# Patient Record
Sex: MALE | Race: BLACK OR AFRICAN AMERICAN | Employment: OTHER | URBAN - METROPOLITAN AREA
[De-identification: names, ages, dates, MRNs, and addresses within clinical notes are randomized per-mention and may not be internally consistent; named-entity substitution may affect disease eponyms.]

---

## 2017-01-09 ENCOUNTER — TELEPHONE (OUTPATIENT)
Dept: INTERNAL MEDICINE | Age: 82
End: 2017-01-09

## 2017-01-09 RX ORDER — BENZONATATE 100 MG/1
100 CAPSULE ORAL 3 TIMES DAILY PRN
Qty: 30 CAPSULE | Refills: 0 | Status: SHIPPED | OUTPATIENT
Start: 2017-01-09 | End: 2017-01-25 | Stop reason: SDUPTHER

## 2017-01-25 RX ORDER — BENZONATATE 100 MG/1
CAPSULE ORAL
Qty: 30 CAPSULE | Refills: 0 | Status: SHIPPED | OUTPATIENT
Start: 2017-01-25 | End: 2017-02-13 | Stop reason: SDUPTHER

## 2017-03-07 ENCOUNTER — OFFICE VISIT (OUTPATIENT)
Dept: INTERNAL MEDICINE | Age: 82
End: 2017-03-07

## 2017-03-07 VITALS
WEIGHT: 186 LBS | DIASTOLIC BLOOD PRESSURE: 92 MMHG | SYSTOLIC BLOOD PRESSURE: 164 MMHG | OXYGEN SATURATION: 96 % | HEART RATE: 80 BPM | BODY MASS INDEX: 23.25 KG/M2

## 2017-03-07 DIAGNOSIS — D64.9 ANEMIA, UNSPECIFIED TYPE: Chronic | ICD-10-CM

## 2017-03-07 DIAGNOSIS — R11.0 NAUSEA: ICD-10-CM

## 2017-03-07 DIAGNOSIS — K22.89 PATULOUS LOWER ESOPHAGEAL SPHINCTER: ICD-10-CM

## 2017-03-07 DIAGNOSIS — E11.9 TYPE 2 DIABETES MELLITUS WITHOUT COMPLICATION, WITHOUT LONG-TERM CURRENT USE OF INSULIN (HCC): Primary | Chronic | ICD-10-CM

## 2017-03-07 DIAGNOSIS — I10 ESSENTIAL HYPERTENSION: Chronic | ICD-10-CM

## 2017-03-07 DIAGNOSIS — R11.2 NON-INTRACTABLE VOMITING WITH NAUSEA, UNSPECIFIED VOMITING TYPE: ICD-10-CM

## 2017-03-07 DIAGNOSIS — N32.81 OVERACTIVE BLADDER: Chronic | ICD-10-CM

## 2017-03-07 DIAGNOSIS — E78.5 HYPERLIPIDEMIA, UNSPECIFIED HYPERLIPIDEMIA TYPE: Chronic | ICD-10-CM

## 2017-03-07 LAB
ALBUMIN SERPL-MCNC: 4.1 G/DL (ref 3.4–5)
ALP BLD-CCNC: 92 U/L (ref 40–129)
ALT SERPL-CCNC: 10 U/L (ref 10–40)
ANION GAP SERPL CALCULATED.3IONS-SCNC: 15 MMOL/L (ref 3–16)
AST SERPL-CCNC: 16 U/L (ref 15–37)
BASOPHILS ABSOLUTE: 0 K/UL (ref 0–0.2)
BASOPHILS RELATIVE PERCENT: 0.7 %
BILIRUB SERPL-MCNC: 0.3 MG/DL (ref 0–1)
BILIRUBIN DIRECT: <0.2 MG/DL (ref 0–0.3)
BILIRUBIN URINE: NEGATIVE
BILIRUBIN, INDIRECT: NORMAL MG/DL (ref 0–1)
BLOOD, URINE: NEGATIVE
BUN BLDV-MCNC: 15 MG/DL (ref 7–20)
CALCIUM SERPL-MCNC: 9.4 MG/DL (ref 8.3–10.6)
CHLORIDE BLD-SCNC: 101 MMOL/L (ref 99–110)
CHOLESTEROL, TOTAL: 136 MG/DL (ref 0–199)
CLARITY: CLEAR
CO2: 25 MMOL/L (ref 21–32)
COLOR: YELLOW
CREAT SERPL-MCNC: 1.2 MG/DL (ref 0.8–1.3)
CREATININE URINE: 93.1 MG/DL (ref 39–259)
EOSINOPHILS ABSOLUTE: 0.2 K/UL (ref 0–0.6)
EOSINOPHILS RELATIVE PERCENT: 3.1 %
EPITHELIAL CELLS, UA: 1 /HPF (ref 0–5)
GFR AFRICAN AMERICAN: >60
GFR NON-AFRICAN AMERICAN: 58
GLUCOSE BLD-MCNC: 123 MG/DL (ref 70–99)
GLUCOSE URINE: NEGATIVE MG/DL
HCT VFR BLD CALC: 35.8 % (ref 40.5–52.5)
HDLC SERPL-MCNC: 55 MG/DL (ref 40–60)
HEMOGLOBIN: 11.7 G/DL (ref 13.5–17.5)
HYALINE CASTS: 2 /HPF (ref 0–8)
IRON SATURATION: 41 % (ref 20–50)
IRON: 99 UG/DL (ref 59–158)
KETONES, URINE: NEGATIVE MG/DL
LDL CHOLESTEROL CALCULATED: 56 MG/DL
LEUKOCYTE ESTERASE, URINE: NEGATIVE
LYMPHOCYTES ABSOLUTE: 1.1 K/UL (ref 1–5.1)
LYMPHOCYTES RELATIVE PERCENT: 16.8 %
MCH RBC QN AUTO: 28 PG (ref 26–34)
MCHC RBC AUTO-ENTMCNC: 32.7 G/DL (ref 31–36)
MCV RBC AUTO: 85.6 FL (ref 80–100)
MICROALBUMIN UR-MCNC: 58.3 MG/DL
MICROALBUMIN/CREAT UR-RTO: 626.2 MG/G (ref 0–30)
MICROSCOPIC EXAMINATION: YES
MONOCYTES ABSOLUTE: 0.6 K/UL (ref 0–1.3)
MONOCYTES RELATIVE PERCENT: 9.2 %
NEUTROPHILS ABSOLUTE: 4.5 K/UL (ref 1.7–7.7)
NEUTROPHILS RELATIVE PERCENT: 70.2 %
NITRITE, URINE: NEGATIVE
PDW BLD-RTO: 12.7 % (ref 12.4–15.4)
PH UA: 6.5
PHOSPHORUS: 3.6 MG/DL (ref 2.5–4.9)
PLATELET # BLD: 200 K/UL (ref 135–450)
PMV BLD AUTO: 9.9 FL (ref 5–10.5)
POTASSIUM SERPL-SCNC: 4.6 MMOL/L (ref 3.5–5.1)
PROTEIN UA: 100 MG/DL
RBC # BLD: 4.19 M/UL (ref 4.2–5.9)
RBC UA: 3 /HPF (ref 0–4)
SODIUM BLD-SCNC: 141 MMOL/L (ref 136–145)
SPECIFIC GRAVITY UA: 1.01
TOTAL IRON BINDING CAPACITY: 244 UG/DL (ref 260–445)
TOTAL PROTEIN: 7.1 G/DL (ref 6.4–8.2)
TRIGL SERPL-MCNC: 125 MG/DL (ref 0–150)
URINE TYPE: ABNORMAL
UROBILINOGEN, URINE: 0.2 E.U./DL
VLDLC SERPL CALC-MCNC: 25 MG/DL
WBC # BLD: 6.4 K/UL (ref 4–11)
WBC UA: 1 /HPF (ref 0–5)

## 2017-03-07 PROCEDURE — 99214 OFFICE O/P EST MOD 30 MIN: CPT | Performed by: INTERNAL MEDICINE

## 2017-03-07 PROCEDURE — G8420 CALC BMI NORM PARAMETERS: HCPCS | Performed by: INTERNAL MEDICINE

## 2017-03-07 PROCEDURE — 4040F PNEUMOC VAC/ADMIN/RCVD: CPT | Performed by: INTERNAL MEDICINE

## 2017-03-07 PROCEDURE — G8598 ASA/ANTIPLAT THER USED: HCPCS | Performed by: INTERNAL MEDICINE

## 2017-03-07 PROCEDURE — G8427 DOCREV CUR MEDS BY ELIG CLIN: HCPCS | Performed by: INTERNAL MEDICINE

## 2017-03-07 PROCEDURE — 1123F ACP DISCUSS/DSCN MKR DOCD: CPT | Performed by: INTERNAL MEDICINE

## 2017-03-07 PROCEDURE — 1036F TOBACCO NON-USER: CPT | Performed by: INTERNAL MEDICINE

## 2017-03-07 PROCEDURE — G8484 FLU IMMUNIZE NO ADMIN: HCPCS | Performed by: INTERNAL MEDICINE

## 2017-03-07 RX ORDER — DESMOPRESSIN ACETATE 0.2 MG/1
0.2 TABLET ORAL NIGHTLY
COMMUNITY
Start: 2017-03-07 | End: 2018-09-07 | Stop reason: SDUPTHER

## 2017-03-07 RX ORDER — ASPIRIN 325 MG
325 TABLET, DELAYED RELEASE (ENTERIC COATED) ORAL DAILY
COMMUNITY
Start: 2017-03-07 | End: 2018-06-01 | Stop reason: SDUPTHER

## 2017-03-07 RX ORDER — OXYBUTYNIN CHLORIDE 15 MG/1
15 TABLET, EXTENDED RELEASE ORAL 2 TIMES DAILY
COMMUNITY
Start: 2017-03-07 | End: 2019-04-02 | Stop reason: SDUPTHER

## 2017-03-07 ASSESSMENT — ENCOUNTER SYMPTOMS
ABDOMINAL PAIN: 0
ABDOMINAL DISTENTION: 0
RECTAL PAIN: 0
APNEA: 0
NAUSEA: 1
SHORTNESS OF BREATH: 0
EYES NEGATIVE: 1
VOMITING: 1
BLOOD IN STOOL: 0
CHOKING: 0
BLURRED VISION: 0
CHEST TIGHTNESS: 0
WHEEZING: 0
BACK PAIN: 1
STRIDOR: 0
CONSTIPATION: 0
COUGH: 1
DIARRHEA: 0
ANAL BLEEDING: 0

## 2017-03-08 LAB
ESTIMATED AVERAGE GLUCOSE: 148.5 MG/DL
HBA1C MFR BLD: 6.8 %

## 2017-03-10 ENCOUNTER — HOSPITAL ENCOUNTER (OUTPATIENT)
Dept: GENERAL RADIOLOGY | Age: 82
Discharge: HOME | End: 2017-03-10
Attending: INTERNAL MEDICINE | Admitting: INTERNAL MEDICINE

## 2017-03-10 DIAGNOSIS — K22.89 PATULOUS LOWER ESOPHAGEAL SPHINCTER: ICD-10-CM

## 2017-03-10 DIAGNOSIS — R11.0 NAUSEA: ICD-10-CM

## 2017-03-10 DIAGNOSIS — R11.2 NON-INTRACTABLE VOMITING WITH NAUSEA, UNSPECIFIED VOMITING TYPE: ICD-10-CM

## 2017-03-21 DIAGNOSIS — F20.0 PARANOID SCHIZOPHRENIA (HCC): Chronic | ICD-10-CM

## 2017-03-21 RX ORDER — RISPERIDONE 0.5 MG/1
TABLET, FILM COATED ORAL
Qty: 90 TABLET | Refills: 1 | Status: SHIPPED | OUTPATIENT
Start: 2017-03-21 | End: 2017-09-19 | Stop reason: SDUPTHER

## 2017-04-11 ENCOUNTER — TELEPHONE (OUTPATIENT)
Dept: INTERNAL MEDICINE | Age: 82
End: 2017-04-11

## 2017-04-25 ENCOUNTER — HOSPITAL ENCOUNTER (OUTPATIENT)
Dept: ENDOSCOPY | Age: 82
Discharge: HOME | End: 2017-04-25
Attending: INTERNAL MEDICINE | Admitting: INTERNAL MEDICINE

## 2017-04-25 VITALS
BODY MASS INDEX: 23 KG/M2 | TEMPERATURE: 97.9 F | SYSTOLIC BLOOD PRESSURE: 162 MMHG | DIASTOLIC BLOOD PRESSURE: 74 MMHG | OXYGEN SATURATION: 96 % | HEIGHT: 75 IN | RESPIRATION RATE: 16 BRPM | WEIGHT: 185 LBS | HEART RATE: 55 BPM

## 2017-04-25 LAB
GLUCOSE BLD-MCNC: 112 MG/DL (ref 70–99)
GLUCOSE BLD-MCNC: 126 MG/DL (ref 70–99)
PERFORMED ON: ABNORMAL
PERFORMED ON: ABNORMAL

## 2017-04-25 RX ORDER — LIDOCAINE HYDROCHLORIDE 10 MG/ML
1 INJECTION, SOLUTION EPIDURAL; INFILTRATION; INTRACAUDAL; PERINEURAL
Status: ACTIVE | OUTPATIENT
Start: 2017-04-25 | End: 2017-04-25

## 2017-04-25 RX ORDER — SODIUM CHLORIDE 9 MG/ML
INJECTION, SOLUTION INTRAVENOUS CONTINUOUS
Status: DISCONTINUED | OUTPATIENT
Start: 2017-04-25 | End: 2017-04-26 | Stop reason: HOSPADM

## 2017-04-25 RX ORDER — SODIUM CHLORIDE 0.9 % (FLUSH) 0.9 %
10 SYRINGE (ML) INJECTION PRN
Status: DISCONTINUED | OUTPATIENT
Start: 2017-04-25 | End: 2017-04-26 | Stop reason: HOSPADM

## 2017-04-25 RX ORDER — SODIUM CHLORIDE 0.9 % (FLUSH) 0.9 %
10 SYRINGE (ML) INJECTION EVERY 12 HOURS SCHEDULED
Status: DISCONTINUED | OUTPATIENT
Start: 2017-04-25 | End: 2017-04-26 | Stop reason: HOSPADM

## 2017-04-25 RX ADMIN — SODIUM CHLORIDE: 9 INJECTION, SOLUTION INTRAVENOUS at 09:57

## 2017-04-25 ASSESSMENT — PAIN - FUNCTIONAL ASSESSMENT: PAIN_FUNCTIONAL_ASSESSMENT: 0-10

## 2017-04-25 ASSESSMENT — ENCOUNTER SYMPTOMS: SHORTNESS OF BREATH: 1

## 2017-04-25 ASSESSMENT — PAIN SCALES - GENERAL: PAINLEVEL_OUTOF10: 0

## 2017-06-15 ENCOUNTER — OFFICE VISIT (OUTPATIENT)
Dept: INFECTIOUS DISEASES | Age: 82
End: 2017-06-15

## 2017-06-15 VITALS
SYSTOLIC BLOOD PRESSURE: 140 MMHG | BODY MASS INDEX: 22.25 KG/M2 | TEMPERATURE: 98.2 F | WEIGHT: 178 LBS | DIASTOLIC BLOOD PRESSURE: 66 MMHG

## 2017-06-15 DIAGNOSIS — T84.50XA INFECTION AND INFLAMMATORY REACTION DUE TO INTERNAL JOINT PROSTHESIS, INITIAL ENCOUNTER (HCC): Primary | ICD-10-CM

## 2017-06-15 DIAGNOSIS — A49.8 KLEBSIELLA INFECTION: ICD-10-CM

## 2017-06-15 PROCEDURE — 99214 OFFICE O/P EST MOD 30 MIN: CPT | Performed by: INTERNAL MEDICINE

## 2017-06-15 PROCEDURE — G8598 ASA/ANTIPLAT THER USED: HCPCS | Performed by: INTERNAL MEDICINE

## 2017-06-15 PROCEDURE — 1123F ACP DISCUSS/DSCN MKR DOCD: CPT | Performed by: INTERNAL MEDICINE

## 2017-06-15 PROCEDURE — G8427 DOCREV CUR MEDS BY ELIG CLIN: HCPCS | Performed by: INTERNAL MEDICINE

## 2017-06-15 PROCEDURE — 4040F PNEUMOC VAC/ADMIN/RCVD: CPT | Performed by: INTERNAL MEDICINE

## 2017-06-15 PROCEDURE — 1036F TOBACCO NON-USER: CPT | Performed by: INTERNAL MEDICINE

## 2017-06-15 PROCEDURE — G8420 CALC BMI NORM PARAMETERS: HCPCS | Performed by: INTERNAL MEDICINE

## 2017-06-16 ENCOUNTER — OFFICE VISIT (OUTPATIENT)
Dept: INTERNAL MEDICINE | Age: 82
End: 2017-06-16

## 2017-06-16 VITALS
SYSTOLIC BLOOD PRESSURE: 172 MMHG | WEIGHT: 177 LBS | DIASTOLIC BLOOD PRESSURE: 82 MMHG | BODY MASS INDEX: 22.01 KG/M2 | HEIGHT: 75 IN | HEART RATE: 67 BPM | OXYGEN SATURATION: 98 %

## 2017-06-16 DIAGNOSIS — I10 ESSENTIAL HYPERTENSION: Chronic | ICD-10-CM

## 2017-06-16 DIAGNOSIS — R11.0 NAUSEA: Chronic | ICD-10-CM

## 2017-06-16 DIAGNOSIS — E11.9 TYPE 2 DIABETES MELLITUS WITHOUT COMPLICATION, WITHOUT LONG-TERM CURRENT USE OF INSULIN (HCC): Primary | Chronic | ICD-10-CM

## 2017-06-16 DIAGNOSIS — E78.5 HYPERLIPIDEMIA, UNSPECIFIED HYPERLIPIDEMIA TYPE: Chronic | ICD-10-CM

## 2017-06-16 DIAGNOSIS — F20.0 PARANOID SCHIZOPHRENIA (HCC): ICD-10-CM

## 2017-06-16 DIAGNOSIS — G40.209 PARTIAL SYMPTOMATIC EPILEPSY WITH COMPLEX PARTIAL SEIZURES, NOT INTRACTABLE, WITHOUT STATUS EPILEPTICUS (HCC): ICD-10-CM

## 2017-06-16 DIAGNOSIS — N18.30 TYPE 2 DIABETES MELLITUS WITH STAGE 3 CHRONIC KIDNEY DISEASE, WITHOUT LONG-TERM CURRENT USE OF INSULIN (HCC): ICD-10-CM

## 2017-06-16 DIAGNOSIS — M25.552 LEFT HIP PAIN: ICD-10-CM

## 2017-06-16 DIAGNOSIS — I73.9 PERIPHERAL VASCULAR DISEASE, UNSPECIFIED (HCC): ICD-10-CM

## 2017-06-16 DIAGNOSIS — E11.22 TYPE 2 DIABETES MELLITUS WITH STAGE 3 CHRONIC KIDNEY DISEASE, WITHOUT LONG-TERM CURRENT USE OF INSULIN (HCC): ICD-10-CM

## 2017-06-16 DIAGNOSIS — D64.9 ANEMIA, UNSPECIFIED TYPE: Chronic | ICD-10-CM

## 2017-06-16 DIAGNOSIS — N18.30 CHRONIC KIDNEY DISEASE, STAGE 3 (HCC): ICD-10-CM

## 2017-06-16 LAB
ALBUMIN SERPL-MCNC: 4.1 G/DL (ref 3.4–5)
ANION GAP SERPL CALCULATED.3IONS-SCNC: 14 MMOL/L (ref 3–16)
BASOPHILS ABSOLUTE: 0 K/UL (ref 0–0.2)
BASOPHILS RELATIVE PERCENT: 0.6 %
BUN BLDV-MCNC: 19 MG/DL (ref 7–20)
CALCIUM SERPL-MCNC: 9.4 MG/DL (ref 8.3–10.6)
CHLORIDE BLD-SCNC: 99 MMOL/L (ref 99–110)
CHOLESTEROL, TOTAL: 135 MG/DL (ref 0–199)
CO2: 27 MMOL/L (ref 21–32)
CREAT SERPL-MCNC: 1.3 MG/DL (ref 0.8–1.3)
EOSINOPHILS ABSOLUTE: 0.2 K/UL (ref 0–0.6)
EOSINOPHILS RELATIVE PERCENT: 3.3 %
FOLATE: >20 NG/ML (ref 4.78–24.2)
GFR AFRICAN AMERICAN: >60
GFR NON-AFRICAN AMERICAN: 53
GLUCOSE BLD-MCNC: 105 MG/DL (ref 70–99)
HCT VFR BLD CALC: 35.5 % (ref 40.5–52.5)
HDLC SERPL-MCNC: 52 MG/DL (ref 40–60)
HEMOGLOBIN: 11.4 G/DL (ref 13.5–17.5)
LDL CHOLESTEROL CALCULATED: 63 MG/DL
LYMPHOCYTES ABSOLUTE: 1 K/UL (ref 1–5.1)
LYMPHOCYTES RELATIVE PERCENT: 17.6 %
MCH RBC QN AUTO: 27.9 PG (ref 26–34)
MCHC RBC AUTO-ENTMCNC: 32 G/DL (ref 31–36)
MCV RBC AUTO: 87 FL (ref 80–100)
MONOCYTES ABSOLUTE: 0.6 K/UL (ref 0–1.3)
MONOCYTES RELATIVE PERCENT: 10 %
NEUTROPHILS ABSOLUTE: 4 K/UL (ref 1.7–7.7)
NEUTROPHILS RELATIVE PERCENT: 68.5 %
PDW BLD-RTO: 13.1 % (ref 12.4–15.4)
PHOSPHORUS: 3.7 MG/DL (ref 2.5–4.9)
PLATELET # BLD: 214 K/UL (ref 135–450)
PMV BLD AUTO: 9.4 FL (ref 5–10.5)
POTASSIUM SERPL-SCNC: 4.3 MMOL/L (ref 3.5–5.1)
RBC # BLD: 4.07 M/UL (ref 4.2–5.9)
SODIUM BLD-SCNC: 140 MMOL/L (ref 136–145)
TRIGL SERPL-MCNC: 99 MG/DL (ref 0–150)
VITAMIN B-12: 1081 PG/ML (ref 211–911)
VLDLC SERPL CALC-MCNC: 20 MG/DL
WBC # BLD: 5.8 K/UL (ref 4–11)

## 2017-06-16 PROCEDURE — G8420 CALC BMI NORM PARAMETERS: HCPCS | Performed by: INTERNAL MEDICINE

## 2017-06-16 PROCEDURE — 1036F TOBACCO NON-USER: CPT | Performed by: INTERNAL MEDICINE

## 2017-06-16 PROCEDURE — 1123F ACP DISCUSS/DSCN MKR DOCD: CPT | Performed by: INTERNAL MEDICINE

## 2017-06-16 PROCEDURE — 4040F PNEUMOC VAC/ADMIN/RCVD: CPT | Performed by: INTERNAL MEDICINE

## 2017-06-16 PROCEDURE — G8598 ASA/ANTIPLAT THER USED: HCPCS | Performed by: INTERNAL MEDICINE

## 2017-06-16 PROCEDURE — 99214 OFFICE O/P EST MOD 30 MIN: CPT | Performed by: INTERNAL MEDICINE

## 2017-06-16 PROCEDURE — G8427 DOCREV CUR MEDS BY ELIG CLIN: HCPCS | Performed by: INTERNAL MEDICINE

## 2017-06-16 RX ORDER — HYDROCODONE BITARTRATE AND ACETAMINOPHEN 5; 325 MG/1; MG/1
1 TABLET ORAL EVERY 6 HOURS PRN
Qty: 60 TABLET | Refills: 0 | Status: SHIPPED | OUTPATIENT
Start: 2017-06-16 | End: 2018-08-14 | Stop reason: SDUPTHER

## 2017-06-16 RX ORDER — ONDANSETRON 4 MG/1
4 TABLET, FILM COATED ORAL DAILY PRN
Qty: 30 TABLET | Refills: 12 | Status: SHIPPED | OUTPATIENT
Start: 2017-06-16 | End: 2020-08-03 | Stop reason: SDUPTHER

## 2017-06-16 ASSESSMENT — PATIENT HEALTH QUESTIONNAIRE - PHQ9
2. FEELING DOWN, DEPRESSED OR HOPELESS: 0
SUM OF ALL RESPONSES TO PHQ9 QUESTIONS 1 & 2: 0
SUM OF ALL RESPONSES TO PHQ QUESTIONS 1-9: 0
1. LITTLE INTEREST OR PLEASURE IN DOING THINGS: 0

## 2017-06-16 ASSESSMENT — ENCOUNTER SYMPTOMS
NAUSEA: 1
RESPIRATORY NEGATIVE: 1
EYES NEGATIVE: 1
SHORTNESS OF BREATH: 0
BLURRED VISION: 0

## 2017-06-17 LAB
ESTIMATED AVERAGE GLUCOSE: 139.9 MG/DL
HBA1C MFR BLD: 6.5 %

## 2017-06-19 LAB
ALBUMIN SERPL-MCNC: 3.5 G/DL (ref 3.1–4.9)
ALPHA-1-GLOBULIN: 0.3 G/DL (ref 0.2–0.4)
ALPHA-2-GLOBULIN: 0.8 G/DL (ref 0.4–1.1)
BETA GLOBULIN: 1.1 G/DL (ref 0.9–1.6)
GAMMA GLOBULIN: 1.8 G/DL (ref 0.6–1.8)
SPE/IFE INTERPRETATION: NORMAL
TOTAL PROTEIN: 7.5 G/DL (ref 6.4–8.2)

## 2017-07-26 RX ORDER — LOSARTAN POTASSIUM 100 MG/1
100 TABLET ORAL DAILY
Qty: 90 TABLET | Refills: 3 | Status: SHIPPED | OUTPATIENT
Start: 2017-07-26 | End: 2017-07-26 | Stop reason: SDUPTHER

## 2017-07-26 RX ORDER — LOSARTAN POTASSIUM 100 MG/1
100 TABLET ORAL DAILY
Qty: 90 TABLET | Refills: 3 | Status: SHIPPED | OUTPATIENT
Start: 2017-07-26 | End: 2018-03-12 | Stop reason: SINTOL

## 2017-09-02 DIAGNOSIS — R41.3 MEMORY LOSS: Chronic | ICD-10-CM

## 2017-09-05 RX ORDER — GALANTAMINE HYDROBROMIDE 24 MG/1
CAPSULE, EXTENDED RELEASE ORAL
Qty: 30 CAPSULE | Refills: 12 | Status: SHIPPED | OUTPATIENT
Start: 2017-09-05 | End: 2017-09-19 | Stop reason: SDUPTHER

## 2017-09-19 ENCOUNTER — OFFICE VISIT (OUTPATIENT)
Dept: INTERNAL MEDICINE | Age: 82
End: 2017-09-19

## 2017-09-19 ENCOUNTER — HOSPITAL ENCOUNTER (OUTPATIENT)
Dept: OTHER | Age: 82
Discharge: HOME | End: 2017-09-19
Attending: INTERNAL MEDICINE | Admitting: INTERNAL MEDICINE

## 2017-09-19 VITALS
BODY MASS INDEX: 22.13 KG/M2 | DIASTOLIC BLOOD PRESSURE: 70 MMHG | HEIGHT: 75 IN | OXYGEN SATURATION: 97 % | WEIGHT: 178 LBS | HEART RATE: 63 BPM | SYSTOLIC BLOOD PRESSURE: 120 MMHG

## 2017-09-19 DIAGNOSIS — Z23 NEED FOR INFLUENZA VACCINATION: ICD-10-CM

## 2017-09-19 DIAGNOSIS — R05.9 COUGH: ICD-10-CM

## 2017-09-19 DIAGNOSIS — R09.89 CHEST CONGESTION: ICD-10-CM

## 2017-09-19 DIAGNOSIS — I10 ESSENTIAL HYPERTENSION: Chronic | ICD-10-CM

## 2017-09-19 DIAGNOSIS — K59.00 CONSTIPATION, UNSPECIFIED CONSTIPATION TYPE: Chronic | ICD-10-CM

## 2017-09-19 DIAGNOSIS — K21.9 GASTROESOPHAGEAL REFLUX DISEASE WITHOUT ESOPHAGITIS: Chronic | ICD-10-CM

## 2017-09-19 DIAGNOSIS — E11.9 TYPE 2 DIABETES MELLITUS WITHOUT COMPLICATION, WITHOUT LONG-TERM CURRENT USE OF INSULIN (HCC): Primary | Chronic | ICD-10-CM

## 2017-09-19 DIAGNOSIS — D64.9 ANEMIA, UNSPECIFIED TYPE: Chronic | ICD-10-CM

## 2017-09-19 DIAGNOSIS — R93.89 ABNORMAL CHEST CT: ICD-10-CM

## 2017-09-19 DIAGNOSIS — F20.0 PARANOID SCHIZOPHRENIA (HCC): Chronic | ICD-10-CM

## 2017-09-19 DIAGNOSIS — F34.1 DYSTHYMIA: ICD-10-CM

## 2017-09-19 DIAGNOSIS — F41.9 ANXIETY: Chronic | ICD-10-CM

## 2017-09-19 DIAGNOSIS — R41.3 MEMORY LOSS: Chronic | ICD-10-CM

## 2017-09-19 DIAGNOSIS — E78.5 HYPERLIPIDEMIA, UNSPECIFIED HYPERLIPIDEMIA TYPE: Chronic | ICD-10-CM

## 2017-09-19 LAB
ALBUMIN SERPL-MCNC: 4.1 G/DL (ref 3.4–5)
ANION GAP SERPL CALCULATED.3IONS-SCNC: 12 MMOL/L (ref 3–16)
BASOPHILS ABSOLUTE: 0 K/UL (ref 0–0.2)
BASOPHILS RELATIVE PERCENT: 0.7 %
BILIRUBIN URINE: NEGATIVE
BLOOD, URINE: NEGATIVE
BUN BLDV-MCNC: 18 MG/DL (ref 7–20)
CALCIUM SERPL-MCNC: 9.3 MG/DL (ref 8.3–10.6)
CHLORIDE BLD-SCNC: 100 MMOL/L (ref 99–110)
CLARITY: ABNORMAL
CO2: 27 MMOL/L (ref 21–32)
COLOR: YELLOW
CREAT SERPL-MCNC: 1.4 MG/DL (ref 0.8–1.3)
CREATININE URINE: 91.9 MG/DL (ref 39–259)
EOSINOPHILS ABSOLUTE: 0.2 K/UL (ref 0–0.6)
EOSINOPHILS RELATIVE PERCENT: 2.8 %
EPITHELIAL CELLS, UA: 2 /HPF (ref 0–5)
FERRITIN: 173.2 NG/ML (ref 30–400)
GFR AFRICAN AMERICAN: 59
GFR NON-AFRICAN AMERICAN: 49
GLUCOSE BLD-MCNC: 106 MG/DL (ref 70–99)
GLUCOSE URINE: NEGATIVE MG/DL
HCT VFR BLD CALC: 34.4 % (ref 40.5–52.5)
HEMOGLOBIN: 11.4 G/DL (ref 13.5–17.5)
HYALINE CASTS: 1 /LPF (ref 0–8)
IRON SATURATION: 31 % (ref 20–50)
IRON: 72 UG/DL (ref 59–158)
KETONES, URINE: NEGATIVE MG/DL
LEUKOCYTE ESTERASE, URINE: NEGATIVE
LYMPHOCYTES ABSOLUTE: 1 K/UL (ref 1–5.1)
LYMPHOCYTES RELATIVE PERCENT: 15.6 %
MCH RBC QN AUTO: 28.5 PG (ref 26–34)
MCHC RBC AUTO-ENTMCNC: 33.2 G/DL (ref 31–36)
MCV RBC AUTO: 85.8 FL (ref 80–100)
MICROALBUMIN UR-MCNC: 15.1 MG/DL
MICROALBUMIN/CREAT UR-RTO: 164.3 MG/G (ref 0–30)
MICROSCOPIC EXAMINATION: YES
MONOCYTES ABSOLUTE: 0.6 K/UL (ref 0–1.3)
MONOCYTES RELATIVE PERCENT: 9.9 %
NEUTROPHILS ABSOLUTE: 4.5 K/UL (ref 1.7–7.7)
NEUTROPHILS RELATIVE PERCENT: 71 %
NITRITE, URINE: NEGATIVE
PDW BLD-RTO: 12.6 % (ref 12.4–15.4)
PH UA: 7
PHOSPHORUS: 3.7 MG/DL (ref 2.5–4.9)
PLATELET # BLD: 197 K/UL (ref 135–450)
PMV BLD AUTO: 9.7 FL (ref 5–10.5)
POTASSIUM SERPL-SCNC: 4.6 MMOL/L (ref 3.5–5.1)
PROTEIN UA: ABNORMAL MG/DL
RBC # BLD: 4 M/UL (ref 4.2–5.9)
RBC UA: 2 /HPF (ref 0–4)
SODIUM BLD-SCNC: 139 MMOL/L (ref 136–145)
SPECIFIC GRAVITY UA: 1.01
TOTAL IRON BINDING CAPACITY: 230 UG/DL (ref 260–445)
TSH SERPL DL<=0.05 MIU/L-ACNC: 1.86 UIU/ML (ref 0.27–4.2)
URINE TYPE: ABNORMAL
UROBILINOGEN, URINE: 0.2 E.U./DL
WBC # BLD: 6.4 K/UL (ref 4–11)
WBC UA: 1 /HPF (ref 0–5)

## 2017-09-19 PROCEDURE — G8427 DOCREV CUR MEDS BY ELIG CLIN: HCPCS | Performed by: INTERNAL MEDICINE

## 2017-09-19 PROCEDURE — G8598 ASA/ANTIPLAT THER USED: HCPCS | Performed by: INTERNAL MEDICINE

## 2017-09-19 PROCEDURE — 90662 IIV NO PRSV INCREASED AG IM: CPT | Performed by: INTERNAL MEDICINE

## 2017-09-19 PROCEDURE — G0008 ADMIN INFLUENZA VIRUS VAC: HCPCS | Performed by: INTERNAL MEDICINE

## 2017-09-19 PROCEDURE — 4040F PNEUMOC VAC/ADMIN/RCVD: CPT | Performed by: INTERNAL MEDICINE

## 2017-09-19 PROCEDURE — 1036F TOBACCO NON-USER: CPT | Performed by: INTERNAL MEDICINE

## 2017-09-19 PROCEDURE — 1123F ACP DISCUSS/DSCN MKR DOCD: CPT | Performed by: INTERNAL MEDICINE

## 2017-09-19 PROCEDURE — 99214 OFFICE O/P EST MOD 30 MIN: CPT | Performed by: INTERNAL MEDICINE

## 2017-09-19 PROCEDURE — G8420 CALC BMI NORM PARAMETERS: HCPCS | Performed by: INTERNAL MEDICINE

## 2017-09-19 RX ORDER — TERAZOSIN 10 MG/1
10 CAPSULE ORAL NIGHTLY
Qty: 90 CAPSULE | Refills: 3 | Status: SHIPPED | OUTPATIENT
Start: 2017-09-19 | End: 2018-10-03 | Stop reason: SDUPTHER

## 2017-09-19 RX ORDER — POTASSIUM CHLORIDE 750 MG/1
10 TABLET, EXTENDED RELEASE ORAL DAILY
Qty: 30 TABLET | Refills: 12 | Status: SHIPPED | OUTPATIENT
Start: 2017-09-19 | End: 2018-09-26 | Stop reason: SDUPTHER

## 2017-09-19 RX ORDER — RISPERIDONE 0.5 MG/1
0.5 TABLET, FILM COATED ORAL NIGHTLY
Qty: 90 TABLET | Refills: 3 | Status: SHIPPED | OUTPATIENT
Start: 2017-09-19 | End: 2018-07-09

## 2017-09-19 RX ORDER — GALANTAMINE HYDROBROMIDE 24 MG/1
24 CAPSULE, EXTENDED RELEASE ORAL DAILY
COMMUNITY
Start: 2017-09-19 | End: 2017-11-01 | Stop reason: ALTCHOICE

## 2017-09-19 RX ORDER — MONTELUKAST SODIUM 10 MG/1
10 TABLET ORAL NIGHTLY
Qty: 90 TABLET | Refills: 3 | Status: SHIPPED | OUTPATIENT
Start: 2017-09-19 | End: 2018-10-18 | Stop reason: SDUPTHER

## 2017-09-19 RX ORDER — POLYETHYLENE GLYCOL 3350 17 G/17G
17 POWDER, FOR SOLUTION ORAL DAILY PRN
Qty: 1 EACH | Refills: 12 | Status: SHIPPED | OUTPATIENT
Start: 2017-09-19 | End: 2019-06-12 | Stop reason: SDUPTHER

## 2017-09-19 RX ORDER — PANTOPRAZOLE SODIUM 40 MG/1
40 TABLET, DELAYED RELEASE ORAL DAILY
Qty: 90 TABLET | Refills: 3 | Status: SHIPPED | OUTPATIENT
Start: 2017-09-19 | End: 2018-11-08 | Stop reason: SDUPTHER

## 2017-09-19 RX ORDER — SIMVASTATIN 10 MG
10 TABLET ORAL NIGHTLY
Qty: 90 TABLET | Refills: 3 | Status: SHIPPED | OUTPATIENT
Start: 2017-09-19 | End: 2017-10-04 | Stop reason: SDUPTHER

## 2017-09-19 RX ORDER — ATENOLOL 25 MG/1
12.5 TABLET ORAL DAILY
Qty: 45 TABLET | Refills: 3 | Status: SHIPPED | OUTPATIENT
Start: 2017-09-19 | End: 2018-10-08 | Stop reason: SDUPTHER

## 2017-09-19 ASSESSMENT — ENCOUNTER SYMPTOMS
BLOOD IN STOOL: 0
SHORTNESS OF BREATH: 0
BLURRED VISION: 0
RECTAL PAIN: 0
APNEA: 0
WHEEZING: 0
DIARRHEA: 0
BACK PAIN: 0
STRIDOR: 0
EYES NEGATIVE: 1
COUGH: 1
ABDOMINAL DISTENTION: 0
CHOKING: 0
VOMITING: 0
NAUSEA: 0
CONSTIPATION: 1
ABDOMINAL PAIN: 0
ANAL BLEEDING: 0

## 2017-09-20 LAB
ESTIMATED AVERAGE GLUCOSE: 137 MG/DL
HBA1C MFR BLD: 6.4 %

## 2017-09-22 ENCOUNTER — HOSPITAL ENCOUNTER (OUTPATIENT)
Dept: CT IMAGING | Age: 82
Discharge: HOME | End: 2017-09-22
Attending: INTERNAL MEDICINE | Admitting: INTERNAL MEDICINE

## 2017-09-22 DIAGNOSIS — R09.89 CHEST CONGESTION: ICD-10-CM

## 2017-09-22 DIAGNOSIS — R93.89 ABNORMAL CHEST CT: ICD-10-CM

## 2017-09-22 DIAGNOSIS — R05.9 COUGH: ICD-10-CM

## 2017-10-04 ENCOUNTER — OFFICE VISIT (OUTPATIENT)
Dept: CARDIOTHORACIC SURGERY | Age: 82
End: 2017-10-04

## 2017-10-04 VITALS
HEART RATE: 68 BPM | SYSTOLIC BLOOD PRESSURE: 158 MMHG | DIASTOLIC BLOOD PRESSURE: 80 MMHG | BODY MASS INDEX: 22.38 KG/M2 | WEIGHT: 180 LBS | HEIGHT: 75 IN | OXYGEN SATURATION: 97 % | TEMPERATURE: 98.1 F

## 2017-10-04 DIAGNOSIS — I10 ESSENTIAL HYPERTENSION: ICD-10-CM

## 2017-10-04 DIAGNOSIS — K22.0 ACHALASIA: ICD-10-CM

## 2017-10-04 DIAGNOSIS — I25.9 ISCHEMIC HEART DISEASE: ICD-10-CM

## 2017-10-04 DIAGNOSIS — E11.9 TYPE 2 DIABETES MELLITUS WITHOUT COMPLICATION, WITHOUT LONG-TERM CURRENT USE OF INSULIN (HCC): Primary | ICD-10-CM

## 2017-10-04 PROCEDURE — 99202 OFFICE O/P NEW SF 15 MIN: CPT | Performed by: THORACIC SURGERY (CARDIOTHORACIC VASCULAR SURGERY)

## 2017-10-04 NOTE — MR AVS SNAPSHOT
by mouth daily    fluticasone (FLONASE) 50 MCG/ACT nasal spray 2 sprays by Nasal route daily    nitroGLYCERIN (NITROSTAT) 0.4 MG SL tablet Place 1 tablet under the tongue every 5 minutes as needed.       Allergies              Lorazepam          Additional Information        Basic Information     Date Of Birth Sex Race Ethnicity Preferred Language    1935 Male Black Non-/Non  English      Problem List as of 10/4/2017  Date Reviewed: 5/31/2016                Type 2 diabetes mellitus without complication, without long-term current use of insulin (HCC) (Chronic)    Essential hypertension (Chronic)    Anemia (Chronic)    Hyperlipidemia (Chronic)    Ischemic heart disease (Chronic)    Elevated PSA (Chronic)    Anxiety (Chronic)    Paranoid schizophrenia (HCC) (Chronic)    Erectile dysfunction (Chronic)    Back pain (Chronic)    BPH (benign prostatic hypertrophy) (Chronic)    Dyspnea on exertion (Chronic)    Peripheral vascular disease (HCC) (Chronic)    Gastroesophageal reflux disease without esophagitis (Chronic)    Depression (Chronic)    Constipation (Chronic)    Insomnia (Chronic)    Left hip pain (Chronic)    Nausea (Chronic)    Memory loss (Chronic)    Prostate cancer (HCC) (Chronic)    Acute cholecystitis (Chronic)    Overactive bladder (Chronic)    Eczema (Chronic)    Allergic rhinitis (Chronic)    Complex partial seizure disorder (HCC) (Chronic)    Diverticulosis (Chronic)    Hemorrhoid    Chest congestion    Non-intractable vomiting with nausea    Patulous lower esophageal sphincter    Perennial allergic rhinitis (Chronic)    Chronic kidney disease, stage 3    Type 2 diabetes mellitus with stage 3 chronic kidney disease (HCC)    Abnormal chest CT    Abnormal weight loss    Former smoker    Need for pneumococcal vaccination    S/P hip replacement    Closed left hip fracture (HCC)    Bradycardia    Need for influenza vaccination    Need for shingles vaccine    Need for Tdap vaccination

## 2017-10-05 NOTE — PROGRESS NOTES
Consultation H&P        Date of Admission:  No admission date for patient encounter. Date of Consultation:  10/5/2017    PCP:  No primary care provider on file. Chief Complaint: Dysphagia with recurrent paroxysmal cough    History of Present Illness:    We are asked to see this patient in consultation by Dr. nusrat Cox Bertrand is a 80 y.o. male who lung history of gastroesophageal reflux subsequently was diagnosed with achalasia underwent Botox injection 3 times his last barium swallow showed dilated esophagus as he described his cough especially when he tried to lie flat fifth of severe cough no street vein foamy secretions no blood     Past Medical History:  Past Medical History:   Diagnosis Date    Acute cholecystitis 9/13/2012    Anemia 1/9/2012    Anxiety 7/2/2010    Back pain 5/16/2010    BPH (benign prostatic hypertrophy) 5/16/2010    Constipation 6/16/2015    Depression 6/16/2015    Diverticulosis 5/16/2010    Dyspnea on exertion 5/16/2010    Eczema 4/12/2011    Elevated PSA 10/30/2012    Erectile dysfunction 5/16/2010    Essential hypertension 8/26/2015    GERD (gastroesophageal reflux disease) 5/16/2010    Hemorrhoid 5/16/2010    Hyperlipidemia 1/10/2011    Hyperphosphatemia 7/3/2010    Hypertension 5/16/2010    Insomnia 6/16/2015    Left hip pain 6/16/2015    Memory loss 1/14/2015    Nausea 6/16/2015    Overactive bladder 7/12/2011    Paranoid schizophrenia ([address] 5/16/2010    Perennial allergic rhinitis 11/30/2016    Prostate cancer ([address] 1/8/2013    Type 2 diabetes mellitus without complication, without long-term current use of insulin ([address] 8/31/2016       Past Surgical History:  Past Surgical History:   Procedure Laterality Date    CHOLECYSTECTOMY, LAPAROSCOPIC  September 13, 2012    Dr. Garcia Press  December 1, 2011    Dr. Yessi Paredes COLONOSCOPY  June 16, 2011   Sherley Reddish HIP SURGERY Left June 23, 2015     mouth daily with food 9/6/17  Yes Ingrid Linn MD   OXcarbazepine (TRILEPTAL) 300 MG tablet Take 1 tablet by mouth 2 times daily 8/23/17  Yes Ingrid Linn MD   losartan (COZAAR) 100 MG tablet Take 1 tablet by mouth daily 7/26/17  Yes Ingrid Linn MD   HYDROcodone-acetaminophen (NORCO) 5-325 MG per tablet Take 1 tablet by mouth every 6 hours as needed for Pain . 6/16/17  Yes Ingrid Linn MD   [facility] 4 MG tablet Take 1 tablet by mouth daily as needed for Nausea 6/16/17  Yes Ingrid Linn MD   aspirin 325 MG EC tablet Take 1 tablet by mouth daily with food 3/7/17  Yes Ingrid Linn MD   oxybutynin (DITROPAN XL) 15 MG extended release tablet Take 1 tablet by mouth 2 times daily 3/7/17  Yes Ingrid Linn MD   desmopressin (DDAVP) 0.2 MG tablet Take 1 tablet by mouth nightly 3/7/17  Yes Ingrid Linn MD   Accu-Chek Softclix Lancets MISC TEST 2 TO 4 TIMES PER DAY 11/14/16  Yes Ingrid Linn MD   ACCU-CHEKENAN ZEENAT PLUS strip USE AS DIRECTED TO TEST BLOOD SUGAR UP TO 2-4 TIMES A DAY 11/14/16  Yes Ingrid Linn MD   melatonin 3 MG TABS tablet Take 1 tablet by mouth nightly 8/31/16  Yes Ingrid Linn MD   ferrous sulfate 325 (65 FE) MG tablet Take 1 tablet by mouth 2 times daily (with meals) 8/31/16  Yes Ingrid Linn MD   cefUROXime (CEFTIN) 500 MG tablet Take 1 tablet by mouth daily 5/31/16  Yes Ingrid Linn MD   diazepam (VALIUM) 5 MG tablet Take 1 tablet by mouth 2 times daily as needed for Anxiety 8/26/15  Yes Ingrid Linn MD   Multiple Vitamins-Minerals (CENTRUM SILVER ULTRA MENS) TABS Take 1 tablet by mouth daily 6/16/15  Yes Ingrid Linn MD   fluticasone [address]) 50 MCG/ACT nasal spray 2 sprays by Nasal route daily 6/16/15  Yes Ingrid Linn MD   nitroGLYCERIN (NITROSTAT) 0.4 MG SL tablet Place 1 tablet under the tongue every 5 minutes as needed. 1/9/12  Yes Ingrid Linn MD        Facility Administered Medications: Allergies:     Allergies   Allergen Reactions    Lorazepam         Social History:    Working:   Caffeine:   Lifestyle:    Social History     Social History    Marital status:      Spouse name: Christelle Lee Carry)    Number of children: 4    Years of education: N/A     Occupational History    retired - January 2000      pest control for 17 years     Social History Main Topics    Smoking status: Former Smoker     Years: 5.00     Types: Cigarettes     Quit date: 4/13/1990    Smokeless tobacco: Never Used      Comment: quit smoking in 1990 --- smoked from [id number]    Alcohol use No    Drug use: No    Sexual activity: Yes     Partners: Female      Comment:  - Christelle Vanegas (Ankit Ramirez) - 1958     Other Topics Concern    Not on file     Social History Narrative       Family History:  Heart Disease:   Stroke:   Cancer:   Diabetes:   Hypertension:   Aneurysm/PVD:         Problem Relation Age of Onset    Cancer Mother      colon cancer    Cancer Brother      colon cancer, stomach cancer       Review of Systems:  Constitutional:  No night sweats, headaches, weight loss. Eyes:  No glaucoma, cataracts. ENMT:  No nosebleeds, deviated septum. Cardiac:  No arrhythmias, previous MI. Vascular:  No claudication, varicosities. GI:  No PUD, heartburn. :  No kidney stones, frequent UTIs  Musculoskeletal:  No arthritis, gout. Respiratory:  No SOB, emphysema, asthma. Integumentary:  No dermatitis, itching, rash. Neurological:  No stroke, TIAs, seizures. Psychiatric:  No depression, anxiety. Endocrine: No diabetes, thyroid issues. Hematologic:  No bleeding, easy bruising. Immunologic:  No known cancer, steroid therapies.       Physical Examination:    BP (!) 158/80 (Site: Left Arm, Position: Sitting, Cuff Size: Medium Adult)  Pulse 68  Temp 98.1 °F (36.7 °C) (Oral)   Ht 6' 3\" (1.905 m)  Wt 180 lb (81.6 kg)  SpO2 97%  BMI 22.5 kg/m2     BP RUE:  BP LUE:   Admission Weight: 180 lb (81.6 kg)   Hand dominance:    General appearance: NAD, well nourished  Eyes: anicteric, PERRLA  ENMT: no scars or lesions, no nasal deformity, normal dentition, no cyanosis of oral mucosa  Neck: no masses, no thyroid enlargement, no JVD. Respiratory: effort is unlabored, symmetric, no crackles, wheezes or rubs. No palpable/percussable abnormalities. Cardiovascular: regular, no murmur. PMI normal, no thrill. No carotid bruits. No edema or varicosities. Abdominal aorta cannot be appreciated given body habitus. GI: abdomen soft, nondistended, no organomegaly. No masses. Lymphatic: no cervical/supraclavicular adenopathy  Musculoskeletal: strength and tone normal. Full ROM. No scoliosis. Extremities: warm and pink. No clubbing or petechiae. Skin: no dermatitis or ulceration. No nodularity or induration. Neuro: CN grossly intact. Sensation and motor function grossly intact. Psychiatric: oriented, appropriate mood/affect. MEDICAL DECISION MAKING/TESTING  Studies personally reviewed. Echo:     CXR:   Limited upper GI series due to patient significantly limited   mobility and also inability to stand. Stomach and duodenum not   adequately evaluated. 2. Esophageal distention with frequent tertiary contractions and   severe dysmotility. Limited evaluation of gastroesophageal   junction. I cannot exclude inflammatory or neoplastic stricture. Delayed passage of contrast into the stomach, which is felt to be   primarily related to significant esophageal dysmotility. Recommend   correlation with upper endoscopy. CT  Impression:   1. Dilated esophagus with dependent fluid which could be related   to esophageal reflux or esophageal dysmotility. 2. Mild wall thickening of mid and distal thoracic esophagus   likely inflammatory. 3. Extensive tree-in-bud opacities in the lungs stable. 4. 4 mm left upper lobe pulmonary nodule stable. Labs:   CBC: No results for input(s): WBC, HGB, HCT, MCV, PLT in the last 72 hours.   BMP: No results for input(s): NA, K, CL, CO2, PHOS, BUN, CREATININE,

## 2017-10-12 ENCOUNTER — OFFICE VISIT (OUTPATIENT)
Dept: CARDIOLOGY CLINIC | Age: 82
End: 2017-10-12

## 2017-10-12 VITALS
WEIGHT: 181.6 LBS | SYSTOLIC BLOOD PRESSURE: 120 MMHG | HEART RATE: 58 BPM | BODY MASS INDEX: 22.7 KG/M2 | DIASTOLIC BLOOD PRESSURE: 60 MMHG

## 2017-10-12 DIAGNOSIS — R94.31 ABNORMAL ECG: ICD-10-CM

## 2017-10-12 DIAGNOSIS — I25.9 ISCHEMIC HEART DISEASE: Primary | Chronic | ICD-10-CM

## 2017-10-12 DIAGNOSIS — E11.9 CONTROLLED TYPE 2 DIABETES MELLITUS WITHOUT COMPLICATION, WITHOUT LONG-TERM CURRENT USE OF INSULIN (HCC): ICD-10-CM

## 2017-10-12 PROCEDURE — 93000 ELECTROCARDIOGRAM COMPLETE: CPT | Performed by: INTERNAL MEDICINE

## 2017-10-12 PROCEDURE — G8598 ASA/ANTIPLAT THER USED: HCPCS | Performed by: INTERNAL MEDICINE

## 2017-10-12 PROCEDURE — G8420 CALC BMI NORM PARAMETERS: HCPCS | Performed by: INTERNAL MEDICINE

## 2017-10-12 PROCEDURE — 1036F TOBACCO NON-USER: CPT | Performed by: INTERNAL MEDICINE

## 2017-10-12 PROCEDURE — 99204 OFFICE O/P NEW MOD 45 MIN: CPT | Performed by: INTERNAL MEDICINE

## 2017-10-12 PROCEDURE — 1123F ACP DISCUSS/DSCN MKR DOCD: CPT | Performed by: INTERNAL MEDICINE

## 2017-10-12 PROCEDURE — G8427 DOCREV CUR MEDS BY ELIG CLIN: HCPCS | Performed by: INTERNAL MEDICINE

## 2017-10-12 PROCEDURE — 4040F PNEUMOC VAC/ADMIN/RCVD: CPT | Performed by: INTERNAL MEDICINE

## 2017-10-12 PROCEDURE — G8484 FLU IMMUNIZE NO ADMIN: HCPCS | Performed by: INTERNAL MEDICINE

## 2017-10-12 ASSESSMENT — ENCOUNTER SYMPTOMS
EYES NEGATIVE: 1
ABDOMINAL PAIN: 1
RESPIRATORY NEGATIVE: 1
ALLERGIC/IMMUNOLOGIC NEGATIVE: 1
ABDOMINAL DISTENTION: 1

## 2017-10-12 NOTE — PROGRESS NOTES
Subjective:      Patient ID: Abril Ledesma is a 80 y.o. male. CC:  DM and abnormal ECG and preop clearance    HPI:  Patient needs a heller myotomy and needs cardiac eval.  Echo in 2013 was normal and has no chest pain. eCG with NSST/T wave changes. Walks with cane. Had hip surgery in 2014 with no cardiac issues. Review of Systems   Constitutional: Negative. HENT: Negative. Eyes: Negative. Respiratory: Negative. Gastrointestinal: Positive for abdominal distention and abdominal pain. Endocrine: Negative. Genitourinary: Negative. Musculoskeletal: Positive for arthralgias and gait problem. Allergic/Immunologic: Negative. Hematological: Negative. Psychiatric/Behavioral: Negative. Objective:   Physical Exam   Constitutional: He is oriented to person, place, and time. He appears well-developed and well-nourished. HENT:   Head: Normocephalic and atraumatic. Eyes: EOM are normal. Pupils are equal, round, and reactive to light. Neck: Normal range of motion. Neck supple. Cardiovascular: Normal rate and regular rhythm. Exam reveals no friction rub. No murmur heard. Pulmonary/Chest: Effort normal and breath sounds normal.   Abdominal: Soft. Bowel sounds are normal.   Musculoskeletal: He exhibits no edema or deformity. Neurological: He is alert and oriented to person, place, and time. Skin: Skin is warm and dry. Psychiatric: He has a normal mood and affect.  His behavior is normal. Judgment and thought content normal.       Assessment:      Patient Active Problem List   Diagnosis    Ischemic heart disease    Paranoid schizophrenia ([address]    Complex partial seizure disorder ([address]    Diverticulosis    Erectile dysfunction    Back pain    BPH (benign prostatic hypertrophy)    Dyspnea on exertion    Peripheral vascular disease ([address]    Hemorrhoid    Anxiety    Allergic rhinitis    Hyperlipidemia    Eczema    Overactive bladder    Special screening for

## 2017-10-13 ENCOUNTER — HOSPITAL ENCOUNTER (OUTPATIENT)
Dept: NON INVASIVE DIAGNOSTICS | Age: 82
Discharge: HOME | End: 2017-10-13
Attending: INTERNAL MEDICINE | Admitting: INTERNAL MEDICINE

## 2017-10-13 DIAGNOSIS — E11.9 CONTROLLED TYPE 2 DIABETES MELLITUS WITHOUT COMPLICATION, WITHOUT LONG-TERM CURRENT USE OF INSULIN (HCC): ICD-10-CM

## 2017-10-13 DIAGNOSIS — R94.31 ABNORMAL ECG: ICD-10-CM

## 2017-10-13 DIAGNOSIS — I25.9 CHRONIC ISCHEMIC HEART DISEASE: ICD-10-CM

## 2017-10-13 DIAGNOSIS — I25.9 ISCHEMIC HEART DISEASE: ICD-10-CM

## 2017-10-13 LAB
LV EF: 66 %
LVEF MODALITY: NORMAL

## 2017-10-25 ENCOUNTER — OFFICE VISIT (OUTPATIENT)
Dept: CARDIOTHORACIC SURGERY | Age: 82
End: 2017-10-25

## 2017-10-25 VITALS
TEMPERATURE: 98.3 F | WEIGHT: 176 LBS | HEIGHT: 75 IN | SYSTOLIC BLOOD PRESSURE: 154 MMHG | OXYGEN SATURATION: 97 % | HEART RATE: 63 BPM | DIASTOLIC BLOOD PRESSURE: 80 MMHG | BODY MASS INDEX: 21.88 KG/M2

## 2017-10-25 DIAGNOSIS — K21.9 GASTROESOPHAGEAL REFLUX DISEASE WITHOUT ESOPHAGITIS: Primary | ICD-10-CM

## 2017-10-25 PROCEDURE — 99212 OFFICE O/P EST SF 10 MIN: CPT | Performed by: THORACIC SURGERY (CARDIOTHORACIC VASCULAR SURGERY)

## 2017-10-26 ENCOUNTER — TELEPHONE (OUTPATIENT)
Dept: INTERNAL MEDICINE | Age: 82
End: 2017-10-26

## 2017-10-26 DIAGNOSIS — G47.00 INSOMNIA, UNSPECIFIED TYPE: Chronic | ICD-10-CM

## 2017-10-26 RX ORDER — LANOLIN ALCOHOL/MO/W.PET/CERES
3 CREAM (GRAM) TOPICAL NIGHTLY
Qty: 90 TABLET | Refills: 0 | Status: SHIPPED | OUTPATIENT
Start: 2017-10-26 | End: 2017-10-30 | Stop reason: SDUPTHER

## 2017-10-26 NOTE — PROGRESS NOTES
[facility]  Clinic Note     10/25/2017 9:32 PM  Surgeon:  Tavo Villaseñor    achalasia     Chief complaint : Dysphagia  Subjective:  Mr. Izabela Savage   Patient was seen in the clinic for finish his workup came with his family to discuss his upcoming procedure  Vital Signs: BP (!) 154/80 (Site: Right Arm, Position: Sitting, Cuff Size: Medium Adult)   Pulse 63   Temp 98.3 °F (36.8 °C) (Oral)   Ht 6' 3\" (1.905 m)   Wt 176 lb (79.8 kg)   SpO2 97%   BMI 22.00 kg/m²      I/O:  No intake or output data in the 24 hours ending 10/25/17 2132    Exam:   Cardiovascular: S1 plus S2 +0 no additional sounds  Pulmonary: Bilaterally clear      Labs:   CBC: No results for input(s): WBC, HGB, HCT, MCV, PLT in the last 72 hours. BMP: No results for input(s): NA, K, CL, CO2, PHOS, BUN, CREATININE in the last 72 hours. Invalid input(s): CA  PT/INR: No results for input(s): PROTIME, INR in the last 72 hours. APTT: No results for input(s): APTT in the last 72 hours.     Scheduled Meds:     Patient Active Problem List   Diagnosis    Ischemic heart disease    Paranoid schizophrenia ([address]    Complex partial seizure disorder ([address]    Diverticulosis    Erectile dysfunction    Back pain    Benign prostatic hyperplasia    Dyspnea on exertion    Peripheral vascular disease ([address]    Hemorrhoid    Anxiety    Allergic rhinitis    Hyperlipidemia    Eczema    Overactive bladder    Special screening for malignant neoplasm of prostate    Fatigue    Anemia    Cough    Routine general medical examination at a health care facility    Special screening for malignant neoplasms, colon    Acute cholecystitis    Elevated PSA    Prostate cancer ([address]    Need for Tdap vaccination    Need for influenza vaccination    Need for shingles vaccine    Bradycardia    Memory loss    Closed left hip fracture ([address]    Depression    Constipation    Insomnia    Left hip pain    Nausea    S/P hip replacement    Essential hypertension    Abnormal weight loss    Former smoker    Need for pneumococcal vaccination    Abnormal chest CT    Chronic kidney disease, stage 3    Type 2 diabetes mellitus with stage 3 chronic kidney disease (HCC)    Gastroesophageal reflux disease without esophagitis    Type 2 diabetes mellitus without complication, without long-term current use of insulin (HCC)    Perennial allergic rhinitis    Non-intractable vomiting with nausea    Patulous lower esophageal sphincter    Chest congestion       Assessment/Plan:   1. Risk and benefit was discussed with the patient included not limited to bleeding infection esophageal leak  2.  Laparoscopic procedure was discussed with him and his family in detail Heller myotomy success rate and possibility of dilation in the future patient was agreeable to proceed    Marcial Reddy MD FACS

## 2017-10-30 RX ORDER — OMEPRAZOLE MAGNESIUM 20 MG
3 CAPSULE,DELAYED RELEASE (ENTERIC COATED) ORAL NIGHTLY
Qty: 30 TABLET | Refills: 12 | Status: SHIPPED | OUTPATIENT
Start: 2017-10-30 | End: 2018-11-06 | Stop reason: SDUPTHER

## 2017-11-01 ENCOUNTER — HOSPITAL ENCOUNTER (OUTPATIENT)
Dept: PREADMISSION TESTING | Age: 82
Discharge: HOME | End: 2017-11-01
Attending: THORACIC SURGERY (CARDIOTHORACIC VASCULAR SURGERY) | Admitting: THORACIC SURGERY (CARDIOTHORACIC VASCULAR SURGERY)

## 2017-11-01 VITALS
RESPIRATION RATE: 16 BRPM | HEART RATE: 77 BPM | BODY MASS INDEX: 21.88 KG/M2 | DIASTOLIC BLOOD PRESSURE: 67 MMHG | HEIGHT: 75 IN | OXYGEN SATURATION: 7 % | WEIGHT: 176 LBS | SYSTOLIC BLOOD PRESSURE: 134 MMHG | TEMPERATURE: 98.6 F

## 2017-11-01 LAB
ABO/RH: NORMAL
ALBUMIN SERPL-MCNC: 3.8 G/DL (ref 3.4–5)
ALP BLD-CCNC: 80 U/L (ref 40–129)
ALT SERPL-CCNC: 8 U/L (ref 10–40)
ANION GAP SERPL CALCULATED.3IONS-SCNC: 13 MMOL/L (ref 3–16)
ANTIBODY SCREEN: NORMAL
APTT: 28.2 SEC (ref 24.1–34.9)
AST SERPL-CCNC: 16 U/L (ref 15–37)
BACTERIA: ABNORMAL /HPF
BILIRUB SERPL-MCNC: <0.2 MG/DL (ref 0–1)
BILIRUBIN DIRECT: <0.2 MG/DL (ref 0–0.3)
BILIRUBIN URINE: NEGATIVE
BILIRUBIN, INDIRECT: ABNORMAL MG/DL (ref 0–1)
BLOOD, URINE: NEGATIVE
BUN BLDV-MCNC: 19 MG/DL (ref 7–20)
CALCIUM SERPL-MCNC: 9.1 MG/DL (ref 8.3–10.6)
CHLORIDE BLD-SCNC: 102 MMOL/L (ref 99–110)
CLARITY: CLEAR
CO2: 26 MMOL/L (ref 21–32)
COLOR: YELLOW
CREAT SERPL-MCNC: 1.2 MG/DL (ref 0.8–1.3)
EPITHELIAL CELLS, UA: ABNORMAL /HPF
GFR AFRICAN AMERICAN: >60
GFR NON-AFRICAN AMERICAN: 58
GLUCOSE BLD-MCNC: 172 MG/DL (ref 70–99)
GLUCOSE URINE: NEGATIVE MG/DL
HCT VFR BLD CALC: 33.9 % (ref 40.5–52.5)
HEMOGLOBIN: 11.2 G/DL (ref 13.5–17.5)
INR BLD: 1.1 (ref 0.85–1.15)
KETONES, URINE: NEGATIVE MG/DL
LEUKOCYTE ESTERASE, URINE: ABNORMAL
MAGNESIUM: 1.9 MG/DL (ref 1.8–2.4)
MCH RBC QN AUTO: 28.6 PG (ref 26–34)
MCHC RBC AUTO-ENTMCNC: 32.9 G/DL (ref 31–36)
MCV RBC AUTO: 86.8 FL (ref 80–100)
MICROSCOPIC EXAMINATION: YES
NITRITE, URINE: NEGATIVE
PDW BLD-RTO: 12.9 % (ref 12.4–15.4)
PH UA: 6
PLATELET # BLD: 205 K/UL (ref 135–450)
PMV BLD AUTO: 9.3 FL (ref 5–10.5)
POTASSIUM SERPL-SCNC: 4.4 MMOL/L (ref 3.5–5.1)
PROTEIN UA: 30 MG/DL
PROTHROMBIN TIME: 12.4 SEC (ref 9.6–13)
RBC # BLD: 3.9 M/UL (ref 4.2–5.9)
RBC UA: ABNORMAL /HPF (ref 0–2)
SODIUM BLD-SCNC: 141 MMOL/L (ref 136–145)
SPECIFIC GRAVITY UA: 1.01
TOTAL PROTEIN: 7.5 G/DL (ref 6.4–8.2)
URINE TYPE: ABNORMAL
UROBILINOGEN, URINE: 0.2 E.U./DL
WBC # BLD: 5.6 K/UL (ref 4–11)
WBC UA: ABNORMAL /HPF (ref 0–5)

## 2017-11-01 RX ORDER — SODIUM CHLORIDE 9 MG/ML
INJECTION, SOLUTION INTRAVENOUS CONTINUOUS
Status: CANCELLED | OUTPATIENT
Start: 2017-11-05

## 2017-11-01 NOTE — PRE-PROCEDURE INSTRUCTIONS
[address]                          Date of Procedure 11/6 Time of Procedure 0730    PRIOR TO PROCEDURE DATE:  1. Please follow any guidelines/instructions prior to your procedure as advised by your surgeon. 2. Arrange for someone to drive you home and be with you for the first 24 hours after discharge for your safety after your procedure for which you received sedation. Ensure it is someone we can share information with regarding your discharge. 3. You must contact your surgeon for instructions IF:   You are taking any blood thinners, aspirin, anti-inflammatory or vitamin E.   There is a change in your physical condition such as a cold, fever, rash, cuts, sores or any other infection, especially near your surgical site. 4. Do not drink alcohol the day before or day of your procedure. 5. A Pre-op History and Physical for surgery MUST be completed by your Physician or Urgent Care within 30 days of your procedure date. Please bring a copy with you on the day of your procedure and along with any other testing performed. THE DAY OF YOUR PROCEDURE:  1. Follow instructions for ARRIVAL TIME as DIRECTED BY YOUR SURGEON. If your surgeon does not give you a specific arrival time, please arrive at  0530.    2. Enter the MAIN entrance from [facility] and follow the signs to the free [employer] or [facility] parking (offered free of charge 6am-5pm). 3. Enter the Main Entrance of the hospital (do NOT enter from the lower level of the parking garage). Upon entrance, check in with the  at the main desk on your left. If no one is available at the desk, proceed into the [facility] Waiting Room and go through the door directly into the [facility]. There is a Check-in desk ACROSS from Room 5 (marked with a sign hanging from the ceiling).  The phone number for the surgery center is [phone].    4. DO NOT EAT OR DRINK ANYTHING AFTER MIDNIGHT (exception would be medication instructions below only)     5. MEDICATIONS    Take the following medications with a SMALL sip of water:  TAKE ATENOLOL, LOSARTAN, TERAZOSIN, PROTONIX, OXYBUTYNIN. OK TO TAKE DIAZEPAM  OR FLONASE. Edmund Alexander CALL SURGEON TODAY TO CONFIRM WHEN/ IF TO HOLD ASPIRIN.  Use your usual dose of inhalers the morning of surgery. BRING your rescue inhaler with you to hospital.    Anesthesia does NOT want you to take insulin the morning of surgery. They will control your blood sugar while you are at the hospital. Please contact your ordering physician for instructions regarding your insulin the night before your procedure. If you have an insulin pump, please keep it set on basal rate. 6. Do not swallow water when brushing teeth. No gum, candy, mints or ice chips. Refrain from smoking or at least decrease the amount. 7. Dress in loose, comfortable clothing appropriate for redressing after your procedure. Do not wear jewelry (including body piercings), make-up (especially NO eye make-up), fingernail polish (NO toenail polish if foot/leg surgery), lotion, powders or metal hairclips. 8. Dentures, glasses, or contacts will need to be removed before your procedure. Bring cases for your glasses, contacts, dentures, or hearing aids to protect them while you are in surgery. 9. If you use a CPAP, please bring it with you on the day of your procedure. 10. We recommend that valuable personal  belongings, such as credit cards, cash, cell phones, e-tablets or jewelry, be left at home during your stay. The hospital will not be responsible for valuables that are not secured in the hospital safe. However, if your insurance requires a co-pay, you may want to bring a method of payment, i.e. Check or credit card, if you wish to pay your co-pay the day of surgery. 11. If you are to stay overnight, you may bring a bag with personal items.  Please have any large items you may need brought in by your family after your arrival to your hospital room. 12. If you have a Living Will or Durable Power of , please bring a copy on the day of your procedure. 15.  With your permission, one family member may accompany you while you are being prepared for surgery. Once you are ready, additional family members may join you. HOW WE KEEP YOU SAFE and WORK TO PREVENT SURGICAL SITE INFECTIONS:  1. Health care workers should always check your ID bracelet to verify your name and birth date. You will be asked many times to state your name, date of birth, and allergies. 2. Health care workers should always clean their hands with soap or alcohol gel before providing care to you. It is okay to ask anyone if they cleaned their hands before they touch you. 3. You will be actively involved in verifying the type of procedure you are having and ensuring the correct surgical site. This will be confirmed multiple times prior to your procedure. Do NOT elmira your surgery site UNLESS instructed to by your surgeon. 4. Do not shave or wax for 72 hours prior to procedure near your operative site. Shaving with a razor can irritate your skin and make it easier to develop an infection. On the day of your procedure, any hair that needs to be removed near the surgical site will be clipped by a healthcare worker using a special clippers designed to avoid skin irritation. 5. When you are in the operating room, your surgical site will be cleansed with a special soap, and in most cases, you will be given an antibiotic before the surgery begins. AFTER YOUR PROCEDURE:  1. For comfort and safety, arrange to have someone at home with you for the first 24 hours after discharge. 2. You and your family will be given written instructions about your diet, activity, dressing care, medications, and return visits. 3. Always clean your hands before and after caring for your wound.  Do not let your family touch your surgery site without cleaning their hands. 4. Mild nausea, headache, muscle aches, sore throat, or fatigue may occur after anesthesia. Should any of these symptoms become severe, or should you notice any signs of infection, you should call your surgeon. 5. Narcotic pain medications can cause significant constipation. You may want to add a stool softener to your postoperative medication schedule or speak to your surgeon on how best to manage this SIDE EFFECT. SPECIAL INSTRUCTIONS     Thank you for allowing us to care for you. We strive to exceed your expectations in the overall delivery of care and service provided to you and your family. If you need to contact us for any reason, please call us at [phone]    Instructions reviewed and copy given to patient during preadmission testing visit. Ish Campbell. 11/1/2017 .1:00 PM      ADDITIONAL EDUCATIONAL INFORMATION REVIEWED / PROVIDED TO YOU AND YOUR FAMILY:  Yes Taking Control of Your Pain   Yes FAQs about Surgical Site Infections      Yes Hibiclens® Bathing Instructions   No Incentive Spirometer given to patient- PLEASE BRING THIS SPIROMETER BACK WITH YOU ON THE DAY OF YOUR SURGERY  No CMS Comprehensive Care for Joint Replacement Model Notification Letter  No Other

## 2017-11-01 NOTE — PROGRESS NOTES
The following educational items and goals will be achieved upon completion of the patient's Pre-admission testing experience:             Identify the learner who is being assessed for education:  patient                    Ability to Learn:  Exhibits ability to grasp concepts and respond to questions: High  Ready to Learn: Yes  calm   Preferred Method of Learning:  verbal  Barriers to Learning: Verbalizes interest  Special Considerations due to cultural, Jewish, spiritual beliefs:  No  Language:  English  :  Belinda Rivas  [x] Appropriate evaluation / integration of data as delineated by ASPAN Standards of Perianesthesia Nursing Practice    Pain scale and pain management   [x]Patient verbalizes understanding of pain scale and pain management  [x]Pre-operative determination of patients anticipated Post-Operative pain goal:   4 of 10 on 10 point scale post op goal  [] Other     Medication(s) - Compliance with preop medication instructions  [x] Patient verbalizes understanding of preop medications (see [facility] Presurgical Instructions)    Instructions, Pre op                                                                                            [x] Patient verbalizes understanding of presurgical instructions as reviewed with phone interview nurse or in-person nurse review    Fall Risk Potential, Preoperatively                                                                                   []No preoperative risk identified  [x]Preop risk identified:                    []Sensory deficit        []Motor deficit        []Balance problem        [x]Home medication        [x]Uses assistive device                    []History of a Fall within the last 30 days    Goal(s) for fall prevention:  [x]Prevent fall or injury by requesting assistance with activities of daily living  [x]Patient / Significant other verbalizes understanding the need to call for

## 2017-11-02 ENCOUNTER — TELEPHONE (OUTPATIENT)
Dept: CARDIOTHORACIC SURGERY | Age: 82
End: 2017-11-02

## 2017-11-02 NOTE — TELEPHONE ENCOUNTER
Spoke with patient regarding surgery scheduled for 11/6/2017 @ 7:30 am with arrival time of 5:30 am @ [facility] with Dr. Dante James to include: Hyler myotomy, esophagogastroduodenoscopy with possible 270 wrap. Patient completed pre-admit testing 11/1/2017 and knows not to eat or drink after midnight the day of surgery and to call me with any questions or concerns.

## 2017-11-03 LAB — URINE CULTURE, ROUTINE: NORMAL

## 2017-11-06 PROBLEM — K22.0 ACHALASIA: Status: ACTIVE | Noted: 2017-11-06

## 2017-11-09 ENCOUNTER — CARE COORDINATION (OUTPATIENT)
Dept: CASE MANAGEMENT | Age: 82
End: 2017-11-09

## 2017-11-09 NOTE — CARE COORDINATION
Alfon[facility] 45 Transitions Initial Follow Up Call    Call within 2 business days of discharge: Yes    Patient: Mikael Mathew Patient : 1935   MRN: [id number]  Reason for Admission: Dilated esophagus with surgery  Discharge Date: 17 RARS: [facility] Risk Score: 19.5     Spoke with: spouse    Facility: [facility]    Non-face-to-face services provided:  Scheduled appointment with Specialist-Dr Grecia Jimenez and reviewed discharge summary and/or continuity of care documents  Education of patient/family/caregiver/guardian to support self-management-review diet      Care Transitions 24 Hour Call    Do you have any ongoing symptoms?:  No  Do you have a copy of your discharge instructions?:  Yes  Do you have all of your prescriptions and are they filled?:  Yes  Have you been contacted by a [facility] Pharmacist?:  No  Have you scheduled your follow up appointment?:  Yes  How are you going to get to your appointment?:  Car - family or friend to transport  Were you discharged with any Home Care or Post Acute Services:  No  Do you feel like you have everything you need to keep you well at home?:  Yes  Care Transitions Interventions     Care Transition f/u call to pt home. Spoke with spouse. She said pt is doing \"real good'. States he is eating good. No nausea or pain. Review diet and she confirmed pt is on a full liquid diet as ordered on discharge and she has the diet instructions. To see Dr Claudio Tan on . She said pt took the dressing off his chest this morning and there was no drainage. Pt then showered. Care transitions will continue to follow. Follow Up  Future Appointments  Date Time Provider Al Perkins   2017 12:00 PM Chante Kaur MD CVTS R[facility]   2017 10:40 AM Ray Mohan MD [facility] [address] IM [facility]   2017 10:40 AM Onur Vargas MD [address] [place] [address] Transition Coordinator  [phone]  [email or web address]

## 2017-11-09 NOTE — CARE COORDINATION
Alfon[facility] 45 Transitions Initial Follow Up Call    Call within 2 business days of discharge: Yes    Patient: Zaid Hooker Patient : 1935   MRN: [id number]  Reason for Admission: Dilated esophagus with surgery. Discharge Date: 17 RARS: [facility] Risk Score: 19.5     Care Transition attempted 24 hr post hospital followup call. Called home/cell number and phone rang several times without answer and no option to leave message. Will try again later. Facility: [facility]    Non-face-to-face services provided:  Obtained and reviewed discharge summary and/or continuity of care documents    Care Transitions 24 Hour Call    Care Transitions Interventions         Follow Up  Future Appointments  Date Time Provider Al Perkins   2017 12:00 PM Franky Dobbs MD CVTS R[facility]   2017 10:40 AM Nazia Hernandez MD [facility] [address] IM [facility]   2017 10:40 AM Dorys Morin MD [address] [place] [address] Transition Coordinator  [phone]  [email or web address]

## 2017-11-14 ENCOUNTER — OFFICE VISIT (OUTPATIENT)
Dept: CARDIOTHORACIC SURGERY | Age: 82
End: 2017-11-14

## 2017-11-14 VITALS
BODY MASS INDEX: 22.88 KG/M2 | HEIGHT: 75 IN | OXYGEN SATURATION: 98 % | TEMPERATURE: 97.7 F | SYSTOLIC BLOOD PRESSURE: 130 MMHG | DIASTOLIC BLOOD PRESSURE: 68 MMHG | HEART RATE: 64 BPM | WEIGHT: 184 LBS

## 2017-11-14 DIAGNOSIS — I10 ESSENTIAL HYPERTENSION: ICD-10-CM

## 2017-11-14 DIAGNOSIS — Z48.89 AFTERCARE FOLLOWING SURGERY: Primary | ICD-10-CM

## 2017-11-14 PROCEDURE — 99024 POSTOP FOLLOW-UP VISIT: CPT | Performed by: THORACIC SURGERY (CARDIOTHORACIC VASCULAR SURGERY)

## 2017-11-14 NOTE — LETTER
11/14/17      Julee Juarez M.D., Joie Li, [facility], [address] Cardiovascular and Thoracic Surgeons  [address] [address]        RE:    Kimberleenikki Patel,   1935    Dear Bonny Vital MD    Kimberlee Patel was seen today for routine follow-up after his recent Nissen fundoplication. Attached is the postop note.         Sincerely,     Jayden Cartagena MD    CC: Yessi Urena MD  No address on file

## 2017-11-22 RX ORDER — CEFUROXIME AXETIL 500 MG/1
500 TABLET ORAL 2 TIMES DAILY
Qty: 60 TABLET | Refills: 5 | Status: SHIPPED | OUTPATIENT
Start: 2017-11-22 | End: 2017-12-13 | Stop reason: SDUPTHER

## 2017-11-29 ENCOUNTER — OFFICE VISIT (OUTPATIENT)
Dept: CARDIOTHORACIC SURGERY | Age: 82
End: 2017-11-29

## 2017-11-29 VITALS
OXYGEN SATURATION: 97 % | WEIGHT: 182 LBS | HEIGHT: 75 IN | DIASTOLIC BLOOD PRESSURE: 76 MMHG | TEMPERATURE: 98.2 F | HEART RATE: 56 BPM | BODY MASS INDEX: 22.63 KG/M2 | SYSTOLIC BLOOD PRESSURE: 154 MMHG

## 2017-11-29 DIAGNOSIS — K21.9 HIATAL HERNIA WITH GERD: ICD-10-CM

## 2017-11-29 DIAGNOSIS — K21.9 GASTROESOPHAGEAL REFLUX DISEASE WITHOUT ESOPHAGITIS: Primary | ICD-10-CM

## 2017-11-29 DIAGNOSIS — K44.9 HIATAL HERNIA WITH GERD: ICD-10-CM

## 2017-11-29 DIAGNOSIS — K22.0 ACHALASIA OF ESOPHAGUS: ICD-10-CM

## 2017-11-29 PROCEDURE — 99024 POSTOP FOLLOW-UP VISIT: CPT | Performed by: THORACIC SURGERY (CARDIOTHORACIC VASCULAR SURGERY)

## 2017-11-29 NOTE — PROGRESS NOTES
Cardiac, Vascular and Thoracic Surgeons  Clinic Note     11/29/2017 5:00 PM  Surgeon:  Mary Sandoval     S/P :  Hiatal hernia repair upper scopic with Heller myotomy and floppy wrap    Chief complaint : Post op follow-up  Subjective:  Mr. Aníbal Lopez   No major complain or events since seen last  Vital Signs: BP (!) 154/76 (Site: Right Arm, Position: Sitting, Cuff Size: Medium Adult)   Pulse 56   Temp 98.2 °F (36.8 °C) (Oral)   Ht 6' 3\" (1.905 m)   Wt 182 lb (82.6 kg)   SpO2 97%   BMI 22.75 kg/m²      I/O:  No intake or output data in the 24 hours ending 11/29/17 1700    Exam:   Cardiovascular: S1 plus S2 +0  Pulmonary: Bilaterally clear    Chest X-Ray:  Normal    Labs:   CBC: No results for input(s): WBC, HGB, HCT, MCV, PLT in the last 72 hours. BMP: No results for input(s): NA, K, CL, CO2, PHOS, BUN, CREATININE in the last 72 hours. Invalid input(s): CA  PT/INR: No results for input(s): PROTIME, INR in the last 72 hours. APTT: No results for input(s): APTT in the last 72 hours.     Scheduled Meds:     Patient Active Problem List   Diagnosis    Ischemic heart disease    Paranoid schizophrenia ([address]    Complex partial seizure disorder ([address]    Diverticulosis    Erectile dysfunction    Back pain    Benign prostatic hyperplasia    Dyspnea on exertion    Peripheral vascular disease ([address]    Hemorrhoid    Anxiety    Allergic rhinitis    Hyperlipidemia    Eczema    Overactive bladder    Special screening for malignant neoplasm of prostate    Fatigue    Anemia    Cough    Routine general medical examination at a health care facility    Special screening for malignant neoplasms, colon    Acute cholecystitis    Elevated PSA    Prostate cancer ([address]    Need for Tdap vaccination    Need for influenza vaccination    Need for shingles vaccine    Bradycardia    Memory loss    Closed left hip fracture ([address]    Depression    Constipation    Insomnia    Left hip pain    Nausea    S/P hip

## 2017-12-11 ENCOUNTER — OFFICE VISIT (OUTPATIENT)
Dept: INTERNAL MEDICINE | Age: 82
End: 2017-12-11

## 2017-12-11 DIAGNOSIS — I10 ESSENTIAL HYPERTENSION: Chronic | ICD-10-CM

## 2017-12-11 DIAGNOSIS — C61 PROSTATE CANCER (HCC): ICD-10-CM

## 2017-12-11 DIAGNOSIS — E11.22 TYPE 2 DIABETES MELLITUS WITH STAGE 3 CHRONIC KIDNEY DISEASE, WITHOUT LONG-TERM CURRENT USE OF INSULIN (HCC): ICD-10-CM

## 2017-12-11 DIAGNOSIS — E11.22 TYPE 2 DIABETES MELLITUS WITH STAGE 3 CHRONIC KIDNEY DISEASE, WITHOUT LONG-TERM CURRENT USE OF INSULIN (HCC): Primary | ICD-10-CM

## 2017-12-11 DIAGNOSIS — Z00.00 MEDICARE ANNUAL WELLNESS VISIT, SUBSEQUENT: ICD-10-CM

## 2017-12-11 DIAGNOSIS — N18.30 TYPE 2 DIABETES MELLITUS WITH STAGE 3 CHRONIC KIDNEY DISEASE, WITHOUT LONG-TERM CURRENT USE OF INSULIN (HCC): ICD-10-CM

## 2017-12-11 DIAGNOSIS — N18.30 TYPE 2 DIABETES MELLITUS WITH STAGE 3 CHRONIC KIDNEY DISEASE, WITHOUT LONG-TERM CURRENT USE OF INSULIN (HCC): Primary | ICD-10-CM

## 2017-12-11 DIAGNOSIS — K21.9 GASTROESOPHAGEAL REFLUX DISEASE WITHOUT ESOPHAGITIS: Chronic | ICD-10-CM

## 2017-12-11 LAB
A/G RATIO: 1.2 (ref 1.1–2.2)
ALBUMIN SERPL-MCNC: 4.1 G/DL (ref 3.4–5)
ALP BLD-CCNC: 80 U/L (ref 40–129)
ALT SERPL-CCNC: 8 U/L (ref 10–40)
ANION GAP SERPL CALCULATED.3IONS-SCNC: 15 MMOL/L (ref 3–16)
AST SERPL-CCNC: 14 U/L (ref 15–37)
BILIRUB SERPL-MCNC: <0.2 MG/DL (ref 0–1)
BUN BLDV-MCNC: 18 MG/DL (ref 7–20)
CALCIUM SERPL-MCNC: 9.5 MG/DL (ref 8.3–10.6)
CHLORIDE BLD-SCNC: 100 MMOL/L (ref 99–110)
CHOLESTEROL, TOTAL: 133 MG/DL (ref 0–199)
CO2: 25 MMOL/L (ref 21–32)
CREAT SERPL-MCNC: 1.3 MG/DL (ref 0.8–1.3)
GFR AFRICAN AMERICAN: >60
GFR NON-AFRICAN AMERICAN: 53
GLOBULIN: 3.3 G/DL
GLUCOSE BLD-MCNC: 115 MG/DL (ref 70–99)
HCT VFR BLD CALC: 32.9 % (ref 40.5–52.5)
HDLC SERPL-MCNC: 57 MG/DL (ref 40–60)
HEMOGLOBIN: 10.8 G/DL (ref 13.5–17.5)
LDL CHOLESTEROL CALCULATED: 60 MG/DL
MCH RBC QN AUTO: 28.4 PG (ref 26–34)
MCHC RBC AUTO-ENTMCNC: 32.8 G/DL (ref 31–36)
MCV RBC AUTO: 86.6 FL (ref 80–100)
PDW BLD-RTO: 13.1 % (ref 12.4–15.4)
PLATELET # BLD: 238 K/UL (ref 135–450)
PMV BLD AUTO: 9.3 FL (ref 5–10.5)
POTASSIUM SERPL-SCNC: 4.8 MMOL/L (ref 3.5–5.1)
RBC # BLD: 3.79 M/UL (ref 4.2–5.9)
SODIUM BLD-SCNC: 140 MMOL/L (ref 136–145)
TOTAL PROTEIN: 7.4 G/DL (ref 6.4–8.2)
TRIGL SERPL-MCNC: 79 MG/DL (ref 0–150)
VLDLC SERPL CALC-MCNC: 16 MG/DL
WBC # BLD: 6.4 K/UL (ref 4–11)

## 2017-12-11 PROCEDURE — G0439 PPPS, SUBSEQ VISIT: HCPCS | Performed by: INTERNAL MEDICINE

## 2017-12-11 PROCEDURE — 99173 VISUAL ACUITY SCREEN: CPT | Performed by: INTERNAL MEDICINE

## 2017-12-11 PROCEDURE — G8598 ASA/ANTIPLAT THER USED: HCPCS | Performed by: INTERNAL MEDICINE

## 2017-12-11 ASSESSMENT — ENCOUNTER SYMPTOMS
NAUSEA: 0
SHORTNESS OF BREATH: 0
VOMITING: 0
CHEST TIGHTNESS: 0
ABDOMINAL PAIN: 0

## 2017-12-11 ASSESSMENT — LIFESTYLE VARIABLES: HOW OFTEN DO YOU HAVE A DRINK CONTAINING ALCOHOL: 0

## 2017-12-11 ASSESSMENT — ANXIETY QUESTIONNAIRES: GAD7 TOTAL SCORE: 0

## 2017-12-11 ASSESSMENT — PATIENT HEALTH QUESTIONNAIRE - PHQ9: SUM OF ALL RESPONSES TO PHQ QUESTIONS 1-9: 0

## 2017-12-11 NOTE — PROGRESS NOTES
2011   [facility] HIP SURGERY Left June 23, 2015    Dr. Francina Homans - incision and drainage of left hip infected hematoma     JOINT REPLACEMENT Left     hip    OTHER SURGICAL HISTORY  11/06/2017     ESOPHAGOGASTRODUODENOSCOPY                OTHER SURGICAL HISTORY  11/06/2017     LAPAROSCOPIC HELLER MYOTOMY 1 WRAP, EGD    PROSTATE BIOPSY  November 2012    Dr. Lynn Driver  May 8, 2001    TURP    TOTAL HIP ARTHROPLASTY Left May 17, 2015    Dr. Candido Monahan  November 7, 2011    UPPER GASTROINTESTINAL ENDOSCOPY  February 4, 2016    Dr. Lilia De Guzman ENDOSCOPY N/A 04/15/2016    Esophagogastroduodenoscopy with Botulinum toxin injection of the lower esophageal sphincter (LES)    UPPER GASTROINTESTINAL ENDOSCOPY  04/25/2017    dilatation; and botox injection 4 units       Family History:       Problem Relation Age of Onset    Cancer Mother      colon cancer    Cancer Brother      colon cancer, stomach cancer       Social History:   TOBACCO:   reports that he quit smoking about 27 years ago. His smoking use included Cigarettes. He quit after 5.00 years of use. He has never used smokeless tobacco.  ETOH:   reports that he does not drink alcohol. OCCUPATION:  Retired     Allergies:  Lorazepam    Current Medications:    Prior to Admission medications    Medication Sig Start Date End Date Taking? Authorizing Provider   cefUROXime (CEFTIN) 500 MG tablet Take 1 tablet by mouth daily 12/13/17 3/13/18  Irena Miner MD   fluticasone [facility]) 50 MCG/ACT nasal spray 2 sprays by Nasal route daily as needed for Rhinitis    Historical Provider, MD   [facility] MELATONIN 3 MG TABS tablet TAKE 1 TABLET BY MOUTH NIGHTLY 10/30/17   Theodore De Oliveira MD   polyethylene glycol (GLYCOLAX) packet Take 17 g by mouth daily as needed for Constipation Mix with eight ounces of water.  9/19/17   Ad Maynard MD   pantoprazole (PROTONIX) 40 MG tablet Take 1 tablet by mouth daily 9/19/17   Gurdeep Almanza MD   terazosin (HYTRIN) 10 MG capsule Take 1 capsule by mouth nightly 9/19/17   Gurdeep Almanza MD   montelukast (SINGULAIR) 10 MG tablet Take 1 tablet by mouth nightly 9/19/17   Gurdeep Almanza MD   potassium chloride (KLOR-CON M10) 10 MEQ extended release tablet Take 1 tablet by mouth daily with food. 9/19/17   Gurdeep Almanza MD   risperiDONE (RISPERDAL) 0.5 MG tablet Take 1 tablet by mouth nightly 9/19/17   Gurdeep Almanza MD   sertraline (ZOLOFT) 50 MG tablet Take 1 tablet by mouth daily 9/19/17   Gurdeep Almanza MD   atenolol (TENORMIN) 25 MG tablet Take 0.5 tablets by mouth daily 9/19/17   Gurdeep Almanza MD   metFORMIN (GLUCOPHAGE) 500 MG tablet Take 1 tablet by mouth daily with food 9/6/17   Gurdeep Almanza MD   OXcarbazepine (TRILEPTAL) 300 MG tablet Take 1 tablet by mouth 2 times daily 8/23/17   Gurdeep Almanza MD   losartan (COZAAR) 100 MG tablet Take 1 tablet by mouth daily 7/26/17   Gurdeep Almanza MD   HYDROcodone-acetaminophen (NORCO) 5-325 MG per tablet Take 1 tablet by mouth every 6 hours as needed for Pain .  6/16/17   Gurdeep Almanza MD   ondansetron [facility]) 4 MG tablet Take 1 tablet by mouth daily as needed for Nausea 6/16/17   Gurdeep Almanza MD   aspirin 325 MG EC tablet Take 1 tablet by mouth daily with food 3/7/17   Gurdeep Almanza MD   oxybutynin (DITROPAN XL) 15 MG extended release tablet Take 1 tablet by mouth 2 times daily 3/7/17   Gurdeep Almanza MD   desmopressin (DDAVP) 0.2 MG tablet Take 1 tablet by mouth nightly 3/7/17   Gurdeep Almanza MD   Accu-Chennamdi Softclix Lancets MISC TEST 2 TO 4 TIMES PER DAY 11/14/16   Gurdeep Almanza MD   ACCUAna MariaCHENNAMDI ZEENAT PLUS strip USE AS DIRECTED TO TEST BLOOD SUGAR UP TO 2-4 TIMES A DAY 11/14/16   Gurdeep Almanza MD   ferrous sulfate 325 (65 FE) MG tablet Take 1 tablet by mouth 2 times daily (with meals) 8/31/16   Gurdeep Almanza MD   diazepam (VALIUM) 5 MG tablet Take 1 tablet by mouth 2 times daily as needed for Anxiety 8/26/15   Leroy Sherman MD   Multiple Vitamins-Minerals (CENTRUM SILVER ULTRA MENS) TABS Take 1 tablet by mouth daily 6/16/15   Leroy Sherman MD   nitroGLYCERIN (NITROSTAT) 0.4 MG SL tablet Place 1 tablet under the tongue every 5 minutes as needed. 1/9/12   Leroy Sherman MD       REVIEW OF SYSTEMS:  Review of Systems   Constitutional: Negative for chills and fever. Respiratory: Negative for chest tightness and shortness of breath. Cardiovascular: Negative for chest pain. Gastrointestinal: Negative for abdominal pain, nausea and vomiting. Neurological: Negative for weakness and numbness. Psychiatric/Behavioral: Negative for dysphoric mood. The patient is nervous/anxious. Screening:  Colon cancer screening: he had recent colonoscopy - to his knowledge (and his wife- normal       Prostate cancer screening: he is treated for prostate cancer     Need screening for lung cancer? No    AAA screening needed:  no     Last eye exam: 2014- I recommended to repeat that     Annual wellness visit:  1) Patient reports > 2 fall in the past year ? No  2) Patient has safety precautious to prevent falls at home? Yes      safety precautions tips were given  3) patient reports anxiety/ depression? Yes  4) patient report limiting vision difficulties? No      Patient was encouraged to see his regular eye specialist and to do a hearing     screening   5) patient report limiting hearing difficulties? No  6) patient report eating well and satisfactory diet? Yes - healthy diet tips were given   7) patient reports physical activity? Yes recommended walking 30 min/ day for 5 day a week   8) patient has a living well? No living will forms and explanations were       Given  9) Patient lives at: house with his wife       Immunization:    Immunization History   Administered Date(s) Administered    Influenza A (H1N1) Vaccine IM 01/05/2010    Influenza Virus Vaccine 10/09/2013    Influenza, High Dose 10/01/2010, 10/10/2011, 10/09/2012, 10/15/2014, 11/25/2015, 11/30/2016, 09/19/2017    PPD Test 05/21/2015, 05/31/2015    Pneumococcal 13-valent Conjugate (Rcryprx79) 11/25/2015    Pneumococcal Polysaccharide ([id number]) 11/13/2006, 10/10/2011    Tdap (Boostrix, Adacel) 04/09/2013       Physical Exam:      Vitals: /64 (Site: Right Arm)   Pulse 60   Ht 6' 3\" (1.905 m)   Wt 177 lb (80.3 kg)   SpO2 91%   BMI 22.12 kg/m²     Body mass index is 22.12 kg/m². Wt Readings from Last 3 Encounters:   12/13/17 174 lb (78.9 kg)   12/11/17 177 lb (80.3 kg)   11/29/17 182 lb (82.6 kg)     Physical Exam   Constitutional: He is oriented to person, place, and time. He appears well-developed and well-nourished. No distress. HENT:   Head: Normocephalic and atraumatic. Right Ear: External ear normal.   Left Ear: External ear normal.   Mouth/Throat: Oropharynx is clear and moist. No oropharyngeal exudate. Eyes: Conjunctivae and EOM are normal. Pupils are equal, round, and reactive to light. Right eye exhibits no discharge. Left eye exhibits no discharge. No scleral icterus. Neck: Normal range of motion. No JVD present. No thyromegaly present. Cardiovascular: Normal rate, normal heart sounds and intact distal pulses. No murmur heard. Pulmonary/Chest: Effort normal and breath sounds normal. No respiratory distress. He has no wheezes. He has no rales. Abdominal: Soft. Bowel sounds are normal. He exhibits no distension. There is no tenderness. There is no rebound. No organomegaly  No pulsating Aorta   Musculoskeletal: Normal range of motion. He exhibits no edema or tenderness. Feet:  No sensory- motor deficit  No wounds/callous/ cuts or rash  Nails:needs to be  trimmed. I recommended daily foot exam, feet protection. Lymphadenopathy:     He has no cervical adenopathy. Neurological: He is alert and oriented to person, place, and time. He has normal strength. No cranial nerve deficit or sensory deficit.  Gait normal. GCS eye Patient presents with    Medicare AWV     establish care        Screenings for behavioral, psychosocial and functional/safety risks, and cognitive dysfunction are all negative except as indicated below. These results, as well as other patient data from the [address] form, are documented in Flowsheets linked to this Encounter. Allergies   Allergen Reactions    Lorazepam      Neither he nor wife remember a problem       Prior to Visit Medications    Medication Sig Taking? Authorizing Provider   cefUROXime (CEFTIN) 500 MG tablet Take 1 tablet by mouth daily  Ct Dumont MD   fluticasone (FLONASE) 50 MCG/ACT nasal spray 2 sprays by Nasal route daily as needed for Rhinitis  Historical Provider, MD   [facility] MELATONIN 3 MG TABS tablet TAKE 1 TABLET BY MOUTH NIGHTLY  Margarita Solano MD   polyethylene glycol (GLYCOLAX) packet Take 17 g by mouth daily as needed for Constipation Mix with eight ounces of water. Melissa Jaffe MD   pantoprazole (PROTONIX) 40 MG tablet Take 1 tablet by mouth daily  Melissa Jaffe MD   terazosin (HYTRIN) 10 MG capsule Take 1 capsule by mouth nightly  Melissa Jaffe MD   montelukast (SINGULAIR) 10 MG tablet Take 1 tablet by mouth nightly  Melissa Jaffe MD   potassium chloride (KLOR-CON M10) 10 MEQ extended release tablet Take 1 tablet by mouth daily with food.   Melissa Jaffe MD   risperiDONE (RISPERDAL) 0.5 MG tablet Take 1 tablet by mouth nightly  Melissa Jaffe MD   sertraline (ZOLOFT) 50 MG tablet Take 1 tablet by mouth daily  Melissa Jaffe MD   atenolol (TENORMIN) 25 MG tablet Take 0.5 tablets by mouth daily  Melissa Jaffe MD   metFORMIN (GLUCOPHAGE) 500 MG tablet Take 1 tablet by mouth daily with food  Melissa Jaffe MD   OXcarbazepine (TRILEPTAL) 300 MG tablet Take 1 tablet by mouth 2 times daily  Melissa Jaffe MD   losartan (COZAAR) 100 MG tablet Take 1 tablet by mouth daily  Melissa Jaffe MD   HYDROcodone-acetaminophen (NORCO) 5-325 MG per tablet Take 1 tablet by mouth every 6 hours as needed for Pain . Shelly Machado MD   ondansetron [facility]) 4 MG tablet Take 1 tablet by mouth daily as needed for Nausea  Shelly Machado MD   aspirin 325 MG EC tablet Take 1 tablet by mouth daily with food  Shelly Machado MD   oxybutynin (DITROPAN XL) 15 MG extended release tablet Take 1 tablet by mouth 2 times daily  Shelly Machado MD   desmopressin (DDAVP) 0.2 MG tablet Take 1 tablet by mouth nightly  Shelly Machado MD   Accu-Chek Softclix Lancets MISC TEST 2 TO 4 TIMES PER DAY  Shelly Machado MD   ACCU-CHEKENAN ZEENAT PLUS strip USE AS DIRECTED TO TEST BLOOD SUGAR UP TO 2-4 TIMES A DAY  Shelly Machado MD   ferrous sulfate 325 (65 FE) MG tablet Take 1 tablet by mouth 2 times daily (with meals)  Shelly Machado MD   diazepam (VALIUM) 5 MG tablet Take 1 tablet by mouth 2 times daily as needed for Anxiety  Shelly Machado MD   Multiple Vitamins-Minerals (CENTRUM SILVER ULTRA MENS) TABS Take 1 tablet by mouth daily  Shelly Machado MD   nitroGLYCERIN (NITROSTAT) 0.4 MG SL tablet Place 1 tablet under the tongue every 5 minutes as needed.   Shelly Machado MD       Past Medical History:   Diagnosis Date    Acute cholecystitis 9/13/2012    Anemia 1/9/2012    Anxiety 7/2/2010    Arthritis     Back pain 5/16/2010    BPH (benign prostatic hypertrophy) 5/16/2010    Constipation 6/16/2015    Depression 6/16/2015    Diverticulosis 5/16/2010    Dyspnea on exertion 5/16/2010    Eczema 4/12/2011    Elevated PSA 10/30/2012    Erectile dysfunction 5/16/2010    Essential hypertension 8/26/2015    GERD (gastroesophageal reflux disease) 5/16/2010    Hemorrhoid 5/16/2010    Hyperlipidemia 1/10/2011    Hyperphosphatemia 7/3/2010    Hypertension 5/16/2010    Insomnia 6/16/2015    Left hip pain 6/16/2015    Memory loss 1/14/2015    Nausea 6/16/2015    Overactive bladder 7/12/2011    Paranoid schizophrenia ([facility] 5/16/2010    Perennial allergic rhinitis 11/30/2016    Prostate cancer ([facility] 1/8/2013    Type 2 Practitioner (Nurse Practitioner)    Wt Readings from Last 3 Encounters:   12/13/17 174 lb (78.9 kg)   12/11/17 177 lb (80.3 kg)   11/29/17 182 lb (82.6 kg)     Vitals:    12/11/17 1009 12/11/17 1113   BP: (!) 166/62 126/64   Site: Right Arm Right Arm   Position: Sitting    Cuff Size: Medium Adult    Pulse: 60    SpO2: 91%    Weight: 177 lb (80.3 kg)    Height: 6' 3\" (1.905 m)      The following problems were reviewed today and where indicated follow up appointments were made and/or referrals ordered.     Risk Factor Screenings with Interventions     Fall Risk:  2 or more falls in past year?: no  Fall with injury in past year?: no  Fall Risk Interventions:    · None indicated    Depression:  PHQ-2 Score: 0  Depression Interventions:  · None indicated    Anxiety:  Anxiety Score: 0  Anxiety Interventions:  · None indicated    Cognitive:     Cognitive Impairment Interventions:  · None indicated    Substance Abuse:  Social History     Social History Main Topics    Smoking status: Former Smoker     Years: 5.00     Types: Cigarettes     Quit date: 4/13/1990    Smokeless tobacco: Never Used      Comment: quit smoking in 1990 --- smoked from 0578-1901    Alcohol use No    Drug use: No    Sexual activity: Yes     Partners: Female      Comment:  - Catarina Pena ([id number]) - 1958     Audit Questionnaire: Screen for Alcohol Misuse  How often do you have a drink containing alcohol?: Never  Substance Abuse Interventions:  · None indicated    Health Risk Assessment:     General  In general, how would you say your health is?: Good  In the past 7 days, have you experienced any of the following?: None of These  Do you get the social and emotional support that you need?: Yes  Do you have a Living Will?: (!) No  General Health Risk Interventions:  · No Living Will: additional information provided    Health Habits/Nutrition  Do you exercise for at least 20 minutes 2-3 times per week?: (!) No  Have you lost any weight without trying in the past 3 months?: No  Do you eat fewer than 2 meals per day?: No  Have you seen a dentist within the past year?: (!) No  Body mass index is 22.12 kg/m². Health Habits/Nutrition Interventions:  · discussed physical activity.  relativly ok     Hearing/Vision  Do you or your family notice any trouble with your hearing?: No  Do you have difficulty driving, watching TV, or doing any of your daily activities because of your eyesight?: No  Have you had an eye exam within the past year?: (!) No  Hearing/Vision Interventions:  · recomended intermittent eye doctro exam     Safety  Do you have working smoke detectors?: Yes  Have all throw rugs been removed or fastened?: Yes  Do you have non-slip mats in all bathtubs?: Yes  Do all of your stairways have a railing or banister?: Yes  Are your doorways, halls and stairs free of clutter?: Yes  Do you always fasten your seatbelt when you are in a car?: Yes  Safety Interventions:  · None indicated    ADLs  In the past 7 days, did you need help from others to perform any of the following everyday activities?: None  In the past 7 days, did you need help from others to take care of any of the following?: None  ADL Interventions:  · None indicated    Personalized Preventive Plan   Current Health Maintenance Status  Immunization History   Administered Date(s) Administered    Influenza A (H1N1) Vaccine IM 01/05/2010    Influenza Virus Vaccine 10/09/2013    Influenza, High Dose 10/01/2010, 10/10/2011, 10/09/2012, 10/15/2014, 11/25/2015, 11/30/2016, 09/19/2017    PPD Test 05/21/2015, 05/31/2015    Pneumococcal 13-valent Conjugate ([id number]) 11/25/2015    Pneumococcal Polysaccharide ([id number]) 11/13/2006, 10/10/2011    Tdap (Boostrix, Adacel) 04/09/2013        Health Maintenance   Topic Date Due    DTaP/Tdap/Td vaccine (2 - Td) 04/09/2023    Zostavax vaccine  Addressed    Flu vaccine  Completed    Pneumococcal low/med risk  Completed       Recommendations for Preventive

## 2017-12-12 LAB
ESTIMATED AVERAGE GLUCOSE: 134.1 MG/DL
HBA1C MFR BLD: 6.3 %

## 2017-12-13 ENCOUNTER — OFFICE VISIT (OUTPATIENT)
Dept: INFECTIOUS DISEASES | Age: 82
End: 2017-12-13

## 2017-12-13 VITALS
DIASTOLIC BLOOD PRESSURE: 82 MMHG | TEMPERATURE: 97.9 F | SYSTOLIC BLOOD PRESSURE: 136 MMHG | HEIGHT: 75 IN | BODY MASS INDEX: 21.64 KG/M2 | WEIGHT: 174 LBS

## 2017-12-13 DIAGNOSIS — T84.50XA INFECTION AND INFLAMMATORY REACTION DUE TO INTERNAL JOINT PROSTHESIS, INITIAL ENCOUNTER (HCC): Primary | ICD-10-CM

## 2017-12-13 DIAGNOSIS — A49.8 KLEBSIELLA INFECTION: ICD-10-CM

## 2017-12-13 LAB
PROSTATE SPECIFIC ANTIGEN FREE: 0.8 UG/L
PROSTATE SPECIFIC ANTIGEN PERCENT FREE: 57.1 %
PROSTATE SPECIFIC ANTIGEN: 1.4 UG/L (ref 0–4)

## 2017-12-13 PROCEDURE — 1036F TOBACCO NON-USER: CPT | Performed by: INTERNAL MEDICINE

## 2017-12-13 PROCEDURE — G8420 CALC BMI NORM PARAMETERS: HCPCS | Performed by: INTERNAL MEDICINE

## 2017-12-13 PROCEDURE — G8484 FLU IMMUNIZE NO ADMIN: HCPCS | Performed by: INTERNAL MEDICINE

## 2017-12-13 PROCEDURE — 4040F PNEUMOC VAC/ADMIN/RCVD: CPT | Performed by: INTERNAL MEDICINE

## 2017-12-13 PROCEDURE — 1123F ACP DISCUSS/DSCN MKR DOCD: CPT | Performed by: INTERNAL MEDICINE

## 2017-12-13 PROCEDURE — G8598 ASA/ANTIPLAT THER USED: HCPCS | Performed by: INTERNAL MEDICINE

## 2017-12-13 PROCEDURE — 99214 OFFICE O/P EST MOD 30 MIN: CPT | Performed by: INTERNAL MEDICINE

## 2017-12-13 PROCEDURE — G8427 DOCREV CUR MEDS BY ELIG CLIN: HCPCS | Performed by: INTERNAL MEDICINE

## 2017-12-13 RX ORDER — CEFUROXIME AXETIL 500 MG/1
500 TABLET ORAL DAILY
Qty: 90 TABLET | Refills: 3 | Status: SHIPPED | OUTPATIENT
Start: 2017-12-13 | End: 2018-12-09 | Stop reason: SDUPTHER

## 2017-12-13 NOTE — PROGRESS NOTES
Infectious Diseases Oupatient Follow-up Note    Primary Care Physician:  Joseph Ignacio MD  History Obtained From:   Patient, wife - Jarred Longoria / ID problem:    Chief Complaint   Patient presents with    Follow-up     Hip infection       HISTORY OF PRESENT ILLNESS / Interval history:      Dx: L TEMO infection, cult + K oxytoca, on chronic suppression. 69 yo man with multiple medical problems. Pt lives in community. Pt does have urinary incontinence. Pt had L hip fracture on 5/15 and had L TEMO on 5/17. He was discharged to [facility] May 20 and home approximately 6/14 (per Dr Callaway Congress note). Pt had L hip subcutaneous collection that was drained in office, culture negative (per Dr Binh Corbin note). Pt reports drainage from wound over few weeks, yellow in color. He say that pain he has had after surgery has diminished over time. No f/c/sweats. Admit 6/19/15 with L hip pain. Afebrile. WBC 13.9. + UA, started on augmentin for UTI. Plain x-ray unremarkable. Seen by Ortho. Pt had hip CT that demonstrated a soft tissue collection. Ortho, Dr Gerry Diaz, aspirated and then opened wound. Fluid with blood and purulence, sent for culture - growing K oxytoca. He underwent surgery 6/23 (I&D, exchange femoral head, no removal). Discharged 6/26/15 to [facility]. Pt received iv ertapenem through 8/1, was discharged. Started on po cefuroxime 500 bid on 8/3. He has f/u with Ortho and PCP. Seen 9/30/15, pt presented with wife, Catarina Pena. He is feeling well overall. L hip is 'sore with sitting a while'. Wound closed. Taking po cefuroxime. He sometimes has nausea in am after taking am antibiotic dose, never with evening dose. He takes this med at the same time as other meds. Wife also reports wt loss though this seems to predate fall / hip fracture, surgeries and SSI. Seen 11/11/15, pt presented with wife, Catarina Pena. He was feeling well overall. L hip is \"better\", still 'sore with sitting a while'.   Wound closed. Taking po cefuroxime twice a day. Tolerating antibiotic, no further nausea after morning dose. His weight is up 4 lb (see note 9/30 regarding wt loss). Seen 2/18/16, pt presents with wife, Binh Diaz. He is feeling well overall. L hip is \"better\", no pain. Wound closed. Taking po cefuroxime once a day. Tolerating antibiotic, no further nausea after morning dose. Seen 5/19/16, pt presents with wife, Binh Diaz. L hip has restricted ROM, unable to bend over. Seen by Dr Lakhwinder Small and had x-ray with 'calcium'. Taking po cefuroxime once a day. Seen 11/16/16, pt presents with wife, Binh Diaz. Pt using walker out of house and cane at home. L hip has restricted ROM, unable to bend over. Taking po cefuroxime twice a day. Today 6/15/17, pt presents with wife, Binh Diaz. Pt using walker out of house and cane at home. L hip has restricted ROM, unable to bend over. Taking po cefuroxime twice a day. Today 12/13/17, pt presents with wife, Binh Diaz. Pt using walker out of house and cane at home. L hip has restricted ROM, unable to bend over. Taking po cefuroxime once a day.       Past Medical History:   Diagnosis Date    Acute cholecystitis 9/13/2012    Anemia 1/9/2012    Anxiety 7/2/2010    Arthritis     Back pain 5/16/2010    BPH (benign prostatic hypertrophy) 5/16/2010    Constipation 6/16/2015    Depression 6/16/2015    Diverticulosis 5/16/2010    Dyspnea on exertion 5/16/2010    Eczema 4/12/2011    Elevated PSA 10/30/2012    Erectile dysfunction 5/16/2010    Essential hypertension 8/26/2015    GERD (gastroesophageal reflux disease) 5/16/2010    Hemorrhoid 5/16/2010    Hyperlipidemia 1/10/2011    Hyperphosphatemia 7/3/2010    Hypertension 5/16/2010    Insomnia 6/16/2015    Left hip pain 6/16/2015    Memory loss 1/14/2015    Nausea 6/16/2015    Overactive bladder 7/12/2011    Paranoid schizophrenia ([facility] [address] 5/16/2010    Perennial allergic rhinitis 11/30/2016    Prostate cancer ([facility] [address] 1/8/2013    Type 2 diabetes mellitus without complication, without long-term current use of insulin ([facility] [address] 8/31/2016       Past Surgical History:   Procedure Laterality Date    CHOLECYSTECTOMY, LAPAROSCOPIC  September 13, 2012    Dr. Orlando Patel  December 1, 2011    Dr. Homero Dias COLONOSCOPY  June 16, 2011   [facility] HIP SURGERY Left June 23, 2015    Dr. Chelle Santoyo - incision and drainage of left hip infected hematoma     JOINT REPLACEMENT Left     hip    OTHER SURGICAL HISTORY  11/06/2017     ESOPHAGOGASTRODUODENOSCOPY                OTHER SURGICAL HISTORY  11/06/2017     LAPAROSCOPIC HELLER MYOTOMY 1 WRAP, EGD    PROSTATE BIOPSY  November 2012    Dr. Robi Bai  May 8, 2001    TURP    TOTAL HIP ARTHROPLASTY Left May 17, 2015    Dr. Destini Thornton  November 7, 2011    UPPER GASTROINTESTINAL ENDOSCOPY  February 4, 2016    Dr. Prabha Mcnair ENDOSCOPY N/A 04/15/2016    Esophagogastroduodenoscopy with Botulinum toxin injection of the lower esophageal sphincter (LES)    UPPER GASTROINTESTINAL ENDOSCOPY  04/25/2017    dilatation; and botox injection 4 units       Current Medications:    Current Outpatient Prescriptions   Medication Sig Dispense Refill    cefUROXime (CEFTIN) 500 MG tablet Take 1 tablet by mouth 2 times daily 60 tablet 5    fluticasone (FLONASE) 50 MCG/ACT nasal spray 2 sprays by Nasal route daily as needed for Rhinitis      CVS MELATONIN 3 MG TABS tablet TAKE 1 TABLET BY MOUTH NIGHTLY 30 tablet 12    polyethylene glycol (GLYCOLAX) packet Take 17 g by mouth daily as needed for Constipation Mix with eight ounces of water.  1 each 12    pantoprazole (PROTONIX) 40 MG tablet Take 1 tablet by mouth daily 90 tablet 3    terazosin (HYTRIN) 10 MG capsule Take 1 capsule by mouth nightly 90 capsule 3    montelukast (SINGULAIR) 10 MG tablet Take 1 tablet by mouth nightly 90 tablet 3    potassium chloride (KLOR-CON M10) 10 MEQ extended release tablet Take 1 tablet by mouth daily with food. 30 tablet 12    risperiDONE (RISPERDAL) 0.5 MG tablet Take 1 tablet by mouth nightly 90 tablet 3    sertraline (ZOLOFT) 50 MG tablet Take 1 tablet by mouth daily 90 tablet 3    atenolol (TENORMIN) 25 MG tablet Take 0.5 tablets by mouth daily 45 tablet 3    metFORMIN (GLUCOPHAGE) 500 MG tablet Take 1 tablet by mouth daily with food 90 tablet 3    OXcarbazepine (TRILEPTAL) 300 MG tablet Take 1 tablet by mouth 2 times daily 180 tablet 3    losartan (COZAAR) 100 MG tablet Take 1 tablet by mouth daily 90 tablet 3    HYDROcodone-acetaminophen (NORCO) 5-325 MG per tablet Take 1 tablet by mouth every 6 hours as needed for Pain . 60 tablet 0    ondansetron (ZOFRAN) 4 MG tablet Take 1 tablet by mouth daily as needed for Nausea 30 tablet 12    aspirin 325 MG EC tablet Take 1 tablet by mouth daily with food      oxybutynin (DITROPAN XL) 15 MG extended release tablet Take 1 tablet by mouth 2 times daily      desmopressin (DDAVP) 0.2 MG tablet Take 1 tablet by mouth nightly      Accu-Chek Softclix Lancets MISC TEST 2 TO 4 TIMES PER  each 1    ACCU-CHEK ZEENAT PLUS strip USE AS DIRECTED TO TEST BLOOD SUGAR UP TO 2-4 TIMES A  strip 2    ferrous sulfate 325 (65 FE) MG tablet Take 1 tablet by mouth 2 times daily (with meals)      diazepam (VALIUM) 5 MG tablet Take 1 tablet by mouth 2 times daily as needed for Anxiety 60 tablet 2    Multiple Vitamins-Minerals (CENTRUM SILVER ULTRA MENS) TABS Take 1 tablet by mouth daily      nitroGLYCERIN (NITROSTAT) 0.4 MG SL tablet Place 1 tablet under the tongue every 5 minutes as needed. 25 tablet 12     No current facility-administered medications for this visit.         Allergies:  Lorazepam    Social History:    TOBACCO:   None   ETOH:   None   DRUGS:   None   MARITAL STATUS:     OCCUPATION:  Retired drainage  4. Return to office in 6 - 12 months    Spent over 40 minutes with patient, reviewing data, discussing multiple problems and medications, formulating plan.   Spent over 50% visit educating and counseling pt and wife, discussed chronic joint infection, risk / benefit of continue or stopping suppressive antibiotics    Quinton Mcknight MD

## 2017-12-25 VITALS
SYSTOLIC BLOOD PRESSURE: 126 MMHG | HEIGHT: 75 IN | OXYGEN SATURATION: 91 % | HEART RATE: 60 BPM | DIASTOLIC BLOOD PRESSURE: 64 MMHG | BODY MASS INDEX: 22.01 KG/M2 | WEIGHT: 177 LBS

## 2018-01-30 RX ORDER — MULTIVITAMIN WITH FOLIC ACID 400 MCG
1 TABLET ORAL DAILY
Qty: 30 TABLET | Refills: 8 | Status: SHIPPED | OUTPATIENT
Start: 2018-01-30 | End: 2018-10-22 | Stop reason: SDUPTHER

## 2018-01-31 ENCOUNTER — CARE COORDINATION (OUTPATIENT)
Dept: CARE COORDINATION | Age: 83
End: 2018-01-31

## 2018-01-31 RX ORDER — MULTIVITAMIN
TABLET ORAL
Qty: 30 TABLET | Refills: 12 | Status: SHIPPED | OUTPATIENT
Start: 2018-01-31 | End: 2019-09-16 | Stop reason: SDUPTHER

## 2018-02-14 ENCOUNTER — CARE COORDINATION (OUTPATIENT)
Dept: CARE COORDINATION | Age: 83
End: 2018-02-14

## 2018-02-20 ENCOUNTER — CARE COORDINATION (OUTPATIENT)
Dept: INTERNAL MEDICINE | Age: 83
End: 2018-02-20

## 2018-02-20 NOTE — LETTER
2/20/2018    Jabari Dominguez  [address]    I am following up on my previous contact. I wanted to introduce myself and the care coordination program offered here at Loretta Mane MD's office. Our goal is to support you in your efforts to be as healthy as possible. [facility] ([facility]) is committed to walk with you on your journey to better health. Please let me know if you have any questions or if you would like to schedule an appointment with me. You can reach me at the numbers listed below.      In good health,     Maggy Finley, RN    Office  [phone]

## 2018-02-27 ENCOUNTER — CARE COORDINATION (OUTPATIENT)
Dept: CARE COORDINATION | Age: 83
End: 2018-02-27

## 2018-02-27 NOTE — CARE COORDINATION
No response after multiple attempt and final letter sent.  [facility] will close out to CC and remove from Care Team.

## 2018-03-12 ENCOUNTER — OFFICE VISIT (OUTPATIENT)
Dept: INTERNAL MEDICINE | Age: 83
End: 2018-03-12

## 2018-03-12 VITALS
WEIGHT: 175 LBS | DIASTOLIC BLOOD PRESSURE: 80 MMHG | SYSTOLIC BLOOD PRESSURE: 152 MMHG | BODY MASS INDEX: 21.87 KG/M2 | OXYGEN SATURATION: 93 % | HEART RATE: 74 BPM

## 2018-03-12 DIAGNOSIS — R05.3 CHRONIC COUGH: Primary | ICD-10-CM

## 2018-03-12 DIAGNOSIS — I10 ESSENTIAL HYPERTENSION: Chronic | ICD-10-CM

## 2018-03-12 DIAGNOSIS — I25.9 ISCHEMIC HEART DISEASE: Chronic | ICD-10-CM

## 2018-03-12 DIAGNOSIS — E78.5 HYPERLIPIDEMIA, UNSPECIFIED HYPERLIPIDEMIA TYPE: Chronic | ICD-10-CM

## 2018-03-12 DIAGNOSIS — F20.0 PARANOID SCHIZOPHRENIA (HCC): Chronic | ICD-10-CM

## 2018-03-12 DIAGNOSIS — E11.9 TYPE 2 DIABETES MELLITUS WITHOUT COMPLICATION, WITHOUT LONG-TERM CURRENT USE OF INSULIN (HCC): Chronic | ICD-10-CM

## 2018-03-12 PROBLEM — R11.2 NON-INTRACTABLE VOMITING WITH NAUSEA: Status: RESOLVED | Noted: 2017-03-07 | Resolved: 2018-03-12

## 2018-03-12 PROBLEM — R09.89 CHEST CONGESTION: Status: RESOLVED | Noted: 2017-09-19 | Resolved: 2018-03-12

## 2018-03-12 LAB
A/G RATIO: 1.2 (ref 1.1–2.2)
ALBUMIN SERPL-MCNC: 3.8 G/DL (ref 3.4–5)
ALP BLD-CCNC: 80 U/L (ref 40–129)
ALT SERPL-CCNC: 10 U/L (ref 10–40)
ANION GAP SERPL CALCULATED.3IONS-SCNC: 16 MMOL/L (ref 3–16)
AST SERPL-CCNC: 17 U/L (ref 15–37)
BASOPHILS ABSOLUTE: 0 K/UL (ref 0–0.2)
BASOPHILS RELATIVE PERCENT: 0.4 %
BILIRUB SERPL-MCNC: <0.2 MG/DL (ref 0–1)
BUN BLDV-MCNC: 17 MG/DL (ref 7–20)
CALCIUM SERPL-MCNC: 8.8 MG/DL (ref 8.3–10.6)
CHLORIDE BLD-SCNC: 102 MMOL/L (ref 99–110)
CHOLESTEROL, TOTAL: 123 MG/DL (ref 0–199)
CO2: 24 MMOL/L (ref 21–32)
CREAT SERPL-MCNC: 1.3 MG/DL (ref 0.8–1.3)
EOSINOPHILS ABSOLUTE: 0.2 K/UL (ref 0–0.6)
EOSINOPHILS RELATIVE PERCENT: 2.8 %
GFR AFRICAN AMERICAN: >60
GFR NON-AFRICAN AMERICAN: 53
GLOBULIN: 3.2 G/DL
GLUCOSE BLD-MCNC: 108 MG/DL (ref 70–99)
HCT VFR BLD CALC: 33.7 % (ref 40.5–52.5)
HDLC SERPL-MCNC: 55 MG/DL (ref 40–60)
HEMOGLOBIN: 11.2 G/DL (ref 13.5–17.5)
LDL CHOLESTEROL CALCULATED: 53 MG/DL
LYMPHOCYTES ABSOLUTE: 0.9 K/UL (ref 1–5.1)
LYMPHOCYTES RELATIVE PERCENT: 12 %
MCH RBC QN AUTO: 28.6 PG (ref 26–34)
MCHC RBC AUTO-ENTMCNC: 33.1 G/DL (ref 31–36)
MCV RBC AUTO: 86.4 FL (ref 80–100)
MONOCYTES ABSOLUTE: 0.6 K/UL (ref 0–1.3)
MONOCYTES RELATIVE PERCENT: 8.9 %
NEUTROPHILS ABSOLUTE: 5.4 K/UL (ref 1.7–7.7)
NEUTROPHILS RELATIVE PERCENT: 75.9 %
PDW BLD-RTO: 13.2 % (ref 12.4–15.4)
PLATELET # BLD: 218 K/UL (ref 135–450)
PMV BLD AUTO: 10 FL (ref 5–10.5)
POTASSIUM SERPL-SCNC: 4.4 MMOL/L (ref 3.5–5.1)
RBC # BLD: 3.9 M/UL (ref 4.2–5.9)
SODIUM BLD-SCNC: 142 MMOL/L (ref 136–145)
TOTAL PROTEIN: 7 G/DL (ref 6.4–8.2)
TRIGL SERPL-MCNC: 77 MG/DL (ref 0–150)
VLDLC SERPL CALC-MCNC: 15 MG/DL
WBC # BLD: 7.1 K/UL (ref 4–11)

## 2018-03-12 PROCEDURE — 1036F TOBACCO NON-USER: CPT | Performed by: INTERNAL MEDICINE

## 2018-03-12 PROCEDURE — 4040F PNEUMOC VAC/ADMIN/RCVD: CPT | Performed by: INTERNAL MEDICINE

## 2018-03-12 PROCEDURE — G8598 ASA/ANTIPLAT THER USED: HCPCS | Performed by: INTERNAL MEDICINE

## 2018-03-12 PROCEDURE — 1123F ACP DISCUSS/DSCN MKR DOCD: CPT | Performed by: INTERNAL MEDICINE

## 2018-03-12 PROCEDURE — 99214 OFFICE O/P EST MOD 30 MIN: CPT | Performed by: INTERNAL MEDICINE

## 2018-03-12 PROCEDURE — G8482 FLU IMMUNIZE ORDER/ADMIN: HCPCS | Performed by: INTERNAL MEDICINE

## 2018-03-12 PROCEDURE — G8420 CALC BMI NORM PARAMETERS: HCPCS | Performed by: INTERNAL MEDICINE

## 2018-03-12 PROCEDURE — G8427 DOCREV CUR MEDS BY ELIG CLIN: HCPCS | Performed by: INTERNAL MEDICINE

## 2018-03-12 RX ORDER — ATORVASTATIN CALCIUM 10 MG/1
10 TABLET, FILM COATED ORAL DAILY
Qty: 90 TABLET | Refills: 3 | Status: SHIPPED | OUTPATIENT
Start: 2018-03-12 | End: 2019-02-23 | Stop reason: SDUPTHER

## 2018-03-12 RX ORDER — AMLODIPINE BESYLATE 10 MG/1
10 TABLET ORAL DAILY
Qty: 90 TABLET | Refills: 2 | Status: SHIPPED | OUTPATIENT
Start: 2018-03-12 | End: 2018-08-14

## 2018-03-12 ASSESSMENT — ENCOUNTER SYMPTOMS
ABDOMINAL PAIN: 0
NAUSEA: 0
VOMITING: 0
SHORTNESS OF BREATH: 0
CHEST TIGHTNESS: 0
BLOOD IN STOOL: 0
COUGH: 1

## 2018-03-12 NOTE — PROGRESS NOTES
congestion. Respiratory: Positive for cough. Negative for chest tightness and shortness of breath. Cardiovascular: Negative for chest pain. Gastrointestinal: Negative for abdominal pain, blood in stool, nausea and vomiting. Genitourinary: Negative for hematuria. Physical Exam:      Vitals: BP (!) 152/80 (Site: Left Arm, Position: Sitting, Cuff Size: Small Adult)   Pulse 74   Wt 175 lb (79.4 kg)   SpO2 93%   BMI 21.87 kg/m²     Body mass index is 21.87 kg/m². Wt Readings from Last 3 Encounters:   03/12/18 175 lb (79.4 kg)   12/13/17 174 lb (78.9 kg)   12/11/17 177 lb (80.3 kg)     Physical Exam   Constitutional: He is oriented to person, place, and time. He appears well-developed and well-nourished. No distress. HENT:   Head: Normocephalic and atraumatic. Right Ear: External ear normal.   Left Ear: External ear normal.   Mouth/Throat: Oropharynx is clear and moist. No oropharyngeal exudate. Eyes: Conjunctivae and EOM are normal. Right eye exhibits no discharge. Left eye exhibits no discharge. No scleral icterus. Neck: Normal range of motion. Neck supple. Cardiovascular: Normal rate and normal heart sounds. No murmur heard. Pulmonary/Chest: Effort normal and breath sounds normal. No respiratory distress. He has no wheezes. He has no rales. Abdominal: Soft. There is no tenderness. Lymphadenopathy:     He has no cervical adenopathy. Neurological: He is alert and oriented to person, place, and time. He has normal reflexes. No cranial nerve deficit. He exhibits normal muscle tone. Skin: No rash noted. He is not diaphoretic. Psychiatric: He has a normal mood and affect. His behavior is normal. Thought content normal.        Assessment/Plan:   Karlie Dover was seen today for diabetes, hypertension and hyperlipidemia. Diagnoses and all orders for this visit:    Chronic cough  Patient has chronic cough. He doesn't have any history of smoking.   He does have symptoms of reflux and also was having evidence of reflux in the CAT scan. He is on lisinoprilI will discontinue the lisinopril and replaced with amlodipine. If that will not help consider GI consult, he is already on Protonix 40 mg    Essential hypertension  Blood pressure is uncontrolled. I will switch lisinopril for amlodipine due to his cough he will come to check the blood pressure D this week and we'll see if further adjustment needs to be done  -     amLODIPine (NORVASC) 10 MG tablet; Take 1 tablet by mouth daily  -     CBC Auto Differential; Future  -     Comprehensive Metabolic Panel; Future  -     Lipid Panel; Future    Type 2 diabetes mellitus without complication, without long-term current use of insulin (HCC)  Well-controlledno changes in medications  -     CBC Auto Differential; Future  -     Comprehensive Metabolic Panel; Future  -     Hemoglobin A1C; Future  -     Lipid Panel; Future    Hyperlipidemia, unspecified hyperlipidemia type  LDL is very low. Although he is diabetic due to his age and comorbidity and polypharmacy I will start Lipitor 10 mg daily -     Lipid Panel; Future    Ischemic heart disease  Keep aspirin, Start Lipitor   -     CBC Auto Differential; Future  -     Comprehensive Metabolic Panel; Future  -     Lipid Panel; Future    Paranoid schizophrenia [facility])  -     External Referral to Psychiatry      Quality & Risk Score Accuracy - MEDICARE ADVANTAGE    Last edited 03/12/18 10:09 EDT by Janna Valerio MD           - Patient was encouraged to call the office or be seen with MD for any worsening or lack    of improvement in his symptoms. Pt was informed that in urgent cases with difficulties     in scheduling- he can come to the office and he will be seen as soon as possible.   - Anticipated follow up in 3 months    Additional patients instructions (if given):   Patient Instructions   Please recheck in the office the blood pressure this week     Janna Valerio M.D.   3/12/2018, 11:06 AM

## 2018-03-13 LAB
ESTIMATED AVERAGE GLUCOSE: 131.2 MG/DL
HBA1C MFR BLD: 6.2 %

## 2018-03-19 ENCOUNTER — OFFICE VISIT (OUTPATIENT)
Dept: PSYCHIATRY | Age: 83
End: 2018-03-19

## 2018-03-19 VITALS
BODY MASS INDEX: 21.51 KG/M2 | SYSTOLIC BLOOD PRESSURE: 148 MMHG | WEIGHT: 173 LBS | HEART RATE: 68 BPM | HEIGHT: 75 IN | DIASTOLIC BLOOD PRESSURE: 66 MMHG

## 2018-03-19 DIAGNOSIS — F41.8 OTHER SPECIFIED ANXIETY DISORDERS: Primary | ICD-10-CM

## 2018-03-19 DIAGNOSIS — F20.0 PARANOID SCHIZOPHRENIA (HCC): ICD-10-CM

## 2018-03-19 PROCEDURE — 99204 OFFICE O/P NEW MOD 45 MIN: CPT | Performed by: PSYCHIATRY & NEUROLOGY

## 2018-05-21 ENCOUNTER — OFFICE VISIT (OUTPATIENT)
Dept: INTERNAL MEDICINE | Age: 83
End: 2018-05-21

## 2018-05-21 VITALS
SYSTOLIC BLOOD PRESSURE: 138 MMHG | RESPIRATION RATE: 12 BRPM | DIASTOLIC BLOOD PRESSURE: 72 MMHG | HEART RATE: 57 BPM | BODY MASS INDEX: 21.51 KG/M2 | HEIGHT: 75 IN | OXYGEN SATURATION: 98 % | WEIGHT: 173 LBS

## 2018-05-21 DIAGNOSIS — L72.3 SEBACEOUS CYST OF RIGHT AXILLA: Primary | ICD-10-CM

## 2018-05-21 PROCEDURE — G8420 CALC BMI NORM PARAMETERS: HCPCS | Performed by: INTERNAL MEDICINE

## 2018-05-21 PROCEDURE — G8598 ASA/ANTIPLAT THER USED: HCPCS | Performed by: INTERNAL MEDICINE

## 2018-05-21 PROCEDURE — 99213 OFFICE O/P EST LOW 20 MIN: CPT | Performed by: INTERNAL MEDICINE

## 2018-05-21 PROCEDURE — 1036F TOBACCO NON-USER: CPT | Performed by: INTERNAL MEDICINE

## 2018-05-21 PROCEDURE — 4040F PNEUMOC VAC/ADMIN/RCVD: CPT | Performed by: INTERNAL MEDICINE

## 2018-05-21 PROCEDURE — 1123F ACP DISCUSS/DSCN MKR DOCD: CPT | Performed by: INTERNAL MEDICINE

## 2018-05-21 PROCEDURE — G8427 DOCREV CUR MEDS BY ELIG CLIN: HCPCS | Performed by: INTERNAL MEDICINE

## 2018-05-21 ASSESSMENT — ENCOUNTER SYMPTOMS
BACK PAIN: 0
NAUSEA: 0
SHORTNESS OF BREATH: 0
ABDOMINAL PAIN: 0
EYE REDNESS: 0
CHEST TIGHTNESS: 0

## 2018-06-01 RX ORDER — ASPIRIN 325 MG
325 TABLET, DELAYED RELEASE (ENTERIC COATED) ORAL DAILY
Qty: 30 TABLET | Refills: 12 | Status: SHIPPED | OUTPATIENT
Start: 2018-06-01 | End: 2019-04-24 | Stop reason: SDUPTHER

## 2018-06-12 ENCOUNTER — OFFICE VISIT (OUTPATIENT)
Dept: INTERNAL MEDICINE | Age: 83
End: 2018-06-12

## 2018-06-12 VITALS
HEART RATE: 61 BPM | BODY MASS INDEX: 21.12 KG/M2 | SYSTOLIC BLOOD PRESSURE: 112 MMHG | DIASTOLIC BLOOD PRESSURE: 62 MMHG | WEIGHT: 169 LBS | OXYGEN SATURATION: 96 %

## 2018-06-12 DIAGNOSIS — I10 ESSENTIAL HYPERTENSION: Chronic | ICD-10-CM

## 2018-06-12 DIAGNOSIS — E11.9 TYPE 2 DIABETES MELLITUS WITHOUT COMPLICATION, WITHOUT LONG-TERM CURRENT USE OF INSULIN (HCC): Primary | Chronic | ICD-10-CM

## 2018-06-12 DIAGNOSIS — I25.9 ISCHEMIC HEART DISEASE: Chronic | ICD-10-CM

## 2018-06-12 DIAGNOSIS — E11.9 TYPE 2 DIABETES MELLITUS WITHOUT COMPLICATION, WITHOUT LONG-TERM CURRENT USE OF INSULIN (HCC): Chronic | ICD-10-CM

## 2018-06-12 DIAGNOSIS — R97.20 ELEVATED PSA: Chronic | ICD-10-CM

## 2018-06-12 DIAGNOSIS — R63.4 LOSS OF WEIGHT: ICD-10-CM

## 2018-06-12 DIAGNOSIS — E78.5 HYPERLIPIDEMIA, UNSPECIFIED HYPERLIPIDEMIA TYPE: Chronic | ICD-10-CM

## 2018-06-12 LAB
A/G RATIO: 1.3 (ref 1.1–2.2)
ALBUMIN SERPL-MCNC: 4 G/DL (ref 3.4–5)
ALP BLD-CCNC: 78 U/L (ref 40–129)
ALT SERPL-CCNC: 10 U/L (ref 10–40)
ANION GAP SERPL CALCULATED.3IONS-SCNC: 12 MMOL/L (ref 3–16)
AST SERPL-CCNC: 17 U/L (ref 15–37)
BASOPHILS ABSOLUTE: 0 K/UL (ref 0–0.2)
BASOPHILS RELATIVE PERCENT: 0.5 %
BILIRUB SERPL-MCNC: <0.2 MG/DL (ref 0–1)
BUN BLDV-MCNC: 21 MG/DL (ref 7–20)
CALCIUM SERPL-MCNC: 9.4 MG/DL (ref 8.3–10.6)
CHLORIDE BLD-SCNC: 102 MMOL/L (ref 99–110)
CO2: 27 MMOL/L (ref 21–32)
CREAT SERPL-MCNC: 1.3 MG/DL (ref 0.8–1.3)
EOSINOPHILS ABSOLUTE: 0.2 K/UL (ref 0–0.6)
EOSINOPHILS RELATIVE PERCENT: 2.6 %
GFR AFRICAN AMERICAN: >60
GFR NON-AFRICAN AMERICAN: 53
GLOBULIN: 3.2 G/DL
GLUCOSE BLD-MCNC: 109 MG/DL (ref 70–99)
HCT VFR BLD CALC: 32.6 % (ref 40.5–52.5)
HEMOGLOBIN: 11 G/DL (ref 13.5–17.5)
LACTATE DEHYDROGENASE: 159 U/L (ref 100–190)
LIPASE: 37 U/L (ref 13–60)
LYMPHOCYTES ABSOLUTE: 1 K/UL (ref 1–5.1)
LYMPHOCYTES RELATIVE PERCENT: 13.3 %
MCH RBC QN AUTO: 29 PG (ref 26–34)
MCHC RBC AUTO-ENTMCNC: 33.9 G/DL (ref 31–36)
MCV RBC AUTO: 85.7 FL (ref 80–100)
MONOCYTES ABSOLUTE: 0.8 K/UL (ref 0–1.3)
MONOCYTES RELATIVE PERCENT: 9.7 %
NEUTROPHILS ABSOLUTE: 5.7 K/UL (ref 1.7–7.7)
NEUTROPHILS RELATIVE PERCENT: 73.9 %
PDW BLD-RTO: 13.1 % (ref 12.4–15.4)
PLATELET # BLD: 223 K/UL (ref 135–450)
PMV BLD AUTO: 9.4 FL (ref 5–10.5)
POTASSIUM SERPL-SCNC: 4.3 MMOL/L (ref 3.5–5.1)
RBC # BLD: 3.8 M/UL (ref 4.2–5.9)
SODIUM BLD-SCNC: 141 MMOL/L (ref 136–145)
TOTAL PROTEIN: 7.2 G/DL (ref 6.4–8.2)
WBC # BLD: 7.8 K/UL (ref 4–11)

## 2018-06-12 PROCEDURE — 1123F ACP DISCUSS/DSCN MKR DOCD: CPT | Performed by: INTERNAL MEDICINE

## 2018-06-12 PROCEDURE — G8427 DOCREV CUR MEDS BY ELIG CLIN: HCPCS | Performed by: INTERNAL MEDICINE

## 2018-06-12 PROCEDURE — G8598 ASA/ANTIPLAT THER USED: HCPCS | Performed by: INTERNAL MEDICINE

## 2018-06-12 PROCEDURE — 1036F TOBACCO NON-USER: CPT | Performed by: INTERNAL MEDICINE

## 2018-06-12 PROCEDURE — 4040F PNEUMOC VAC/ADMIN/RCVD: CPT | Performed by: INTERNAL MEDICINE

## 2018-06-12 PROCEDURE — 99214 OFFICE O/P EST MOD 30 MIN: CPT | Performed by: INTERNAL MEDICINE

## 2018-06-12 PROCEDURE — G8420 CALC BMI NORM PARAMETERS: HCPCS | Performed by: INTERNAL MEDICINE

## 2018-06-12 ASSESSMENT — ENCOUNTER SYMPTOMS
SHORTNESS OF BREATH: 0
CHEST TIGHTNESS: 0
VOMITING: 0
COUGH: 0
ABDOMINAL PAIN: 1
DIARRHEA: 0
NAUSEA: 0
BLOOD IN STOOL: 0

## 2018-06-13 LAB
ESTIMATED AVERAGE GLUCOSE: 137 MG/DL
HBA1C MFR BLD: 6.4 %

## 2018-06-15 LAB
PROSTATE SPECIFIC ANTIGEN FREE: 0.6 UG/L
PROSTATE SPECIFIC ANTIGEN PERCENT FREE: 42.9 %
PROSTATE SPECIFIC ANTIGEN: 1.4 UG/L (ref 0–4)

## 2018-06-16 DIAGNOSIS — R63.4 LOSS OF WEIGHT: Primary | ICD-10-CM

## 2018-07-09 ENCOUNTER — OFFICE VISIT (OUTPATIENT)
Dept: PSYCHIATRY | Age: 83
End: 2018-07-09

## 2018-07-09 VITALS
HEIGHT: 75 IN | HEART RATE: 74 BPM | WEIGHT: 172 LBS | SYSTOLIC BLOOD PRESSURE: 118 MMHG | BODY MASS INDEX: 21.39 KG/M2 | DIASTOLIC BLOOD PRESSURE: 66 MMHG

## 2018-07-09 DIAGNOSIS — F41.8 OTHER SPECIFIED ANXIETY DISORDERS: Primary | ICD-10-CM

## 2018-07-09 PROCEDURE — 99214 OFFICE O/P EST MOD 30 MIN: CPT | Performed by: PSYCHIATRY & NEUROLOGY

## 2018-07-09 NOTE — PROGRESS NOTES
PSYCHIATRY PROGRESS NOTE    Lashell Mata  1935  07/09/18    Face to Face time: 15m  CC:   Chief Complaint   Patient presents with    Follow-up     Patient is here for a follow up. Reported h/o Schizophrenia, anxiety      HPI:   Lashell Mata is a [de-identified] y.o. male with h/o anxiety,  ?paranoid schizophrenia who p/t clinic to establish care with this provider. Referred by Kika Gomes MD.     Interim: unable to obtain records from . Pt and family are not sure why they are here today but say Dr. Dom Eng encouraged them to come in re: medications. They still feel he doesn't need the Risperdal or Trileptal. Denies any aggression, depression, psychosis, ceci, sleep disturbance. The pt does not have access to any firearms. Denies SI/HI. Denies any stressors. History obtained from patient and chart (confirmed by patient today).     Past Psychiatric History:    Prior hospitalizations: [facility] 2009 1 week   Prior diagnoses: ?2009    Prior medication trials/reactions to meds: Valium, Ativan, Risperdal, Zoloft   Outpatient Treatment: PCP   Suicide Attempts: Denies      Substance Use History:   Nicotine:   History   Smoking Status    Former Smoker    Packs/day: 1.00    Years: 18.00    Types: Cigarettes    Start date: 10/22/1972   [facility] Quit date: 4/13/1990   Smokeless Tobacco    Never Used     Comment: quit smoking in 1990       Alcohol: Denies   Illicits: Denies   Caffeine:  Denies   Rehabs/Complicated W/D: Denies, no DUI    Past Medical/Surgical History:   Past Medical History:   Diagnosis Date    Acute cholecystitis 9/13/2012    Anemia 1/9/2012    Anxiety 7/2/2010    Arthritis     Back pain 5/16/2010    BPH (benign prostatic hypertrophy) 5/16/2010    Constipation 6/16/2015    Depression 6/16/2015    Diverticulosis 5/16/2010    Dyspnea on exertion 5/16/2010    Eczema 4/12/2011    Elevated PSA 10/30/2012    Erectile dysfunction 5/16/2010    Essential hypertension 8/26/2015    GERD (gastroesophageal reflux disease) 5/16/2010    Hemorrhoid 5/16/2010    Hyperlipidemia 1/10/2011    Hyperphosphatemia 7/3/2010    Hypertension 5/16/2010    Insomnia 6/16/2015    Left hip pain 6/16/2015    Memory loss 1/14/2015    Nausea 6/16/2015    Overactive bladder 7/12/2011    Paranoid schizophrenia ([facility] [address] 5/16/2010    Perennial allergic rhinitis 11/30/2016    Prostate cancer ([facility] [address] 1/8/2013    Type 2 diabetes mellitus without complication, without long-term current use of insulin ([facility] [address] 8/31/2016     Past Surgical History:   Procedure Laterality Date    CHOLECYSTECTOMY, LAPAROSCOPIC  September 13, 2012    Dr. Felix Peterson  December 1, 2011    Dr. Beth Mendosa  June 16, 2011   [address] [place] [address] HIP SURGERY Left June 23, 2015    Dr. Jared Guzman - incision and drainage of left hip infected hematoma     JOINT REPLACEMENT Left     hip    OTHER SURGICAL HISTORY  11/06/2017     ESOPHAGOGASTRODUODENOSCOPY                OTHER SURGICAL HISTORY  11/06/2017     LAPAROSCOPIC HELLER MYOTOMY 270 WRAP, EGD    PROSTATE BIOPSY  November 2012    Dr. Roseline Melendez  May 8, 2001    TURP    TOTAL HIP ARTHROPLASTY Left May 17, 2015    Dr. Tyler Beard  November 7, 2011    UPPER GASTROINTESTINAL ENDOSCOPY  February 4, 2016    Dr. Guillermina Vargas ENDOSCOPY N/A 04/15/2016    Esophagogastroduodenoscopy with Botulinum toxin injection of the lower esophageal sphincter (LES)    UPPER GASTROINTESTINAL ENDOSCOPY  04/25/2017    dilatation; and botox injection 4 units         PCP: João Arnold MD      Social/Developmental History:    Developmental: Born and raised [address]   Marital: 64 years   Children: 4    Family:    Housing: With wife   Occupation/Income: Retired   Education:              [employer]:     Legal hx: Denies   Abuse hx:   Violence hx:   Access to firearms: No - got rid of in 2009, wife confirms    Family History:    Medical:  Family History   Problem Relation Age of Onset    Cancer Mother         colon cancer    Cancer Brother         colon cancer, stomach cancer      Psychiatric: Denies   History of completed suicide: Denies    Allergies: Allergies   Allergen Reactions    Lorazepam      Neither he nor wife remember a problem         Current Medications:   Current Outpatient Prescriptions   Medication Sig Dispense Refill    aspirin 325 MG EC tablet TAKE 1 TABLET BY MOUTH DAILY WITH FOOD 30 tablet 12    amLODIPine (NORVASC) 10 MG tablet Take 1 tablet by mouth daily 90 tablet 2    atorvastatin (LIPITOR) 10 MG tablet Take 1 tablet by mouth daily 90 tablet 3    DAILY MULTIPLE VITAMINS TABS TAKE 1 TABLET EVERY DAY 30 tablet 12    Multiple Vitamin (DAILY-TJ) TABS TAKE 1 TABLET BY MOUTH DAILY 30 tablet 8    fluticasone (FLONASE) 50 MCG/ACT nasal spray 2 sprays by Nasal route daily as needed for Rhinitis      [employer]S MELATONIN 3 MG TABS tablet TAKE 1 TABLET BY MOUTH NIGHTLY 30 tablet 12    polyethylene glycol (GLYCOLAX) packet Take 17 g by mouth daily as needed for Constipation Mix with eight ounces of water. 1 each 12    pantoprazole (PROTONIX) 40 MG tablet Take 1 tablet by mouth daily 90 tablet 3    terazosin (HYTRIN) 10 MG capsule Take 1 capsule by mouth nightly 90 capsule 3    montelukast (SINGULAIR) 10 MG tablet Take 1 tablet by mouth nightly 90 tablet 3    potassium chloride (KLOR-CON M10) 10 MEQ extended release tablet Take 1 tablet by mouth daily with food.  30 tablet 12    risperiDONE (RISPERDAL) 0.5 MG tablet Take 1 tablet by mouth nightly 90 tablet 3    sertraline (ZOLOFT) 50 MG tablet Take 1 tablet by mouth daily 90 tablet 3    atenolol (TENORMIN) 25 MG tablet Take 0.5 tablets by mouth daily 45 tablet 3    OXcarbazepine (TRILEPTAL) 300 MG tablet Take 1 tablet by mouth 2 times daily 180 tablet 3   

## 2018-07-20 RX ORDER — LOSARTAN POTASSIUM 100 MG/1
100 TABLET ORAL DAILY
Qty: 90 TABLET | Refills: 3 | Status: SHIPPED | OUTPATIENT
Start: 2018-07-20 | End: 2019-07-07 | Stop reason: SDUPTHER

## 2018-07-24 DIAGNOSIS — R41.3 MEMORY LOSS: Chronic | ICD-10-CM

## 2018-07-25 DIAGNOSIS — R41.3 MEMORY LOSS: Chronic | ICD-10-CM

## 2018-07-26 RX ORDER — GALANTAMINE HYDROBROMIDE 24 MG/1
24 CAPSULE, EXTENDED RELEASE ORAL DAILY
Qty: 30 CAPSULE | Refills: 5 | Status: SHIPPED | OUTPATIENT
Start: 2018-07-26 | End: 2018-07-30 | Stop reason: SDUPTHER

## 2018-07-30 ENCOUNTER — TELEPHONE (OUTPATIENT)
Dept: INTERNAL MEDICINE | Age: 83
End: 2018-07-30

## 2018-07-30 DIAGNOSIS — M25.552 LEFT HIP PAIN: ICD-10-CM

## 2018-07-30 RX ORDER — HYDROCODONE BITARTRATE AND ACETAMINOPHEN 5; 325 MG/1; MG/1
1 TABLET ORAL EVERY 6 HOURS PRN
Qty: 60 TABLET | Refills: 0 | Status: CANCELLED | OUTPATIENT
Start: 2018-07-30 | End: 2018-08-29

## 2018-07-30 RX ORDER — GALANTAMINE HYDROBROMIDE 24 MG/1
CAPSULE, EXTENDED RELEASE ORAL
Qty: 30 CAPSULE | Refills: 4 | Status: SHIPPED | OUTPATIENT
Start: 2018-07-30 | End: 2019-04-12 | Stop reason: SDUPTHER

## 2018-08-06 ENCOUNTER — OFFICE VISIT (OUTPATIENT)
Dept: PSYCHIATRY | Age: 83
End: 2018-08-06

## 2018-08-06 VITALS
WEIGHT: 169 LBS | HEIGHT: 75 IN | HEART RATE: 70 BPM | BODY MASS INDEX: 21.01 KG/M2 | SYSTOLIC BLOOD PRESSURE: 124 MMHG | DIASTOLIC BLOOD PRESSURE: 80 MMHG

## 2018-08-06 DIAGNOSIS — F34.1 DYSTHYMIA: ICD-10-CM

## 2018-08-06 DIAGNOSIS — F41.9 ANXIETY: Chronic | ICD-10-CM

## 2018-08-06 DIAGNOSIS — F20.0 PARANOID SCHIZOPHRENIA (HCC): Primary | Chronic | ICD-10-CM

## 2018-08-06 DIAGNOSIS — F41.8 OTHER SPECIFIED ANXIETY DISORDERS: ICD-10-CM

## 2018-08-06 PROCEDURE — 99214 OFFICE O/P EST MOD 30 MIN: CPT | Performed by: PSYCHIATRY & NEUROLOGY

## 2018-08-06 NOTE — PROGRESS NOTES
PSYCHIATRY PROGRESS NOTE    Dinaelys Penaloza  1935  08/06/18    Face to Face time: 15m  CC:   Chief Complaint   Patient presents with    Follow-up     Patient is here for a follow up. Reported h/o Schizophrenia, anxiety      HPI:   Dianelys Penaloza is a [de-identified] y.o. male with h/o anxiety,  ?paranoid schizophrenia who p/t clinic to establish care with this provider. Referred by Alessandra Koyanagi, MD.     Interim: no major events per chart    Risperdal stopped last appt and pt here for f/u to ensure no recurrence of psychosis. Here with wife and they both reports there has been no change since stopping the medication. Sleeping the same amount. Denies depressed mood, SI/HI, AVH, paranoia. Denies Anxiety. No ne stressors, spends time watching TV. D/w pt and family s/sx's of brittany and psychosisto look for. History obtained from patient and chart (confirmed by patient today).     Past Psychiatric History:    Prior hospitalizations: [facility] 2009 1 week   Prior diagnoses: ?2009    Prior medication trials/reactions to meds: Valium, Ativan, Risperdal, Zoloft   Outpatient Treatment: PCP   Suicide Attempts: Denies      Substance Use History:   Nicotine:   History   Smoking Status    Former Smoker    Packs/day: 1.00    Years: 18.00    Types: Cigarettes    Start date: 10/22/1972   Rosie Velasquez Quit date: 4/13/1990   Smokeless Tobacco    Never Used     Comment: quit smoking in 1990       Alcohol: Denies   Illicits: Denies   Caffeine:  Denies   Rehabs/Complicated W/D: Denies, no DUI    Past Medical/Surgical History:   Past Medical History:   Diagnosis Date    Acute cholecystitis 9/13/2012    Anemia 1/9/2012    Anxiety 7/2/2010    Arthritis     Back pain 5/16/2010    BPH (benign prostatic hypertrophy) 5/16/2010    Constipation 6/16/2015    Depression 6/16/2015    Diverticulosis 5/16/2010    Dyspnea on exertion 5/16/2010    Eczema 4/12/2011    Elevated PSA 10/30/2012    Erectile dysfunction 5/16/2010    Essential Denies   Abuse hx:   Violence hx:   Access to firearms: No - got rid of in 2009, wife confirms    Family History:    Medical:  Family History   Problem Relation Age of Onset    Cancer Mother         colon cancer    Cancer Brother         colon cancer, stomach cancer      Psychiatric: Denies   History of completed suicide: Denies    Allergies: Allergies   Allergen Reactions    Lorazepam      Neither he nor wife remember a problem         Current Medications:   Current Outpatient Prescriptions   Medication Sig Dispense Refill    galantamine (RAZADYNE ER) 24 MG extended release capsule TAKE 1 CAPSULE BY MOUTH DAILY 30 capsule 4    losartan (COZAAR) 100 MG tablet TAKE 1 TABLET BY MOUTH DAILY 90 tablet 3    aspirin 325 MG EC tablet TAKE 1 TABLET BY MOUTH DAILY WITH FOOD 30 tablet 12    amLODIPine (NORVASC) 10 MG tablet Take 1 tablet by mouth daily 90 tablet 2    atorvastatin (LIPITOR) 10 MG tablet Take 1 tablet by mouth daily 90 tablet 3    DAILY MULTIPLE VITAMINS TABS TAKE 1 TABLET EVERY DAY 30 tablet 12    Multiple Vitamin (DAILY-TJ) TABS TAKE 1 TABLET BY MOUTH DAILY 30 tablet 8    fluticasone (FLONASE) 50 MCG/ACT nasal spray 2 sprays by Nasal route daily as needed for Rhinitis      [employer] MELATONIN 3 MG TABS tablet TAKE 1 TABLET BY MOUTH NIGHTLY 30 tablet 12    polyethylene glycol (GLYCOLAX) packet Take 17 g by mouth daily as needed for Constipation Mix with eight ounces of water. 1 each 12    pantoprazole (PROTONIX) 40 MG tablet Take 1 tablet by mouth daily 90 tablet 3    terazosin (HYTRIN) 10 MG capsule Take 1 capsule by mouth nightly 90 capsule 3    montelukast (SINGULAIR) 10 MG tablet Take 1 tablet by mouth nightly 90 tablet 3    potassium chloride (KLOR-CON M10) 10 MEQ extended release tablet Take 1 tablet by mouth daily with food.  30 tablet 12    sertraline (ZOLOFT) 50 MG tablet Take 1 tablet by mouth daily 90 tablet 3    atenolol (TENORMIN) 25 MG tablet Take 0.5 tablets by mouth daily 45 tablet 3    HYDROcodone-acetaminophen (NORCO) 5-325 MG per tablet Take 1 tablet by mouth every 6 hours as needed for Pain . 60 tablet 0    ondansetron (ZOFRAN) 4 MG tablet Take 1 tablet by mouth daily as needed for Nausea 30 tablet 12    oxybutynin (DITROPAN XL) 15 MG extended release tablet Take 1 tablet by mouth 2 times daily      desmopressin (DDAVP) 0.2 MG tablet Take 1 tablet by mouth nightly      Accu-Chek Softclix Lancets MISC TEST 2 TO 4 TIMES PER  each 1    ACCU-CHEK ZEENAT PLUS strip USE AS DIRECTED TO TEST BLOOD SUGAR UP TO 2-4 TIMES A  strip 2    ferrous sulfate 325 (65 FE) MG tablet Take 1 tablet by mouth 2 times daily (with meals)      Multiple Vitamins-Minerals (CENTRUM SILVER ULTRA MENS) TABS Take 1 tablet by mouth daily      nitroGLYCERIN (NITROSTAT) 0.4 MG SL tablet Place 1 tablet under the tongue every 5 minutes as needed. 25 tablet 12    cefUROXime (CEFTIN) 500 MG tablet Take 1 tablet by mouth daily 90 tablet 3     No current facility-administered medications for this visit. OBJECTIVE:  Vitals:   Vitals:    08/06/18 1032   BP: 124/80   Pulse: 70     Wt Readings from Last 3 Encounters:   08/06/18 169 lb (76.7 kg)   07/09/18 172 lb (78 kg)   06/12/18 169 lb (76.7 kg)     Body mass index is 21.12 kg/m². ROS: Denies trouble with fever, rash, headache, vision changes, chest pain, shortness of breath, nausea, extremity pain, weakness, dysuria.      Mental Status Exam:     Appearance    alert, cooperative, walker  Muscle strength/tone: no atrophy or abnormal movements, anti-gravity  Gait/station: normal, anti-gravity  Speech    spontaneous, normal rate, normal volume and well articulated  Mood    Euthymic  Affect    normal affect Congruent to thought content and mood  Thought Content    intact, no delusions voiced  Thought Process    linear, goal directed and coherent   Associations    logical connections  Perceptions: denies AH/VH, does not appear preoccupied with the internal environment  Insight    Fair  Judgment    Intact  Orientation    oriented to person, place, time, and general circumstances  Memory    recent and remote memory intact  Attention/Concentration    intact  Ability to understand instructions Yes  Ability to respond meaningfully Yes  SI:   no suicidal ideation  HI: Denies HI    Labs:     Lab Results   Component Value Date     06/12/2018    K 4.3 06/12/2018     06/12/2018    CO2 27 06/12/2018    BUN 21 06/12/2018    CREATININE 1.3 06/12/2018    GLUCOSE 109 06/12/2018    CALCIUM 9.4 06/12/2018      Lab Results   Component Value Date    WBC 7.8 06/12/2018    HGB 11.0 (L) 06/12/2018    HCT 32.6 (L) 06/12/2018    MCV 85.7 06/12/2018     06/12/2018     Lab Results   Component Value Date    COLORU Yellow 11/01/2017    NITRU Negative 11/01/2017    GLUCOSEU Negative 11/01/2017    GLUCOSEU NEGATIVE 01/09/2012    KETUA Negative 11/01/2017    UROBILINOGEN 0.2 11/01/2017    BILIRUBINUR Negative 11/01/2017    BILIRUBINUR NEGATIVE 01/09/2012     Lab Results   Component Value Date    LABA1C 6.4 06/12/2018     Lab Results   Component Value Date    .0 06/12/2018     Lab Results   Component Value Date    CHOL 123 03/12/2018    CHOL 133 12/11/2017    CHOL 135 06/16/2017     Lab Results   Component Value Date    TRIG 77 03/12/2018    TRIG 79 12/11/2017    TRIG 99 06/16/2017     Lab Results   Component Value Date    HDL 55 03/12/2018    HDL 57 12/11/2017    HDL 52 06/16/2017     Lab Results   Component Value Date    LDLCALC 53 03/12/2018    LDLCALC 60 12/11/2017    LDLCALC 63 06/16/2017     Lab Results   Component Value Date    LABVLDL 15 03/12/2018    LABVLDL 16 12/11/2017    LABVLDL 20 06/16/2017     No results found for: [facility]  Lab Results   Component Value Date    TSH 1.86 09/19/2017     No results found for: ZYHWQEX1Q1  Lab Results   Component Value Date    EOURWSRM77 1081 (H) 06/16/2017     Lab Results   Component Value Date    FOLATE >20.00 06/16/2017         Imaging:[facility] 4/5/14: IMPRESSION:   1. No evidence of recent intracranial hemorrhage. 2. Mild inflammatory changes of the paranasal sinuses. Axis I  Anxiety disorder NOS, questionable h/o paranoid schizophrenia    Axis II: No diagnosis     Axis III       Diagnosis Date    Acute cholecystitis 9/13/2012    Anemia 1/9/2012    Anxiety 7/2/2010    Arthritis     Back pain 5/16/2010    BPH (benign prostatic hypertrophy) 5/16/2010    Constipation 6/16/2015    Depression 6/16/2015    Diverticulosis 5/16/2010    Dyspnea on exertion 5/16/2010    Eczema 4/12/2011    Elevated PSA 10/30/2012    Erectile dysfunction 5/16/2010    Essential hypertension 8/26/2015    GERD (gastroesophageal reflux disease) 5/16/2010    Hemorrhoid 5/16/2010    Hyperlipidemia 1/10/2011    Hyperphosphatemia 7/3/2010    Hypertension 5/16/2010    Insomnia 6/16/2015    Left hip pain 6/16/2015    Memory loss 1/14/2015    Nausea 6/16/2015    Overactive bladder 7/12/2011    Paranoid schizophrenia ([facility] [address] 5/16/2010    Perennial allergic rhinitis 11/30/2016    Prostate cancer ([facility] [address] 1/8/2013    Type 2 diabetes mellitus without complication, without long-term current use of insulin ([facility] [address] 8/31/2016      Active Problems:    * No active hospital problems. *  Resolved Problems:    * No resolved hospital problems. *       Axis IV  Problems with primary support group and Problems related to the social environment    ASSESSMENT AND PLAN      1. Safety: NO Imminent risk of danger to/self/others based on the factors considered below. Appropriate for outpatient level of care. Safety plan includes: 911, PES, hotlines, and interventions discussed today.    Risk factors: Age <25 or >49, male gender  Protective factors: denies depression, denies suicidal ideation, does not have lethal plan, does not have access to guns or weapons, patient is zoë for safety, no prior suicide attempts, no family h/o suicide, no substance abuse, patient has social or family support, no active psychosis or cognitive dysfunction, physically healthy, already has outpatient services in place, compliant with recommended medications, collateral information from wife confirms patient safety, and patient is future oriented. 2. Psychiatric  -Unclear why this patient was diagnosed with paranoid schizophrenia. The event they are referring to would have happened when the patient was 72yo. It's possible he was treated for unknown brief psychosis and discharged, but antipsychotic was continued. Ideally he should not continue Risperdal, as he does have diabetes and SE may not outweigh benefit (or it may not be indicated). Patient and wife deny that he has any psychiatric problems or symptoms and don't want to continue medication.  -Continue Zoloft 50mg qAM   -Was unable to obtain records from [facility]. We D/C Risperdal and Trileptal, and have low threshhold for restarting if he develops problems. No problems in the first 4 weeks, pt and family decline my offer to continue seeing him periodically to ensure no psychosis recurrence, but we reviewed sentinel s/sx's to watch for.   -Labs: reviewed in [facility] reviewed, c/w history   -R/b/se/a d/w pt who consents. 3. Medical  -Following with Alessandra Koyanagi, MD    4. Substance   -No active issues. 5. David Medina M.D.   Psychiatrist

## 2018-08-14 ENCOUNTER — TELEPHONE (OUTPATIENT)
Dept: INTERNAL MEDICINE | Age: 83
End: 2018-08-14

## 2018-08-14 ENCOUNTER — OFFICE VISIT (OUTPATIENT)
Dept: INTERNAL MEDICINE | Age: 83
End: 2018-08-14

## 2018-08-14 VITALS
HEART RATE: 64 BPM | OXYGEN SATURATION: 96 % | WEIGHT: 167 LBS | DIASTOLIC BLOOD PRESSURE: 50 MMHG | BODY MASS INDEX: 20.87 KG/M2 | SYSTOLIC BLOOD PRESSURE: 120 MMHG

## 2018-08-14 DIAGNOSIS — E11.9 TYPE 2 DIABETES MELLITUS WITHOUT COMPLICATION, WITHOUT LONG-TERM CURRENT USE OF INSULIN (HCC): Chronic | ICD-10-CM

## 2018-08-14 DIAGNOSIS — R63.4 LOSS OF WEIGHT: ICD-10-CM

## 2018-08-14 DIAGNOSIS — E11.22 TYPE 2 DIABETES MELLITUS WITH STAGE 3 CHRONIC KIDNEY DISEASE, WITHOUT LONG-TERM CURRENT USE OF INSULIN (HCC): ICD-10-CM

## 2018-08-14 DIAGNOSIS — E78.5 HYPERLIPIDEMIA, UNSPECIFIED HYPERLIPIDEMIA TYPE: Chronic | ICD-10-CM

## 2018-08-14 DIAGNOSIS — N18.30 TYPE 2 DIABETES MELLITUS WITH STAGE 3 CHRONIC KIDNEY DISEASE, WITHOUT LONG-TERM CURRENT USE OF INSULIN (HCC): ICD-10-CM

## 2018-08-14 DIAGNOSIS — C61 PROSTATE CANCER (HCC): Chronic | ICD-10-CM

## 2018-08-14 DIAGNOSIS — I10 ESSENTIAL HYPERTENSION: Chronic | ICD-10-CM

## 2018-08-14 DIAGNOSIS — M25.552 LEFT HIP PAIN: ICD-10-CM

## 2018-08-14 DIAGNOSIS — E11.9 TYPE 2 DIABETES MELLITUS WITHOUT COMPLICATION, WITHOUT LONG-TERM CURRENT USE OF INSULIN (HCC): Primary | Chronic | ICD-10-CM

## 2018-08-14 DIAGNOSIS — G40.209 PARTIAL SYMPTOMATIC EPILEPSY WITH COMPLEX PARTIAL SEIZURES, NOT INTRACTABLE, WITHOUT STATUS EPILEPTICUS (HCC): ICD-10-CM

## 2018-08-14 LAB
A/G RATIO: 1.3 (ref 1.1–2.2)
ALBUMIN SERPL-MCNC: 4.2 G/DL (ref 3.4–5)
ALP BLD-CCNC: 79 U/L (ref 40–129)
ALT SERPL-CCNC: 11 U/L (ref 10–40)
ANION GAP SERPL CALCULATED.3IONS-SCNC: 14 MMOL/L (ref 3–16)
AST SERPL-CCNC: 17 U/L (ref 15–37)
BASOPHILS ABSOLUTE: 0 K/UL (ref 0–0.2)
BASOPHILS RELATIVE PERCENT: 0.4 %
BILIRUB SERPL-MCNC: <0.2 MG/DL (ref 0–1)
BUN BLDV-MCNC: 23 MG/DL (ref 7–20)
CALCIUM SERPL-MCNC: 9.2 MG/DL (ref 8.3–10.6)
CHLORIDE BLD-SCNC: 101 MMOL/L (ref 99–110)
CO2: 24 MMOL/L (ref 21–32)
CREAT SERPL-MCNC: 1.6 MG/DL (ref 0.8–1.3)
EOSINOPHILS ABSOLUTE: 0.1 K/UL (ref 0–0.6)
EOSINOPHILS RELATIVE PERCENT: 1.1 %
GFR AFRICAN AMERICAN: 50
GFR NON-AFRICAN AMERICAN: 42
GLOBULIN: 3.3 G/DL
GLUCOSE BLD-MCNC: 137 MG/DL (ref 70–99)
HCT VFR BLD CALC: 32.9 % (ref 40.5–52.5)
HEMOGLOBIN: 11 G/DL (ref 13.5–17.5)
LIPASE: 30 U/L (ref 13–60)
LYMPHOCYTES ABSOLUTE: 0.8 K/UL (ref 1–5.1)
LYMPHOCYTES RELATIVE PERCENT: 10.6 %
MCH RBC QN AUTO: 28.6 PG (ref 26–34)
MCHC RBC AUTO-ENTMCNC: 33.4 G/DL (ref 31–36)
MCV RBC AUTO: 85.6 FL (ref 80–100)
MONOCYTES ABSOLUTE: 0.5 K/UL (ref 0–1.3)
MONOCYTES RELATIVE PERCENT: 7.2 %
NEUTROPHILS ABSOLUTE: 6.1 K/UL (ref 1.7–7.7)
NEUTROPHILS RELATIVE PERCENT: 80.7 %
PDW BLD-RTO: 12.8 % (ref 12.4–15.4)
PLATELET # BLD: 233 K/UL (ref 135–450)
PMV BLD AUTO: 9.4 FL (ref 5–10.5)
POTASSIUM SERPL-SCNC: 4.4 MMOL/L (ref 3.5–5.1)
PROSTATE SPECIFIC ANTIGEN: 1.84 NG/ML (ref 0–4)
RBC # BLD: 3.84 M/UL (ref 4.2–5.9)
SODIUM BLD-SCNC: 139 MMOL/L (ref 136–145)
TOTAL PROTEIN: 7.5 G/DL (ref 6.4–8.2)
WBC # BLD: 7.5 K/UL (ref 4–11)

## 2018-08-14 PROCEDURE — 1123F ACP DISCUSS/DSCN MKR DOCD: CPT | Performed by: INTERNAL MEDICINE

## 2018-08-14 PROCEDURE — G8420 CALC BMI NORM PARAMETERS: HCPCS | Performed by: INTERNAL MEDICINE

## 2018-08-14 PROCEDURE — 1101F PT FALLS ASSESS-DOCD LE1/YR: CPT | Performed by: INTERNAL MEDICINE

## 2018-08-14 PROCEDURE — G8598 ASA/ANTIPLAT THER USED: HCPCS | Performed by: INTERNAL MEDICINE

## 2018-08-14 PROCEDURE — G8427 DOCREV CUR MEDS BY ELIG CLIN: HCPCS | Performed by: INTERNAL MEDICINE

## 2018-08-14 PROCEDURE — 4040F PNEUMOC VAC/ADMIN/RCVD: CPT | Performed by: INTERNAL MEDICINE

## 2018-08-14 PROCEDURE — 99214 OFFICE O/P EST MOD 30 MIN: CPT | Performed by: INTERNAL MEDICINE

## 2018-08-14 PROCEDURE — 1036F TOBACCO NON-USER: CPT | Performed by: INTERNAL MEDICINE

## 2018-08-14 RX ORDER — OXCARBAZEPINE 300 MG/1
300 TABLET, FILM COATED ORAL 2 TIMES DAILY
Qty: 180 TABLET | Refills: 3 | Status: SHIPPED | OUTPATIENT
Start: 2018-08-14 | End: 2020-08-10 | Stop reason: SDUPTHER

## 2018-08-14 RX ORDER — LORATADINE 10 MG/1
10 TABLET ORAL DAILY
Qty: 20 TABLET | Refills: 0 | Status: SHIPPED | OUTPATIENT
Start: 2018-08-14 | End: 2018-08-30 | Stop reason: SDUPTHER

## 2018-08-14 ASSESSMENT — ENCOUNTER SYMPTOMS
CHEST TIGHTNESS: 0
SHORTNESS OF BREATH: 0
NAUSEA: 0
ABDOMINAL PAIN: 0
VOMITING: 0

## 2018-08-14 NOTE — TELEPHONE ENCOUNTER
Patient asking for refill of Norco 5/325 mg 1 tab QID PRN#120  [facility]/pharmacy #[id number]- [place], [place] - [address] [place] [address]. - P [phone] - F [phone] AYLA Aguirre

## 2018-08-14 NOTE — PROGRESS NOTES
Outpatient Note for established Patient - regular Visit    History Obtained From:  patient, electronic medical record  Is the patient new to me ? - No    HISTORY OF PRESENT ILLNESS:   The patient is a [de-identified] y.o. male who is here today for :  1) HTN- today 120/50  He is on Losartan, Amlodipine and Atenolol   He feels 'cold' sensation. He denies weakness/ tired or dizziness. Pt denies CP/SOB/palpitations/N/V.     2) he is c/o of periumbilical pain started a few days ago. No blood in the stool/ urine/ no diarrhea/ constipation  No dysuric Sx. 3) chronic cough   he is c/o morning cough when he wakes up. No chest pain. No fever/ chills/ SOB. Started last year     4) LOW   he lost 2 lbs in the past week and ~ 7 lbs in the past 5 months. No night sweats/ back pain. He had Prostate cancer 2012. He last saw he urologist a few months ago.    He is suppsed to schedule a new meeting with his urologist     5) CKD stable     Past Medical History:        Diagnosis Date    Acute cholecystitis 9/13/2012    Anemia 1/9/2012    Anxiety 7/2/2010    Arthritis     Back pain 5/16/2010    BPH (benign prostatic hypertrophy) 5/16/2010    Constipation 6/16/2015    Depression 6/16/2015    Diverticulosis 5/16/2010    Dyspnea on exertion 5/16/2010    Eczema 4/12/2011    Elevated PSA 10/30/2012    Erectile dysfunction 5/16/2010    Essential hypertension 8/26/2015    GERD (gastroesophageal reflux disease) 5/16/2010    Hemorrhoid 5/16/2010    Hyperlipidemia 1/10/2011    Hyperphosphatemia 7/3/2010    Hypertension 5/16/2010    Insomnia 6/16/2015    Left hip pain 6/16/2015    Memory loss 1/14/2015    Nausea 6/16/2015    Overactive bladder 7/12/2011    Paranoid schizophrenia ([address] 5/16/2010    Perennial allergic rhinitis 11/30/2016    Prostate cancer ([address] 1/8/2013    Type 2 diabetes mellitus without complication, without long-term current use of insulin ([address] 8/31/2016       Social History:   TOBACCO:   reports

## 2018-08-15 DIAGNOSIS — N18.30 CHRONIC KIDNEY DISEASE, STAGE 3 (HCC): Primary | ICD-10-CM

## 2018-08-15 LAB
ESTIMATED AVERAGE GLUCOSE: 145.6 MG/DL
HBA1C MFR BLD: 6.7 %

## 2018-08-16 ENCOUNTER — HOSPITAL ENCOUNTER (EMERGENCY)
Age: 83
Discharge: HOME | End: 2018-08-16
Attending: EMERGENCY MEDICINE
Payer: MEDICARE

## 2018-08-16 ENCOUNTER — APPOINTMENT (OUTPATIENT)
Dept: GENERAL RADIOLOGY | Age: 83
End: 2018-08-16
Payer: MEDICARE

## 2018-08-16 VITALS
TEMPERATURE: 98 F | HEART RATE: 69 BPM | SYSTOLIC BLOOD PRESSURE: 159 MMHG | RESPIRATION RATE: 16 BRPM | HEIGHT: 75 IN | BODY MASS INDEX: 20.76 KG/M2 | DIASTOLIC BLOOD PRESSURE: 74 MMHG | WEIGHT: 167 LBS | OXYGEN SATURATION: 100 %

## 2018-08-16 DIAGNOSIS — K59.00 CONSTIPATION, UNSPECIFIED CONSTIPATION TYPE: Primary | ICD-10-CM

## 2018-08-16 LAB — POC OCCULT BLOOD STOOL: NEGATIVE

## 2018-08-16 PROCEDURE — 82272 OCCULT BLD FECES 1-3 TESTS: CPT

## 2018-08-16 PROCEDURE — 74022 RADEX COMPL AQT ABD SERIES: CPT

## 2018-08-16 PROCEDURE — 99284 EMERGENCY DEPT VISIT MOD MDM: CPT

## 2018-08-16 PROCEDURE — 6370000000 HC RX 637 (ALT 250 FOR IP): Performed by: EMERGENCY MEDICINE

## 2018-08-16 RX ORDER — SODIUM PHOSPHATE,MONO-DIBASIC 19G-7G/118
1 ENEMA (ML) RECTAL ONCE
Status: COMPLETED | OUTPATIENT
Start: 2018-08-16 | End: 2018-08-16

## 2018-08-16 RX ORDER — HYDROCODONE BITARTRATE AND ACETAMINOPHEN 5; 325 MG/1; MG/1
1 TABLET ORAL EVERY 6 HOURS PRN
Qty: 120 TABLET | Refills: 0 | Status: SHIPPED | OUTPATIENT
Start: 2018-08-16 | End: 2018-09-15

## 2018-08-16 RX ADMIN — MAGESIUM CITRATE 296 ML: 1.75 LIQUID ORAL at 20:43

## 2018-08-16 RX ADMIN — ENEMA 1 ENEMA: 19; 7 ENEMA RECTAL at 18:48

## 2018-08-16 ASSESSMENT — ENCOUNTER SYMPTOMS
ABDOMINAL PAIN: 1
CONSTIPATION: 1
SHORTNESS OF BREATH: 0
VOMITING: 0
DIARRHEA: 0
ABDOMINAL DISTENTION: 0
NAUSEA: 0

## 2018-08-16 NOTE — ED PROVIDER NOTES
1 [facility]  EMERGENCY DEPARTMENT ENCOUNTER          ATTENDING PHYSICIAN NOTE       Date of evaluation: 8/16/2018    Chief Complaint     Constipation      History of Present Illness     Leonardo Harding is a [de-identified] y.o. male who presents With vague abdominal discomfort and inability to have a bowel movement for about 6 days. The patient was just seen by his doctor for this problem and a laboratory evaluation was conducted including CBC, comp intensive metabolic panel, and thyroid which were all unremarkable. These were done 2 days ago. The patient has not been able to have a bowel movement since last Saturday which is about 6 days ago. He denies any fevers or chills. He has no urinary problems. He has no nausea or vomiting. Review of Systems     Review of Systems   Constitutional: Negative for chills and fever. Respiratory: Negative for shortness of breath. Cardiovascular: Negative for chest pain. Gastrointestinal: Positive for abdominal pain and constipation. Negative for abdominal distention, diarrhea, nausea and vomiting. Genitourinary: Negative for difficulty urinating. All other systems reviewed and are negative. Past Medical, Surgical, Family, and Social History     He has a past medical history of Acute cholecystitis; Anemia; Anxiety; Arthritis; Back pain; BPH (benign prostatic hypertrophy); Constipation; Depression; Diverticulosis; Dyspnea on exertion; Eczema; Elevated PSA; Erectile dysfunction; Essential hypertension; GERD (gastroesophageal reflux disease); Hemorrhoid; Hyperlipidemia; Hyperphosphatemia; Hypertension; Insomnia; Left hip pain; Memory loss; Nausea; Overactive bladder; Paranoid schizophrenia ([address] Perennial allergic rhinitis; Prostate cancer ([address] and Type 2 diabetes mellitus without complication, without long-term current use of insulin ([address] He has a past surgical history that includes Prostate surgery (May 8, 2001); Colonoscopy (December 1, 2011);  Upper gastrointestinal endoscopy (November 7, 2011); Colonoscopy (June 16, 2011); Cholecystectomy, laparoscopic (September 13, 2012); Prostate biopsy (November 2012); Total hip arthroplasty (Left, May 17, 2015); hip surgery (Left, June 23, 2015); Upper gastrointestinal endoscopy (February 4, 2016); Upper gastrointestinal endoscopy (N/A, 04/15/2016); Upper gastrointestinal endoscopy (04/25/2017); joint replacement (Left); other surgical history (11/06/2017); and other surgical history (11/06/2017). His family history includes Cancer in his brother and mother. He reports that he quit smoking about 28 years ago. His smoking use included Cigarettes. He started smoking about 45 years ago. He has a 18.00 pack-year smoking history. He has never used smokeless tobacco. He reports that he does not drink alcohol or use drugs. Medications     Discharge Medication List as of 8/16/2018  8:47 PM      CONTINUE these medications which have NOT CHANGED    Details   HYDROcodone-acetaminophen (NORCO) 5-325 MG per tablet Take 1 tablet by mouth every 6 hours as needed for Pain for up to 30 days. Chuckie Coeras Date: 8/16/18, Disp-120 tablet, R-0Print      metFORMIN (GLUCOPHAGE) 500 MG tablet Take 1 tablet by mouth daily with food, Disp-90 tablet, R-3Print      loratadine (CLARITIN) 10 MG tablet Take 1 tablet by mouth daily, Disp-20 tablet, R-0Normal      OXcarbazepine (TRILEPTAL) 300 MG tablet TAKE 1 TABLET BY MOUTH 2 TIMES DAILY, Disp-180 tablet, R-3Normal      sertraline (ZOLOFT) 50 MG tablet Take 1 tablet by mouth daily, Disp-90 tablet, R-3Normal      galantamine (RAZADYNE ER) 24 MG extended release capsule TAKE 1 CAPSULE BY MOUTH DAILY, Disp-30 capsule, R-4Normal      losartan (COZAAR) 100 MG tablet TAKE 1 TABLET BY MOUTH DAILY, Disp-90 tablet, R-3Normal      aspirin 325 MG EC tablet TAKE 1 TABLET BY MOUTH DAILY WITH FOOD, Disp-30 tablet, R-12Normal      atorvastatin (LIPITOR) 10 MG tablet Take 1 tablet by mouth daily, Disp-90 needed. , Disp-25 tablet, R-12       !! - Potential duplicate medications found. Please discuss with provider. Allergies     He is allergic to lorazepam.    Physical Exam     INITIAL VITALS: BP: 123/67, Temp: 98 °F (36.7 °C), Pulse: 79, Resp: 16, SpO2: 100 %   Physical Exam   Constitutional: He is oriented to person, place, and time. He appears well-developed and well-nourished. No distress. Eyes: No scleral icterus. Cardiovascular: Normal rate and regular rhythm. Pulmonary/Chest: Effort normal and breath sounds normal.   Abdominal: Soft. Bowel sounds are normal. He exhibits no distension. There is no tenderness. There is no rebound and no guarding. Neurological: He is alert and oriented to person, place, and time. Skin: Skin is warm and dry. He is not diaphoretic. Psychiatric: He has a normal mood and affect. His behavior is normal.   Nursing note and vitals reviewed. ED Course     Nursing Notes, Past Medical Hx, Past Surgical Hx, Social Hx, Allergies, and Family Hx were reviewed. The patient was given the following medications:  Orders Placed This Encounter   Medications    sodium phosphate (FLEET) enema 1 enema    magnesium citrate solution 296 mL    Psyllium (METAMUCIL SMOOTH TEXTURE) 28.3 % POWD     Sig: Mixed with orange juice once or twice daily     Dispense:  1 Bottle     Refill:  0     Patient had little if any response to the Fleet enema because he was unable to really retain it very well. Digital rectal exam did not reveal any stool in the rectal vault. CONSULTS:  None    MEDICAL DECISION MAKING / ASSESSMENT / PLAN     Dedra Olson is a [de-identified] y.o. male presenting with constipation. The patient already had lab work done just 2 days ago and this was all normal.  I do not feel repeating it would be helpful since the problem preceded that visit. The patient has good bowel sounds and is nontender on exam.  He was given an enema With no success.   X-rays of the abdomen

## 2018-08-17 NOTE — ED NOTES
Pt discharged from ED in stable, ambulatory condition. Discharge instructions explained, all questions answered. Prescription given. Pt walked to [facility] independently.        Justin Damon RN  08/16/18 5942 Statement Selected

## 2018-08-20 NOTE — TELEPHONE ENCOUNTER
Have been trying to reach pt for 4 days. No answer, cannot leave a message. No other contact # in pt chart.

## 2018-08-29 ENCOUNTER — HOSPITAL ENCOUNTER (OUTPATIENT)
Dept: ULTRASOUND IMAGING | Age: 83
Discharge: HOME | End: 2018-08-29
Payer: MEDICARE

## 2018-08-29 ENCOUNTER — OFFICE VISIT (OUTPATIENT)
Dept: INFECTIOUS DISEASES | Age: 83
End: 2018-08-29

## 2018-08-29 VITALS
HEIGHT: 75 IN | BODY MASS INDEX: 20.64 KG/M2 | TEMPERATURE: 98.1 F | DIASTOLIC BLOOD PRESSURE: 66 MMHG | WEIGHT: 166 LBS | SYSTOLIC BLOOD PRESSURE: 146 MMHG

## 2018-08-29 DIAGNOSIS — T84.50XA INFECTION AND INFLAMMATORY REACTION DUE TO INTERNAL JOINT PROSTHESIS, INITIAL ENCOUNTER (HCC): Primary | ICD-10-CM

## 2018-08-29 DIAGNOSIS — R63.4 LOSS OF WEIGHT: ICD-10-CM

## 2018-08-29 DIAGNOSIS — A49.8 KLEBSIELLA INFECTION: ICD-10-CM

## 2018-08-29 PROCEDURE — 76700 US EXAM ABDOM COMPLETE: CPT

## 2018-08-29 PROCEDURE — 1101F PT FALLS ASSESS-DOCD LE1/YR: CPT | Performed by: INTERNAL MEDICINE

## 2018-08-29 PROCEDURE — 99214 OFFICE O/P EST MOD 30 MIN: CPT | Performed by: INTERNAL MEDICINE

## 2018-08-29 PROCEDURE — 4040F PNEUMOC VAC/ADMIN/RCVD: CPT | Performed by: INTERNAL MEDICINE

## 2018-08-29 PROCEDURE — 1123F ACP DISCUSS/DSCN MKR DOCD: CPT | Performed by: INTERNAL MEDICINE

## 2018-08-29 PROCEDURE — 1036F TOBACCO NON-USER: CPT | Performed by: INTERNAL MEDICINE

## 2018-08-29 PROCEDURE — G8420 CALC BMI NORM PARAMETERS: HCPCS | Performed by: INTERNAL MEDICINE

## 2018-08-29 PROCEDURE — G8427 DOCREV CUR MEDS BY ELIG CLIN: HCPCS | Performed by: INTERNAL MEDICINE

## 2018-08-29 PROCEDURE — G8598 ASA/ANTIPLAT THER USED: HCPCS | Performed by: INTERNAL MEDICINE

## 2018-08-29 NOTE — PROGRESS NOTES
Infectious Diseases Oupatient Follow-up Note    Primary Care Physician:  Wanda Dasilva MD  History Obtained From:   Patient, wife - Orysia Handler / ID problem:    Chief Complaint   Patient presents with    Follow-up       HISTORY OF PRESENT ILLNESS / Interval history:      Dx: L TEMO infection, cult + K oxytoca, on chronic suppression. 71 yo man with multiple medical problems. Pt lives in community. Pt does have urinary incontinence. Pt had L hip fracture on 5/15 and had L TEMO on 5/17. He was discharged to [facility] May 20 and home approximately 6/14 (per Dr Raul Keane note). Pt had L hip subcutaneous collection that was drained in office, culture negative (per Dr Mulu Salcedo note). Pt reports drainage from wound over few weeks, yellow in color. He say that pain he has had after surgery has diminished over time. No f/c/sweats. Admit 6/19/15 with L hip pain. Afebrile. WBC 13.9. + UA, started on augmentin for UTI. Plain x-ray unremarkable. Seen by Ortho. Pt had hip CT that demonstrated a soft tissue collection. Ortho, Dr Gilma Sanchez, aspirated and then opened wound. Fluid with blood and purulence, sent for culture - growing K oxytoca. He underwent surgery 6/23 (I&D, exchange femoral head, no removal). Discharged 6/26/15 to [facility]. Pt received iv ertapenem through 8/1, was discharged. Started on po cefuroxime 500 bid on 8/3. He has f/u with Ortho and PCP. Seen 9/30/15, pt presented with wife, Nini Liu. He is feeling well overall. L hip is 'sore with sitting a while'. Wound closed. Taking po cefuroxime. He sometimes has nausea in am after taking am antibiotic dose, never with evening dose. He takes this med at the same time as other meds. Wife also reports wt loss though this seems to predate fall / hip fracture, surgeries and SSI. Seen 11/11/15, pt presented with wife, Nini Liu. He was feeling well overall. L hip is \"better\", still 'sore with sitting a while'. Wound closed.   Taking po Nausea 6/16/2015    Overactive bladder 7/12/2011    Paranoid schizophrenia ([facility] 5/16/2010    Perennial allergic rhinitis 11/30/2016    Prostate cancer ([facility] 1/8/2013    Type 2 diabetes mellitus without complication, without long-term current use of insulin ([facility] 8/31/2016       Past Surgical History:   Procedure Laterality Date    CHOLECYSTECTOMY, LAPAROSCOPIC  September 13, 2012    Dr. Lucie Rico  December 1, 2011    Dr. Raine Vega  June 16, 2011   [facility] HIP SURGERY Left June 23, 2015    Dr. Uyen Parrish - incision and drainage of left hip infected hematoma     JOINT REPLACEMENT Left     hip    OTHER SURGICAL HISTORY  11/06/2017     ESOPHAGOGASTRODUODENOSCOPY                OTHER SURGICAL HISTORY  11/06/2017     LAPAROSCOPIC HELLER MYOTOMY 270 WRAP, EGD    PROSTATE BIOPSY  November 2012    Dr. Sierra Aguero  May 8, 2001    TURP    TOTAL HIP ARTHROPLASTY Left May 17, 2015    Dr. Jo Smith  November 7, 2011    UPPER GASTROINTESTINAL ENDOSCOPY  February 4, 2016    Dr. Lona Lee ENDOSCOPY N/A 04/15/2016    Esophagogastroduodenoscopy with Botulinum toxin injection of the lower esophageal sphincter (LES)    UPPER GASTROINTESTINAL ENDOSCOPY  04/25/2017    dilatation; and botox injection 4 units       Current Medications:    Current Outpatient Prescriptions   Medication Sig Dispense Refill    HYDROcodone-acetaminophen (NORCO) 5-325 MG per tablet Take 1 tablet by mouth every 6 hours as needed for Pain for up to 30 days. Aubrey Aver Date: 8/16/18 120 tablet 0    Psyllium (METAMUCIL SMOOTH TEXTURE) 28.3 % POWD Mixed with orange juice once or twice daily 1 Bottle 0    metFORMIN (GLUCOPHAGE) 500 MG tablet Take 1 tablet by mouth daily with food 90 tablet 3    loratadine (CLARITIN) 10 MG tablet Take 1 tablet by mouth daily 20 tablet 0    OXcarbazepine (TRILEPTAL) 300 MG tablet TAKE 1 TABLET BY MOUTH 2 TIMES DAILY 180 tablet 3    sertraline (ZOLOFT) 50 MG tablet Take 1 tablet by mouth daily 90 tablet 3    galantamine (RAZADYNE ER) 24 MG extended release capsule TAKE 1 CAPSULE BY MOUTH DAILY 30 capsule 4    losartan (COZAAR) 100 MG tablet TAKE 1 TABLET BY MOUTH DAILY 90 tablet 3    aspirin 325 MG EC tablet TAKE 1 TABLET BY MOUTH DAILY WITH FOOD 30 tablet 12    atorvastatin (LIPITOR) 10 MG tablet Take 1 tablet by mouth daily 90 tablet 3    DAILY MULTIPLE VITAMINS TABS TAKE 1 TABLET EVERY DAY 30 tablet 12    Multiple Vitamin (DAILY-TJ) TABS TAKE 1 TABLET BY MOUTH DAILY 30 tablet 8    cefUROXime (CEFTIN) 500 MG tablet Take 1 tablet by mouth daily 90 tablet 3    fluticasone (FLONASE) 50 MCG/ACT nasal spray 2 sprays by Nasal route daily as needed for Rhinitis      [facility] MELATONIN 3 MG TABS tablet TAKE 1 TABLET BY MOUTH NIGHTLY 30 tablet 12    polyethylene glycol (GLYCOLAX) packet Take 17 g by mouth daily as needed for Constipation Mix with eight ounces of water. 1 each 12    pantoprazole (PROTONIX) 40 MG tablet Take 1 tablet by mouth daily 90 tablet 3    terazosin (HYTRIN) 10 MG capsule Take 1 capsule by mouth nightly 90 capsule 3    montelukast (SINGULAIR) 10 MG tablet Take 1 tablet by mouth nightly 90 tablet 3    potassium chloride (KLOR-CON M10) 10 MEQ extended release tablet Take 1 tablet by mouth daily with food.  30 tablet 12    atenolol (TENORMIN) 25 MG tablet Take 0.5 tablets by mouth daily 45 tablet 3    ondansetron (ZOFRAN) 4 MG tablet Take 1 tablet by mouth daily as needed for Nausea 30 tablet 12    oxybutynin (DITROPAN XL) 15 MG extended release tablet Take 1 tablet by mouth 2 times daily      desmopressin (DDAVP) 0.2 MG tablet Take 1 tablet by mouth nightly      Accu-Chek Softclix Lancets MISC TEST 2 TO 4 TIMES PER  each 1    ACCU-CHEK ZEENAT PLUS strip USE AS DIRECTED TO TEST BLOOD SUGAR UP TO 2-4 TIMES Lobular area of decreased attenuation consistent with fluid,  possible seroma or resolving hematoma in the left hip laterally    IMPRESSION:    # Multiple medical problems including HTN, DM, dementia, prostate ca  # Wt loss / low weight  # L hip SSI, s/p I&D 6/23/15 with exchange femoral head, no removal (well fixed per OR note) cult + Klebsiella oxytoca    RECOMMENDATIONS:      1. Cont cefuroxime 500 mg once a day   2. F/u PCP (Dr Estelita Bernabe), Ortho ( [facility]),  (Dr Gaudencio Zimmer)  3. Call if change in L hip - pain, swelling, drainage  4. Return to office in 6 - 12 months    Spent over 40 minutes with patient, reviewing data, discussing multiple problems and medications, formulating plan.   Spent over 50% visit educating and counseling pt and wife, discussed chronic joint infection, risk / benefit of continue or stopping suppressive antibiotics    Suleman Mason MD

## 2018-08-30 RX ORDER — CHOLECALCIFEROL (VITAMIN D3) 50 MCG
CAPSULE ORAL
Qty: 20 TABLET | Refills: 0 | Status: SHIPPED | OUTPATIENT
Start: 2018-08-30 | End: 2018-09-17 | Stop reason: SDUPTHER

## 2018-09-10 ENCOUNTER — APPOINTMENT (OUTPATIENT)
Dept: GENERAL RADIOLOGY | Age: 83
End: 2018-09-10
Payer: MEDICARE

## 2018-09-10 ENCOUNTER — HOSPITAL ENCOUNTER (EMERGENCY)
Age: 83
Discharge: HOME | End: 2018-09-10
Attending: EMERGENCY MEDICINE
Payer: MEDICARE

## 2018-09-10 VITALS
RESPIRATION RATE: 16 BRPM | BODY MASS INDEX: 20.64 KG/M2 | SYSTOLIC BLOOD PRESSURE: 153 MMHG | HEIGHT: 75 IN | HEART RATE: 70 BPM | WEIGHT: 166 LBS | DIASTOLIC BLOOD PRESSURE: 82 MMHG | OXYGEN SATURATION: 100 % | TEMPERATURE: 98.5 F

## 2018-09-10 DIAGNOSIS — R53.1 GENERALIZED WEAKNESS: Primary | ICD-10-CM

## 2018-09-10 LAB
ANION GAP SERPL CALCULATED.3IONS-SCNC: 16 MMOL/L (ref 3–16)
BASOPHILS ABSOLUTE: 0.1 K/UL (ref 0–0.2)
BASOPHILS RELATIVE PERCENT: 0.9 %
BILIRUBIN URINE: NEGATIVE MG/DL
BLOOD, URINE: NEGATIVE
BUN BLDV-MCNC: 23 MG/DL (ref 7–20)
CALCIUM SERPL-MCNC: 9.7 MG/DL (ref 8.3–10.6)
CHLORIDE BLD-SCNC: 101 MMOL/L (ref 99–110)
CLARITY: ABNORMAL
CO2: 21 MMOL/L (ref 21–32)
COLOR: ABNORMAL
CREAT SERPL-MCNC: 1.3 MG/DL (ref 0.8–1.3)
EOSINOPHILS ABSOLUTE: 0.2 K/UL (ref 0–0.6)
EOSINOPHILS RELATIVE PERCENT: 3 %
GFR AFRICAN AMERICAN: >60
GFR NON-AFRICAN AMERICAN: 53
GLUCOSE BLD-MCNC: 108 MG/DL (ref 70–99)
GLUCOSE URINE: NEGATIVE MG/DL
HCT VFR BLD CALC: 33.5 % (ref 40.5–52.5)
HEMOGLOBIN: 11.3 G/DL (ref 13.5–17.5)
KETONES, URINE: NEGATIVE MG/DL
LEUKOCYTE ESTERASE, URINE: NEGATIVE
LYMPHOCYTES ABSOLUTE: 1.3 K/UL (ref 1–5.1)
LYMPHOCYTES RELATIVE PERCENT: 17.3 %
MCH RBC QN AUTO: 28.8 PG (ref 26–34)
MCHC RBC AUTO-ENTMCNC: 33.7 G/DL (ref 31–36)
MCV RBC AUTO: 85.6 FL (ref 80–100)
MICROSCOPIC EXAMINATION: YES
MONOCYTES ABSOLUTE: 0.8 K/UL (ref 0–1.3)
MONOCYTES RELATIVE PERCENT: 10.3 %
NEUTROPHILS ABSOLUTE: 5.1 K/UL (ref 1.7–7.7)
NEUTROPHILS RELATIVE PERCENT: 68.5 %
NITRITE, URINE: NEGATIVE
PDW BLD-RTO: 12.8 % (ref 12.4–15.4)
PH UA: 7.5
PLATELET # BLD: 216 K/UL (ref 135–450)
PMV BLD AUTO: 10 FL (ref 5–10.5)
POTASSIUM REFLEX MAGNESIUM: 3.9 MMOL/L (ref 3.5–5.1)
PROTEIN UA: 100 MG/DL
RBC # BLD: 3.92 M/UL (ref 4.2–5.9)
RBC UA: NORMAL /HPF (ref 0–2)
SODIUM BLD-SCNC: 138 MMOL/L (ref 136–145)
SPECIFIC GRAVITY UA: 1.01
TROPONIN: <0.01 NG/ML
UROBILINOGEN, URINE: 0.2 E.U./DL
WBC # BLD: 7.5 K/UL (ref 4–11)
WBC UA: NORMAL /HPF (ref 0–5)

## 2018-09-10 PROCEDURE — 80048 BASIC METABOLIC PNL TOTAL CA: CPT

## 2018-09-10 PROCEDURE — 93005 ELECTROCARDIOGRAM TRACING: CPT | Performed by: EMERGENCY MEDICINE

## 2018-09-10 PROCEDURE — 99285 EMERGENCY DEPT VISIT HI MDM: CPT

## 2018-09-10 PROCEDURE — 81001 URINALYSIS AUTO W/SCOPE: CPT

## 2018-09-10 PROCEDURE — 71046 X-RAY EXAM CHEST 2 VIEWS: CPT

## 2018-09-10 PROCEDURE — 85025 COMPLETE CBC W/AUTO DIFF WBC: CPT

## 2018-09-10 PROCEDURE — 84484 ASSAY OF TROPONIN QUANT: CPT

## 2018-09-10 ASSESSMENT — ENCOUNTER SYMPTOMS
ABDOMINAL PAIN: 0
SHORTNESS OF BREATH: 1
DIARRHEA: 0
VOMITING: 0

## 2018-09-10 NOTE — ED NOTES
Pt presents to the ED from home per EMS with c/o generalized weakness. Pt a/o x3, answering questions, also endorses sob intermittently. SpO2 100% on RA, no s/s of distress.       Cassi Bright RN  09/10/18 [id number]

## 2018-09-10 NOTE — ED PROVIDER NOTES
[address] [facility]          ATTENDING PHYSICIAN NOTE       Date of evaluation: 9/10/2018    Chief Complaint     Fatigue      History of Present Illness     Yanni Gonzalez is a [de-identified] y.o. male who presents With generalized weakness and on sensation in chest. The symptoms all started this evening. He is not very mobile at baseline and only gets up occasionally with use of his walker. This evening was watching TV when he had it on sensation in his chest. He states it is not pain and he cannot further describe the quality. Reports it is associated with some difficulty breathing. He has not had any cough or fevers. He has not had any vomiting. He has not had any hiccups. Review of Systems     Review of Systems   Constitutional: Positive for fatigue. Negative for activity change, appetite change, chills and fever. Respiratory: Positive for shortness of breath. Cardiovascular: Negative for palpitations. Gastrointestinal: Negative for abdominal pain, diarrhea and vomiting. Musculoskeletal: Negative for neck pain. Neurological: Positive for weakness. Negative for dizziness, light-headedness and headaches. All other systems reviewed and are negative. Past Medical, Surgical, Family, and Social History     He has a past medical history of Acute cholecystitis; Anemia; Anxiety; Arthritis; Back pain; BPH (benign prostatic hypertrophy); Constipation; Depression; Diverticulosis; Dyspnea on exertion; Eczema; Elevated PSA; Erectile dysfunction; Essential hypertension; GERD (gastroesophageal reflux disease); Hemorrhoid; Hyperlipidemia; Hyperphosphatemia; Hypertension; Insomnia; Left hip pain; Memory loss; Nausea; Overactive bladder; Paranoid schizophrenia ([address] Perennial allergic rhinitis; Prostate cancer ([address] and Type 2 diabetes mellitus without complication, without long-term current use of insulin ([address]   He has a past surgical history that includes Prostate surgery (May 8, MG PER TABLET    Take 1 tablet by mouth every 6 hours as needed for Pain for up to 30 days. Mic Bazzi Date: 8/16/18    LOSARTAN (COZAAR) 100 MG TABLET    TAKE 1 TABLET BY MOUTH DAILY    METFORMIN (GLUCOPHAGE) 500 MG TABLET    Take 1 tablet by mouth daily with food    MONTELUKAST (SINGULAIR) 10 MG TABLET    Take 1 tablet by mouth nightly    MULTIPLE VITAMIN (DAILY-TJ) TABS    TAKE 1 TABLET BY MOUTH DAILY    MULTIPLE VITAMINS-MINERALS (CENTRUM SILVER ULTRA MENS) TABS    Take 1 tablet by mouth daily    NITROGLYCERIN (NITROSTAT) 0.4 MG SL TABLET    Place 1 tablet under the tongue every 5 minutes as needed. ONDANSETRON (ZOFRAN) 4 MG TABLET    Take 1 tablet by mouth daily as needed for Nausea    OXCARBAZEPINE (TRILEPTAL) 300 MG TABLET    TAKE 1 TABLET BY MOUTH 2 TIMES DAILY    OXYBUTYNIN (DITROPAN XL) 15 MG EXTENDED RELEASE TABLET    Take 1 tablet by mouth 2 times daily    PANTOPRAZOLE (PROTONIX) 40 MG TABLET    Take 1 tablet by mouth daily    POLYETHYLENE GLYCOL (GLYCOLAX) PACKET    Take 17 g by mouth daily as needed for Constipation Mix with eight ounces of water. POTASSIUM CHLORIDE (KLOR-CON M10) 10 MEQ EXTENDED RELEASE TABLET    Take 1 tablet by mouth daily with food. PSYLLIUM (METAMUCIL SMOOTH TEXTURE) 28.3 % POWD    Mixed with orange juice once or twice daily    SERTRALINE (ZOLOFT) 50 MG TABLET    Take 1 tablet by mouth daily    TERAZOSIN (HYTRIN) 10 MG CAPSULE    Take 1 capsule by mouth nightly       Allergies     He is allergic to lorazepam.    Physical Exam     INITIAL VITALS: BP: (!) 181/110, Temp: 98.5 °F (36.9 °C), Pulse: 69, Resp: 17, SpO2: 100 %   Physical Exam   Constitutional: He is oriented to person, place, and time. He appears well-developed and well-nourished. No distress. HENT:   Head: Normocephalic and atraumatic. Mouth/Throat: Oropharynx is clear and moist.   Eyes: Pupils are equal, round, and reactive to light. EOM are normal.   Neck: Normal range of motion. No JVD present. Cardiovascular: Normal rate, regular rhythm, normal heart sounds and intact distal pulses. No murmur heard. Pulmonary/Chest: Effort normal and breath sounds normal. No respiratory distress. He has no wheezes. He has no rales. Abdominal: Soft. He exhibits no distension. There is no tenderness. Musculoskeletal: Normal range of motion. He exhibits no edema. Neurological: He is alert and oriented to person, place, and time. No cranial nerve deficit. Coordination normal.   Skin: Skin is warm and dry. Nursing note and vitals reviewed. Diagnostic Results     EKG   EKG Interpretation    Interpreted by emergency department physician    Rhythm: sinus arrhythmia  Rate: normal  Axis: normal  Ectopy: none  Conduction: 1st degree AV block  ST Segments: normal  T Waves: normal  Q Waves: none    Clinical Impression: 1st degree AV block    Iris Bald      RADIOLOGY:  XR CHEST STANDARD (2 VW)   Final Result       Diffuse osteopenia. No acute fracture. Unchanged cardia mediastinal silhouette. Unchanged reticular opacities in the lung bases. No large consolidation    or pleural effusion.           LABS:   Results for orders placed or performed during the hospital encounter of 09/10/18   CBC auto differential   Result Value Ref Range    WBC 7.5 4.0 - 11.0 K/uL    RBC 3.92 (L) 4.20 - 5.90 M/uL    Hemoglobin 11.3 (L) 13.5 - 17.5 g/dL    Hematocrit 33.5 (L) 40.5 - 52.5 %    MCV 85.6 80.0 - 100.0 fL    MCH 28.8 26.0 - 34.0 pg    MCHC 33.7 31.0 - 36.0 g/dL    RDW 12.8 12.4 - 15.4 %    Platelets 975 367 - 754 K/uL    MPV 10.0 5.0 - 10.5 fL    Neutrophils % 68.5 %    Lymphocytes % 17.3 %    Monocytes % 10.3 %    Eosinophils % 3.0 %    Basophils % 0.9 %    Neutrophils # 5.1 1.7 - 7.7 K/uL    Lymphocytes # 1.3 1.0 - 5.1 K/uL    Monocytes # 0.8 0.0 - 1.3 K/uL    Eosinophils # 0.2 0.0 - 0.6 K/uL    Basophils # 0.1 0.0 - 0.2 K/uL   Basic Metabolic Panel w/ Reflex to MG   Result Value Ref Range    Sodium 138 136 - 145 mmol/L    Potassium reflex Magnesium 3.9 3.5 - 5.1 mmol/L    Chloride 101 99 - 110 mmol/L    CO2 21 21 - 32 mmol/L    Anion Gap 16 3 - 16    Glucose 108 (H) 70 - 99 mg/dL    BUN 23 (H) 7 - 20 mg/dL    CREATININE 1.3 0.8 - 1.3 mg/dL    GFR Non- 53 (A) >60    GFR African American >60 >60    Calcium 9.7 8.3 - 10.6 mg/dL   Troponin   Result Value Ref Range    Troponin <0.01 <0.01 ng/mL   POC URINE with Microscopic   Result Value Ref Range    Color, UA Not Entered Straw/Yellow    Clarity, UA Not Entered Clear    Glucose, Ur Negative Negative mg/dL    Bilirubin Urine Negative Negative mg/dL    Ketones, Urine Negative Negative mg/dL    Specific Gravity, UA 1.015 1.005 - 1.030    Blood, Urine Negative Negative    pH, UA 7.5 5.0 - 8.0    Protein,  (A) Negative mg/dL    Urobilinogen, Urine 0.2 <2.0 E.U./dL    Nitrite, Urine Negative Negative    Leukocyte Esterase, Urine Negative Negative    Microscopic Examination Yes        RECENT VITALS:  BP: (!) 153/82, Temp: 98.5 °F (36.9 °C), Pulse: 70, Resp: 16, SpO2: 100 %       ED Course     Nursing Notes, Past Medical Hx, Past Surgical Hx, Social Hx, Allergies, and Family Hx were reviewed. The patient was given the following medications:  No orders of the defined types were placed in this encounter. CONSULTS:  None    MEDICAL DECISION MAKING / ASSESSMENT / PLAN     Kylie Ellsworth is a [de-identified] y.o. male who presents with generalized weakness and abnormal sensation in his chest.There was concern for recurrence of hiatal hernia, which was not shown to be the case on chest x-ray. He has not had any infectious symptoms and has no abnormal lung sounds or infiltrates on chest x-ray to suggest underlying infection. He is pain is very atypical and no changes on EKG or elevation of troponin to suggest underlying cardiac cause. He is asymptomatic at this time and stable for discharge. Clinical Impression     1.  Generalized weakness        Disposition

## 2018-09-11 ENCOUNTER — OFFICE VISIT (OUTPATIENT)
Dept: INTERNAL MEDICINE | Age: 83
End: 2018-09-11

## 2018-09-11 VITALS
SYSTOLIC BLOOD PRESSURE: 132 MMHG | BODY MASS INDEX: 21.25 KG/M2 | WEIGHT: 170 LBS | DIASTOLIC BLOOD PRESSURE: 78 MMHG | OXYGEN SATURATION: 97 % | HEART RATE: 61 BPM

## 2018-09-11 DIAGNOSIS — R05.9 COUGH: ICD-10-CM

## 2018-09-11 DIAGNOSIS — R63.4 ABNORMAL LOSS OF WEIGHT: Primary | ICD-10-CM

## 2018-09-11 DIAGNOSIS — C61 PROSTATE CANCER (HCC): Chronic | ICD-10-CM

## 2018-09-11 DIAGNOSIS — I25.9 ISCHEMIC HEART DISEASE: Chronic | ICD-10-CM

## 2018-09-11 DIAGNOSIS — I10 ESSENTIAL HYPERTENSION: Chronic | ICD-10-CM

## 2018-09-11 DIAGNOSIS — D64.9 ANEMIA, UNSPECIFIED TYPE: Chronic | ICD-10-CM

## 2018-09-11 DIAGNOSIS — R63.4 ABNORMAL LOSS OF WEIGHT: ICD-10-CM

## 2018-09-11 DIAGNOSIS — E11.9 TYPE 2 DIABETES MELLITUS WITHOUT COMPLICATION, WITHOUT LONG-TERM CURRENT USE OF INSULIN (HCC): Chronic | ICD-10-CM

## 2018-09-11 DIAGNOSIS — E78.5 HYPERLIPIDEMIA, UNSPECIFIED HYPERLIPIDEMIA TYPE: Chronic | ICD-10-CM

## 2018-09-11 DIAGNOSIS — Z23 NEED FOR INFLUENZA VACCINATION: ICD-10-CM

## 2018-09-11 LAB
ALBUMIN SERPL-MCNC: 3.9 G/DL (ref 3.4–5)
ALP BLD-CCNC: 78 U/L (ref 40–129)
ALT SERPL-CCNC: 11 U/L (ref 10–40)
AST SERPL-CCNC: 19 U/L (ref 15–37)
BASOPHILS ABSOLUTE: 0.1 K/UL (ref 0–0.2)
BASOPHILS RELATIVE PERCENT: 0.9 %
BILIRUB SERPL-MCNC: 0.4 MG/DL (ref 0–1)
BILIRUBIN DIRECT: <0.2 MG/DL (ref 0–0.3)
BILIRUBIN, INDIRECT: NORMAL MG/DL (ref 0–1)
EOSINOPHILS ABSOLUTE: 0.2 K/UL (ref 0–0.6)
EOSINOPHILS RELATIVE PERCENT: 3.5 %
HCT VFR BLD CALC: 33.4 % (ref 40.5–52.5)
HEMOGLOBIN: 11.2 G/DL (ref 13.5–17.5)
LACTATE DEHYDROGENASE: 160 U/L (ref 100–190)
LYMPHOCYTES ABSOLUTE: 1 K/UL (ref 1–5.1)
LYMPHOCYTES RELATIVE PERCENT: 17 %
MCH RBC QN AUTO: 29.1 PG (ref 26–34)
MCHC RBC AUTO-ENTMCNC: 33.6 G/DL (ref 31–36)
MCV RBC AUTO: 86.4 FL (ref 80–100)
MONOCYTES ABSOLUTE: 0.7 K/UL (ref 0–1.3)
MONOCYTES RELATIVE PERCENT: 11.2 %
NEUTROPHILS ABSOLUTE: 4.1 K/UL (ref 1.7–7.7)
NEUTROPHILS RELATIVE PERCENT: 67.4 %
PDW BLD-RTO: 12.6 % (ref 12.4–15.4)
PLATELET # BLD: 207 K/UL (ref 135–450)
PMV BLD AUTO: 9.7 FL (ref 5–10.5)
RBC # BLD: 3.87 M/UL (ref 4.2–5.9)
TOTAL PROTEIN: 7.4 G/DL (ref 6.4–8.2)
WBC # BLD: 6.1 K/UL (ref 4–11)

## 2018-09-11 PROCEDURE — 90662 IIV NO PRSV INCREASED AG IM: CPT | Performed by: INTERNAL MEDICINE

## 2018-09-11 PROCEDURE — 99214 OFFICE O/P EST MOD 30 MIN: CPT | Performed by: INTERNAL MEDICINE

## 2018-09-11 PROCEDURE — 1101F PT FALLS ASSESS-DOCD LE1/YR: CPT | Performed by: INTERNAL MEDICINE

## 2018-09-11 PROCEDURE — 1036F TOBACCO NON-USER: CPT | Performed by: INTERNAL MEDICINE

## 2018-09-11 PROCEDURE — 1123F ACP DISCUSS/DSCN MKR DOCD: CPT | Performed by: INTERNAL MEDICINE

## 2018-09-11 PROCEDURE — G8427 DOCREV CUR MEDS BY ELIG CLIN: HCPCS | Performed by: INTERNAL MEDICINE

## 2018-09-11 PROCEDURE — 4040F PNEUMOC VAC/ADMIN/RCVD: CPT | Performed by: INTERNAL MEDICINE

## 2018-09-11 PROCEDURE — G0008 ADMIN INFLUENZA VIRUS VAC: HCPCS | Performed by: INTERNAL MEDICINE

## 2018-09-11 PROCEDURE — G8420 CALC BMI NORM PARAMETERS: HCPCS | Performed by: INTERNAL MEDICINE

## 2018-09-11 PROCEDURE — G8598 ASA/ANTIPLAT THER USED: HCPCS | Performed by: INTERNAL MEDICINE

## 2018-09-11 ASSESSMENT — ENCOUNTER SYMPTOMS
CHEST TIGHTNESS: 0
VOMITING: 0
ABDOMINAL PAIN: 0
SHORTNESS OF BREATH: 0
NAUSEA: 0
COUGH: 1

## 2018-09-11 ASSESSMENT — PATIENT HEALTH QUESTIONNAIRE - PHQ9
SUM OF ALL RESPONSES TO PHQ QUESTIONS 1-9: 0
SUM OF ALL RESPONSES TO PHQ9 QUESTIONS 1 & 2: 0
1. LITTLE INTEREST OR PLEASURE IN DOING THINGS: 0
2. FEELING DOWN, DEPRESSED OR HOPELESS: 0
SUM OF ALL RESPONSES TO PHQ QUESTIONS 1-9: 0

## 2018-09-11 NOTE — PROGRESS NOTES
Outpatient Note for established Patient - regular Visit    History Obtained From:  patient, electronic medical record  Is the patient new to me ? - No    HISTORY OF PRESENT ILLNESS:   The patient is a [de-identified] y.o. male who is here today for :  1) DM   Lab Results   Component Value Date    LABA1C 6.7 08/14/2018     Lab Results   Component Value Date    .6 08/14/2018   he is on Metformin 500 mg daily. Also Lipitor 10 mg daily. No side effects. 2) HTN  Today 132/78   No side effects. 3) HLD    4) He is c/o of SOB that started a few days ago. No chest pain. He has chronic cough for long time. He was treated with Claritin that helped him. Cough is mainly in the morning. 5) LOW- He did loose ~8 lbs in the past year. No hemoptysis. No Hx of smoking. He has normocytic anemia stable for 6 years. He has constipation - he has BM every 3-4 days. Past Medical History:        Diagnosis Date    Acute cholecystitis 9/13/2012    Anemia 1/9/2012    Anxiety 7/2/2010    Arthritis     Back pain 5/16/2010    BPH (benign prostatic hypertrophy) 5/16/2010    Constipation 6/16/2015    Depression 6/16/2015    Diverticulosis 5/16/2010    Dyspnea on exertion 5/16/2010    Eczema 4/12/2011    Elevated PSA 10/30/2012    Erectile dysfunction 5/16/2010    Essential hypertension 8/26/2015    GERD (gastroesophageal reflux disease) 5/16/2010    Hemorrhoid 5/16/2010    Hyperlipidemia 1/10/2011    Hyperphosphatemia 7/3/2010    Hypertension 5/16/2010    Insomnia 6/16/2015    Left hip pain 6/16/2015    Memory loss 1/14/2015    Nausea 6/16/2015    Overactive bladder 7/12/2011    Paranoid schizophrenia ([address] 5/16/2010    Perennial allergic rhinitis 11/30/2016    Prostate cancer ([address] 1/8/2013    Type 2 diabetes mellitus without complication, without long-term current use of insulin ([address] 8/31/2016       Social History:   TOBACCO:   reports that he quit smoking about 28 years ago.  His smoking use TABLET BY MOUTH NIGHTLY 10/30/17   Gemini Ferrara MD   polyethylene glycol (GLYCOLAX) packet Take 17 g by mouth daily as needed for Constipation Mix with eight ounces of water. 9/19/17   Samuel Oliva MD   pantoprazole (PROTONIX) 40 MG tablet Take 1 tablet by mouth daily 9/19/17   Samuel Oliva MD   terazosin (HYTRIN) 10 MG capsule Take 1 capsule by mouth nightly 9/19/17   Samuel Oliva MD   montelukast (SINGULAIR) 10 MG tablet Take 1 tablet by mouth nightly 9/19/17   Samuel Oliva MD   potassium chloride (KLOR-CON M10) 10 MEQ extended release tablet Take 1 tablet by mouth daily with food. 9/19/17   Samuel Oliva MD   atenolol (TENORMIN) 25 MG tablet Take 0.5 tablets by mouth daily 9/19/17   Samuel Oliva MD   ondansetron [facility]) 4 MG tablet Take 1 tablet by mouth daily as needed for Nausea 6/16/17   Samuel Oliva MD   oxybutynin (DITROPAN XL) 15 MG extended release tablet Take 1 tablet by mouth 2 times daily 3/7/17   Samuel Oliva MD   desmopressin (DDAVP) 0.2 MG tablet Take 1 tablet by mouth nightly 3/7/17   Samuel Oliva MD   Accu-Chennamdi Softclix Lancets MISC TEST 2 TO 4 TIMES PER DAY 11/14/16   Samuel Oliva MD   ACCU-CHENNAMDI ZEENAT PLUS strip USE AS DIRECTED TO TEST BLOOD SUGAR UP TO 2-4 TIMES A DAY 11/14/16   Samuel Oliva MD   ferrous sulfate 325 (65 FE) MG tablet Take 1 tablet by mouth 2 times daily (with meals) 8/31/16   Samuel Oliva MD   Multiple Vitamins-Minerals (CENTRUM SILVER ULTRA MENS) TABS Take 1 tablet by mouth daily 6/16/15   Samuel Oliva MD   nitroGLYCERIN (NITROSTAT) 0.4 MG SL tablet Place 1 tablet under the tongue every 5 minutes as needed. 1/9/12   Samuel Oliva MD       REVIEW OF SYSTEMS:  Review of Systems   Constitutional: Positive for unexpected weight change. Negative for activity change, appetite change, chills and fever. HENT: Negative for hearing loss. Eyes: Negative for visual disturbance. Respiratory: Positive for cough.  Negative for chest tightness and shortness of

## 2018-09-12 RX ORDER — DESMOPRESSIN ACETATE 0.2 MG/1
TABLET ORAL
Qty: 30 TABLET | Refills: 2 | Status: SHIPPED | OUTPATIENT
Start: 2018-09-12 | End: 2019-04-02 | Stop reason: SDUPTHER

## 2018-09-17 RX ORDER — LORATADINE 10 MG/1
TABLET ORAL
Qty: 30 TABLET | Refills: 2 | Status: SHIPPED | OUTPATIENT
Start: 2018-09-17 | End: 2018-11-12 | Stop reason: SDUPTHER

## 2018-09-19 ENCOUNTER — HOSPITAL ENCOUNTER (OUTPATIENT)
Dept: PULMONOLOGY | Age: 83
Discharge: HOME | End: 2018-09-19
Payer: MEDICARE

## 2018-09-19 DIAGNOSIS — R05.9 COUGH: ICD-10-CM

## 2018-09-19 PROCEDURE — 94761 N-INVAS EAR/PLS OXIMETRY MLT: CPT

## 2018-09-19 PROCEDURE — 94060 EVALUATION OF WHEEZING: CPT

## 2018-09-19 PROCEDURE — 94640 AIRWAY INHALATION TREATMENT: CPT

## 2018-09-19 PROCEDURE — 94664 DEMO&/EVAL PT USE INHALER: CPT

## 2018-09-19 PROCEDURE — 94729 DIFFUSING CAPACITY: CPT

## 2018-09-19 PROCEDURE — 94726 PLETHYSMOGRAPHY LUNG VOLUMES: CPT

## 2018-09-19 PROCEDURE — 6360000002 HC RX W HCPCS: Performed by: INTERNAL MEDICINE

## 2018-09-19 RX ORDER — ALBUTEROL SULFATE 2.5 MG/3ML
2.5 SOLUTION RESPIRATORY (INHALATION) ONCE
Status: COMPLETED | OUTPATIENT
Start: 2018-09-19 | End: 2018-09-19

## 2018-09-19 RX ADMIN — ALBUTEROL SULFATE 2.5 MG: 2.5 SOLUTION RESPIRATORY (INHALATION) at 16:02

## 2018-09-20 LAB
EKG ATRIAL RATE: 65 BPM
EKG DIAGNOSIS: NORMAL
EKG P AXIS: 73 DEGREES
EKG P-R INTERVAL: 210 MS
EKG Q-T INTERVAL: 418 MS
EKG QRS DURATION: 88 MS
EKG QTC CALCULATION (BAZETT): 434 MS
EKG R AXIS: 60 DEGREES
EKG T AXIS: 53 DEGREES
EKG VENTRICULAR RATE: 65 BPM

## 2018-09-22 DIAGNOSIS — J45.909 MILD ASTHMA WITHOUT COMPLICATION, UNSPECIFIED WHETHER PERSISTENT: Primary | ICD-10-CM

## 2018-09-22 RX ORDER — FLUTICASONE PROPIONATE 44 UG/1
2 AEROSOL, METERED RESPIRATORY (INHALATION) 2 TIMES DAILY
Qty: 1 INHALER | Refills: 3 | Status: SHIPPED | OUTPATIENT
Start: 2018-09-22 | End: 2018-12-27 | Stop reason: SDUPTHER

## 2018-09-22 RX ORDER — ALBUTEROL SULFATE 90 UG/1
2 AEROSOL, METERED RESPIRATORY (INHALATION) EVERY 6 HOURS PRN
Qty: 1 INHALER | Refills: 3 | Status: SHIPPED | OUTPATIENT
Start: 2018-09-22 | End: 2019-01-02 | Stop reason: SDUPTHER

## 2018-09-26 RX ORDER — POTASSIUM CHLORIDE 750 MG/1
10 TABLET, EXTENDED RELEASE ORAL DAILY
Qty: 30 TABLET | Refills: 3 | Status: SHIPPED | OUTPATIENT
Start: 2018-09-26 | End: 2018-12-22 | Stop reason: SDUPTHER

## 2018-10-03 DIAGNOSIS — I10 ESSENTIAL HYPERTENSION: Chronic | ICD-10-CM

## 2018-10-04 RX ORDER — TERAZOSIN 10 MG/1
CAPSULE ORAL
Qty: 90 CAPSULE | Refills: 3 | Status: SHIPPED | OUTPATIENT
Start: 2018-10-04 | End: 2019-10-10 | Stop reason: SDUPTHER

## 2018-10-08 DIAGNOSIS — I10 ESSENTIAL HYPERTENSION: Chronic | ICD-10-CM

## 2018-10-09 RX ORDER — ATENOLOL 25 MG/1
TABLET ORAL
Qty: 45 TABLET | Refills: 1 | Status: SHIPPED | OUTPATIENT
Start: 2018-10-09 | End: 2019-01-02 | Stop reason: SDUPTHER

## 2018-10-15 ENCOUNTER — OFFICE VISIT (OUTPATIENT)
Dept: INTERNAL MEDICINE CLINIC | Age: 83
End: 2018-10-15
Payer: MEDICARE

## 2018-10-15 VITALS
SYSTOLIC BLOOD PRESSURE: 140 MMHG | BODY MASS INDEX: 21.01 KG/M2 | WEIGHT: 169 LBS | DIASTOLIC BLOOD PRESSURE: 68 MMHG | HEIGHT: 75 IN

## 2018-10-15 DIAGNOSIS — R59.9 ADENOPATHY: Primary | ICD-10-CM

## 2018-10-15 DIAGNOSIS — R06.09 DYSPNEA ON EXERTION: Chronic | ICD-10-CM

## 2018-10-15 DIAGNOSIS — R63.4 ABNORMAL WEIGHT LOSS: ICD-10-CM

## 2018-10-15 DIAGNOSIS — R09.89 PULMONARY AIR TRAPPING: ICD-10-CM

## 2018-10-15 DIAGNOSIS — I10 ESSENTIAL HYPERTENSION: Chronic | ICD-10-CM

## 2018-10-15 DIAGNOSIS — E11.9 TYPE 2 DIABETES MELLITUS WITHOUT COMPLICATION, WITHOUT LONG-TERM CURRENT USE OF INSULIN (HCC): Chronic | ICD-10-CM

## 2018-10-15 DIAGNOSIS — Z87.891 FORMER SMOKER: ICD-10-CM

## 2018-10-15 DIAGNOSIS — D64.9 ANEMIA, UNSPECIFIED TYPE: Chronic | ICD-10-CM

## 2018-10-15 PROCEDURE — G8482 FLU IMMUNIZE ORDER/ADMIN: HCPCS | Performed by: INTERNAL MEDICINE

## 2018-10-15 PROCEDURE — 99214 OFFICE O/P EST MOD 30 MIN: CPT | Performed by: INTERNAL MEDICINE

## 2018-10-15 PROCEDURE — G8427 DOCREV CUR MEDS BY ELIG CLIN: HCPCS | Performed by: INTERNAL MEDICINE

## 2018-10-15 PROCEDURE — 1036F TOBACCO NON-USER: CPT | Performed by: INTERNAL MEDICINE

## 2018-10-15 PROCEDURE — 1101F PT FALLS ASSESS-DOCD LE1/YR: CPT | Performed by: INTERNAL MEDICINE

## 2018-10-15 PROCEDURE — 4040F PNEUMOC VAC/ADMIN/RCVD: CPT | Performed by: INTERNAL MEDICINE

## 2018-10-15 PROCEDURE — G8420 CALC BMI NORM PARAMETERS: HCPCS | Performed by: INTERNAL MEDICINE

## 2018-10-15 PROCEDURE — G8598 ASA/ANTIPLAT THER USED: HCPCS | Performed by: INTERNAL MEDICINE

## 2018-10-15 PROCEDURE — 1123F ACP DISCUSS/DSCN MKR DOCD: CPT | Performed by: INTERNAL MEDICINE

## 2018-10-15 ASSESSMENT — ENCOUNTER SYMPTOMS
SHORTNESS OF BREATH: 1
CHEST TIGHTNESS: 0
ABDOMINAL PAIN: 0

## 2018-10-18 RX ORDER — MONTELUKAST SODIUM 10 MG/1
10 TABLET ORAL NIGHTLY
Qty: 90 TABLET | Refills: 3 | Status: SHIPPED | OUTPATIENT
Start: 2018-10-18 | End: 2019-10-21 | Stop reason: SDUPTHER

## 2018-10-22 RX ORDER — MULTIVITAMIN WITH FOLIC ACID 400 MCG
1 TABLET ORAL DAILY
Qty: 30 TABLET | Refills: 5 | Status: SHIPPED | OUTPATIENT
Start: 2018-10-22 | End: 2019-03-09 | Stop reason: SDUPTHER

## 2018-11-06 RX ORDER — OMEPRAZOLE MAGNESIUM 20 MG
3 CAPSULE,DELAYED RELEASE (ENTERIC COATED) ORAL NIGHTLY
Qty: 30 TABLET | Refills: 9 | Status: SHIPPED | OUTPATIENT
Start: 2018-11-06 | End: 2019-04-02 | Stop reason: SDUPTHER

## 2018-11-08 DIAGNOSIS — K21.9 GASTROESOPHAGEAL REFLUX DISEASE WITHOUT ESOPHAGITIS: Chronic | ICD-10-CM

## 2018-11-12 RX ORDER — LORATADINE 10 MG/1
TABLET ORAL
Qty: 30 TABLET | Refills: 2 | Status: SHIPPED | OUTPATIENT
Start: 2018-11-12 | End: 2019-05-20

## 2018-11-13 RX ORDER — PANTOPRAZOLE SODIUM 40 MG/1
40 TABLET, DELAYED RELEASE ORAL DAILY
Qty: 90 TABLET | Refills: 3 | Status: SHIPPED | OUTPATIENT
Start: 2018-11-13 | End: 2019-09-04 | Stop reason: ALTCHOICE

## 2018-12-18 ENCOUNTER — OFFICE VISIT (OUTPATIENT)
Dept: PULMONOLOGY | Age: 83
End: 2018-12-18
Payer: MEDICARE

## 2018-12-18 VITALS
DIASTOLIC BLOOD PRESSURE: 56 MMHG | RESPIRATION RATE: 16 BRPM | HEART RATE: 60 BPM | OXYGEN SATURATION: 98 % | SYSTOLIC BLOOD PRESSURE: 110 MMHG | WEIGHT: 175.2 LBS | HEIGHT: 75 IN | BODY MASS INDEX: 21.78 KG/M2

## 2018-12-18 DIAGNOSIS — J30.89 PERENNIAL ALLERGIC RHINITIS: Chronic | ICD-10-CM

## 2018-12-18 DIAGNOSIS — R05.3 CHRONIC COUGH: Primary | ICD-10-CM

## 2018-12-18 DIAGNOSIS — R93.89 ABNORMAL CHEST CT: ICD-10-CM

## 2018-12-18 PROCEDURE — 99204 OFFICE O/P NEW MOD 45 MIN: CPT | Performed by: INTERNAL MEDICINE

## 2018-12-18 PROCEDURE — 1036F TOBACCO NON-USER: CPT | Performed by: INTERNAL MEDICINE

## 2018-12-18 PROCEDURE — G8598 ASA/ANTIPLAT THER USED: HCPCS | Performed by: INTERNAL MEDICINE

## 2018-12-18 PROCEDURE — 4040F PNEUMOC VAC/ADMIN/RCVD: CPT | Performed by: INTERNAL MEDICINE

## 2018-12-18 PROCEDURE — 1123F ACP DISCUSS/DSCN MKR DOCD: CPT | Performed by: INTERNAL MEDICINE

## 2018-12-18 PROCEDURE — 1101F PT FALLS ASSESS-DOCD LE1/YR: CPT | Performed by: INTERNAL MEDICINE

## 2018-12-18 PROCEDURE — G8482 FLU IMMUNIZE ORDER/ADMIN: HCPCS | Performed by: INTERNAL MEDICINE

## 2018-12-18 PROCEDURE — G8420 CALC BMI NORM PARAMETERS: HCPCS | Performed by: INTERNAL MEDICINE

## 2018-12-18 PROCEDURE — G8427 DOCREV CUR MEDS BY ELIG CLIN: HCPCS | Performed by: INTERNAL MEDICINE

## 2018-12-18 RX ORDER — HYDROCODONE BITARTRATE AND ACETAMINOPHEN 5; 325 MG/1; MG/1
1 TABLET ORAL EVERY 6 HOURS PRN
COMMUNITY
End: 2019-01-02 | Stop reason: DRUGHIGH

## 2018-12-24 RX ORDER — POTASSIUM CHLORIDE 750 MG/1
10 TABLET, EXTENDED RELEASE ORAL DAILY
Qty: 30 TABLET | Refills: 3 | Status: SHIPPED | OUTPATIENT
Start: 2018-12-24 | End: 2019-03-23 | Stop reason: SDUPTHER

## 2018-12-27 DIAGNOSIS — J45.909 MILD ASTHMA WITHOUT COMPLICATION, UNSPECIFIED WHETHER PERSISTENT: ICD-10-CM

## 2018-12-27 RX ORDER — FLUTICASONE PROPIONATE 44 MCG
AEROSOL WITH ADAPTER (GRAM) INHALATION
Qty: 10.6 INHALER | Refills: 3 | Status: SHIPPED | OUTPATIENT
Start: 2018-12-27 | End: 2019-01-02 | Stop reason: DRUGHIGH

## 2019-01-02 ENCOUNTER — TELEPHONE (OUTPATIENT)
Dept: INTERNAL MEDICINE CLINIC | Age: 84
End: 2019-01-02

## 2019-01-02 ENCOUNTER — OFFICE VISIT (OUTPATIENT)
Dept: INTERNAL MEDICINE CLINIC | Age: 84
End: 2019-01-02
Payer: MEDICARE

## 2019-01-02 VITALS
BODY MASS INDEX: 21.26 KG/M2 | HEART RATE: 76 BPM | HEIGHT: 75 IN | DIASTOLIC BLOOD PRESSURE: 74 MMHG | OXYGEN SATURATION: 97 % | WEIGHT: 171 LBS | SYSTOLIC BLOOD PRESSURE: 158 MMHG

## 2019-01-02 DIAGNOSIS — F20.0 PARANOID SCHIZOPHRENIA (HCC): ICD-10-CM

## 2019-01-02 DIAGNOSIS — E11.9 TYPE 2 DIABETES MELLITUS WITHOUT COMPLICATION, WITHOUT LONG-TERM CURRENT USE OF INSULIN (HCC): Chronic | ICD-10-CM

## 2019-01-02 DIAGNOSIS — E78.5 HYPERLIPIDEMIA, UNSPECIFIED HYPERLIPIDEMIA TYPE: Chronic | ICD-10-CM

## 2019-01-02 DIAGNOSIS — E11.22 TYPE 2 DIABETES MELLITUS WITH STAGE 3 CHRONIC KIDNEY DISEASE, WITHOUT LONG-TERM CURRENT USE OF INSULIN (HCC): ICD-10-CM

## 2019-01-02 DIAGNOSIS — I10 ESSENTIAL HYPERTENSION: Chronic | ICD-10-CM

## 2019-01-02 DIAGNOSIS — J45.909 MILD ASTHMA WITHOUT COMPLICATION, UNSPECIFIED WHETHER PERSISTENT: ICD-10-CM

## 2019-01-02 DIAGNOSIS — I73.9 PERIPHERAL VASCULAR DISEASE (HCC): ICD-10-CM

## 2019-01-02 DIAGNOSIS — R20.9 SENSATION OF COLD IN LEG: ICD-10-CM

## 2019-01-02 DIAGNOSIS — D64.9 ANEMIA, UNSPECIFIED TYPE: Chronic | ICD-10-CM

## 2019-01-02 DIAGNOSIS — I10 ESSENTIAL HYPERTENSION: Primary | Chronic | ICD-10-CM

## 2019-01-02 DIAGNOSIS — N18.30 TYPE 2 DIABETES MELLITUS WITH STAGE 3 CHRONIC KIDNEY DISEASE, WITHOUT LONG-TERM CURRENT USE OF INSULIN (HCC): ICD-10-CM

## 2019-01-02 DIAGNOSIS — I25.9 ISCHEMIC HEART DISEASE: Chronic | ICD-10-CM

## 2019-01-02 DIAGNOSIS — Z96.642 STATUS POST LEFT HIP REPLACEMENT: ICD-10-CM

## 2019-01-02 DIAGNOSIS — R45.89 DYSPHORIC MOOD: ICD-10-CM

## 2019-01-02 DIAGNOSIS — Z23 NEED FOR INFLUENZA VACCINATION: ICD-10-CM

## 2019-01-02 LAB
A/G RATIO: 1.4 (ref 1.1–2.2)
ALBUMIN SERPL-MCNC: 4.1 G/DL (ref 3.4–5)
ALP BLD-CCNC: 74 U/L (ref 40–129)
ALT SERPL-CCNC: 12 U/L (ref 10–40)
ANION GAP SERPL CALCULATED.3IONS-SCNC: 16 MMOL/L (ref 3–16)
AST SERPL-CCNC: 19 U/L (ref 15–37)
BASOPHILS ABSOLUTE: 0 K/UL (ref 0–0.2)
BASOPHILS RELATIVE PERCENT: 0.5 %
BILIRUB SERPL-MCNC: 0.3 MG/DL (ref 0–1)
BUN BLDV-MCNC: 23 MG/DL (ref 7–20)
CALCIUM SERPL-MCNC: 9.4 MG/DL (ref 8.3–10.6)
CHLORIDE BLD-SCNC: 100 MMOL/L (ref 99–110)
CHOLESTEROL, TOTAL: 115 MG/DL (ref 0–199)
CO2: 23 MMOL/L (ref 21–32)
CREAT SERPL-MCNC: 1.3 MG/DL (ref 0.8–1.3)
EOSINOPHILS ABSOLUTE: 0.1 K/UL (ref 0–0.6)
EOSINOPHILS RELATIVE PERCENT: 2.4 %
GFR AFRICAN AMERICAN: >60
GFR NON-AFRICAN AMERICAN: 53
GLOBULIN: 2.9 G/DL
GLUCOSE BLD-MCNC: 126 MG/DL (ref 70–99)
HCT VFR BLD CALC: 33.9 % (ref 40.5–52.5)
HDLC SERPL-MCNC: 54 MG/DL (ref 40–60)
HEMOGLOBIN: 11.3 G/DL (ref 13.5–17.5)
LDL CHOLESTEROL CALCULATED: 48 MG/DL
LYMPHOCYTES ABSOLUTE: 0.7 K/UL (ref 1–5.1)
LYMPHOCYTES RELATIVE PERCENT: 12.6 %
MCH RBC QN AUTO: 28.8 PG (ref 26–34)
MCHC RBC AUTO-ENTMCNC: 33.4 G/DL (ref 31–36)
MCV RBC AUTO: 86.4 FL (ref 80–100)
MONOCYTES ABSOLUTE: 0.5 K/UL (ref 0–1.3)
MONOCYTES RELATIVE PERCENT: 9.3 %
NEUTROPHILS ABSOLUTE: 4.3 K/UL (ref 1.7–7.7)
NEUTROPHILS RELATIVE PERCENT: 75.2 %
PDW BLD-RTO: 13 % (ref 12.4–15.4)
PLATELET # BLD: 225 K/UL (ref 135–450)
PMV BLD AUTO: 9.5 FL (ref 5–10.5)
POTASSIUM SERPL-SCNC: 4.5 MMOL/L (ref 3.5–5.1)
RBC # BLD: 3.93 M/UL (ref 4.2–5.9)
SODIUM BLD-SCNC: 139 MMOL/L (ref 136–145)
TOTAL PROTEIN: 7 G/DL (ref 6.4–8.2)
TRIGL SERPL-MCNC: 64 MG/DL (ref 0–150)
TSH REFLEX: 1.28 UIU/ML (ref 0.27–4.2)
VLDLC SERPL CALC-MCNC: 13 MG/DL
WBC # BLD: 5.7 K/UL (ref 4–11)

## 2019-01-02 PROCEDURE — 4040F PNEUMOC VAC/ADMIN/RCVD: CPT | Performed by: INTERNAL MEDICINE

## 2019-01-02 PROCEDURE — 1101F PT FALLS ASSESS-DOCD LE1/YR: CPT | Performed by: INTERNAL MEDICINE

## 2019-01-02 PROCEDURE — G8598 ASA/ANTIPLAT THER USED: HCPCS | Performed by: INTERNAL MEDICINE

## 2019-01-02 PROCEDURE — 1123F ACP DISCUSS/DSCN MKR DOCD: CPT | Performed by: INTERNAL MEDICINE

## 2019-01-02 PROCEDURE — G8420 CALC BMI NORM PARAMETERS: HCPCS | Performed by: INTERNAL MEDICINE

## 2019-01-02 PROCEDURE — G0008 ADMIN INFLUENZA VIRUS VAC: HCPCS | Performed by: INTERNAL MEDICINE

## 2019-01-02 PROCEDURE — G8482 FLU IMMUNIZE ORDER/ADMIN: HCPCS | Performed by: INTERNAL MEDICINE

## 2019-01-02 PROCEDURE — G8427 DOCREV CUR MEDS BY ELIG CLIN: HCPCS | Performed by: INTERNAL MEDICINE

## 2019-01-02 PROCEDURE — 1036F TOBACCO NON-USER: CPT | Performed by: INTERNAL MEDICINE

## 2019-01-02 PROCEDURE — 99214 OFFICE O/P EST MOD 30 MIN: CPT | Performed by: INTERNAL MEDICINE

## 2019-01-02 PROCEDURE — 90662 IIV NO PRSV INCREASED AG IM: CPT | Performed by: INTERNAL MEDICINE

## 2019-01-02 RX ORDER — ALBUTEROL SULFATE 90 UG/1
2 AEROSOL, METERED RESPIRATORY (INHALATION) EVERY 6 HOURS PRN
Qty: 1 INHALER | Refills: 3 | Status: SHIPPED | OUTPATIENT
Start: 2019-01-02 | End: 2020-09-04

## 2019-01-02 RX ORDER — HYDROCODONE BITARTRATE AND ACETAMINOPHEN 5; 325 MG/1; MG/1
1 TABLET ORAL EVERY 12 HOURS PRN
Qty: 60 TABLET | Refills: 0 | Status: SHIPPED | OUTPATIENT
Start: 2019-01-02 | End: 2019-01-09

## 2019-01-02 RX ORDER — BENZONATATE 100 MG/1
100 CAPSULE ORAL 3 TIMES DAILY PRN
Qty: 42 CAPSULE | Refills: 1 | Status: SHIPPED | OUTPATIENT
Start: 2019-01-02 | End: 2019-04-30 | Stop reason: SDUPTHER

## 2019-01-02 RX ORDER — FLUTICASONE PROPIONATE 50 MCG
2 SPRAY, SUSPENSION (ML) NASAL DAILY PRN
Qty: 1 BOTTLE | Refills: 3 | Status: SHIPPED | OUTPATIENT
Start: 2019-01-02 | End: 2019-04-21 | Stop reason: SDUPTHER

## 2019-01-02 RX ORDER — FLUTICASONE PROPIONATE 110 UG/1
1 AEROSOL, METERED RESPIRATORY (INHALATION) 2 TIMES DAILY
Qty: 1 INHALER | Refills: 3 | Status: SHIPPED | OUTPATIENT
Start: 2019-01-02 | End: 2019-06-13 | Stop reason: ALTCHOICE

## 2019-01-02 RX ORDER — ATENOLOL 25 MG/1
12.5 TABLET ORAL DAILY
Qty: 45 TABLET | Refills: 2 | Status: SHIPPED | OUTPATIENT
Start: 2019-01-02 | End: 2019-09-09 | Stop reason: SDUPTHER

## 2019-01-02 ASSESSMENT — ENCOUNTER SYMPTOMS
VOMITING: 0
BLOOD IN STOOL: 0
NAUSEA: 0
DIARRHEA: 0
ABDOMINAL PAIN: 0
CHEST TIGHTNESS: 0
SHORTNESS OF BREATH: 0

## 2019-01-03 LAB
ESTIMATED AVERAGE GLUCOSE: 137 MG/DL
HBA1C MFR BLD: 6.4 %

## 2019-01-15 ENCOUNTER — TELEPHONE (OUTPATIENT)
Dept: INTERNAL MEDICINE CLINIC | Age: 84
End: 2019-01-15

## 2019-02-23 RX ORDER — ATORVASTATIN CALCIUM 10 MG/1
10 TABLET, FILM COATED ORAL DAILY
Qty: 90 TABLET | Refills: 3 | Status: SHIPPED | OUTPATIENT
Start: 2019-02-23 | End: 2020-02-06

## 2019-03-09 RX ORDER — MULTIVITAMIN WITH FOLIC ACID 400 MCG
TABLET ORAL
Qty: 30 TABLET | Refills: 4 | Status: SHIPPED | OUTPATIENT
Start: 2019-03-09 | End: 2019-06-04 | Stop reason: SDUPTHER

## 2019-03-12 ENCOUNTER — OFFICE VISIT (OUTPATIENT)
Dept: PULMONOLOGY | Age: 84
End: 2019-03-12
Payer: MEDICARE

## 2019-03-12 VITALS
DIASTOLIC BLOOD PRESSURE: 58 MMHG | WEIGHT: 166.5 LBS | SYSTOLIC BLOOD PRESSURE: 132 MMHG | OXYGEN SATURATION: 100 % | HEIGHT: 74 IN | BODY MASS INDEX: 21.37 KG/M2 | HEART RATE: 71 BPM

## 2019-03-12 DIAGNOSIS — R05.3 CHRONIC COUGH: Primary | ICD-10-CM

## 2019-03-12 PROCEDURE — 4040F PNEUMOC VAC/ADMIN/RCVD: CPT | Performed by: INTERNAL MEDICINE

## 2019-03-12 PROCEDURE — 1036F TOBACCO NON-USER: CPT | Performed by: INTERNAL MEDICINE

## 2019-03-12 PROCEDURE — G8427 DOCREV CUR MEDS BY ELIG CLIN: HCPCS | Performed by: INTERNAL MEDICINE

## 2019-03-12 PROCEDURE — 1123F ACP DISCUSS/DSCN MKR DOCD: CPT | Performed by: INTERNAL MEDICINE

## 2019-03-12 PROCEDURE — G8482 FLU IMMUNIZE ORDER/ADMIN: HCPCS | Performed by: INTERNAL MEDICINE

## 2019-03-12 PROCEDURE — 99213 OFFICE O/P EST LOW 20 MIN: CPT | Performed by: INTERNAL MEDICINE

## 2019-03-12 PROCEDURE — 1101F PT FALLS ASSESS-DOCD LE1/YR: CPT | Performed by: INTERNAL MEDICINE

## 2019-03-12 PROCEDURE — G8598 ASA/ANTIPLAT THER USED: HCPCS | Performed by: INTERNAL MEDICINE

## 2019-03-12 PROCEDURE — G8420 CALC BMI NORM PARAMETERS: HCPCS | Performed by: INTERNAL MEDICINE

## 2019-03-25 RX ORDER — POTASSIUM CHLORIDE 750 MG/1
10 TABLET, EXTENDED RELEASE ORAL DAILY
Qty: 30 TABLET | Refills: 2 | Status: SHIPPED | OUTPATIENT
Start: 2019-03-25 | End: 2019-06-16 | Stop reason: SDUPTHER

## 2019-04-02 ENCOUNTER — OFFICE VISIT (OUTPATIENT)
Dept: INTERNAL MEDICINE CLINIC | Age: 84
End: 2019-04-02
Payer: MEDICARE

## 2019-04-02 VITALS
HEIGHT: 74 IN | SYSTOLIC BLOOD PRESSURE: 134 MMHG | WEIGHT: 168 LBS | BODY MASS INDEX: 21.56 KG/M2 | OXYGEN SATURATION: 97 % | DIASTOLIC BLOOD PRESSURE: 68 MMHG | HEART RATE: 69 BPM

## 2019-04-02 DIAGNOSIS — E11.9 TYPE 2 DIABETES MELLITUS WITHOUT COMPLICATION, WITHOUT LONG-TERM CURRENT USE OF INSULIN (HCC): Chronic | ICD-10-CM

## 2019-04-02 DIAGNOSIS — I10 ESSENTIAL HYPERTENSION: Chronic | ICD-10-CM

## 2019-04-02 DIAGNOSIS — E78.5 HYPERLIPIDEMIA, UNSPECIFIED HYPERLIPIDEMIA TYPE: Chronic | ICD-10-CM

## 2019-04-02 DIAGNOSIS — R21 RASH: ICD-10-CM

## 2019-04-02 DIAGNOSIS — D64.9 ANEMIA, UNSPECIFIED TYPE: Chronic | ICD-10-CM

## 2019-04-02 DIAGNOSIS — I25.9 ISCHEMIC HEART DISEASE: Chronic | ICD-10-CM

## 2019-04-02 DIAGNOSIS — N32.81 OVERACTIVE BLADDER: Chronic | ICD-10-CM

## 2019-04-02 DIAGNOSIS — I10 ESSENTIAL HYPERTENSION: Primary | Chronic | ICD-10-CM

## 2019-04-02 DIAGNOSIS — C61 PROSTATE CANCER (HCC): Chronic | ICD-10-CM

## 2019-04-02 LAB
A/G RATIO: 1.3 (ref 1.1–2.2)
ALBUMIN SERPL-MCNC: 4 G/DL (ref 3.4–5)
ALP BLD-CCNC: 70 U/L (ref 40–129)
ALT SERPL-CCNC: 12 U/L (ref 10–40)
ANION GAP SERPL CALCULATED.3IONS-SCNC: 12 MMOL/L (ref 3–16)
AST SERPL-CCNC: 19 U/L (ref 15–37)
BASOPHILS ABSOLUTE: 0 K/UL (ref 0–0.2)
BASOPHILS RELATIVE PERCENT: 0.6 %
BILIRUB SERPL-MCNC: 0.3 MG/DL (ref 0–1)
BUN BLDV-MCNC: 19 MG/DL (ref 7–20)
CALCIUM SERPL-MCNC: 9.3 MG/DL (ref 8.3–10.6)
CHLORIDE BLD-SCNC: 101 MMOL/L (ref 99–110)
CHOLESTEROL, TOTAL: 100 MG/DL (ref 0–199)
CO2: 25 MMOL/L (ref 21–32)
CREAT SERPL-MCNC: 1.4 MG/DL (ref 0.8–1.3)
EOSINOPHILS ABSOLUTE: 0.1 K/UL (ref 0–0.6)
EOSINOPHILS RELATIVE PERCENT: 2.3 %
GFR AFRICAN AMERICAN: 59
GFR NON-AFRICAN AMERICAN: 49
GLOBULIN: 3.2 G/DL
GLUCOSE BLD-MCNC: 112 MG/DL (ref 70–99)
HCT VFR BLD CALC: 34.4 % (ref 40.5–52.5)
HDLC SERPL-MCNC: 51 MG/DL (ref 40–60)
HEMOGLOBIN: 11.2 G/DL (ref 13.5–17.5)
LDL CHOLESTEROL CALCULATED: 36 MG/DL
LYMPHOCYTES ABSOLUTE: 0.9 K/UL (ref 1–5.1)
LYMPHOCYTES RELATIVE PERCENT: 17.3 %
MCH RBC QN AUTO: 28 PG (ref 26–34)
MCHC RBC AUTO-ENTMCNC: 32.5 G/DL (ref 31–36)
MCV RBC AUTO: 86.1 FL (ref 80–100)
MONOCYTES ABSOLUTE: 0.5 K/UL (ref 0–1.3)
MONOCYTES RELATIVE PERCENT: 9.8 %
NEUTROPHILS ABSOLUTE: 3.6 K/UL (ref 1.7–7.7)
NEUTROPHILS RELATIVE PERCENT: 70 %
PDW BLD-RTO: 13 % (ref 12.4–15.4)
PLATELET # BLD: 204 K/UL (ref 135–450)
PMV BLD AUTO: 9.8 FL (ref 5–10.5)
POTASSIUM SERPL-SCNC: 4.7 MMOL/L (ref 3.5–5.1)
RBC # BLD: 4 M/UL (ref 4.2–5.9)
SODIUM BLD-SCNC: 138 MMOL/L (ref 136–145)
TOTAL PROTEIN: 7.2 G/DL (ref 6.4–8.2)
TRIGL SERPL-MCNC: 63 MG/DL (ref 0–150)
VLDLC SERPL CALC-MCNC: 13 MG/DL
WBC # BLD: 5.1 K/UL (ref 4–11)

## 2019-04-02 PROCEDURE — 1123F ACP DISCUSS/DSCN MKR DOCD: CPT | Performed by: INTERNAL MEDICINE

## 2019-04-02 PROCEDURE — 1036F TOBACCO NON-USER: CPT | Performed by: INTERNAL MEDICINE

## 2019-04-02 PROCEDURE — G8427 DOCREV CUR MEDS BY ELIG CLIN: HCPCS | Performed by: INTERNAL MEDICINE

## 2019-04-02 PROCEDURE — 99214 OFFICE O/P EST MOD 30 MIN: CPT | Performed by: INTERNAL MEDICINE

## 2019-04-02 PROCEDURE — 4040F PNEUMOC VAC/ADMIN/RCVD: CPT | Performed by: INTERNAL MEDICINE

## 2019-04-02 PROCEDURE — G8598 ASA/ANTIPLAT THER USED: HCPCS | Performed by: INTERNAL MEDICINE

## 2019-04-02 PROCEDURE — G8420 CALC BMI NORM PARAMETERS: HCPCS | Performed by: INTERNAL MEDICINE

## 2019-04-02 RX ORDER — DESMOPRESSIN ACETATE 0.2 MG/1
TABLET ORAL
Qty: 30 TABLET | Refills: 5 | Status: SHIPPED | OUTPATIENT
Start: 2019-04-02 | End: 2019-09-21 | Stop reason: SDUPTHER

## 2019-04-02 RX ORDER — OXYBUTYNIN CHLORIDE 15 MG/1
15 TABLET, EXTENDED RELEASE ORAL 2 TIMES DAILY
Qty: 60 TABLET | Refills: 3 | Status: SHIPPED | OUTPATIENT
Start: 2019-04-02 | End: 2020-10-15 | Stop reason: SDUPTHER

## 2019-04-02 RX ORDER — CLOTRIMAZOLE 1 %
CREAM (GRAM) TOPICAL
Qty: 1 TUBE | Refills: 2 | Status: SHIPPED | OUTPATIENT
Start: 2019-04-02 | End: 2020-05-11

## 2019-04-02 RX ORDER — LANOLIN ALCOHOL/MO/W.PET/CERES
3 CREAM (GRAM) TOPICAL NIGHTLY
Qty: 30 TABLET | Refills: 5 | Status: SHIPPED | OUTPATIENT
Start: 2019-04-02 | End: 2020-01-23

## 2019-04-02 ASSESSMENT — ENCOUNTER SYMPTOMS
CHEST TIGHTNESS: 0
BLOOD IN STOOL: 0
VOMITING: 0
COUGH: 0
SHORTNESS OF BREATH: 0
NAUSEA: 0
ABDOMINAL PAIN: 0

## 2019-04-02 NOTE — PROGRESS NOTES
Outpatient Note for established Patient - regular Visit    History Obtained From:  patient, electronic medical record  Is the patient new to me ? - No    HISTORY OF PRESENT ILLNESS:   The patient is a 82 y.o. male who is here today for :  Joni Kessler was seen today for rash, hypertension and diabetes. Diagnoses and all orders for this visit:    Essential hypertension  /68  He is on Losartan, Atenolol  Also o Aspirin     Type 2 diabetes mellitus without complication, without long-term current use of insulin ([facility])  Lab Results   Component Value Date    LABA1C 7.0 04/02/2019     Lab Results   Component Value Date    .2 04/02/2019   he is on Metformin 500 mg   No reported side effects  Pt denies CP/SOB/palpitations/abdominal pain/N/V. Last eye exam- last month   Known CKD and ASCVD- controlled     Ischemic heart disease  Pt denies CP/SOB/palpitations/abdominal pain/N/V.    He si on statin, Aspirin and BB     Hyperlipidemia, unspecified hyperlipidemia type  Lab Results   Component Value Date    CHOL 100 04/02/2019    CHOL 115 01/02/2019    CHOL 123 03/12/2018     Lab Results   Component Value Date    TRIG 63 04/02/2019    TRIG 64 01/02/2019    TRIG 77 03/12/2018     Lab Results   Component Value Date    HDL 51 04/02/2019    HDL 54 01/02/2019    HDL 55 03/12/2018     Lab Results   Component Value Date    LDLCALC 36 04/02/2019    LDLCALC 48 01/02/2019    LDLCALC 53 03/12/2018     Lab Results   Component Value Date    LABVLDL 13 04/02/2019    LABVLDL 13 01/02/2019    LABVLDL 15 03/12/2018   history Lipitor 10 mg no reported side effects    Anemia, unspecified type  No bleeding in stool/ urine   Stable anemia normocytic for at least 8 years     Overactive bladder    Prostate cancer [facility])  He is not following a urologist - he will schedule an appt   He see Dr Milton Sellers     He stopped using Flovent   No cough     He is c/o itching rash in lower back     Past Medical History:        Diagnosis Date    Acute cholecystitis 17 g by mouth daily as needed for Constipation Mix with eight ounces of water. 9/19/17  Yes Kaylyn Gonzales MD   [facility] 4 MG tablet Take 1 tablet by mouth daily as needed for Nausea 6/16/17  Yes Kaylyn Gonzales MD   Accu-Chek Softclix Lancets MISC TEST 2 TO 4 TIMES PER DAY 11/14/16  Yes Kaylyn Gonzales MD   ACCU-CHEK ZEENAT PLUS strip USE AS DIRECTED TO TEST BLOOD SUGAR UP TO 2-4 TIMES A DAY 11/14/16  Yes Kaylyn Gonzales MD   ferrous sulfate 325 (65 FE) MG tablet Take 1 tablet by mouth 2 times daily (with meals) 8/31/16  Yes Kaylyn Gonzales MD   Multiple Vitamins-Minerals (CENTRUM SILVER ULTRA MENS) TABS Take 1 tablet by mouth daily 6/16/15  Yes Kaylyn Gonzales MD   nitroGLYCERIN (NITROSTAT) 0.4 MG SL tablet Place 1 tablet under the tongue every 5 minutes as needed. 1/9/12  Yes Kaylyn Gonzales MD       REVIEW OF SYSTEMS:  Review of Systems   Constitutional: Negative for activity change, appetite change, chills, fever and unexpected weight change. Eyes: Negative for visual disturbance. Respiratory: Negative for cough, chest tightness and shortness of breath. Cardiovascular: Negative for chest pain. Gastrointestinal: Negative for abdominal pain, blood in stool, nausea and vomiting. Genitourinary: Negative for difficulty urinating and hematuria. Skin: Positive for rash. Itch in lowe back    Allergic/Immunologic: Negative for immunocompromised state. Neurological: Negative for dizziness, weakness and headaches. Psychiatric/Behavioral: Negative for dysphoric mood and suicidal ideas. The patient is not nervous/anxious. Physical Exam:      Vitals: /68 (Site: Right Upper Arm, Position: Sitting, Cuff Size: Large Adult)   Pulse 69   Ht 6' 2\" (1.88 m)   Wt 168 lb (76.2 kg)   SpO2 97%   BMI 21.57 kg/m²     Body mass index is 21.57 kg/m².   Wt Readings from Last 3 Encounters:   04/02/19 168 lb (76.2 kg)   03/12/19 166 lb 8 oz (75.5 kg)   01/02/19 171 lb (77.6 kg)     Physical Exam Constitutional: He is oriented to person, place, and time. He appears well-developed and well-nourished. No distress. HENT:   Head: Normocephalic and atraumatic. Left Ear: External ear normal.   Mouth/Throat: Oropharynx is clear and moist. No oropharyngeal exudate. R ear small emount of wax    Eyes: Conjunctivae and EOM are normal. Right eye exhibits no discharge. Left eye exhibits no discharge. No scleral icterus. Neck: Normal range of motion. Neck supple. Cardiovascular: Normal rate and normal heart sounds. No murmur heard. Pulmonary/Chest: Effort normal and breath sounds normal. No respiratory distress. He has no wheezes. He has no rales. Abdominal: Soft. He exhibits no distension. There is no tenderness. Musculoskeletal: He exhibits no deformity. Lymphadenopathy:        Head (right side): Submandibular adenopathy present. No submental, no tonsillar, no preauricular, no posterior auricular and no occipital adenopathy present. Head (left side): Submandibular (b/l nodes, symmetric, unchanged, non fixed ) adenopathy present. No submental, no tonsillar, no preauricular and no posterior auricular adenopathy present. He has no cervical adenopathy. Right cervical: No superficial cervical and no deep cervical adenopathy present. Left cervical: No superficial cervical and no deep cervical adenopathy present. Neurological: He is alert and oriented to person, place, and time. He has normal reflexes. He exhibits normal muscle tone. GCS eye subscore is 4. GCS verbal subscore is 5. GCS motor subscore is 6. Skin: No rash noted. He is not diaphoretic. Psychiatric: He has a normal mood and affect. His behavior is normal. Thought content normal.          Assessment/Plan:   Kathy Moran was seen today for rash, hypertension and diabetes. Diagnoses and all orders for this visit:    Essential hypertension  Well controlled- no changes in medication today.    Monitor BP   -     CBC Auto Differential; Future  -     Comprehensive Metabolic Panel; Future  -     Lipid Panel; Future    Type 2 diabetes mellitus without complication, without long-term current use of insulin (HCC)  Well controlled- no changes in medication today. Monitor A1C  -     oxybutynin (DITROPAN XL) 15 MG extended release tablet; Take 1 tablet by mouth 2 times daily  -     desmopressin (DDAVP) 0.2 MG tablet; TAKE 1 TABLET AT BEDTIME  -     melatonin ([employer] MELATONIN) 3 MG TABS tablet; Take 1 tablet by mouth nightly  -     CBC Auto Differential; Future  -     Comprehensive Metabolic Panel; Future  -     Hemoglobin A1C; Future  -     Lipid Panel; Future    Ischemic heart disease  Patient is asymptomatic on lovastatin aspirin and ARB  No need to change medications  -     CBC Auto Differential; Future  -     Comprehensive Metabolic Panel; Future  -     Lipid Panel; Future    Hyperlipidemia, unspecified hyperlipidemia type  Well controlled- no changes in medication today. Monitor lipids  -     Lipid Panel; Future    Anemia, unspecified type  Chronic stable normocytic anemia recheck CBC  -     CBC Auto Differential; Future    Overactive bladder  -     oxybutynin (DITROPAN XL) 15 MG extended release tablet; Take 1 tablet by mouth 2 times daily    Prostate cancer (HCC)    Rash  -     clotrimazole (LOTRIMIN) 1 % cream; Apply topically 2 times daily. Quality & Risk Score Accuracy    Last edited 04/02/19 12:25 EDT by Chun Manzo MD           - Patient was encouraged to callthe office (and ask to see me) or be seen by other provider for any worsening or lack    of improvement in his symptoms.       - Pt was asked toschedule an appointment in 3 months, and to let me know of any scheduling difficulties. Additional patients instructions (if given): There are no Patient Instructions on file for this visit.     Chun Manzo M.D.   4/6/2019, 6:12 PM

## 2019-04-03 LAB
ESTIMATED AVERAGE GLUCOSE: 154.2 MG/DL
HBA1C MFR BLD: 7 %

## 2019-04-04 DIAGNOSIS — D64.9 ANEMIA, UNSPECIFIED TYPE: Primary | ICD-10-CM

## 2019-04-12 DIAGNOSIS — R41.3 MEMORY LOSS: Chronic | ICD-10-CM

## 2019-04-15 RX ORDER — GALANTAMINE HYDROBROMIDE 24 MG/1
CAPSULE, EXTENDED RELEASE ORAL
Qty: 30 CAPSULE | Refills: 0 | Status: SHIPPED | OUTPATIENT
Start: 2019-04-15 | End: 2019-05-08 | Stop reason: SDUPTHER

## 2019-04-22 RX ORDER — FLUTICASONE PROPIONATE 50 MCG
SPRAY, SUSPENSION (ML) NASAL
Qty: 1 BOTTLE | Refills: 3 | Status: SHIPPED | OUTPATIENT
Start: 2019-04-22 | End: 2019-06-13 | Stop reason: ALTCHOICE

## 2019-04-30 RX ORDER — BENZONATATE 100 MG/1
CAPSULE ORAL
Qty: 42 CAPSULE | Refills: 1 | Status: SHIPPED | OUTPATIENT
Start: 2019-04-30 | End: 2019-10-12 | Stop reason: SDUPTHER

## 2019-05-08 DIAGNOSIS — R41.3 MEMORY LOSS: Chronic | ICD-10-CM

## 2019-05-08 RX ORDER — GALANTAMINE HYDROBROMIDE 24 MG/1
CAPSULE, EXTENDED RELEASE ORAL
Qty: 30 CAPSULE | Refills: 2 | Status: SHIPPED | OUTPATIENT
Start: 2019-05-08 | End: 2019-08-07 | Stop reason: SDUPTHER

## 2019-05-16 ENCOUNTER — TELEPHONE (OUTPATIENT)
Dept: INTERNAL MEDICINE CLINIC | Age: 84
End: 2019-05-16

## 2019-05-16 NOTE — TELEPHONE ENCOUNTER
Pt's daughter walked into office to inform her dad has lost 15-20 pounds in a month in a half time frame. Merline Memos pt's daughter would like an appt.

## 2019-05-20 ENCOUNTER — HOSPITAL ENCOUNTER (OUTPATIENT)
Age: 84
Discharge: HOME | End: 2019-05-20
Payer: MEDICARE

## 2019-05-20 ENCOUNTER — OFFICE VISIT (OUTPATIENT)
Dept: INTERNAL MEDICINE CLINIC | Age: 84
End: 2019-05-20
Payer: MEDICARE

## 2019-05-20 ENCOUNTER — TELEPHONE (OUTPATIENT)
Dept: INTERNAL MEDICINE CLINIC | Age: 84
End: 2019-05-20

## 2019-05-20 ENCOUNTER — HOSPITAL ENCOUNTER (OUTPATIENT)
Dept: GENERAL RADIOLOGY | Age: 84
Discharge: HOME | End: 2019-05-20
Payer: MEDICARE

## 2019-05-20 VITALS
WEIGHT: 166 LBS | SYSTOLIC BLOOD PRESSURE: 128 MMHG | BODY MASS INDEX: 21.3 KG/M2 | OXYGEN SATURATION: 99 % | DIASTOLIC BLOOD PRESSURE: 62 MMHG | HEART RATE: 71 BPM | HEIGHT: 74 IN

## 2019-05-20 DIAGNOSIS — R10.9 ABDOMINAL PAIN, UNSPECIFIED ABDOMINAL LOCATION: ICD-10-CM

## 2019-05-20 DIAGNOSIS — R63.4 LOSS OF WEIGHT: Primary | ICD-10-CM

## 2019-05-20 DIAGNOSIS — R53.83 OTHER FATIGUE: ICD-10-CM

## 2019-05-20 DIAGNOSIS — R63.4 LOSS OF WEIGHT: ICD-10-CM

## 2019-05-20 DIAGNOSIS — R05.9 COUGH: ICD-10-CM

## 2019-05-20 DIAGNOSIS — R05.9 COUGH: Primary | ICD-10-CM

## 2019-05-20 LAB
A/G RATIO: 1.4 (ref 1.1–2.2)
ALBUMIN SERPL-MCNC: 4.1 G/DL (ref 3.4–5)
ALP BLD-CCNC: 66 U/L (ref 40–129)
ALT SERPL-CCNC: 10 U/L (ref 10–40)
ANION GAP SERPL CALCULATED.3IONS-SCNC: 14 MMOL/L (ref 3–16)
AST SERPL-CCNC: 17 U/L (ref 15–37)
BASOPHILS ABSOLUTE: 0 K/UL (ref 0–0.2)
BASOPHILS RELATIVE PERCENT: 0.5 %
BILIRUB SERPL-MCNC: 0.3 MG/DL (ref 0–1)
BUN BLDV-MCNC: 19 MG/DL (ref 7–20)
CALCIUM SERPL-MCNC: 9.5 MG/DL (ref 8.3–10.6)
CHLORIDE BLD-SCNC: 100 MMOL/L (ref 99–110)
CO2: 23 MMOL/L (ref 21–32)
CREAT SERPL-MCNC: 1.5 MG/DL (ref 0.8–1.3)
EOSINOPHILS ABSOLUTE: 0.1 K/UL (ref 0–0.6)
EOSINOPHILS RELATIVE PERCENT: 1 %
GFR AFRICAN AMERICAN: 54
GFR NON-AFRICAN AMERICAN: 45
GLOBULIN: 3 G/DL
GLUCOSE BLD-MCNC: 119 MG/DL (ref 70–99)
HCT VFR BLD CALC: 31.8 % (ref 40.5–52.5)
HEMOGLOBIN: 10.8 G/DL (ref 13.5–17.5)
LIPASE: 29 U/L (ref 13–60)
LYMPHOCYTES ABSOLUTE: 0.7 K/UL (ref 1–5.1)
LYMPHOCYTES RELATIVE PERCENT: 11.6 %
MCH RBC QN AUTO: 28.9 PG (ref 26–34)
MCHC RBC AUTO-ENTMCNC: 33.9 G/DL (ref 31–36)
MCV RBC AUTO: 85 FL (ref 80–100)
MONOCYTES ABSOLUTE: 0.6 K/UL (ref 0–1.3)
MONOCYTES RELATIVE PERCENT: 10.1 %
NEUTROPHILS ABSOLUTE: 4.6 K/UL (ref 1.7–7.7)
NEUTROPHILS RELATIVE PERCENT: 76.8 %
PDW BLD-RTO: 12.6 % (ref 12.4–15.4)
PLATELET # BLD: 209 K/UL (ref 135–450)
PMV BLD AUTO: 9.6 FL (ref 5–10.5)
POTASSIUM SERPL-SCNC: 4.8 MMOL/L (ref 3.5–5.1)
RBC # BLD: 3.74 M/UL (ref 4.2–5.9)
SODIUM BLD-SCNC: 137 MMOL/L (ref 136–145)
TOTAL PROTEIN: 7.1 G/DL (ref 6.4–8.2)
WBC # BLD: 5.9 K/UL (ref 4–11)

## 2019-05-20 PROCEDURE — 1123F ACP DISCUSS/DSCN MKR DOCD: CPT | Performed by: INTERNAL MEDICINE

## 2019-05-20 PROCEDURE — G8427 DOCREV CUR MEDS BY ELIG CLIN: HCPCS | Performed by: INTERNAL MEDICINE

## 2019-05-20 PROCEDURE — 1036F TOBACCO NON-USER: CPT | Performed by: INTERNAL MEDICINE

## 2019-05-20 PROCEDURE — 4040F PNEUMOC VAC/ADMIN/RCVD: CPT | Performed by: INTERNAL MEDICINE

## 2019-05-20 PROCEDURE — 71046 X-RAY EXAM CHEST 2 VIEWS: CPT

## 2019-05-20 PROCEDURE — G8420 CALC BMI NORM PARAMETERS: HCPCS | Performed by: INTERNAL MEDICINE

## 2019-05-20 PROCEDURE — 99213 OFFICE O/P EST LOW 20 MIN: CPT | Performed by: INTERNAL MEDICINE

## 2019-05-20 PROCEDURE — G8598 ASA/ANTIPLAT THER USED: HCPCS | Performed by: INTERNAL MEDICINE

## 2019-05-20 ASSESSMENT — ENCOUNTER SYMPTOMS
NAUSEA: 0
ANAL BLEEDING: 0
DIARRHEA: 1
VOMITING: 0
ABDOMINAL PAIN: 0
BLOOD IN STOOL: 0
CONSTIPATION: 0
SHORTNESS OF BREATH: 0
CHEST TIGHTNESS: 0

## 2019-05-20 NOTE — TELEPHONE ENCOUNTER
Pt was scheduled at 9:30 for an appointment. I called to check on him because he was not here. Pt stated that his wife marked down appt time at 10:30 am. He would like to know if he can still come. Dr Loraine Arthur said he can still come. Pt is on his way.

## 2019-05-20 NOTE — PROGRESS NOTES
Outpatient Note for established Patient - regular Visit    History Obtained From:  patient, electronic medical record  Is the patient new to me ? - No    HISTORY OF PRESENT ILLNESS:   The patient is a 82 y.o. male who is here today for :  Aliyah Rehman was seen today for weight loss. Diagnoses and all orders for this visit:    Loss of weight  -     CBC Auto Differential; Future  -     Comprehensive Metabolic Panel; Future  -     LIPASE; Future  -     XR CHEST STANDARD (2 VW); Future  -     Jared Hines MD, Gastroenterology, [facility]    Abdominal pain, unspecified abdominal location  -     [facility] Meghan Blackman MD, Gastroenterology, [facility]    Other fatigue  -     CBC Auto Differential; Future  -     Comprehensive Metabolic Panel; Future  -     LIPASE; Future  -     XR CHEST STANDARD (2 VW); Future  -     Jared Hines MD, Gastroenterology, [facility]    Cough  -     XR CHEST STANDARD (2 VW); Future      He has lost a few lbs  166 (5/2019)<--168 (4/19)<--171 (1/2019)  He has lower abdominal pain occasional nausea. Appetite is reduced   He does feel weak and fatigue. He does get diarrhea in the past 2-3 days. He he quit smoking 25 years ago (he used to smoke 1 cig/ day). No CP/ SOB. He has mild cough only in the morning. Not worsening. No hemoptysis , no GIB or melena reported.    No blood in the urine , no difficulties with urination     Past Medical History:        Diagnosis Date    Acute cholecystitis 9/13/2012    Anemia 1/9/2012    Anxiety 7/2/2010    Arthritis     Back pain 5/16/2010    BPH (benign prostatic hypertrophy) 5/16/2010    Constipation 6/16/2015    Depression 6/16/2015    Diverticulosis 5/16/2010    Dyspnea on exertion 5/16/2010    Eczema 4/12/2011    Elevated PSA 10/30/2012    Erectile dysfunction 5/16/2010    Essential hypertension 8/26/2015    GERD (gastroesophageal reflux disease) 5/16/2010    Hemorrhoid 5/16/2010    Hyperlipidemia 1/10/2011    Hyperphosphatemia 7/3/2010    Hypertension 5/16/2010    Insomnia 6/16/2015    Left hip pain 6/16/2015    Memory loss 1/14/2015    Nausea 6/16/2015    Overactive bladder 7/12/2011    Paranoid schizophrenia ([facility] 5/16/2010    Perennial allergic rhinitis 11/30/2016    Prostate cancer ([facility] 1/8/2013    Type 2 diabetes mellitus without complication, without long-term current use of insulin ([facility] 8/31/2016       Social History:   TOBACCO:   reports that he quit smoking about 29 years ago. His smoking use included cigarettes. He started smoking about 46 years ago. He has a 18.00 pack-year smoking history. He has never used smokeless tobacco.    ETOH:   reports that he does not drink alcohol. Current Medications:    Prior to Admission medications    Medication Sig Start Date End Date Taking? Authorizing Provider   galantamine (RAZADYNE ER) 24 MG extended release capsule TAKE 1 CAPSULE BY MOUTH EVERY DAY 5/8/19  Yes Brooks Clifton MD   benzonatate (TESSALON) 100 MG capsule TAKE 1 CAPSULE BY MOUTH THREE TIMES A DAY AS NEEDED FOR COUGH 4/30/19  Yes Brooks Clifton MD   aspirin 325 MG EC tablet TAKE 1 TABLET BY MOUTH DAILY WITH FOOD 4/24/19  Yes Brooks Clifton MD   fluticasone (FLONASE) 50 MCG/ACT nasal spray USE 2 SPRAYS BY NASAL ROUTE DAILY AS NEEDED FOR RHINITIS 4/22/19  Yes Brooks Clifton MD   desmopressin (DDAVP) 0.2 MG tablet TAKE 1 TABLET AT BEDTIME 4/2/19  Yes Brooks Clifton MD   melatonin ([employer] MELATONIN) 3 MG TABS tablet Take 1 tablet by mouth nightly 4/2/19  Yes Brooks Clifton MD   clotrimazole (LOTRIMIN) 1 % cream Apply topically 2 times daily.  4/2/19  Yes Brooks Clifton MD   KLOR-CON M10 10 MEQ extended release tablet TAKE 1 TABLET BY MOUTH DAILY WITH FOOD. 3/25/19  Yes Brooks Clifton MD   Multiple Vitamin (DAILY-TJ) TABS TAKE 1 TABLET BY MOUTH EVERY DAY 3/9/19  Yes Brooks Clifton MD   atorvastatin (LIPITOR) 10 MG tablet TAKE 1 TABLET BY MOUTH DAILY 2/23/19  Yes Ammon Benavidez MD   albuterol sulfate HFA (PROAIR HFA) 108 (90 Base) MCG/ACT inhaler Inhale 2 puffs into the lungs every 6 hours as needed for Wheezing 1/2/19  Yes Ammon Benavidez MD   fluticasone (FLOVENT HFA) 110 MCG/ACT inhaler Inhale 1 puff into the lungs 2 times daily 1/2/19  Yes Ammon Benavidez MD   atenolol (TENORMIN) 25 MG tablet Take 0.5 tablets by mouth daily 1/2/19  Yes Ammon Benavidez MD   pantoprazole (PROTONIX) 40 MG tablet TAKE 1 TABLET BY MOUTH DAILY 11/13/18  Yes Ronny Donahue MD   montelukast (SINGULAIR) 10 MG tablet TAKE 1 TABLET BY MOUTH NIGHTLY 10/18/18  Yes Ammon Benavidez MD   terazosin (HYTRIN) 10 MG capsule TAKE ONE CAPSULE BY MOUTH NIGHTLY 10/4/18  Yes Ammon Benavidez MD   metFORMIN (GLUCOPHAGE) 500 MG tablet Take 1 tablet by mouth daily with food 8/14/18  Yes Ammon Benavidez MD   losartan (COZAAR) 100 MG tablet TAKE 1 TABLET BY MOUTH DAILY 7/20/18  Yes Ammon Benavidez MD   DAILY MULTIPLE VITAMINS TABS TAKE 1 TABLET EVERY DAY 1/31/18  Yes Ammon Benavidez MD   ondansetron (ZOFRAN) 4 MG tablet Take 1 tablet by mouth daily as needed for Nausea 6/16/17  Yes Annia Lackey MD   Accu-Chennamdi Softclix Lancets MISC TEST 2 TO 4 TIMES PER DAY 11/14/16  Yes Annia Lackey MD   ACCDESEAN ZEENAT PLUS strip USE AS DIRECTED TO TEST BLOOD SUGAR UP TO 2-4 TIMES A DAY 11/14/16  Yes Annia Lackey MD   ferrous sulfate 325 (65 FE) MG tablet Take 1 tablet by mouth 2 times daily (with meals) 8/31/16  Yes Annia Lackey MD   Multiple Vitamins-Minerals (CENTRUM SILVER ULTRA MENS) TABS Take 1 tablet by mouth daily 6/16/15  Yes Annia Lackey MD   nitroGLYCERIN (NITROSTAT) 0.4 MG SL tablet Place 1 tablet under the tongue every 5 minutes as needed.  1/9/12  Yes Annia Lackey MD   oxybutynin (DITROPAN XL) 15 MG extended release tablet Take 1 tablet by mouth 2 times daily 4/2/19   Ammon Benavidez MD   Psyllium thyromegaly present. Cardiovascular: Normal rate and normal heart sounds. No murmur heard. Pulmonary/Chest: Effort normal and breath sounds normal. No respiratory distress. He has no wheezes. He has no rales. Abdominal: Soft. He exhibits no distension and no mass. There is no tenderness. There is no guarding. Musculoskeletal: He exhibits no edema or deformity. Lymphadenopathy:        Head (right side): No submental, no submandibular, no tonsillar, no preauricular, no posterior auricular and no occipital adenopathy present. Head (left side): No submental, no submandibular, no tonsillar, no preauricular, no posterior auricular and no occipital adenopathy present. He has no cervical adenopathy. Right cervical: No superficial cervical and no deep cervical adenopathy present. Left cervical: No superficial cervical and no deep cervical adenopathy present. Right: No supraclavicular adenopathy present. Left: No supraclavicular adenopathy present. Neurological: He is alert and oriented to person, place, and time. He has normal reflexes. He exhibits normal muscle tone. GCS eye subscore is 4. GCS verbal subscore is 5. GCS motor subscore is 6. Skin: No rash noted. He is not diaphoretic. Psychiatric: He has a normal mood and affect. His behavior is normal. Thought content normal.          Assessment/Plan:   Andreea Damon was seen today for weight loss. Diagnoses and all orders for this visit:    Loss of weight  Some weakness weight loss and abdominal pain  Recheck labs and CXR  Also Gi referral  See me in 2 months   -     CBC Auto Differential; Future  -     Comprehensive Metabolic Panel; Future  -     LIPASE; Future  -     XR CHEST STANDARD (2 VW);  Future  -     Gumaro Watters MD, Gastroenterology, [facility]    Abdominal pain, unspecified abdominal location  As above  -     Gumaro Watters MD, Gastroenterology, [facility]    Other fatigue  As abvoe   -     CBC Auto Differential; Future  -     Comprehensive Metabolic Panel; Future  -     LIPASE; Future  -     XR CHEST STANDARD (2 VW); Future  -     Dominic Rodriguez MD, Gastroenterology, [facility]    Cough  No significant smoking Hx   -     XR CHEST STANDARD (2 VW); Future    - Patient was encouraged to callthe office (and ask to see me) or be seen by other provider for any worsening or lack    of improvement in his symptoms.       - Pt was asked toschedule an appointment in 2 months, and to let me know of any scheduling difficulties. Additional patients instructions (if given): There are no Patient Instructions on file for this visit.     Samantha Chandler M.D.   5/20/2019, 11:18 AM

## 2019-05-24 ENCOUNTER — HOSPITAL ENCOUNTER (OUTPATIENT)
Dept: CT IMAGING | Age: 84
Discharge: HOME | End: 2019-05-24
Payer: MEDICARE

## 2019-05-24 DIAGNOSIS — R63.4 LOSS OF WEIGHT: ICD-10-CM

## 2019-05-24 DIAGNOSIS — R05.9 COUGH: ICD-10-CM

## 2019-05-24 DIAGNOSIS — R53.83 OTHER FATIGUE: ICD-10-CM

## 2019-05-24 PROCEDURE — 71250 CT THORAX DX C-: CPT

## 2019-05-30 DIAGNOSIS — R91.1 LUNG NODULE: Primary | ICD-10-CM

## 2019-06-04 RX ORDER — MULTIVITAMIN WITH FOLIC ACID 400 MCG
TABLET ORAL
Qty: 30 TABLET | Refills: 3 | Status: SHIPPED | OUTPATIENT
Start: 2019-06-04 | End: 2019-09-05 | Stop reason: SDUPTHER

## 2019-06-05 ENCOUNTER — OFFICE VISIT (OUTPATIENT)
Dept: PULMONOLOGY | Age: 84
End: 2019-06-05
Payer: MEDICARE

## 2019-06-05 VITALS
DIASTOLIC BLOOD PRESSURE: 72 MMHG | WEIGHT: 164.6 LBS | HEIGHT: 74 IN | HEART RATE: 69 BPM | BODY MASS INDEX: 21.12 KG/M2 | SYSTOLIC BLOOD PRESSURE: 136 MMHG | OXYGEN SATURATION: 99 %

## 2019-06-05 DIAGNOSIS — R05.3 CHRONIC COUGH: Primary | ICD-10-CM

## 2019-06-05 DIAGNOSIS — R93.89 ABNORMAL CHEST CT: ICD-10-CM

## 2019-06-05 PROCEDURE — 1036F TOBACCO NON-USER: CPT | Performed by: INTERNAL MEDICINE

## 2019-06-05 PROCEDURE — 99213 OFFICE O/P EST LOW 20 MIN: CPT | Performed by: INTERNAL MEDICINE

## 2019-06-05 PROCEDURE — 4040F PNEUMOC VAC/ADMIN/RCVD: CPT | Performed by: INTERNAL MEDICINE

## 2019-06-05 PROCEDURE — G8598 ASA/ANTIPLAT THER USED: HCPCS | Performed by: INTERNAL MEDICINE

## 2019-06-05 PROCEDURE — G8427 DOCREV CUR MEDS BY ELIG CLIN: HCPCS | Performed by: INTERNAL MEDICINE

## 2019-06-05 PROCEDURE — G8420 CALC BMI NORM PARAMETERS: HCPCS | Performed by: INTERNAL MEDICINE

## 2019-06-05 PROCEDURE — 1123F ACP DISCUSS/DSCN MKR DOCD: CPT | Performed by: INTERNAL MEDICINE

## 2019-06-05 RX ORDER — FLUTICASONE FUROATE AND VILANTEROL 100; 25 UG/1; UG/1
1 POWDER RESPIRATORY (INHALATION) DAILY
Qty: 1 EACH | Refills: 5 | Status: SHIPPED | OUTPATIENT
Start: 2019-06-05 | End: 2020-05-04

## 2019-06-05 RX ORDER — FLUTICASONE FUROATE AND VILANTEROL 100; 25 UG/1; UG/1
1 POWDER RESPIRATORY (INHALATION) DAILY
Qty: 1 EACH | Refills: 0 | Status: ON HOLD | COMMUNITY
Start: 2019-06-05 | End: 2019-10-01

## 2019-06-05 NOTE — PROGRESS NOTES
Subjective:      Patient ID: Manas Hugo is a 82 y.o. male. HPI  Mr. Vi Borges returns for follow-up for chronic cough. He has not used inhalers since his last visit. He reports having cough in the morning when he first arises, may clear some sputum. Otherwise he is feeling pretty well during the day, without cough or shortness of breath. I reviewed a CT scan performed on 5/24/19, showing small nodular infiltrates in lower lobes, with a tree-in-bud appearance. This is unchanged from a prior scan September 2017. Review of Systems    Objective:   Physical Exam   Constitutional: He is oriented to person, place, and time. He appears well-developed and well-nourished. HENT:   Head: Normocephalic and atraumatic. Mouth/Throat: Oropharynx is clear and moist. No oropharyngeal exudate. Eyes: Conjunctivae are normal. Right eye exhibits no discharge. No scleral icterus. Neck: No tracheal deviation present. No thyromegaly present. Cardiovascular: Normal rate, regular rhythm, normal heart sounds and intact distal pulses. No murmur heard. Pulmonary/Chest: Effort normal and breath sounds normal. No stridor. No respiratory distress. He has no wheezes. He has no rales. He exhibits no tenderness. Musculoskeletal: He exhibits no edema. Lymphadenopathy:     He has no cervical adenopathy. Neurological: He is alert and oriented to person, place, and time. Skin: Skin is warm and dry. Psychiatric: He has a normal mood and affect. His behavior is normal. Judgment and thought content normal.       Assessment:      Chronic cough is associated with tree-in-bud nodular opacities in lower lung zones. This is indicative of small airways disease. There has been no radiographic progression over the past 2 years; clinical symptoms persist.      Plan:      Small airways disease may be responsive to inhaled steroid and bronchodilator therapy. He is unable to manage use of a metered dose inhaler.   I instructed him in use of a dry powder inhaler (breo) this morning and he had adequate technique with this. He is given instructions to use this once daily in the morning and report his progress in 2 weeks.   If he feels this is improving his condition, we will continue        Rober Petersen MD

## 2019-06-05 NOTE — PATIENT INSTRUCTIONS
Inhale one dose from Breo inhaler daily, in the morning. Call in 2 weeks and report on your progress.   There will be a prescription for this at your pharmacy

## 2019-06-12 DIAGNOSIS — K59.00 CONSTIPATION, UNSPECIFIED CONSTIPATION TYPE: Chronic | ICD-10-CM

## 2019-06-12 RX ORDER — POLYETHYLENE GLYCOL 3350 17 G/17G
POWDER, FOR SOLUTION ORAL
Qty: 17 G | Refills: 2 | Status: SHIPPED | OUTPATIENT
Start: 2019-06-12 | End: 2019-09-05 | Stop reason: SDUPTHER

## 2019-06-13 ENCOUNTER — ANESTHESIA EVENT (OUTPATIENT)
Dept: ENDOSCOPY | Age: 84
End: 2019-06-13
Payer: MEDICARE

## 2019-06-13 NOTE — PROGRESS NOTES
Name_______________________________________Printed:____________________  Date and time of surgery___6/14/19  1230_____________________Arrival Time:___1100 masc_____________   1. Do not eat or drink anything after 12 midnight (or____hours) prior to surgery. This includes no water, chewing gum or mints. Endoscopy patients follow your doctors bowel prep instructions,which may include taking part of prep after midnight. 2. Take the following pills with a small sip of water on the morning of surgery___________atenolol, inhaler________________________________________                  Do not take blood pressure medications ending in pril or sartan the jessica prior to surgery or the morning of surgery--DO NOT TAKE LOSARTAN   3. Aspirin, Ibuprofen, Advil, Naproxen, Vitamin E and other Anti-inflammatory products should be stopped for 5 days before surgery or as directed by your physician. 4. Check with your Doctor regarding stopping Plavix, Coumadin,Eliquis, Lovenox,Effient,Pradaxa,Xarelto, Fragmin or other blood thinners and follow their instructions. 5. Do not smoke, and do not drink any alcoholic beverages 24 hours prior to surgery. This includes NA Beer. Refrain from the usage of any recreational drugs. 6. You may brush your teeth and gargle the morning of surgery. DO NOT SWALLOW WATER   7. You MUST make arrangements for a responsible adult to stay on site while you are here and take you home after your surgery. You will not be allowed to leave alone or drive yourself home. It is strongly suggested someone stay with you the first 24 hrs. Your surgery will be cancelled if you do not have a ride home. 8. A parent/legal guardian must accompany a child scheduled for surgery and plan to stay at the hospital until the child is discharged. Please do not bring other children with you.    9. Please wear simple, loose fitting clothing to the hospital.  Do not bring valuables (money, credit cards, checkbooks, etc.) Do not wear any makeup (including no eye makeup) or nail polish on your fingers or toes. 10. DO NOT wear any jewelry or piercings on day of surgery. All body piercing jewelry must be removed. 11. If you have ___dentures, they will be removed before going to the OR; we will provide you a container. If you wear ___contact lenses or ___glasses, they will be removed; please bring a case for them. 12. Please see your family doctor/pediatrician for a history & physical and/or concerning medications. Bring any test results/reports from your physician's office. PCP__________________Phone___________H&P Appt. Date________             13 If you  have a Living Will and Durable Power of  for Healthcare, please bring in a copy. 15. Notify your Surgeon if you develop any illness between now and surgery  time, cough, cold, fever, sore throat, nausea, vomiting, etc.  Please notify your surgeon if you experience dizziness, shortness of breath or blurred vision between now & the time of your surgery             15. DO NOT shave your operative site 96 hours prior to surgery. For face & neck surgery, men may use an electric razor 48 hours prior to surgery. 16. Shower the night before surgery with ___Antibacterial soap ___Hibiclens             17. To provide excellent care visitors will be limited to one in the room at any given time. 18.  Please bring picture ID and insurance card. 19.  Visit our web site for additional information:  [email or web address]              20.During flu season no children under the age of 15 are permitted in the hospital for the safety of all patients.                               21. If you take a long acting insulin in the evening only  take half of your usual  dose the night  before your procedure              22. If you use a c-pap please bring DOS if staying overnight,             23.For your convenience [facility] has a pharmacy on site to fill your prescriptions. 24. If you use oxygen and have a portable tank please bring it  with you the DOS             25. Bring a complete list of all your medications with name and dose include any supplements. 26. Other__________________________________________   *Please call pre admission testing if you any further questions   [facility]   [address]    [id number] [place]  [phone]   HOSP [facility] [address] KYLE AMBROSIO   [phone]       All above information reviewed with patient's wife by phone. Wife verbalizes understanding. All questions and concerns addressed.                                                                                                  Patient/Rep____________________                                                                                                                                    PRE OP INSTRUCTIONS

## 2019-06-14 ENCOUNTER — HOSPITAL ENCOUNTER (OUTPATIENT)
Age: 84
Setting detail: OUTPATIENT SURGERY
Discharge: HOME | End: 2019-06-14
Attending: INTERNAL MEDICINE | Admitting: INTERNAL MEDICINE
Payer: MEDICARE

## 2019-06-14 ENCOUNTER — ANESTHESIA (OUTPATIENT)
Dept: ENDOSCOPY | Age: 84
End: 2019-06-14
Payer: MEDICARE

## 2019-06-14 VITALS
RESPIRATION RATE: 17 BRPM | OXYGEN SATURATION: 100 % | BODY MASS INDEX: 20.69 KG/M2 | HEIGHT: 74 IN | TEMPERATURE: 97.4 F | DIASTOLIC BLOOD PRESSURE: 77 MMHG | SYSTOLIC BLOOD PRESSURE: 152 MMHG | WEIGHT: 161.25 LBS | HEART RATE: 68 BPM

## 2019-06-14 VITALS — OXYGEN SATURATION: 99 % | DIASTOLIC BLOOD PRESSURE: 71 MMHG | SYSTOLIC BLOOD PRESSURE: 150 MMHG

## 2019-06-14 LAB
GLUCOSE BLD-MCNC: 117 MG/DL (ref 70–99)
PERFORMED ON: ABNORMAL

## 2019-06-14 PROCEDURE — 3609012500 HC EGD DILATION BALLOON: Performed by: INTERNAL MEDICINE

## 2019-06-14 PROCEDURE — 2580000003 HC RX 258: Performed by: NURSE ANESTHETIST, CERTIFIED REGISTERED

## 2019-06-14 PROCEDURE — 7100000010 HC PHASE II RECOVERY - FIRST 15 MIN: Performed by: INTERNAL MEDICINE

## 2019-06-14 PROCEDURE — 2500000003 HC RX 250 WO HCPCS: Performed by: NURSE ANESTHETIST, CERTIFIED REGISTERED

## 2019-06-14 PROCEDURE — 7100000011 HC PHASE II RECOVERY - ADDTL 15 MIN: Performed by: INTERNAL MEDICINE

## 2019-06-14 PROCEDURE — 3700000000 HC ANESTHESIA ATTENDED CARE: Performed by: INTERNAL MEDICINE

## 2019-06-14 PROCEDURE — 3700000001 HC ADD 15 MINUTES (ANESTHESIA): Performed by: INTERNAL MEDICINE

## 2019-06-14 PROCEDURE — 3609013800 HC EGD SUBMUCOSAL/BOTOX INJECTION: Performed by: INTERNAL MEDICINE

## 2019-06-14 PROCEDURE — 6360000002 HC RX W HCPCS: Performed by: INTERNAL MEDICINE

## 2019-06-14 PROCEDURE — 7100000000 HC PACU RECOVERY - FIRST 15 MIN: Performed by: INTERNAL MEDICINE

## 2019-06-14 PROCEDURE — 7100000001 HC PACU RECOVERY - ADDTL 15 MIN: Performed by: INTERNAL MEDICINE

## 2019-06-14 PROCEDURE — 2580000003 HC RX 258: Performed by: FAMILY MEDICINE

## 2019-06-14 PROCEDURE — 2709999900 HC NON-CHARGEABLE SUPPLY: Performed by: INTERNAL MEDICINE

## 2019-06-14 PROCEDURE — C1726 CATH, BAL DIL, NON-VASCULAR: HCPCS | Performed by: INTERNAL MEDICINE

## 2019-06-14 PROCEDURE — 6360000002 HC RX W HCPCS: Performed by: NURSE ANESTHETIST, CERTIFIED REGISTERED

## 2019-06-14 RX ORDER — SODIUM CHLORIDE 9 MG/ML
INJECTION, SOLUTION INTRAVENOUS CONTINUOUS PRN
Status: DISCONTINUED | OUTPATIENT
Start: 2019-06-14 | End: 2019-06-14 | Stop reason: SDUPTHER

## 2019-06-14 RX ORDER — KETAMINE HYDROCHLORIDE 10 MG/ML
INJECTION, SOLUTION INTRAMUSCULAR; INTRAVENOUS PRN
Status: DISCONTINUED | OUTPATIENT
Start: 2019-06-14 | End: 2019-06-14 | Stop reason: SDUPTHER

## 2019-06-14 RX ORDER — SODIUM CHLORIDE 9 MG/ML
INJECTION, SOLUTION INTRAVENOUS CONTINUOUS
Status: DISCONTINUED | OUTPATIENT
Start: 2019-06-14 | End: 2019-06-14 | Stop reason: HOSPADM

## 2019-06-14 RX ORDER — 0.9 % SODIUM CHLORIDE 0.9 %
VIAL (ML) INJECTION
Status: DISCONTINUED
Start: 2019-06-14 | End: 2019-06-14 | Stop reason: HOSPADM

## 2019-06-14 RX ORDER — LABETALOL HYDROCHLORIDE 5 MG/ML
5 INJECTION, SOLUTION INTRAVENOUS EVERY 10 MIN PRN
Status: DISCONTINUED | OUTPATIENT
Start: 2019-06-14 | End: 2019-06-14 | Stop reason: HOSPADM

## 2019-06-14 RX ORDER — SODIUM CHLORIDE 0.9 % (FLUSH) 0.9 %
10 SYRINGE (ML) INJECTION PRN
Status: DISCONTINUED | OUTPATIENT
Start: 2019-06-14 | End: 2019-06-14 | Stop reason: HOSPADM

## 2019-06-14 RX ORDER — PROMETHAZINE HYDROCHLORIDE 25 MG/ML
6.25 INJECTION, SOLUTION INTRAMUSCULAR; INTRAVENOUS
Status: DISCONTINUED | OUTPATIENT
Start: 2019-06-14 | End: 2019-06-14 | Stop reason: HOSPADM

## 2019-06-14 RX ORDER — ONDANSETRON 2 MG/ML
4 INJECTION INTRAMUSCULAR; INTRAVENOUS
Status: DISCONTINUED | OUTPATIENT
Start: 2019-06-14 | End: 2019-06-14 | Stop reason: HOSPADM

## 2019-06-14 RX ORDER — PROPOFOL 10 MG/ML
INJECTION, EMULSION INTRAVENOUS PRN
Status: DISCONTINUED | OUTPATIENT
Start: 2019-06-14 | End: 2019-06-14 | Stop reason: SDUPTHER

## 2019-06-14 RX ORDER — SODIUM CHLORIDE 0.9 % (FLUSH) 0.9 %
10 SYRINGE (ML) INJECTION EVERY 12 HOURS SCHEDULED
Status: DISCONTINUED | OUTPATIENT
Start: 2019-06-14 | End: 2019-06-14 | Stop reason: HOSPADM

## 2019-06-14 RX ORDER — LIDOCAINE HYDROCHLORIDE 20 MG/ML
INJECTION, SOLUTION INFILTRATION; PERINEURAL PRN
Status: DISCONTINUED | OUTPATIENT
Start: 2019-06-14 | End: 2019-06-14 | Stop reason: SDUPTHER

## 2019-06-14 RX ADMIN — KETAMINE HYDROCHLORIDE 5 MG: 10 INJECTION, SOLUTION INTRAMUSCULAR; INTRAVENOUS at 13:12

## 2019-06-14 RX ADMIN — PROPOFOL 40 MG: 10 INJECTION, EMULSION INTRAVENOUS at 13:18

## 2019-06-14 RX ADMIN — SODIUM CHLORIDE: 9 INJECTION, SOLUTION INTRAVENOUS at 12:34

## 2019-06-14 RX ADMIN — PROPOFOL 50 MG: 10 INJECTION, EMULSION INTRAVENOUS at 13:12

## 2019-06-14 RX ADMIN — LIDOCAINE HYDROCHLORIDE 60 MG: 20 INJECTION, SOLUTION INFILTRATION; PERINEURAL at 13:12

## 2019-06-14 RX ADMIN — PROPOFOL 40 MG: 10 INJECTION, EMULSION INTRAVENOUS at 13:16

## 2019-06-14 RX ADMIN — SODIUM CHLORIDE: 9 INJECTION, SOLUTION INTRAVENOUS at 13:01

## 2019-06-14 ASSESSMENT — PULMONARY FUNCTION TESTS: PIF_VALUE: 0

## 2019-06-14 ASSESSMENT — ENCOUNTER SYMPTOMS: SHORTNESS OF BREATH: 1

## 2019-06-14 NOTE — PROGRESS NOTES
Patient education given and the patient expresses understanding and acceptance of instructions. Kaylee Wyatt 6/14/2019 2:19 PM  Discharge instructions reviewed with patient/responsible adult. All home medications have been reviewed, questions answered and patient verbalized understanding. Discharge instructions signed and copies given. Pt d/c'd per w/c. Vss. Pt stable. accomp by wife.

## 2019-06-14 NOTE — H&P
PROSTATE BIOPSY  November 2012    Dr. Ferdinand Benito  May 8, 2001    TURP    TOTAL HIP ARTHROPLASTY Left May 17, 2015    Dr. Derrick Hernandez ENDOSCOPY  November 7, 2011   Luis Armando Louise UPPER GASTROINTESTINAL ENDOSCOPY  February 4, 2016    Dr. Kyle Xiong ENDOSCOPY N/A 04/15/2016    Esophagogastroduodenoscopy with Botulinum toxin injection of the lower esophageal sphincter (LES)    UPPER GASTROINTESTINAL ENDOSCOPY  04/25/2017    dilatation; and botox injection 4 units     Medications Prior to Admission:   No current facility-administered medications on file prior to encounter.       Current Outpatient Medications on File Prior to Encounter   Medication Sig Dispense Refill    Multiple Vitamin (DAILY-TJ) TABS TAKE 1 TABLET BY MOUTH EVERY DAY 30 tablet 3    aspirin 325 MG EC tablet TAKE 1 TABLET BY MOUTH DAILY WITH FOOD 30 tablet 11    oxybutynin (DITROPAN XL) 15 MG extended release tablet Take 1 tablet by mouth 2 times daily 60 tablet 3    atenolol (TENORMIN) 25 MG tablet Take 0.5 tablets by mouth daily 45 tablet 2    pantoprazole (PROTONIX) 40 MG tablet TAKE 1 TABLET BY MOUTH DAILY 90 tablet 3    montelukast (SINGULAIR) 10 MG tablet TAKE 1 TABLET BY MOUTH NIGHTLY 90 tablet 3    terazosin (HYTRIN) 10 MG capsule TAKE ONE CAPSULE BY MOUTH NIGHTLY 90 capsule 3    metFORMIN (GLUCOPHAGE) 500 MG tablet Take 1 tablet by mouth daily with food 90 tablet 3    OXcarbazepine (TRILEPTAL) 300 MG tablet TAKE 1 TABLET BY MOUTH 2 TIMES DAILY 180 tablet 3    losartan (COZAAR) 100 MG tablet TAKE 1 TABLET BY MOUTH DAILY 90 tablet 3    ondansetron (ZOFRAN) 4 MG tablet Take 1 tablet by mouth daily as needed for Nausea 30 tablet 12    Multiple Vitamins-Minerals (CENTRUM SILVER ULTRA MENS) TABS Take 1 tablet by mouth daily      fluticasone-vilanterol (BREO ELLIPTA) 100-25 MCG/INH AEPB inhaler Inhale 1 puff into the lungs daily 1 each 5    fluticasone-vilanterol (BREO ELLIPTA) 100-25 MCG/INH AEPB inhaler Inhale 1 puff into the lungs daily 1 each 0    galantamine (RAZADYNE ER) 24 MG extended release capsule TAKE 1 CAPSULE BY MOUTH EVERY DAY 30 capsule 2    benzonatate (TESSALON) 100 MG capsule TAKE 1 CAPSULE BY MOUTH THREE TIMES A DAY AS NEEDED FOR COUGH 42 capsule 1    desmopressin (DDAVP) 0.2 MG tablet TAKE 1 TABLET AT BEDTIME 30 tablet 5    melatonin ([employer]S MELATONIN) 3 MG TABS tablet Take 1 tablet by mouth nightly 30 tablet 5    clotrimazole (LOTRIMIN) 1 % cream Apply topically 2 times daily. 1 Tube 2    KLOR-CON M10 10 MEQ extended release tablet TAKE 1 TABLET BY MOUTH DAILY WITH FOOD. 30 tablet 2    atorvastatin (LIPITOR) 10 MG tablet TAKE 1 TABLET BY MOUTH DAILY 90 tablet 3    albuterol sulfate HFA (PROAIR HFA) 108 (90 Base) MCG/ACT inhaler Inhale 2 puffs into the lungs every 6 hours as needed for Wheezing 1 Inhaler 3    Psyllium (METAMUCIL SMOOTH TEXTURE) 28.3 % POWD Mixed with orange juice once or twice daily 1 Bottle 0    DAILY MULTIPLE VITAMINS TABS TAKE 1 TABLET EVERY DAY 30 tablet 12    Accu-Chek Softclix Lancets MISC TEST 2 TO 4 TIMES PER  each 1    ACCU-CHEK ZEENAT PLUS strip USE AS DIRECTED TO TEST BLOOD SUGAR UP TO 2-4 TIMES A  strip 2    ferrous sulfate 325 (65 FE) MG tablet Take 1 tablet by mouth 2 times daily (with meals)      nitroGLYCERIN (NITROSTAT) 0.4 MG SL tablet Place 1 tablet under the tongue every 5 minutes as needed. 25 tablet 12     Allergies:  Lorazepam    History of allergic reaction to anesthesia:  No    Social History:   TOBACCO:   reports that he quit smoking about 29 years ago. His smoking use included cigarettes. He started smoking about 46 years ago. He has a 18.00 pack-year smoking history. He has never used smokeless tobacco.  ETOH:   reports that he does not drink alcohol. DRUGS:   reports that he does not use drugs.   Family History:       Problem Relation Age of Onset    Cancer Mother         colon cancer    Cancer Brother         colon cancer, stomach cancer       PHYSICAL EXAM:      BP (!) 175/80   Pulse 55   Temp 97.6 °F (36.4 °C) (Temporal)   Resp 16   Ht 6' 2\" (1.88 m)   Wt 161 lb 4 oz (73.1 kg)   SpO2 99%   BMI 20.70 kg/m²  I        Heart:  Normal apical impulse, regular rate and rhythm, normal S1 and S2, no S3 or S4, and no murmur noted    Lungs:  No increased work of breathing, good air exchange, clear to auscultation bilaterally, no crackles or wheezing    Abdomen:  Normal bowel sounds, soft, non-distended, non-tender, no masses palpated, no hepatosplenomegally      ASA Grade:  ASA 3 - Patient with moderate systemic disease with functional limitations    Mallampati Class:  Class I: Soft palate, uvula, fauces, pillars visible  __________  Class II: Soft palate, uvula, fauces visible  ____X_____   Class III: Soft palate, base of uvula visible  __________  Class IV: Hard palate only visible   __________      ASSESSMENT AND PLAN:    1. Patient is a 82 y.o. male here for EGD with anesthesia  2. Procedure options, risks and benefits reviewed with patient. Patient expresses understanding.      Sonia Banuelos MD  [address] [place] [address]  6/14/2019

## 2019-06-14 NOTE — OP NOTE
[address] [place] [address]  Endoscopy Note    Patient: Brigida Segura  : 1935  CSN: [id number]    Procedure: Esophagogastroduodenoscopy with Botulinum toxin injection of the lower esophageal sphincter (LES) and esophageal balloon dilation    Date:  2019    Surgeon:  Kim Vuong MD    Referring Physician:   Regan Miller    Preoperative Diagnosis:   Achalasia    Postoperative Diagnosis:   Achalasia    Anesthesia:   TIVA/MAC per anesthesia     Indications: This is a 82y.o. year old male who presents today with worsening dysphagia and history of achalasia. Description of Procedure:  Informed consent was obtained from the patient after explanation of indications, benefits and possible risks and complications of the procedure. The patient was then taken to the endoscopy suite, placed in the left lateral decubitus position and the above IV sedation was administrered. The Olympus videoendoscope was placed in the patient's mouth and under direct visualization passed into the esophagus. Visualization of the esophagus demonstrated normal mucosa. The scope was then advanced into the stomach. Visualization of the gastric body and antrum demonstrated normal mucosa. A retroflexed exam of the gastric cardia and fundus demonstrated normal mucosa. The pylorus was patent and the scope was advanced into the duodenum. Visualization of the duodenal bulb demonstrated normal mucosa. The second portion of the duodenum demonstrated normal mucosa. The scope was then withdrawn back into the stomach. Botulin toxin injection of the LES was performed using a standard sclerotherapy needle. Four intramuscular injections were made around the circumference of the LES using 25 unit aliquots for a total dose of 100 units botulinin toxin. The distal esophagus was then dilated with a 20 mm balloon dilator. The stomach was then decompressed, and the scope was completely withdrawn.      The patient tolerated the procedure well and was taken to the post anesthesia care unit in good condition. Impression:    1) Normal EGD     2) Successful Botox injection of the LES performed as above. 3) Balloon dilation of esophagus performed as above. Recommendations:  1) Resume meds and diet. 2) Follow up in office in 6 - 12 months.     Griselda Dickerson MD  [address] [place] [address]  6/14/2019

## 2019-06-14 NOTE — ANESTHESIA POSTPROCEDURE EVALUATION
[facility] Department of Anesthesiology  Post-Anesthesia Note       Name:  Manas Hugo                                  Age:  82 y.o. MRN:  [id number]     Last Vitals & Oxygen Saturation: BP (!) 152/77   Pulse 62   Temp 97.1 °F (36.2 °C) (Temporal)   Resp 17   Ht 6' 2\" (1.88 m)   Wt 161 lb 4 oz (73.1 kg)   SpO2 100%   BMI 20.70 kg/m²   Patient Vitals for the past 4 hrs:   BP Temp Temp src Pulse Resp SpO2 Height Weight   06/14/19 1345 (!) 152/77 -- -- 62 17 100 % -- --   06/14/19 1340 (!) 149/73 -- -- 69 16 100 % -- --   06/14/19 1335 137/76 -- -- 71 17 100 % -- --   06/14/19 1330 (!) 142/66 97.1 °F (36.2 °C) Temporal 60 18 100 % -- --   06/14/19 1328 139/64 -- -- 59 -- 100 % -- --   06/14/19 1213 (!) 175/80 97.6 °F (36.4 °C) Temporal 55 16 99 % 6' 2\" (1.88 m) 161 lb 4 oz (73.1 kg)       Level of consciousness:  Awake, alert    Respiratory: Respirations easy, no distress. Stable. Cardiovascular: Hemodynamically stable. Hydration: Adequate. PONV: Adequately managed. Post-op pain: Adequately controlled. Post-op assessment: Tolerated anesthetic well without complication. Complications:  None.     Mare Wyman MD  June 14, 2019   1:56 PM

## 2019-06-14 NOTE — ANESTHESIA PRE PROCEDURE
[facility] Department of Anesthesiology  Pre-Anesthesia Evaluation/Consultation       Name:  Jorge Munoz  : 1935  Age:  82 y.o.                                            MRN:  [id number]  Date: 2019           Surgeon: Surgeon(s):  Tanya Duque MD    Procedure: Procedure(s):  EGD WITH BOTOZ LES     Allergies   Allergen Reactions    Lorazepam      Neither he nor wife remember a problem     Patient Active Problem List   Diagnosis    Ischemic heart disease    Paranoid schizophrenia ([address]    Complex partial seizure disorder ([address]    Diverticulosis    Erectile dysfunction    Back pain    Dyspnea on exertion    Peripheral vascular disease ([address]    Hemorrhoid    Other specified anxiety disorders    Hyperlipidemia    Eczema    Overactive bladder    Fatigue    Anemia    Chronic cough    Elevated PSA    Prostate cancer ([address]    Bradycardia    Memory loss    Closed left hip fracture ([address]    Depression    Constipation    Insomnia    Left hip pain    S/P hip replacement    Essential hypertension    Abnormal weight loss    Former smoker    Abnormal chest CT    Chronic kidney disease, stage 3 (HCC)    Type 2 diabetes mellitus with stage 3 chronic kidney disease (HCC)    Gastroesophageal reflux disease without esophagitis    Type 2 diabetes mellitus without complication, without long-term current use of insulin (HCC)    Perennial allergic rhinitis    Patulous lower esophageal sphincter    Achalasia    Adenopathy     Past Medical History:   Diagnosis Date    Acute cholecystitis 2012    Anemia 2012    Anxiety 2010    Arthritis     Back pain 2010    BPH (benign prostatic hypertrophy) 2010    Constipation 2015    Depression 2015    Diverticulosis 2010    Dyspnea on exertion 2010    Eczema 2011    Elevated PSA 10/30/2012    Erectile dysfunction 2010    Essential hypertension 2015    GERD in 1990    Substance Use Topics    Alcohol use: No    Drug use: No     Medications  No current facility-administered medications on file prior to encounter.       Current Outpatient Medications on File Prior to Encounter   Medication Sig Dispense Refill    Multiple Vitamin (DAILY-TJ) TABS TAKE 1 TABLET BY MOUTH EVERY DAY 30 tablet 3    aspirin 325 MG EC tablet TAKE 1 TABLET BY MOUTH DAILY WITH FOOD 30 tablet 11    oxybutynin (DITROPAN XL) 15 MG extended release tablet Take 1 tablet by mouth 2 times daily 60 tablet 3    atenolol (TENORMIN) 25 MG tablet Take 0.5 tablets by mouth daily 45 tablet 2    pantoprazole (PROTONIX) 40 MG tablet TAKE 1 TABLET BY MOUTH DAILY 90 tablet 3    montelukast (SINGULAIR) 10 MG tablet TAKE 1 TABLET BY MOUTH NIGHTLY 90 tablet 3    terazosin (HYTRIN) 10 MG capsule TAKE ONE CAPSULE BY MOUTH NIGHTLY 90 capsule 3    metFORMIN (GLUCOPHAGE) 500 MG tablet Take 1 tablet by mouth daily with food 90 tablet 3    OXcarbazepine (TRILEPTAL) 300 MG tablet TAKE 1 TABLET BY MOUTH 2 TIMES DAILY 180 tablet 3    losartan (COZAAR) 100 MG tablet TAKE 1 TABLET BY MOUTH DAILY 90 tablet 3    ondansetron (ZOFRAN) 4 MG tablet Take 1 tablet by mouth daily as needed for Nausea 30 tablet 12    Multiple Vitamins-Minerals (CENTRUM SILVER ULTRA MENS) TABS Take 1 tablet by mouth daily      fluticasone-vilanterol (BREO ELLIPTA) 100-25 MCG/INH AEPB inhaler Inhale 1 puff into the lungs daily 1 each 5    fluticasone-vilanterol (BREO ELLIPTA) 100-25 MCG/INH AEPB inhaler Inhale 1 puff into the lungs daily 1 each 0    galantamine (RAZADYNE ER) 24 MG extended release capsule TAKE 1 CAPSULE BY MOUTH EVERY DAY 30 capsule 2    benzonatate (TESSALON) 100 MG capsule TAKE 1 CAPSULE BY MOUTH THREE TIMES A DAY AS NEEDED FOR COUGH 42 capsule 1    desmopressin (DDAVP) 0.2 MG tablet TAKE 1 TABLET AT BEDTIME 30 tablet 5    melatonin (CVS MELATONIN) 3 MG TABS tablet Take 1 tablet by mouth nightly 30 tablet 5    clotrimazole (LOTRIMIN) 1 % cream Apply topically 2 times daily. 1 Tube 2    KLOR-CON M10 10 MEQ extended release tablet TAKE 1 TABLET BY MOUTH DAILY WITH FOOD. 30 tablet 2    atorvastatin (LIPITOR) 10 MG tablet TAKE 1 TABLET BY MOUTH DAILY 90 tablet 3    albuterol sulfate HFA (PROAIR HFA) 108 (90 Base) MCG/ACT inhaler Inhale 2 puffs into the lungs every 6 hours as needed for Wheezing 1 Inhaler 3    Psyllium (METAMUCIL SMOOTH TEXTURE) 28.3 % POWD Mixed with orange juice once or twice daily 1 Bottle 0    DAILY MULTIPLE VITAMINS TABS TAKE 1 TABLET EVERY DAY 30 tablet 12    Accu-Chek Softclix Lancets MISC TEST 2 TO 4 TIMES PER  each 1    ACCU-CHEK ZEENAT PLUS strip USE AS DIRECTED TO TEST BLOOD SUGAR UP TO 2-4 TIMES A  strip 2    ferrous sulfate 325 (65 FE) MG tablet Take 1 tablet by mouth 2 times daily (with meals)      nitroGLYCERIN (NITROSTAT) 0.4 MG SL tablet Place 1 tablet under the tongue every 5 minutes as needed.  25 tablet 12     Current Facility-Administered Medications   Medication Dose Route Frequency Provider Last Rate Last Dose    0.9 % sodium chloride infusion   Intravenous Continuous Mellisa Koroma MD        sodium chloride flush 0.9 % injection 10 mL  10 mL Intravenous 2 times per day Mellisa Koroma MD        sodium chloride flush 0.9 % injection 10 mL  10 mL Intravenous PRN Mellisa Koroma MD        onabotulinumtoxin A (BOTOX) injection 100 Units  100 Units Intramuscular Once Em Avery MD         Vital Signs (Current)   Vitals:    19   BP: (!) 175/80   Pulse: 55   Resp: 16   Temp: 97.6 °F (36.4 °C)   SpO2: 99%     Vital Signs Statistics (for past 48 hrs)     Temp  Av.6 °F (36.4 °C)  Min: 97.6 °F (36.4 °C)   Min taken time: 19  Max: 97.6 °F (36.4 °C)   Max taken time: 19  Pulse  Av  Min: 54   Min taken time: 19  Max: 54   Max taken time: 19  Resp  Av  Min: 16   Min taken time: 19 1213  Max: 16 11/07/2017    MLT2HRF 25 11/07/2017    BEART 1.1 11/07/2017    D6UCVTDO 97 11/07/2017      Type & Screen (If Applicable)  Lab Results   Component Value Date    LABABO O 09/13/2012    LABRH Negative 09/13/2012                            BMI: Wt Readings from Last 3 Encounters:       NPO Status:   Date of last liquid consumption: 06/14/19   Time of last liquid consumption: 1000   Date of last solid food consumption: 06/13/19      Time of last solid consumption: 1208       Anesthesia Evaluation  Patient summary reviewed no history of anesthetic complications:   Airway: Mallampati: III  TM distance: >3 FB   Neck ROM: full  Mouth opening: < 3 FB Dental:    (+) edentulous      Pulmonary:normal exam    (+) shortness of breath:                             Cardiovascular:  Exercise tolerance: poor (<4 METS),   (+) hypertension:, GUTIERRES:,         Rhythm: regular  Rate: normal                    Neuro/Psych:   (+) seizures:, neuromuscular disease:, psychiatric history:depression/anxiety             GI/Hepatic/Renal:   (+) GERD:, renal disease: CRI,           Endo/Other:    (+) DiabetesType II DM, , .                 Abdominal:           Vascular: negative vascular ROS. Anesthesia Plan      MAC     ASA 3       Induction: intravenous. Anesthetic plan and risks discussed with patient. Plan discussed with CRNA. This pre-anesthesia assessment may be used as a history and physical.    DOS STAFF ADDENDUM:    Pt seen and examined, chart reviewed (including anesthesia, drug and allergy history). No interval changes to history and physical examination. Anesthetic plan, risks, benefits, alternatives, and personnel involved discussed with patient. Patient verbalized an understanding and agrees to proceed.       Melina Barroso MD  June 14, 2019  12:26 PM

## 2019-06-17 RX ORDER — POTASSIUM CHLORIDE 750 MG/1
TABLET, EXTENDED RELEASE ORAL
Qty: 30 TABLET | Refills: 5 | Status: SHIPPED | OUTPATIENT
Start: 2019-06-17 | End: 2019-12-10 | Stop reason: SDUPTHER

## 2019-06-19 ENCOUNTER — TELEPHONE (OUTPATIENT)
Dept: PULMONOLOGY | Age: 84
End: 2019-06-19

## 2019-06-19 NOTE — TELEPHONE ENCOUNTER
YUE:  Magda Carr is doing deondre rsince his last visit, he does still have a slight cough, states they were suppose to call and update you.

## 2019-06-20 ENCOUNTER — TELEPHONE (OUTPATIENT)
Dept: PULMONOLOGY | Age: 84
End: 2019-06-20

## 2019-06-20 RX ORDER — INHALER, ASSIST DEVICES
SPACER (EA) MISCELLANEOUS
Qty: 1 EACH | Refills: 0 | Status: SHIPPED | OUTPATIENT
Start: 2019-06-20 | End: 2020-08-19

## 2019-06-20 NOTE — TELEPHONE ENCOUNTER
Please advise patient states BREO is to expensive, and would like if you can send in alternative, medication

## 2019-07-08 RX ORDER — LOSARTAN POTASSIUM 100 MG/1
TABLET ORAL
Qty: 90 TABLET | Refills: 3 | Status: SHIPPED | OUTPATIENT
Start: 2019-07-08 | End: 2020-06-22

## 2019-08-01 DIAGNOSIS — N18.30 TYPE 2 DIABETES MELLITUS WITH STAGE 3 CHRONIC KIDNEY DISEASE, WITHOUT LONG-TERM CURRENT USE OF INSULIN (HCC): ICD-10-CM

## 2019-08-01 DIAGNOSIS — E11.22 TYPE 2 DIABETES MELLITUS WITH STAGE 3 CHRONIC KIDNEY DISEASE, WITHOUT LONG-TERM CURRENT USE OF INSULIN (HCC): ICD-10-CM

## 2019-08-07 DIAGNOSIS — R41.3 MEMORY LOSS: Chronic | ICD-10-CM

## 2019-08-07 RX ORDER — GALANTAMINE HYDROBROMIDE 24 MG/1
CAPSULE, EXTENDED RELEASE ORAL
Qty: 90 CAPSULE | Refills: 0 | Status: SHIPPED | OUTPATIENT
Start: 2019-08-07 | End: 2019-10-30 | Stop reason: SDUPTHER

## 2019-08-12 RX ORDER — FLUTICASONE PROPIONATE 50 MCG
SPRAY, SUSPENSION (ML) NASAL
Qty: 1 BOTTLE | Refills: 1 | Status: SHIPPED | OUTPATIENT
Start: 2019-08-12

## 2019-09-04 ENCOUNTER — TELEPHONE (OUTPATIENT)
Dept: INTERNAL MEDICINE CLINIC | Age: 84
End: 2019-09-04

## 2019-09-04 RX ORDER — OMEPRAZOLE 20 MG/1
20 CAPSULE, DELAYED RELEASE ORAL
Qty: 90 CAPSULE | Refills: 1 | Status: SHIPPED | OUTPATIENT
Start: 2019-09-04 | End: 2020-02-24

## 2019-09-05 ENCOUNTER — OFFICE VISIT (OUTPATIENT)
Dept: INTERNAL MEDICINE CLINIC | Age: 84
End: 2019-09-05
Payer: MEDICARE

## 2019-09-05 VITALS
BODY MASS INDEX: 19.52 KG/M2 | HEART RATE: 86 BPM | OXYGEN SATURATION: 98 % | WEIGHT: 152 LBS | SYSTOLIC BLOOD PRESSURE: 130 MMHG | DIASTOLIC BLOOD PRESSURE: 62 MMHG

## 2019-09-05 DIAGNOSIS — E11.9 TYPE 2 DIABETES MELLITUS WITHOUT COMPLICATION, WITHOUT LONG-TERM CURRENT USE OF INSULIN (HCC): Chronic | ICD-10-CM

## 2019-09-05 DIAGNOSIS — L72.0 EPIDERMOID CYST OF SKIN OF CHEST: ICD-10-CM

## 2019-09-05 DIAGNOSIS — Z12.11 SCREEN FOR COLON CANCER: ICD-10-CM

## 2019-09-05 DIAGNOSIS — R63.0 LOSS OF APPETITE: ICD-10-CM

## 2019-09-05 DIAGNOSIS — D64.9 ANEMIA, UNSPECIFIED TYPE: ICD-10-CM

## 2019-09-05 DIAGNOSIS — I25.9 ISCHEMIC HEART DISEASE: Chronic | ICD-10-CM

## 2019-09-05 DIAGNOSIS — R63.4 LOSS OF WEIGHT: ICD-10-CM

## 2019-09-05 DIAGNOSIS — E78.5 HYPERLIPIDEMIA, UNSPECIFIED HYPERLIPIDEMIA TYPE: Chronic | ICD-10-CM

## 2019-09-05 DIAGNOSIS — I10 ESSENTIAL HYPERTENSION: Primary | Chronic | ICD-10-CM

## 2019-09-05 DIAGNOSIS — I73.9 PERIPHERAL VASCULAR DISEASE (HCC): Chronic | ICD-10-CM

## 2019-09-05 DIAGNOSIS — Z23 NEED FOR INFLUENZA VACCINATION: ICD-10-CM

## 2019-09-05 DIAGNOSIS — I10 ESSENTIAL HYPERTENSION: Chronic | ICD-10-CM

## 2019-09-05 LAB
A/G RATIO: 1.5 (ref 1.1–2.2)
ALBUMIN SERPL-MCNC: 4.4 G/DL (ref 3.4–5)
ALP BLD-CCNC: 58 U/L (ref 40–129)
ALT SERPL-CCNC: 9 U/L (ref 10–40)
ANION GAP SERPL CALCULATED.3IONS-SCNC: 13 MMOL/L (ref 3–16)
AST SERPL-CCNC: 18 U/L (ref 15–37)
BASOPHILS ABSOLUTE: 0 K/UL (ref 0–0.2)
BASOPHILS RELATIVE PERCENT: 0.9 %
BILIRUB SERPL-MCNC: 0.3 MG/DL (ref 0–1)
BUN BLDV-MCNC: 30 MG/DL (ref 7–20)
CALCIUM SERPL-MCNC: 9.8 MG/DL (ref 8.3–10.6)
CHLORIDE BLD-SCNC: 103 MMOL/L (ref 99–110)
CO2: 24 MMOL/L (ref 21–32)
CREAT SERPL-MCNC: 1.4 MG/DL (ref 0.8–1.3)
EOSINOPHILS ABSOLUTE: 0.1 K/UL (ref 0–0.6)
EOSINOPHILS RELATIVE PERCENT: 2 %
FERRITIN: 152.6 NG/ML (ref 30–400)
GFR AFRICAN AMERICAN: 59
GFR NON-AFRICAN AMERICAN: 49
GLOBULIN: 3 G/DL
GLUCOSE BLD-MCNC: 122 MG/DL (ref 70–99)
HCT VFR BLD CALC: 32.7 % (ref 40.5–52.5)
HEMOGLOBIN: 10.8 G/DL (ref 13.5–17.5)
IRON SATURATION: 33 % (ref 20–50)
IRON: 87 UG/DL (ref 59–158)
LIPASE: 22 U/L (ref 13–60)
LYMPHOCYTES ABSOLUTE: 0.7 K/UL (ref 1–5.1)
LYMPHOCYTES RELATIVE PERCENT: 13.4 %
MCH RBC QN AUTO: 28.6 PG (ref 26–34)
MCHC RBC AUTO-ENTMCNC: 33.2 G/DL (ref 31–36)
MCV RBC AUTO: 86.2 FL (ref 80–100)
MONOCYTES ABSOLUTE: 0.5 K/UL (ref 0–1.3)
MONOCYTES RELATIVE PERCENT: 9 %
NEUTROPHILS ABSOLUTE: 3.9 K/UL (ref 1.7–7.7)
NEUTROPHILS RELATIVE PERCENT: 74.7 %
PDW BLD-RTO: 13.3 % (ref 12.4–15.4)
PLATELET # BLD: 216 K/UL (ref 135–450)
PMV BLD AUTO: 9.4 FL (ref 5–10.5)
POTASSIUM SERPL-SCNC: 4.5 MMOL/L (ref 3.5–5.1)
RBC # BLD: 3.79 M/UL (ref 4.2–5.9)
SODIUM BLD-SCNC: 140 MMOL/L (ref 136–145)
TOTAL IRON BINDING CAPACITY: 267 UG/DL (ref 260–445)
TOTAL PROTEIN: 7.4 G/DL (ref 6.4–8.2)
WBC # BLD: 5.2 K/UL (ref 4–11)

## 2019-09-05 PROCEDURE — 4040F PNEUMOC VAC/ADMIN/RCVD: CPT | Performed by: INTERNAL MEDICINE

## 2019-09-05 PROCEDURE — G8598 ASA/ANTIPLAT THER USED: HCPCS | Performed by: INTERNAL MEDICINE

## 2019-09-05 PROCEDURE — 99214 OFFICE O/P EST MOD 30 MIN: CPT | Performed by: INTERNAL MEDICINE

## 2019-09-05 PROCEDURE — G8427 DOCREV CUR MEDS BY ELIG CLIN: HCPCS | Performed by: INTERNAL MEDICINE

## 2019-09-05 PROCEDURE — G0008 ADMIN INFLUENZA VIRUS VAC: HCPCS | Performed by: INTERNAL MEDICINE

## 2019-09-05 PROCEDURE — 1123F ACP DISCUSS/DSCN MKR DOCD: CPT | Performed by: INTERNAL MEDICINE

## 2019-09-05 PROCEDURE — 90653 IIV ADJUVANT VACCINE IM: CPT | Performed by: INTERNAL MEDICINE

## 2019-09-05 PROCEDURE — G8420 CALC BMI NORM PARAMETERS: HCPCS | Performed by: INTERNAL MEDICINE

## 2019-09-05 PROCEDURE — 1036F TOBACCO NON-USER: CPT | Performed by: INTERNAL MEDICINE

## 2019-09-05 RX ORDER — MULTIVITAMIN WITH FOLIC ACID 400 MCG
TABLET ORAL
Qty: 90 TABLET | Refills: 1 | Status: SHIPPED | OUTPATIENT
Start: 2019-09-05 | End: 2019-09-16 | Stop reason: SDUPTHER

## 2019-09-05 RX ORDER — LATANOPROST 50 UG/ML
1 SOLUTION/ DROPS OPHTHALMIC NIGHTLY
COMMUNITY
Start: 2019-07-27

## 2019-09-05 RX ORDER — LANCETS
EACH MISCELLANEOUS
Qty: 100 EACH | Refills: 1 | Status: SHIPPED | OUTPATIENT
Start: 2019-09-05 | End: 2019-09-06 | Stop reason: SDUPTHER

## 2019-09-05 RX ORDER — POLYETHYLENE GLYCOL 3350 17 G/17G
17 POWDER, FOR SOLUTION ORAL DAILY PRN
Qty: 1530 G | Refills: 1 | Status: SHIPPED | OUTPATIENT
Start: 2019-09-05 | End: 2019-10-05

## 2019-09-05 RX ORDER — MIRTAZAPINE 7.5 MG/1
7.5 TABLET, FILM COATED ORAL NIGHTLY
Qty: 90 TABLET | Refills: 1 | Status: SHIPPED | OUTPATIENT
Start: 2019-09-05 | End: 2020-02-17

## 2019-09-05 ASSESSMENT — ENCOUNTER SYMPTOMS
NAUSEA: 0
VOMITING: 0
CONSTIPATION: 0
SHORTNESS OF BREATH: 0
CHEST TIGHTNESS: 0
ABDOMINAL PAIN: 0

## 2019-09-05 NOTE — PATIENT INSTRUCTIONS
Patient Education        Hip Arthritis: Exercises  Introduction  Here are some examples of exercises for you to try. The exercises may be suggested for a condition or for rehabilitation. Start each exercise slowly. Ease off the exercises if you start to have pain. You will be told when to start these exercises and which ones will work best for you. How to do the exercises  Straight-leg raises to the outside    1. Lie on your side, with your affected hip on top. 2. Tighten the front thigh muscles of your top leg to keep your knee straight. 3. Keep your hip and your leg straight in line with the rest of your body, and keep your knee pointing forward. Do not drop your hip back. 4. Lift your top leg straight up toward the ceiling, about 12 inches off the floor. Hold for about 6 seconds, then slowly lower your leg. 5. Repeat 8 to 12 times. 6. Switch legs and repeat steps 1 through 5, even if only one hip is sore. Straight-leg raises to the inside    1. Lie on your side with your affected hip on the floor. 2. You can either prop up your other leg on a chair, or you can bend that knee and put that foot in front of your other knee. Do not drop your hip back. 3. Tighten the muscles on the front thigh of your bottom leg to straighten that knee. 4. Keep your kneecap pointing forward and your leg straight, and lift your bottom leg up toward the ceiling about 6 inches. Hold for about 6 seconds, then lower slowly. 5. Repeat 8 to 12 times. 6. Switch legs and repeat steps 1 through 5, even if only one hip is sore. Hip hike    1. Stand sideways on the bottom step of a staircase, and hold on to the banister or wall. 2. Keeping both knees straight, lift your good leg off the step and let it hang down. Then hike your good hip up to the same level as your affected hip or a little higher. 3. Repeat 8 to 12 times. 4. Switch legs and repeat steps 1 through 3, even if only one hip is sore. Bridging    1.  Lie on your back with both knees bent. Your knees should be bent about 90 degrees. 2. Then push your feet into the floor, squeeze your buttocks, and lift your hips off the floor until your shoulders, hips, and knees are all in a straight line. 3. Hold for about 6 seconds as you continue to breathe normally, and then slowly lower your hips back down to the floor and rest for up to 10 seconds. 4. Repeat 8 to 12 times. Hamstring stretch (lying down)    1. Lie flat on your back with your legs straight. If you feel discomfort in your back, place a small towel roll under your lower back. 2. Holding the back of your affected leg, lift your leg straight up and toward your body until you feel a stretch at the back of your thigh. 3. Hold the stretch for at least 30 seconds. 4. Repeat 2 to 4 times. 5. Switch legs and repeat steps 1 through 4, even if only one hip is sore. Standing quadriceps stretch    1. If you are not steady on your feet, hold on to a chair, counter, or wall. You can also lie on your stomach or your side to do this exercise. 2. Bend the knee of the leg you want to stretch, and reach behind you to grab the front of your foot or ankle with the hand on the same side. For example, if you are stretching your right leg, use your right hand. 3. Keeping your knees next to each other, pull your foot toward your buttock until you feel a gentle stretch across the front of your hip and down the front of your thigh. Your knee should be pointed directly to the ground, and not out to the side. 4. Hold the stretch for at least 15 to 30 seconds. 5. Repeat 2 to 4 times. 6. Switch legs and repeat steps 1 through 5, even if only one hip is sore. Hip rotator stretch    1. Lie on your back with both knees bent and your feet flat on the floor. 2. Put the ankle of your affected leg on your opposite thigh near your knee.   3. Use your hand to gently push your knee away from your body until you feel a gentle stretch sore.    Hip rotator stretch    8. Lie on your back with both knees bent and your feet flat on the floor. 9. Put the ankle of your affected leg on your opposite thigh near your knee. 10. Use your hand to gently push your knee away from your body until you feel a gentle stretch around your hip. 11. Hold the stretch for 15 to 30 seconds. 12. Repeat 2 to 4 times. 13. Repeat steps 1 through 5, but this time use your hand to gently pull your knee toward your opposite shoulder. 14. Switch legs and repeat steps 1 through 6, even if only one hip is sore. Knee-to-chest    8. Lie on your back with your knees bent and your feet flat on the floor. 9. Bring your affected leg to your chest, keeping the other foot flat on the floor (or keeping the other leg straight, whichever feels better on your lower back). 10. Keep your lower back pressed to the floor. Hold for at least 15 to 30 seconds. 11. Relax, and lower the knee to the starting position. 12. Repeat 2 to 4 times. 13. Switch legs and repeat steps 1 through 5, even if only one hip is sore. 14. To get more stretch, put your other leg flat on the floor while pulling your knee to your chest.    Clamshell    7. Lie on your side, with your affected hip on top. Keep your feet and knees together and your knees bent. 8. Raise your top knee, but keep your feet together. Do not let your hips roll back. Your legs should open up like a clamshell. 9. Hold for 6 seconds. 10. Slowly lower your knee back down. Rest for 10 seconds. 11. Repeat 8 to 12 times. 12. Switch legs and repeat steps 1 through 5, even if only one hip is sore. Follow-up care is a key part of your treatment and safety. Be sure to make and go to all appointments, and call your doctor if you are having problems. It's also a good idea to know your test results and keep a list of the medicines you take. Where can you learn more? Go to [email or web address] and sign in to your

## 2019-09-05 NOTE — PROGRESS NOTES
morning (before breakfast) 9/4/19  Yes Moisés Stokes MD   fluticasone (FLONASE) 50 MCG/ACT nasal spray USE 2 SPRAYS BY NASAL ROUTE DAILY AS NEEDED FOR RHINITIS 8/12/19  Yes Moisés Stokes MD   galantamine (RAZADYNE ER) 24 MG extended release capsule TAKE 1 CAPSULE BY MOUTH EVERY DAY 8/7/19  Yes Moisés Stokes MD   metFORMIN (GLUCOPHAGE) 500 MG tablet TAKE 1 TABLET EVERY DAY WITH FOOD 8/1/19  Yes Moisés Stokes MD   losartan (COZAAR) 100 MG tablet TAKE 1 TABLET BY MOUTH EVERY DAY 7/8/19  Yes Moisés Stokes MD   fluticasone-salmeterol (ADVAIR HFA) 115-21 MCG/ACT inhaler Inhale 2 puffs into the lungs 2 times daily 6/20/19  Yes Selvin Collado MD   Spacer/Aero-Holding Chambers (AEROCHAMBER MV) MISC Use with inhaler as directed 6/20/19  Yes Selvin Collado MD   KLOR-CON M10 10 MEQ extended release tablet TAKE 1 TABLET BY MOUTH EVERY DAY WITH FOOD 6/17/19  Yes Moisés Stokes MD   fluticasone-vilanterol (BREO ELLIPTA) 100-25 MCG/INH AEPB inhaler Inhale 1 puff into the lungs daily 6/5/19  Yes Selvin Collado MD   fluticasone-vilanterol (BREO ELLIPTA) 100-25 MCG/INH AEPB inhaler Inhale 1 puff into the lungs daily 6/5/19  Yes Selvin Collado MD   benzonatate (TESSALON) 100 MG capsule TAKE 1 CAPSULE BY MOUTH THREE TIMES A DAY AS NEEDED FOR COUGH 4/30/19  Yes Moisés Stokes MD   aspirin 325 MG EC tablet TAKE 1 TABLET BY MOUTH DAILY WITH FOOD 4/24/19  Yes Moisés Stokes MD   oxybutynin (DITROPAN XL) 15 MG extended release tablet Take 1 tablet by mouth 2 times daily 4/2/19  Yes Moisés Stokes MD   desmopressin (DDAVP) 0.2 MG tablet TAKE 1 TABLET AT BEDTIME 4/2/19  Yes Moisés Stokes MD   melatonin ([employer]S MELATONIN) 3 MG TABS tablet Take 1 tablet by mouth nightly 4/2/19  Yes Moisés Stokes MD   clotrimazole (LOTRIMIN) 1 % cream Apply topically 2 times daily.  4/2/19  Yes Moisés Stokes MD   atorvastatin (LIPITOR) 10 MG tablet TAKE 1 for hematuria. Skin: Negative for rash. Allergic/Immunologic: Negative for immunocompromised state. Neurological: Negative for dizziness, weakness and headaches. Psychiatric/Behavioral: Negative for dysphoric mood (mild dysphoric mood ) and suicidal ideas. Physical Exam:      Vitals: /62   Pulse 86   Wt 152 lb (68.9 kg)   SpO2 98%   BMI 19.52 kg/m²     Body mass index is 19.52 kg/m². Wt Readings from Last 3 Encounters:   09/05/19 152 lb (68.9 kg)   06/14/19 161 lb 4 oz (73.1 kg)   06/05/19 164 lb 9.6 oz (74.7 kg)     Physical Exam   Constitutional: He is oriented to person, place, and time. He appears well-developed and well-nourished. No distress. HENT:   Head: Normocephalic and atraumatic. Mouth/Throat: Oropharynx is clear and moist. No oropharyngeal exudate. Eyes: Conjunctivae and EOM are normal. Right eye exhibits no discharge. Left eye exhibits no discharge. No scleral icterus. Neck: Normal range of motion. Neck supple. Cardiovascular: Normal rate and normal heart sounds. No murmur heard. Pulmonary/Chest: Effort normal and breath sounds normal. No respiratory distress. He has no wheezes. He has no rales. Abdominal: Soft. He exhibits no distension and no mass. There is no tenderness. There is no guarding. Musculoskeletal: He exhibits no deformity. Lymphadenopathy:        Head (right side): No submental and no submandibular adenopathy present. Head (left side): No submental and no submandibular adenopathy present. He has no cervical adenopathy. Right cervical: No superficial cervical and no deep cervical adenopathy present. Left cervical: No superficial cervical and no deep cervical adenopathy present. Neurological: He is alert and oriented to person, place, and time. He has normal reflexes. He exhibits normal muscle tone. GCS eye subscore is 4. GCS verbal subscore is 5. GCS motor subscore is 6. Skin: No rash noted. He is not diaphoretic. nightly  -     CBC Auto Differential; Future  -     Comprehensive Metabolic Panel; Future    Anemia, unspecified type  -     FERRITIN; Future  -     IRON AND TIBC; Future  -     CBC Auto Differential; Future    Epidermoid cyst of skin of chest  Looks like a benign cyst-patient referred to [facility], MD [place], Plastic Surgery, [facility]    Need for influenza vaccination  -     INFLUENZA, TRIV, INACTIVATED, SUBUNIT, ADJUVANTED, 65 YRS AND OLDER, IM, PREFILL SYR, 0.5ML (FLUAD TRIV)    Other orders  -     polyethylene glycol (GLYCOLAX) powder; Take 17 g by mouth daily as needed (cosntipation)  -     blood glucose test strips (ACCU-CHEK ZEENAT PLUS) strip; As needed. -     [address]; Once daily as needed    - Patient was encouraged to callthe office (and ask to see me) or be seen by other provider for any worsening or lack    of improvement in his symptoms.       - Pt was asked toschedule an appointment in 3 months, and to let me know of any scheduling difficulties. Additional patients instructions (if given):   Patient Instructions     Patient Education        Hip Arthritis: Exercises  Introduction  Here are some examples of exercises for you to try. The exercises may be suggested for a condition or for rehabilitation. Start each exercise slowly. Ease off the exercises if you start to have pain. You will be told when to start these exercises and which ones will work best for you. How to do the exercises  Straight-leg raises to the outside    1. Lie on your side, with your affected hip on top. 2. Tighten the front thigh muscles of your top leg to keep your knee straight. 3. Keep your hip and your leg straight in line with the rest of your body, and keep your knee pointing forward. Do not drop your hip back. 4. Lift your top leg straight up toward the ceiling, about 12 inches off the floor. Hold for about 6 seconds, then slowly lower your leg.   5. Repeat 8 to 12

## 2019-09-06 ENCOUNTER — TELEPHONE (OUTPATIENT)
Dept: INTERNAL MEDICINE CLINIC | Age: 84
End: 2019-09-06

## 2019-09-06 DIAGNOSIS — N18.30 TYPE 2 DIABETES MELLITUS WITH STAGE 3 CHRONIC KIDNEY DISEASE, WITHOUT LONG-TERM CURRENT USE OF INSULIN (HCC): Primary | ICD-10-CM

## 2019-09-06 DIAGNOSIS — E11.22 TYPE 2 DIABETES MELLITUS WITH STAGE 3 CHRONIC KIDNEY DISEASE, WITHOUT LONG-TERM CURRENT USE OF INSULIN (HCC): Primary | ICD-10-CM

## 2019-09-06 LAB
ESTIMATED AVERAGE GLUCOSE: 134.1 MG/DL
HBA1C MFR BLD: 6.3 %

## 2019-09-06 RX ORDER — LANCETS
1 EACH MISCELLANEOUS DAILY
Qty: 100 EACH | Refills: 1 | Status: SHIPPED | OUTPATIENT
Start: 2019-09-06 | End: 2020-08-19

## 2019-09-06 NOTE — TELEPHONE ENCOUNTER
Pharmacy called stating they need you to fax  them new prescriptions with ICD 10 codes and specific directions on these:        [address] [place] [[id number]    blood glucose test strips (ACCU-CHEK ZEENAT PLUS) strip [[id number]

## 2019-09-09 DIAGNOSIS — I10 ESSENTIAL HYPERTENSION: Chronic | ICD-10-CM

## 2019-09-09 RX ORDER — ATENOLOL 25 MG/1
12.5 TABLET ORAL DAILY
Qty: 45 TABLET | Refills: 2 | Status: SHIPPED | OUTPATIENT
Start: 2019-09-09 | End: 2020-05-11

## 2019-09-12 DIAGNOSIS — R63.4 WEIGHT LOSS: Primary | ICD-10-CM

## 2019-09-12 DIAGNOSIS — D64.9 ANEMIA, UNSPECIFIED TYPE: ICD-10-CM

## 2019-09-21 DIAGNOSIS — E11.9 TYPE 2 DIABETES MELLITUS WITHOUT COMPLICATION, WITHOUT LONG-TERM CURRENT USE OF INSULIN (HCC): Chronic | ICD-10-CM

## 2019-09-23 RX ORDER — DESMOPRESSIN ACETATE 0.2 MG/1
TABLET ORAL
Qty: 90 TABLET | Refills: 1 | Status: SHIPPED | OUTPATIENT
Start: 2019-09-23 | End: 2020-03-24

## 2019-09-30 ENCOUNTER — OFFICE VISIT (OUTPATIENT)
Dept: PULMONOLOGY | Age: 84
End: 2019-09-30
Payer: MEDICARE

## 2019-09-30 VITALS
WEIGHT: 148 LBS | DIASTOLIC BLOOD PRESSURE: 66 MMHG | HEIGHT: 75 IN | SYSTOLIC BLOOD PRESSURE: 136 MMHG | BODY MASS INDEX: 18.4 KG/M2 | HEART RATE: 86 BPM | OXYGEN SATURATION: 99 %

## 2019-09-30 DIAGNOSIS — J41.0 SIMPLE CHRONIC BRONCHITIS (HCC): ICD-10-CM

## 2019-09-30 PROCEDURE — G8427 DOCREV CUR MEDS BY ELIG CLIN: HCPCS | Performed by: INTERNAL MEDICINE

## 2019-09-30 PROCEDURE — G8420 CALC BMI NORM PARAMETERS: HCPCS | Performed by: INTERNAL MEDICINE

## 2019-09-30 PROCEDURE — 1123F ACP DISCUSS/DSCN MKR DOCD: CPT | Performed by: INTERNAL MEDICINE

## 2019-09-30 PROCEDURE — G8926 SPIRO NO PERF OR DOC: HCPCS | Performed by: INTERNAL MEDICINE

## 2019-09-30 PROCEDURE — G8598 ASA/ANTIPLAT THER USED: HCPCS | Performed by: INTERNAL MEDICINE

## 2019-09-30 PROCEDURE — 1036F TOBACCO NON-USER: CPT | Performed by: INTERNAL MEDICINE

## 2019-09-30 PROCEDURE — 99213 OFFICE O/P EST LOW 20 MIN: CPT | Performed by: INTERNAL MEDICINE

## 2019-09-30 PROCEDURE — 3023F SPIROM DOC REV: CPT | Performed by: INTERNAL MEDICINE

## 2019-09-30 PROCEDURE — 4040F PNEUMOC VAC/ADMIN/RCVD: CPT | Performed by: INTERNAL MEDICINE

## 2019-10-01 ENCOUNTER — HOSPITAL ENCOUNTER (OUTPATIENT)
Age: 84
Setting detail: OUTPATIENT SURGERY
Discharge: HOME | End: 2019-10-01
Attending: INTERNAL MEDICINE | Admitting: INTERNAL MEDICINE
Payer: MEDICARE

## 2019-10-01 ENCOUNTER — ANESTHESIA EVENT (OUTPATIENT)
Dept: ENDOSCOPY | Age: 84
End: 2019-10-01
Payer: MEDICARE

## 2019-10-01 ENCOUNTER — ANESTHESIA (OUTPATIENT)
Dept: ENDOSCOPY | Age: 84
End: 2019-10-01
Payer: MEDICARE

## 2019-10-01 VITALS
BODY MASS INDEX: 18.46 KG/M2 | HEIGHT: 75 IN | OXYGEN SATURATION: 100 % | WEIGHT: 148.5 LBS | TEMPERATURE: 97.1 F | SYSTOLIC BLOOD PRESSURE: 185 MMHG | DIASTOLIC BLOOD PRESSURE: 89 MMHG | HEART RATE: 65 BPM | RESPIRATION RATE: 16 BRPM

## 2019-10-01 VITALS
DIASTOLIC BLOOD PRESSURE: 72 MMHG | RESPIRATION RATE: 14 BRPM | SYSTOLIC BLOOD PRESSURE: 143 MMHG | OXYGEN SATURATION: 99 %

## 2019-10-01 LAB
GLUCOSE BLD-MCNC: 83 MG/DL (ref 70–99)
GLUCOSE BLD-MCNC: 90 MG/DL (ref 70–99)
PERFORMED ON: NORMAL
PERFORMED ON: NORMAL

## 2019-10-01 PROCEDURE — 2709999900 HC NON-CHARGEABLE SUPPLY: Performed by: INTERNAL MEDICINE

## 2019-10-01 PROCEDURE — 7100000010 HC PHASE II RECOVERY - FIRST 15 MIN: Performed by: INTERNAL MEDICINE

## 2019-10-01 PROCEDURE — 7100000001 HC PACU RECOVERY - ADDTL 15 MIN: Performed by: INTERNAL MEDICINE

## 2019-10-01 PROCEDURE — 7100000000 HC PACU RECOVERY - FIRST 15 MIN: Performed by: INTERNAL MEDICINE

## 2019-10-01 PROCEDURE — 2580000003 HC RX 258: Performed by: INTERNAL MEDICINE

## 2019-10-01 PROCEDURE — 6360000002 HC RX W HCPCS: Performed by: NURSE ANESTHETIST, CERTIFIED REGISTERED

## 2019-10-01 PROCEDURE — 3609009500 HC COLONOSCOPY DIAGNOSTIC OR SCREENING: Performed by: INTERNAL MEDICINE

## 2019-10-01 PROCEDURE — 7100000011 HC PHASE II RECOVERY - ADDTL 15 MIN: Performed by: INTERNAL MEDICINE

## 2019-10-01 PROCEDURE — 3700000001 HC ADD 15 MINUTES (ANESTHESIA): Performed by: INTERNAL MEDICINE

## 2019-10-01 PROCEDURE — 2500000003 HC RX 250 WO HCPCS: Performed by: NURSE ANESTHETIST, CERTIFIED REGISTERED

## 2019-10-01 PROCEDURE — 3700000000 HC ANESTHESIA ATTENDED CARE: Performed by: INTERNAL MEDICINE

## 2019-10-01 RX ORDER — LIDOCAINE HYDROCHLORIDE 10 MG/ML
1 INJECTION, SOLUTION EPIDURAL; INFILTRATION; INTRACAUDAL; PERINEURAL
Status: CANCELLED | OUTPATIENT
Start: 2019-10-01 | End: 2019-10-01

## 2019-10-01 RX ORDER — SODIUM CHLORIDE 9 MG/ML
INJECTION, SOLUTION INTRAVENOUS CONTINUOUS
Status: CANCELLED | OUTPATIENT
Start: 2019-10-01

## 2019-10-01 RX ORDER — HYDROMORPHONE HCL 110MG/55ML
0.25 PATIENT CONTROLLED ANALGESIA SYRINGE INTRAVENOUS EVERY 5 MIN PRN
Status: DISCONTINUED | OUTPATIENT
Start: 2019-10-01 | End: 2019-10-01 | Stop reason: HOSPADM

## 2019-10-01 RX ORDER — FENTANYL CITRATE 50 UG/ML
50 INJECTION, SOLUTION INTRAMUSCULAR; INTRAVENOUS EVERY 5 MIN PRN
Status: DISCONTINUED | OUTPATIENT
Start: 2019-10-01 | End: 2019-10-01 | Stop reason: HOSPADM

## 2019-10-01 RX ORDER — HYDROMORPHONE HCL 110MG/55ML
0.5 PATIENT CONTROLLED ANALGESIA SYRINGE INTRAVENOUS EVERY 5 MIN PRN
Status: DISCONTINUED | OUTPATIENT
Start: 2019-10-01 | End: 2019-10-01 | Stop reason: HOSPADM

## 2019-10-01 RX ORDER — HYDROCODONE BITARTRATE AND ACETAMINOPHEN 5; 325 MG/1; MG/1
1 TABLET ORAL PRN
Status: DISCONTINUED | OUTPATIENT
Start: 2019-10-01 | End: 2019-10-01 | Stop reason: HOSPADM

## 2019-10-01 RX ORDER — PROPOFOL 10 MG/ML
INJECTION, EMULSION INTRAVENOUS PRN
Status: DISCONTINUED | OUTPATIENT
Start: 2019-10-01 | End: 2019-10-01 | Stop reason: SDUPTHER

## 2019-10-01 RX ORDER — PROMETHAZINE HYDROCHLORIDE 25 MG/ML
6.25 INJECTION, SOLUTION INTRAMUSCULAR; INTRAVENOUS EVERY 30 MIN PRN
Status: DISCONTINUED | OUTPATIENT
Start: 2019-10-01 | End: 2019-10-01 | Stop reason: HOSPADM

## 2019-10-01 RX ORDER — HYDROCODONE BITARTRATE AND ACETAMINOPHEN 5; 325 MG/1; MG/1
2 TABLET ORAL PRN
Status: DISCONTINUED | OUTPATIENT
Start: 2019-10-01 | End: 2019-10-01 | Stop reason: HOSPADM

## 2019-10-01 RX ORDER — LIDOCAINE HYDROCHLORIDE 20 MG/ML
INJECTION, SOLUTION EPIDURAL; INFILTRATION; INTRACAUDAL; PERINEURAL PRN
Status: DISCONTINUED | OUTPATIENT
Start: 2019-10-01 | End: 2019-10-01 | Stop reason: SDUPTHER

## 2019-10-01 RX ORDER — DIPHENHYDRAMINE HYDROCHLORIDE 50 MG/ML
12.5 INJECTION INTRAMUSCULAR; INTRAVENOUS
Status: DISCONTINUED | OUTPATIENT
Start: 2019-10-01 | End: 2019-10-01 | Stop reason: HOSPADM

## 2019-10-01 RX ORDER — SODIUM CHLORIDE 0.9 % (FLUSH) 0.9 %
10 SYRINGE (ML) INJECTION PRN
Status: CANCELLED | OUTPATIENT
Start: 2019-10-01

## 2019-10-01 RX ORDER — SODIUM CHLORIDE 9 MG/ML
INJECTION, SOLUTION INTRAVENOUS CONTINUOUS
Status: DISCONTINUED | OUTPATIENT
Start: 2019-10-01 | End: 2019-10-01 | Stop reason: HOSPADM

## 2019-10-01 RX ORDER — MEPERIDINE HYDROCHLORIDE 25 MG/ML
12.5 INJECTION INTRAMUSCULAR; INTRAVENOUS; SUBCUTANEOUS EVERY 5 MIN PRN
Status: DISCONTINUED | OUTPATIENT
Start: 2019-10-01 | End: 2019-10-01 | Stop reason: HOSPADM

## 2019-10-01 RX ORDER — SODIUM CHLORIDE 0.9 % (FLUSH) 0.9 %
10 SYRINGE (ML) INJECTION EVERY 12 HOURS SCHEDULED
Status: CANCELLED | OUTPATIENT
Start: 2019-10-01

## 2019-10-01 RX ORDER — FENTANYL CITRATE 50 UG/ML
25 INJECTION, SOLUTION INTRAMUSCULAR; INTRAVENOUS EVERY 5 MIN PRN
Status: DISCONTINUED | OUTPATIENT
Start: 2019-10-01 | End: 2019-10-01 | Stop reason: HOSPADM

## 2019-10-01 RX ADMIN — PROPOFOL 40 MG: 10 INJECTION, EMULSION INTRAVENOUS at 11:36

## 2019-10-01 RX ADMIN — SODIUM CHLORIDE: 9 INJECTION, SOLUTION INTRAVENOUS at 11:20

## 2019-10-01 RX ADMIN — PROPOFOL 40 MG: 10 INJECTION, EMULSION INTRAVENOUS at 11:49

## 2019-10-01 RX ADMIN — PROPOFOL 40 MG: 10 INJECTION, EMULSION INTRAVENOUS at 11:28

## 2019-10-01 RX ADMIN — SODIUM CHLORIDE: 9 INJECTION, SOLUTION INTRAVENOUS at 09:40

## 2019-10-01 RX ADMIN — LIDOCAINE HYDROCHLORIDE 60 MG: 20 INJECTION, SOLUTION EPIDURAL; INFILTRATION; INTRACAUDAL; PERINEURAL at 11:22

## 2019-10-01 RX ADMIN — PROPOFOL 40 MG: 10 INJECTION, EMULSION INTRAVENOUS at 11:45

## 2019-10-01 RX ADMIN — PROPOFOL 40 MG: 10 INJECTION, EMULSION INTRAVENOUS at 11:25

## 2019-10-01 ASSESSMENT — PULMONARY FUNCTION TESTS
PIF_VALUE: 0

## 2019-10-01 ASSESSMENT — ENCOUNTER SYMPTOMS: SHORTNESS OF BREATH: 1

## 2019-10-01 ASSESSMENT — PAIN - FUNCTIONAL ASSESSMENT: PAIN_FUNCTIONAL_ASSESSMENT: 0-10

## 2019-10-09 ENCOUNTER — OFFICE VISIT (OUTPATIENT)
Dept: SURGERY | Age: 84
End: 2019-10-09
Payer: MEDICARE

## 2019-10-09 VITALS
HEART RATE: 70 BPM | HEIGHT: 75 IN | TEMPERATURE: 97 F | WEIGHT: 153 LBS | DIASTOLIC BLOOD PRESSURE: 76 MMHG | BODY MASS INDEX: 19.02 KG/M2 | SYSTOLIC BLOOD PRESSURE: 163 MMHG | OXYGEN SATURATION: 95 %

## 2019-10-09 DIAGNOSIS — L98.9 CHEST SKIN LESION: Primary | ICD-10-CM

## 2019-10-09 PROCEDURE — G8598 ASA/ANTIPLAT THER USED: HCPCS | Performed by: SURGERY

## 2019-10-09 PROCEDURE — 99202 OFFICE O/P NEW SF 15 MIN: CPT | Performed by: SURGERY

## 2019-10-09 PROCEDURE — G8482 FLU IMMUNIZE ORDER/ADMIN: HCPCS | Performed by: SURGERY

## 2019-10-09 PROCEDURE — G8420 CALC BMI NORM PARAMETERS: HCPCS | Performed by: SURGERY

## 2019-10-09 PROCEDURE — 4040F PNEUMOC VAC/ADMIN/RCVD: CPT | Performed by: SURGERY

## 2019-10-09 PROCEDURE — 1123F ACP DISCUSS/DSCN MKR DOCD: CPT | Performed by: SURGERY

## 2019-10-09 PROCEDURE — G8427 DOCREV CUR MEDS BY ELIG CLIN: HCPCS | Performed by: SURGERY

## 2019-10-09 PROCEDURE — 1036F TOBACCO NON-USER: CPT | Performed by: SURGERY

## 2019-10-09 RX ORDER — BENZONATATE 100 MG/1
CAPSULE ORAL
Qty: 42 CAPSULE | Refills: 1 | OUTPATIENT
Start: 2019-10-09

## 2019-10-10 DIAGNOSIS — I10 ESSENTIAL HYPERTENSION: Chronic | ICD-10-CM

## 2019-10-10 RX ORDER — TERAZOSIN 10 MG/1
CAPSULE ORAL
Qty: 90 CAPSULE | Refills: 3 | Status: SHIPPED | OUTPATIENT
Start: 2019-10-10 | End: 2020-08-19 | Stop reason: ALTCHOICE

## 2019-10-14 RX ORDER — BENZONATATE 100 MG/1
CAPSULE ORAL
Qty: 42 CAPSULE | Refills: 1 | Status: SHIPPED | OUTPATIENT
Start: 2019-10-14 | End: 2019-12-10 | Stop reason: SDUPTHER

## 2019-10-15 ENCOUNTER — HOSPITAL ENCOUNTER (OUTPATIENT)
Dept: CT IMAGING | Age: 84
Discharge: HOME | End: 2019-10-15
Payer: MEDICARE

## 2019-10-15 DIAGNOSIS — R63.0 ALMOST NO APPETITE: ICD-10-CM

## 2019-10-15 DIAGNOSIS — R63.4 LOSS OF WEIGHT: ICD-10-CM

## 2019-10-15 LAB
GFR AFRICAN AMERICAN: 59
GFR NON-AFRICAN AMERICAN: 49
PERFORMED ON: ABNORMAL
POC CREATININE: 1.4 MG/DL (ref 0.8–1.3)
POC SAMPLE TYPE: ABNORMAL

## 2019-10-15 PROCEDURE — 74177 CT ABD & PELVIS W/CONTRAST: CPT

## 2019-10-15 PROCEDURE — 6360000004 HC RX CONTRAST MEDICATION: Performed by: INTERNAL MEDICINE

## 2019-10-15 PROCEDURE — 82565 ASSAY OF CREATININE: CPT

## 2019-10-15 RX ADMIN — IOHEXOL 50 ML: 240 INJECTION, SOLUTION INTRATHECAL; INTRAVASCULAR; INTRAVENOUS; ORAL at 11:12

## 2019-10-15 RX ADMIN — IOPAMIDOL 80 ML: 755 INJECTION, SOLUTION INTRAVENOUS at 11:12

## 2019-10-21 ENCOUNTER — TELEPHONE (OUTPATIENT)
Dept: SURGERY | Age: 84
End: 2019-10-21

## 2019-10-21 RX ORDER — MONTELUKAST SODIUM 10 MG/1
TABLET ORAL
Qty: 90 TABLET | Refills: 3 | Status: SHIPPED | OUTPATIENT
Start: 2019-10-21 | End: 2020-08-19

## 2019-10-29 ENCOUNTER — TELEPHONE (OUTPATIENT)
Dept: SURGERY | Age: 84
End: 2019-10-29

## 2019-10-30 DIAGNOSIS — R41.3 MEMORY LOSS: Chronic | ICD-10-CM

## 2019-10-30 RX ORDER — GALANTAMINE HYDROBROMIDE 24 MG/1
CAPSULE, EXTENDED RELEASE ORAL
Qty: 90 CAPSULE | Refills: 1 | Status: SHIPPED | OUTPATIENT
Start: 2019-10-30 | End: 2020-03-31

## 2019-11-13 ENCOUNTER — TELEPHONE (OUTPATIENT)
Dept: PULMONOLOGY | Age: 84
End: 2019-11-13

## 2019-12-10 ENCOUNTER — OFFICE VISIT (OUTPATIENT)
Dept: INTERNAL MEDICINE CLINIC | Age: 84
End: 2019-12-10
Payer: MEDICARE

## 2019-12-10 VITALS
SYSTOLIC BLOOD PRESSURE: 128 MMHG | HEART RATE: 63 BPM | OXYGEN SATURATION: 99 % | BODY MASS INDEX: 20.12 KG/M2 | DIASTOLIC BLOOD PRESSURE: 84 MMHG | WEIGHT: 161 LBS

## 2019-12-10 DIAGNOSIS — R55 SYNCOPE, UNSPECIFIED SYNCOPE TYPE: ICD-10-CM

## 2019-12-10 DIAGNOSIS — Z01.818 PREOP TESTING: ICD-10-CM

## 2019-12-10 DIAGNOSIS — I10 ESSENTIAL HYPERTENSION: Primary | Chronic | ICD-10-CM

## 2019-12-10 PROCEDURE — G8420 CALC BMI NORM PARAMETERS: HCPCS | Performed by: INTERNAL MEDICINE

## 2019-12-10 PROCEDURE — 1123F ACP DISCUSS/DSCN MKR DOCD: CPT | Performed by: INTERNAL MEDICINE

## 2019-12-10 PROCEDURE — G8482 FLU IMMUNIZE ORDER/ADMIN: HCPCS | Performed by: INTERNAL MEDICINE

## 2019-12-10 PROCEDURE — 99214 OFFICE O/P EST MOD 30 MIN: CPT | Performed by: INTERNAL MEDICINE

## 2019-12-10 PROCEDURE — 1036F TOBACCO NON-USER: CPT | Performed by: INTERNAL MEDICINE

## 2019-12-10 PROCEDURE — G8427 DOCREV CUR MEDS BY ELIG CLIN: HCPCS | Performed by: INTERNAL MEDICINE

## 2019-12-10 PROCEDURE — G8598 ASA/ANTIPLAT THER USED: HCPCS | Performed by: INTERNAL MEDICINE

## 2019-12-10 PROCEDURE — 4040F PNEUMOC VAC/ADMIN/RCVD: CPT | Performed by: INTERNAL MEDICINE

## 2019-12-10 RX ORDER — BENZONATATE 100 MG/1
100 CAPSULE ORAL 3 TIMES DAILY PRN
Qty: 30 CAPSULE | Refills: 2 | Status: SHIPPED | OUTPATIENT
Start: 2019-12-10 | End: 2020-05-21

## 2019-12-10 RX ORDER — POTASSIUM CHLORIDE 750 MG/1
TABLET, EXTENDED RELEASE ORAL
Qty: 90 TABLET | Refills: 1 | Status: SHIPPED | OUTPATIENT
Start: 2019-12-10 | End: 2020-06-13

## 2019-12-10 SDOH — ECONOMIC STABILITY: FOOD INSECURITY: WITHIN THE PAST 12 MONTHS, YOU WORRIED THAT YOUR FOOD WOULD RUN OUT BEFORE YOU GOT MONEY TO BUY MORE.: NEVER TRUE

## 2019-12-10 SDOH — ECONOMIC STABILITY: FOOD INSECURITY: WITHIN THE PAST 12 MONTHS, THE FOOD YOU BOUGHT JUST DIDN'T LAST AND YOU DIDN'T HAVE MONEY TO GET MORE.: NEVER TRUE

## 2019-12-10 SDOH — ECONOMIC STABILITY: TRANSPORTATION INSECURITY
IN THE PAST 12 MONTHS, HAS LACK OF TRANSPORTATION KEPT YOU FROM MEETINGS, WORK, OR FROM GETTING THINGS NEEDED FOR DAILY LIVING?: NO

## 2019-12-10 SDOH — ECONOMIC STABILITY: INCOME INSECURITY: HOW HARD IS IT FOR YOU TO PAY FOR THE VERY BASICS LIKE FOOD, HOUSING, MEDICAL CARE, AND HEATING?: NOT HARD AT ALL

## 2019-12-10 SDOH — ECONOMIC STABILITY: TRANSPORTATION INSECURITY
IN THE PAST 12 MONTHS, HAS THE LACK OF TRANSPORTATION KEPT YOU FROM MEDICAL APPOINTMENTS OR FROM GETTING MEDICATIONS?: NO

## 2019-12-17 ENCOUNTER — TELEPHONE (OUTPATIENT)
Dept: SURGERY | Age: 84
End: 2019-12-17

## 2020-01-20 ENCOUNTER — TELEPHONE (OUTPATIENT)
Dept: SURGERY | Age: 85
End: 2020-01-20

## 2020-01-20 ENCOUNTER — ANESTHESIA EVENT (OUTPATIENT)
Dept: OPERATING ROOM | Age: 85
End: 2020-01-20
Payer: MEDICARE

## 2020-01-20 NOTE — PROGRESS NOTES
[facility] time_0800___________        Surgery time_1145___________    Take the following medications with a sip of water: Follow your MD/Surgeons pre-procedure instructions regarding your medications    Do not eat or drink anything after 12:00 midnight prior to your surgery. This includes water chewing gum, mints and ice chips. You may brush your teeth and gargle the morning of your surgery, but do not swallow the water     Please see your family doctor/pediatrician for a history and physical and/or concerning medications. Bring any test results/reports from your physicians office. If you are under the care of a heart doctor or specialist doctor, please be aware that you may be asked to them for clearance    You may be asked to stop blood thinners such as Coumadin, Plavix, Fragmin, Lovenox, etc., or any anti-inflammatories such as:  Aspirin, Ibuprofen, Advil, Naproxen prior to your surgery. We also ask that you stop any OTC medications such as fish oil, vitamin E, glucosamine, garlic, Multivitamins, COQ 10, etc.    We ask that you do not smoke 24 hours prior to surgery  We ask that you do not  drink any alcoholic beverages 24 hours prior to surgery     You must make arrangements for a responsible adult to take you home after your surgery. For your safety you will not be allowed to leave alone or drive yourself home. Your surgery will be cancelled if you do not have a ride home. Also for your safety, it is strongly suggested that someone stay with you the first 24 hours after your surgery. A parent or legal guardian must accompany a child scheduled for surgery and plan to stay at the hospital until the child is discharged. Please do not bring other children with you. For your comfort, please wear simple loose fitting clothing to the hospital.  Please do not bring valuables.     Do not wear any make-up or nail polish on your fingers or toes      For your safety, please do not wear any jewelry or body piercing's on the day of surgery. All jewelry must be removed. If you have dentures, they will be removed before going to operating room. For your convenience, we will provide you with a container. If you wear contact lenses or glasses, they will be removed, please bring a case for them. If you have a living will and a durable power of  for healthcare, please bring in a copy. As part of our patient safety program to minimize surgical site infections, we ask you to do the following:    · Please notify your surgeon if you develop any illness between         now and the  day of your surgery. · This includes a cough, cold, fever, sore throat, nausea,         or vomiting, and diarrhea, etc.  ·  Please notify your surgeon if you experience dizziness, shortness         of breath or blurred vision between now and the time of your surgery. Do not shave your operative site 96 hours prior to surgery. For face and neck surgery, men may use an electric razor 48 hours   prior to surgery. You may shower the night before surgery or the morning of   your surgery with an antibacterial soap. You will need to bring a photo ID and insurance card    [facility] has an onsite pharmacy, would you like to utilize our pharmacy     If you will be staying overnight and use a C-pap machine, please bring   your C-pap to hospital     Our goal is to provide you with excellent care, therefore, visitors will be limited to two(2) in the room at a time so that we may focus on providing this care for you. Please contact pre-admission testing if you have any further questions. [facility] phone number:  [phone] PAT fax number:  [phone]  Please note these are generalized instructions for all surgical cases, you may be provided with more specific instructions according to your surgery.

## 2020-01-21 ENCOUNTER — ANESTHESIA (OUTPATIENT)
Dept: OPERATING ROOM | Age: 85
End: 2020-01-21
Payer: MEDICARE

## 2020-01-21 ENCOUNTER — HOSPITAL ENCOUNTER (OUTPATIENT)
Age: 85
Setting detail: OUTPATIENT SURGERY
Discharge: HOME | End: 2020-01-21
Attending: SURGERY | Admitting: SURGERY
Payer: MEDICARE

## 2020-01-21 VITALS
HEART RATE: 82 BPM | HEIGHT: 74 IN | RESPIRATION RATE: 12 BRPM | SYSTOLIC BLOOD PRESSURE: 184 MMHG | OXYGEN SATURATION: 99 % | TEMPERATURE: 97.4 F | WEIGHT: 185 LBS | BODY MASS INDEX: 23.74 KG/M2 | DIASTOLIC BLOOD PRESSURE: 89 MMHG

## 2020-01-21 LAB
A/G RATIO: 1.2 (ref 1.1–2.2)
ALBUMIN SERPL-MCNC: 4.1 G/DL (ref 3.4–5)
ALP BLD-CCNC: 69 U/L (ref 40–129)
ALT SERPL-CCNC: 33 U/L (ref 10–40)
ANION GAP SERPL CALCULATED.3IONS-SCNC: 11 MMOL/L (ref 3–16)
APTT: 32.4 SEC (ref 24.2–36.2)
AST SERPL-CCNC: 31 U/L (ref 15–37)
BILIRUB SERPL-MCNC: 0.3 MG/DL (ref 0–1)
BUN BLDV-MCNC: 26 MG/DL (ref 7–20)
CALCIUM SERPL-MCNC: 9.8 MG/DL (ref 8.3–10.6)
CHLORIDE BLD-SCNC: 107 MMOL/L (ref 99–110)
CO2: 25 MMOL/L (ref 21–32)
CREAT SERPL-MCNC: 1.4 MG/DL (ref 0.8–1.3)
GFR AFRICAN AMERICAN: 59
GFR NON-AFRICAN AMERICAN: 48
GLOBULIN: 3.3 G/DL
GLUCOSE BLD-MCNC: 106 MG/DL (ref 70–99)
GLUCOSE BLD-MCNC: 108 MG/DL (ref 70–99)
GLUCOSE BLD-MCNC: 121 MG/DL (ref 70–99)
HCT VFR BLD CALC: 32.6 % (ref 40.5–52.5)
HEMOGLOBIN: 10.9 G/DL (ref 13.5–17.5)
INR BLD: 1.14 (ref 0.86–1.14)
MCH RBC QN AUTO: 28.9 PG (ref 26–34)
MCHC RBC AUTO-ENTMCNC: 33.3 G/DL (ref 31–36)
MCV RBC AUTO: 87 FL (ref 80–100)
PDW BLD-RTO: 12.5 % (ref 12.4–15.4)
PERFORMED ON: ABNORMAL
PERFORMED ON: ABNORMAL
PLATELET # BLD: 160 K/UL (ref 135–450)
PMV BLD AUTO: 9.8 FL (ref 5–10.5)
POTASSIUM SERPL-SCNC: 4.7 MMOL/L (ref 3.5–5.1)
PROTHROMBIN TIME: 13.3 SEC (ref 10–13.2)
RBC # BLD: 3.75 M/UL (ref 4.2–5.9)
SODIUM BLD-SCNC: 143 MMOL/L (ref 136–145)
TOTAL PROTEIN: 7.4 G/DL (ref 6.4–8.2)
WBC # BLD: 5.6 K/UL (ref 4–11)

## 2020-01-21 PROCEDURE — 88300 SURGICAL PATH GROSS: CPT

## 2020-01-21 PROCEDURE — 2500000003 HC RX 250 WO HCPCS: Performed by: SURGERY

## 2020-01-21 PROCEDURE — 2580000003 HC RX 258: Performed by: ANESTHESIOLOGY

## 2020-01-21 PROCEDURE — 7100000001 HC PACU RECOVERY - ADDTL 15 MIN: Performed by: SURGERY

## 2020-01-21 PROCEDURE — 2580000003 HC RX 258: Performed by: SURGERY

## 2020-01-21 PROCEDURE — 80053 COMPREHEN METABOLIC PANEL: CPT

## 2020-01-21 PROCEDURE — 3600000002 HC SURGERY LEVEL 2 BASE: Performed by: SURGERY

## 2020-01-21 PROCEDURE — 6360000002 HC RX W HCPCS: Performed by: ANESTHESIOLOGY

## 2020-01-21 PROCEDURE — 7100000011 HC PHASE II RECOVERY - ADDTL 15 MIN: Performed by: SURGERY

## 2020-01-21 PROCEDURE — 7100000010 HC PHASE II RECOVERY - FIRST 15 MIN: Performed by: SURGERY

## 2020-01-21 PROCEDURE — 88304 TISSUE EXAM BY PATHOLOGIST: CPT

## 2020-01-21 PROCEDURE — 85027 COMPLETE CBC AUTOMATED: CPT

## 2020-01-21 PROCEDURE — 93005 ELECTROCARDIOGRAM TRACING: CPT | Performed by: SURGERY

## 2020-01-21 PROCEDURE — 85610 PROTHROMBIN TIME: CPT

## 2020-01-21 PROCEDURE — 2709999900 HC NON-CHARGEABLE SUPPLY: Performed by: SURGERY

## 2020-01-21 PROCEDURE — 2500000003 HC RX 250 WO HCPCS: Performed by: ANESTHESIOLOGY

## 2020-01-21 PROCEDURE — 11403 EXC TR-EXT B9+MARG 2.1-3CM: CPT | Performed by: SURGERY

## 2020-01-21 PROCEDURE — 85730 THROMBOPLASTIN TIME PARTIAL: CPT

## 2020-01-21 PROCEDURE — 13101 CMPLX RPR TRUNK 2.6-7.5 CM: CPT | Performed by: SURGERY

## 2020-01-21 PROCEDURE — 6370000000 HC RX 637 (ALT 250 FOR IP): Performed by: SURGERY

## 2020-01-21 PROCEDURE — 7100000000 HC PACU RECOVERY - FIRST 15 MIN: Performed by: SURGERY

## 2020-01-21 PROCEDURE — 3600000012 HC SURGERY LEVEL 2 ADDTL 15MIN: Performed by: SURGERY

## 2020-01-21 RX ORDER — SODIUM CHLORIDE 0.9 % (FLUSH) 0.9 %
10 SYRINGE (ML) INJECTION EVERY 12 HOURS SCHEDULED
Status: DISCONTINUED | OUTPATIENT
Start: 2020-01-21 | End: 2020-01-21 | Stop reason: HOSPADM

## 2020-01-21 RX ORDER — SODIUM CHLORIDE 9 MG/ML
INJECTION, SOLUTION INTRAVENOUS CONTINUOUS
Status: DISCONTINUED | OUTPATIENT
Start: 2020-01-21 | End: 2020-01-21 | Stop reason: HOSPADM

## 2020-01-21 RX ORDER — PROMETHAZINE HYDROCHLORIDE 25 MG/ML
6.25 INJECTION, SOLUTION INTRAMUSCULAR; INTRAVENOUS
Status: DISCONTINUED | OUTPATIENT
Start: 2020-01-21 | End: 2020-01-21 | Stop reason: HOSPADM

## 2020-01-21 RX ORDER — LABETALOL HYDROCHLORIDE 5 MG/ML
INJECTION, SOLUTION INTRAVENOUS PRN
Status: DISCONTINUED | OUTPATIENT
Start: 2020-01-21 | End: 2020-01-21

## 2020-01-21 RX ORDER — BACITRACIN ZINC AND POLYMYXIN B SULFATE 500; 1000 [USP'U]/G; [USP'U]/G
OINTMENT TOPICAL
Status: COMPLETED | OUTPATIENT
Start: 2020-01-21 | End: 2020-01-21

## 2020-01-21 RX ORDER — FENTANYL CITRATE 50 UG/ML
25 INJECTION, SOLUTION INTRAMUSCULAR; INTRAVENOUS EVERY 5 MIN PRN
Status: DISCONTINUED | OUTPATIENT
Start: 2020-01-21 | End: 2020-01-21 | Stop reason: HOSPADM

## 2020-01-21 RX ORDER — ONDANSETRON 2 MG/ML
4 INJECTION INTRAMUSCULAR; INTRAVENOUS
Status: DISCONTINUED | OUTPATIENT
Start: 2020-01-21 | End: 2020-01-21 | Stop reason: HOSPADM

## 2020-01-21 RX ORDER — HYDROCODONE BITARTRATE AND ACETAMINOPHEN 5; 325 MG/1; MG/1
2 TABLET ORAL PRN
Status: DISCONTINUED | OUTPATIENT
Start: 2020-01-21 | End: 2020-01-21 | Stop reason: HOSPADM

## 2020-01-21 RX ORDER — HYDROCODONE BITARTRATE AND ACETAMINOPHEN 5; 325 MG/1; MG/1
1 TABLET ORAL PRN
Status: DISCONTINUED | OUTPATIENT
Start: 2020-01-21 | End: 2020-01-21 | Stop reason: HOSPADM

## 2020-01-21 RX ORDER — MEPERIDINE HYDROCHLORIDE 25 MG/ML
12.5 INJECTION INTRAMUSCULAR; INTRAVENOUS; SUBCUTANEOUS EVERY 5 MIN PRN
Status: DISCONTINUED | OUTPATIENT
Start: 2020-01-21 | End: 2020-01-21 | Stop reason: HOSPADM

## 2020-01-21 RX ORDER — LABETALOL HYDROCHLORIDE 5 MG/ML
10 INJECTION, SOLUTION INTRAVENOUS ONCE
Status: COMPLETED | OUTPATIENT
Start: 2020-01-21 | End: 2020-01-21

## 2020-01-21 RX ORDER — FENTANYL CITRATE 50 UG/ML
50 INJECTION, SOLUTION INTRAMUSCULAR; INTRAVENOUS EVERY 5 MIN PRN
Status: DISCONTINUED | OUTPATIENT
Start: 2020-01-21 | End: 2020-01-21 | Stop reason: HOSPADM

## 2020-01-21 RX ORDER — SODIUM CHLORIDE 0.9 % (FLUSH) 0.9 %
10 SYRINGE (ML) INJECTION PRN
Status: DISCONTINUED | OUTPATIENT
Start: 2020-01-21 | End: 2020-01-21 | Stop reason: HOSPADM

## 2020-01-21 RX ORDER — HYDRALAZINE HYDROCHLORIDE 20 MG/ML
5 INJECTION INTRAMUSCULAR; INTRAVENOUS EVERY 10 MIN PRN
Status: DISCONTINUED | OUTPATIENT
Start: 2020-01-21 | End: 2020-01-21 | Stop reason: HOSPADM

## 2020-01-21 RX ORDER — MAGNESIUM HYDROXIDE 1200 MG/15ML
LIQUID ORAL CONTINUOUS PRN
Status: COMPLETED | OUTPATIENT
Start: 2020-01-21 | End: 2020-01-21

## 2020-01-21 RX ADMIN — SODIUM CHLORIDE: 9 INJECTION, SOLUTION INTRAVENOUS at 09:28

## 2020-01-21 RX ADMIN — LABETALOL HYDROCHLORIDE 10 MG: 5 INJECTION INTRAVENOUS at 12:09

## 2020-01-21 RX ADMIN — HYDRALAZINE HYDROCHLORIDE 5 MG: 20 INJECTION INTRAMUSCULAR; INTRAVENOUS at 12:18

## 2020-01-21 ASSESSMENT — ENCOUNTER SYMPTOMS: SHORTNESS OF BREATH: 1

## 2020-01-21 ASSESSMENT — PAIN SCALES - GENERAL
PAINLEVEL_OUTOF10: 0

## 2020-01-21 ASSESSMENT — PAIN - FUNCTIONAL ASSESSMENT: PAIN_FUNCTIONAL_ASSESSMENT: 0-10

## 2020-01-21 NOTE — ANESTHESIA PRE PROCEDURE
performed by My Hope MD at [address] History     Tobacco Use    Smoking status: Former Smoker     Packs/day: 1.00     Years: 18.00     Pack years: 18.00     Types: Cigarettes     Start date: 10/22/1972     Last attempt to quit: 1990     Years since quittin.7    Smokeless tobacco: Never Used    Tobacco comment: quit smoking in     Substance Use Topics    Alcohol use: No    Drug use: Never     Medications  Current Outpatient Medications on File Prior to Visit   Medication Sig Dispense Refill    polyethylene glycol (GLYCOLAX) powder Take 17 g by mouth daily as needed      dicyclomine (BENTYL) 20 MG tablet Take 20 mg by mouth 3 times daily (with meals)      KLOR-CON M10 10 MEQ extended release tablet TAKE 1 TABLET BY MOUTH EVERY DAY WITH FOOD 90 tablet 1    galantamine (RAZADYNE ER) 24 MG extended release capsule TAKE 1 CAPSULE BY MOUTH EVERY DAY 90 capsule 1    montelukast (SINGULAIR) 10 MG tablet TAKE 1 TABLET BY MOUTH EVERY DAY AT NIGHT 90 tablet 3    terazosin (HYTRIN) 10 MG capsule TAKE ONE CAPSULE BY MOUTH NIGHTLY 90 capsule 3    desmopressin (DDAVP) 0.2 MG tablet TAKE 1 TABLET BY MOUTH EVERYDAY AT BEDTIME 90 tablet 1    atenolol (TENORMIN) 25 MG tablet TAKE 0.5 TABLETS BY MOUTH DAILY 45 tablet 2    ACCU-CHEK SOFTCLIX LANCETS MISC 1 each by Does not apply route daily 100 each 1    blood glucose test strips (ACCU-CHEK ZEENAT PLUS) strip 1 each by In Vitro route daily as needed (symptoms of low or high blood sugar) 100 strip 2    latanoprost (XALATAN) 0.005 % ophthalmic solution Place 1 drop into the left eye nightly       mirtazapine (REMERON) 7.5 MG tablet Take 1 tablet by mouth nightly 90 tablet 1    omeprazole (PRILOSEC) 20 MG delayed release capsule Take 1 capsule by mouth every morning (before breakfast) 90 capsule 1    fluticasone (FLONASE) 50 MCG/ACT nasal spray USE 2 SPRAYS BY NASAL ROUTE DAILY AS NEEDED FOR RHINITIS 1 Bottle 1    metFORMIN 10/09/19 (!) 163/76   10/01/19 (!) 185/89     BMI  There is no height or weight on file to calculate BMI. Estimated body mass index is 20.67 kg/m² as calculated from the following:    Height as of 1/20/20: 6' 2\" (1.88 m). Weight as of 1/20/20: 161 lb (73 kg). CBC   Lab Results   Component Value Date    WBC 5.2 09/05/2019    RBC 3.79 09/05/2019    HGB 10.8 09/05/2019    HCT 32.7 09/05/2019    MCV 86.2 09/05/2019    RDW 13.3 09/05/2019     09/05/2019     CMP    Lab Results   Component Value Date     09/05/2019    K 4.5 09/05/2019    K 3.9 09/10/2018     09/05/2019    CO2 24 09/05/2019    BUN 30 09/05/2019    CREATININE 1.4 10/15/2019    CREATININE 1.4 09/05/2019    GFRAA 59 10/15/2019    GFRAA >60 01/08/2013    AGRATIO 1.5 09/05/2019    LABGLOM 49 10/15/2019    GLUCOSE 122 09/05/2019    PROT 7.4 09/05/2019    PROT 7.2 01/08/2013    CALCIUM 9.8 09/05/2019    BILITOT 0.3 09/05/2019    ALKPHOS 58 09/05/2019    AST 18 09/05/2019    ALT 9 09/05/2019     BMP    Lab Results   Component Value Date     09/05/2019    K 4.5 09/05/2019    K 3.9 09/10/2018     09/05/2019    CO2 24 09/05/2019    BUN 30 09/05/2019    CREATININE 1.4 10/15/2019    CREATININE 1.4 09/05/2019    CALCIUM 9.8 09/05/2019    GFRAA 59 10/15/2019    GFRAA >60 01/08/2013    LABGLOM 49 10/15/2019    GLUCOSE 122 09/05/2019     POCGlucose  No results for input(s): GLUCOSE in the last 72 hours.    Coags    Lab Results   Component Value Date    PROTIME 12.4 11/01/2017    INR 1.10 11/01/2017    APTT 28.2 53/13/6818     HCG (If Applicable) No results found for: PREGTESTUR, PREGSERUM, HCG, HCGQUANT   ABGs   Lab Results   Component Value Date    PHART 7.43 11/07/2017    PO2ART 81.0 11/07/2017    VTU0PJF 38 11/07/2017    EPV3IJK 25 11/07/2017    BEART 1.1 11/07/2017    M6TVDENQ 97 11/07/2017      Type & Screen (If Applicable)  Lab Results   Component Value Date    LABABO O 09/13/2012    LABRH Negative 09/13/2012

## 2020-01-21 NOTE — PROGRESS NOTES
To pacu. Dr. Iona Lloyd here. Patient here for elevated BP. Dr. Bladimir Duenas aware. Patient denies pain/nausea.

## 2020-01-21 NOTE — PROGRESS NOTES
Discharge instructions given to pt and wife. Told pt to call surgeon tomorrow and ask when pt can restart Aspirin and if he needs antibiotic ointment. Told pt to call his PCP about his elevated BP. Pt and wife both express an understanding of instructions. Wheeled pt to wife's car.

## 2020-01-21 NOTE — H&P
COLONOSCOPY   June 16, 2011    COLONOSCOPY N/A 10/1/2019     COLONOSCOPY DIAGNOSTIC performed by Missy Simmons MD at [address], COLON, DIAGNOSTIC        HIP SURGERY Left June 23, 2015     Dr. Darcie Baker - incision and drainage of left hip infected hematoma     JOINT REPLACEMENT Left       hip    OTHER SURGICAL HISTORY   11/06/2017      ESOPHAGOGASTRODUODENOSCOPY                OTHER SURGICAL HISTORY   11/06/2017      LAPAROSCOPIC HELLER MYOTOMY [address], EGD    PROSTATE BIOPSY   November 2012     Dr. Ike Lorenz   May 8, 2001     TURP    TOTAL HIP ARTHROPLASTY Left May 17, 2015     Dr. Ale Faustin   November 7, 2011    UPPER GASTROINTESTINAL ENDOSCOPY   February 4, 2016     Dr. Sherren Guess N/A 04/15/2016     Esophagogastroduodenoscopy with Botulinum toxin injection of the lower esophageal sphincter (LES)    UPPER GASTROINTESTINAL ENDOSCOPY   04/25/2017     dilatation; and botox injection 4 units    UPPER GASTROINTESTINAL ENDOSCOPY N/A 6/14/2019     EGD SUBMUCOSAL/BOTOX INJECTION performed by Missy Simmons MD at [address] N/A 6/14/2019     EGD DILATION BALLOON performed by Missy Simmons MD at [address]         Allergy:         Allergies   Allergen Reactions    Lorazepam         Neither he nor wife remember a problem      SHx:   Social History               Socioeconomic History    Marital status:        Spouse name:  Ruben Case Marysol Borja)    Number of children: 4    Years of education: Not on file    Highest education level: Not on file   Occupational History    Occupation: retired - January 2000   [place]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   Social Needs    Financial resource strain: Not on file    Food insecurity:       Worry: Not on file       Inability: Not on file   Ebony Adames mirtazapine (REMERON) 7.5 MG tablet Take 1 tablet by mouth nightly 90 tablet 1    omeprazole (PRILOSEC) 20 MG delayed release capsule Take 1 capsule by mouth every morning (before breakfast) 90 capsule 1    fluticasone (FLONASE) 50 MCG/ACT nasal spray USE 2 SPRAYS BY NASAL ROUTE DAILY AS NEEDED FOR RHINITIS 1 Bottle 1    galantamine (RAZADYNE ER) 24 MG extended release capsule TAKE 1 CAPSULE BY MOUTH EVERY DAY 90 capsule 0    metFORMIN (GLUCOPHAGE) 500 MG tablet TAKE 1 TABLET EVERY DAY WITH FOOD 90 tablet 3    losartan (COZAAR) 100 MG tablet TAKE 1 TABLET BY MOUTH EVERY DAY 90 tablet 3    fluticasone-salmeterol (ADVAIR HFA) 115-21 MCG/ACT inhaler Inhale 2 puffs into the lungs 2 times daily (Patient not taking: Reported on 9/30/2019) 1 Inhaler 3    Spacer/Aero-Holding Chambers (AEROCHAMBER MV) MISC Use with inhaler as directed 1 each 0    KLOR-CON M10 10 MEQ extended release tablet TAKE 1 TABLET BY MOUTH EVERY DAY WITH FOOD 30 tablet 5    fluticasone-vilanterol (BREO ELLIPTA) 100-25 MCG/INH AEPB inhaler Inhale 1 puff into the lungs daily 1 each 5    benzonatate (TESSALON) 100 MG capsule TAKE 1 CAPSULE BY MOUTH THREE TIMES A DAY AS NEEDED FOR COUGH 42 capsule 1    aspirin 325 MG EC tablet TAKE 1 TABLET BY MOUTH DAILY WITH FOOD 30 tablet 11    oxybutynin (DITROPAN XL) 15 MG extended release tablet Take 1 tablet by mouth 2 times daily 60 tablet 3    melatonin ([employer]S MELATONIN) 3 MG TABS tablet Take 1 tablet by mouth nightly 30 tablet 5    clotrimazole (LOTRIMIN) 1 % cream Apply topically 2 times daily.  1 Tube 2    atorvastatin (LIPITOR) 10 MG tablet TAKE 1 TABLET BY MOUTH DAILY 90 tablet 3    albuterol sulfate HFA (PROAIR HFA) 108 (90 Base) MCG/ACT inhaler Inhale 2 puffs into the lungs every 6 hours as needed for Wheezing (Patient not taking: Reported on 9/30/2019) 1 Inhaler 3    montelukast (SINGULAIR) 10 MG tablet TAKE 1 TABLET BY MOUTH NIGHTLY 90 tablet 3    terazosin (HYTRIN) 10 MG capsule TAKE ONE

## 2020-01-22 ENCOUNTER — TELEPHONE (OUTPATIENT)
Dept: SURGERY | Age: 85
End: 2020-01-22

## 2020-01-22 LAB
EKG ATRIAL RATE: 77 BPM
EKG DIAGNOSIS: NORMAL
EKG P AXIS: 81 DEGREES
EKG P-R INTERVAL: 214 MS
EKG Q-T INTERVAL: 378 MS
EKG QRS DURATION: 86 MS
EKG QTC CALCULATION (BAZETT): 427 MS
EKG R AXIS: 48 DEGREES
EKG T AXIS: 57 DEGREES
EKG VENTRICULAR RATE: 77 BPM

## 2020-01-22 PROCEDURE — 93010 ELECTROCARDIOGRAM REPORT: CPT | Performed by: INTERNAL MEDICINE

## 2020-01-23 ENCOUNTER — TELEPHONE (OUTPATIENT)
Dept: SURGERY | Age: 85
End: 2020-01-23

## 2020-01-23 RX ORDER — OMEPRAZOLE MAGNESIUM 20 MG
CAPSULE,DELAYED RELEASE (ENTERIC COATED) ORAL
Qty: 90 TABLET | Refills: 1 | Status: SHIPPED | OUTPATIENT
Start: 2020-01-23 | End: 2020-07-24

## 2020-01-29 ENCOUNTER — OFFICE VISIT (OUTPATIENT)
Dept: SURGERY | Age: 85
End: 2020-01-29

## 2020-01-29 VITALS
BODY MASS INDEX: 19.62 KG/M2 | RESPIRATION RATE: 16 BRPM | WEIGHT: 157.8 LBS | TEMPERATURE: 98 F | SYSTOLIC BLOOD PRESSURE: 164 MMHG | HEIGHT: 75 IN | HEART RATE: 67 BPM | DIASTOLIC BLOOD PRESSURE: 76 MMHG

## 2020-01-29 PROCEDURE — 99024 POSTOP FOLLOW-UP VISIT: CPT | Performed by: SURGERY

## 2020-02-06 RX ORDER — ATORVASTATIN CALCIUM 10 MG/1
TABLET, FILM COATED ORAL
Qty: 90 TABLET | Refills: 3 | Status: SHIPPED | OUTPATIENT
Start: 2020-02-06 | End: 2022-01-13 | Stop reason: SDUPTHER

## 2020-02-17 RX ORDER — MIRTAZAPINE 7.5 MG/1
TABLET, FILM COATED ORAL
Qty: 90 TABLET | Refills: 1 | Status: SHIPPED | OUTPATIENT
Start: 2020-02-17 | End: 2020-08-06

## 2020-02-24 RX ORDER — OMEPRAZOLE 20 MG/1
CAPSULE, DELAYED RELEASE ORAL
Qty: 90 CAPSULE | Refills: 1 | Status: SHIPPED | OUTPATIENT
Start: 2020-02-24 | End: 2020-07-14

## 2020-03-04 ENCOUNTER — OFFICE VISIT (OUTPATIENT)
Dept: SURGERY | Age: 85
End: 2020-03-04

## 2020-03-04 VITALS
TEMPERATURE: 97 F | BODY MASS INDEX: 19 KG/M2 | DIASTOLIC BLOOD PRESSURE: 79 MMHG | OXYGEN SATURATION: 100 % | HEIGHT: 75 IN | WEIGHT: 152.8 LBS | HEART RATE: 74 BPM | SYSTOLIC BLOOD PRESSURE: 169 MMHG

## 2020-03-04 PROCEDURE — 99024 POSTOP FOLLOW-UP VISIT: CPT | Performed by: SURGERY

## 2020-03-10 ENCOUNTER — OFFICE VISIT (OUTPATIENT)
Dept: INTERNAL MEDICINE CLINIC | Age: 85
End: 2020-03-10
Payer: MEDICARE

## 2020-03-10 VITALS
OXYGEN SATURATION: 98 % | DIASTOLIC BLOOD PRESSURE: 68 MMHG | HEART RATE: 60 BPM | SYSTOLIC BLOOD PRESSURE: 136 MMHG | BODY MASS INDEX: 19.67 KG/M2 | WEIGHT: 158.2 LBS | HEIGHT: 75 IN

## 2020-03-10 DIAGNOSIS — I25.9 ISCHEMIC HEART DISEASE: Chronic | ICD-10-CM

## 2020-03-10 DIAGNOSIS — E78.5 HYPERLIPIDEMIA, UNSPECIFIED HYPERLIPIDEMIA TYPE: Chronic | ICD-10-CM

## 2020-03-10 DIAGNOSIS — I10 ESSENTIAL HYPERTENSION: Chronic | ICD-10-CM

## 2020-03-10 DIAGNOSIS — E11.9 TYPE 2 DIABETES MELLITUS WITHOUT COMPLICATION, WITHOUT LONG-TERM CURRENT USE OF INSULIN (HCC): Chronic | ICD-10-CM

## 2020-03-10 LAB
A/G RATIO: 1.3 (ref 1.1–2.2)
ALBUMIN SERPL-MCNC: 3.8 G/DL (ref 3.4–5)
ALP BLD-CCNC: 75 U/L (ref 40–129)
ALT SERPL-CCNC: 19 U/L (ref 10–40)
ANION GAP SERPL CALCULATED.3IONS-SCNC: 12 MMOL/L (ref 3–16)
AST SERPL-CCNC: 25 U/L (ref 15–37)
BASOPHILS ABSOLUTE: 0 K/UL (ref 0–0.2)
BASOPHILS RELATIVE PERCENT: 0.7 %
BILIRUB SERPL-MCNC: <0.2 MG/DL (ref 0–1)
BUN BLDV-MCNC: 22 MG/DL (ref 7–20)
CALCIUM SERPL-MCNC: 9.1 MG/DL (ref 8.3–10.6)
CHLORIDE BLD-SCNC: 103 MMOL/L (ref 99–110)
CHOLESTEROL, TOTAL: 109 MG/DL (ref 0–199)
CO2: 25 MMOL/L (ref 21–32)
CREAT SERPL-MCNC: 1.3 MG/DL (ref 0.8–1.3)
EOSINOPHILS ABSOLUTE: 0.1 K/UL (ref 0–0.6)
EOSINOPHILS RELATIVE PERCENT: 3.2 %
GFR AFRICAN AMERICAN: >60
GFR NON-AFRICAN AMERICAN: 53
GLOBULIN: 2.9 G/DL
GLUCOSE BLD-MCNC: 102 MG/DL (ref 70–99)
HCT VFR BLD CALC: 30.6 % (ref 40.5–52.5)
HDLC SERPL-MCNC: 62 MG/DL (ref 40–60)
HEMOGLOBIN: 10.1 G/DL (ref 13.5–17.5)
LDL CHOLESTEROL CALCULATED: 39 MG/DL
LYMPHOCYTES ABSOLUTE: 0.7 K/UL (ref 1–5.1)
LYMPHOCYTES RELATIVE PERCENT: 16.9 %
MCH RBC QN AUTO: 28.5 PG (ref 26–34)
MCHC RBC AUTO-ENTMCNC: 33 G/DL (ref 31–36)
MCV RBC AUTO: 86.3 FL (ref 80–100)
MONOCYTES ABSOLUTE: 0.5 K/UL (ref 0–1.3)
MONOCYTES RELATIVE PERCENT: 12.6 %
NEUTROPHILS ABSOLUTE: 2.9 K/UL (ref 1.7–7.7)
NEUTROPHILS RELATIVE PERCENT: 66.6 %
PDW BLD-RTO: 12.4 % (ref 12.4–15.4)
PLATELET # BLD: 165 K/UL (ref 135–450)
PMV BLD AUTO: 10.5 FL (ref 5–10.5)
POTASSIUM SERPL-SCNC: 4.8 MMOL/L (ref 3.5–5.1)
RBC # BLD: 3.54 M/UL (ref 4.2–5.9)
SODIUM BLD-SCNC: 140 MMOL/L (ref 136–145)
TOTAL PROTEIN: 6.7 G/DL (ref 6.4–8.2)
TRIGL SERPL-MCNC: 39 MG/DL (ref 0–150)
VLDLC SERPL CALC-MCNC: 8 MG/DL
WBC # BLD: 4.3 K/UL (ref 4–11)

## 2020-03-10 PROCEDURE — G8482 FLU IMMUNIZE ORDER/ADMIN: HCPCS | Performed by: INTERNAL MEDICINE

## 2020-03-10 PROCEDURE — 4040F PNEUMOC VAC/ADMIN/RCVD: CPT | Performed by: INTERNAL MEDICINE

## 2020-03-10 PROCEDURE — G8427 DOCREV CUR MEDS BY ELIG CLIN: HCPCS | Performed by: INTERNAL MEDICINE

## 2020-03-10 PROCEDURE — 99214 OFFICE O/P EST MOD 30 MIN: CPT | Performed by: INTERNAL MEDICINE

## 2020-03-10 PROCEDURE — 1036F TOBACCO NON-USER: CPT | Performed by: INTERNAL MEDICINE

## 2020-03-10 PROCEDURE — 1123F ACP DISCUSS/DSCN MKR DOCD: CPT | Performed by: INTERNAL MEDICINE

## 2020-03-10 PROCEDURE — G8420 CALC BMI NORM PARAMETERS: HCPCS | Performed by: INTERNAL MEDICINE

## 2020-03-10 RX ORDER — MEGESTROL ACETATE 40 MG/1
40 TABLET ORAL DAILY
Qty: 30 TABLET | Refills: 3 | Status: SHIPPED | OUTPATIENT
Start: 2020-03-10 | End: 2020-08-19

## 2020-03-10 ASSESSMENT — ENCOUNTER SYMPTOMS
SHORTNESS OF BREATH: 0
NAUSEA: 0
CHEST TIGHTNESS: 0
VOMITING: 0
ABDOMINAL PAIN: 0

## 2020-03-10 NOTE — PROGRESS NOTES
Hyperlipidemia 1/10/2011    Hyperphosphatemia 7/3/2010    Hypertension 5/16/2010    Insomnia 6/16/2015    Memory loss 1/14/2015    Nausea 6/16/2015    Overactive bladder 7/12/2011    Paranoid schizophrenia ([facility] 5/16/2010    Perennial allergic rhinitis 11/30/2016    Prostate cancer ([facility] 1/8/2013    had radiation treatments    Type 2 diabetes mellitus without complication, without long-term current use of insulin ([facility] 8/31/2016       Social History:   TOBACCO:   reports that he quit smoking about 29 years ago. His smoking use included cigarettes. He started smoking about 47 years ago. He has a 18.00 pack-year smoking history. He has never used smokeless tobacco.    ETOH:   reports no history of alcohol use. Current Medications:    Prior to Admission medications    Medication Sig Start Date End Date Taking?  Authorizing Provider   megestrol (MEGACE) 40 MG tablet Take 1 tablet by mouth daily 3/10/20  Yes Connie Luevano MD   omeprazole (PRILOSEC) 20 MG delayed release capsule TAKE 1 CAPSULE BY MOUTH EVERY DAY IN THE MORNING BEFORE BREAKFAST 2/24/20  Yes Connie Luevano MD   mirtazapine (REMERON) 7.5 MG tablet TAKE 1 TABLET BY MOUTH EVERY DAY AT NIGHT 2/17/20  Yes Connie Luevano MD   atorvastatin (LIPITOR) 10 MG tablet TAKE 1 TABLET BY MOUTH EVERY DAY 2/6/20  Yes Connie Luevano MD   [facility] MELATONIN 3 MG TABS tablet TAKE 1 TABLET BY MOUTH EVERY DAY AT NIGHT 1/23/20  Yes Connie Luevano MD   polyethylene glycol (GLYCOLAX) powder Take 17 g by mouth daily as needed   Yes Historical Provider, MD   dicyclomine (BENTYL) 20 MG tablet Take 20 mg by mouth 3 times daily (with meals)   Yes Historical Provider, MD   KLOR-CON M10 10 MEQ extended release tablet TAKE 1 TABLET BY MOUTH EVERY DAY WITH FOOD 12/10/19  Yes Connie Luevano MD   galantamine (RAZADYNE ER) 24 MG extended release capsule TAKE 1 CAPSULE BY MOUTH EVERY DAY 10/30/19  Yes Connie Luevano MD   montelukast sulfate HFA (PROAIR HFA) 108 (90 Base) MCG/ACT inhaler Inhale 2 puffs into the lungs every 6 hours as needed for Wheezing 1/2/19  Yes Nancy Sanchez MD   OXcarbazepine (TRILEPTAL) 300 MG tablet TAKE 1 TABLET BY MOUTH 2 TIMES DAILY 8/14/18  Yes Nancy Sanchez MD   ondansetron (ZOFRAN) 4 MG tablet Take 1 tablet by mouth daily as needed for Nausea 6/16/17  Yes Ramiro Florez MD   ferrous sulfate 325 (65 FE) MG tablet Take 1 tablet by mouth 2 times daily (with meals) 8/31/16  Yes Ramiro Florez MD   Multiple Vitamins-Minerals (CENTRUM SILVER ULTRA MENS) TABS Take 1 tablet by mouth daily 6/16/15  Yes Ramiro Florez MD   nitroGLYCERIN (NITROSTAT) 0.4 MG SL tablet Place 1 tablet under the tongue every 5 minutes as needed. 1/9/12  Yes Ramiro Florez MD       REVIEW OF SYSTEMS:  Review of Systems   Constitutional: Negative for activity change, appetite change, chills, fever and unexpected weight change. HENT: Negative for hearing loss. Eyes: Negative for visual disturbance. Respiratory: Negative for chest tightness and shortness of breath. Cardiovascular: Negative for chest pain. Gastrointestinal: Negative for abdominal pain, nausea and vomiting. Genitourinary: Negative for hematuria. Skin: Negative for rash. Allergic/Immunologic: Negative for immunocompromised state. Neurological: Negative for dizziness and headaches. Psychiatric/Behavioral: Negative for dysphoric mood and suicidal ideas. Physical Exam:      Vitals: /68   Pulse 60   Ht 6' 2.5\" (1.892 m)   Wt 158 lb 3.2 oz (71.8 kg)   SpO2 98%   BMI 20.04 kg/m²     Body mass index is 20.04 kg/m². Wt Readings from Last 3 Encounters:   03/10/20 158 lb 3.2 oz (71.8 kg)   03/04/20 152 lb 12.8 oz (69.3 kg)   01/29/20 157 lb 12.8 oz (71.6 kg)     Physical Exam  Constitutional:       General: He is not in acute distress. Appearance: He is well-developed. He is not diaphoretic. HENT:      Head: Normocephalic and atraumatic. Mouth/Throat:      Pharynx: No oropharyngeal exudate. Eyes:      General: No scleral icterus. Right eye: No discharge. Left eye: No discharge. Conjunctiva/sclera: Conjunctivae normal.   Neck:      Musculoskeletal: Normal range of motion and neck supple. Cardiovascular:      Rate and Rhythm: Normal rate. Heart sounds: Normal heart sounds. No murmur. Pulmonary:      Effort: Pulmonary effort is normal. No respiratory distress. Breath sounds: Normal breath sounds. No wheezing or rales. Abdominal:      General: There is no distension. Palpations: Abdomen is soft. Tenderness: There is no abdominal tenderness. Musculoskeletal:         General: No deformity. Lymphadenopathy:      Head:      Right side of head: No submental or submandibular adenopathy. Left side of head: No submental or submandibular adenopathy. Cervical: No cervical adenopathy. Right cervical: No superficial or deep cervical adenopathy. Left cervical: No superficial or deep cervical adenopathy. Skin:     Findings: No rash. Neurological:      Mental Status: He is alert and oriented to person, place, and time. GCS: GCS eye subscore is 4. GCS verbal subscore is 5. GCS motor subscore is 6. Motor: No abnormal muscle tone. Deep Tendon Reflexes: Reflexes are normal and symmetric. Psychiatric:         Behavior: Behavior normal.         Thought Content: Thought content normal.        Assessment/Plan:   Ludwig Syed was seen today for hypertension. Diagnoses and all orders for this visit:    Type 2 diabetes mellitus without complication, without long-term current use of insulin (HCC)  Well controlled- no changes in medication today. Told patient that he can stop deformity is not changed. Also going to does not need to check his glucose levels so frequently he wants to keep checking a few times a week and I suggested to at least reduce to once a week.   Recheck labs  - CBC Auto Differential; Future  -     Comprehensive Metabolic Panel; Future  -     Hemoglobin A1C; Future  -     Lipid Panel; Future    Essential hypertension  Well controlled- no changes in medication today. Monitor blood pressure  -     CBC Auto Differential; Future  -     Comprehensive Metabolic Panel; Future  -     Lipid Panel; Future    Hyperlipidemia, unspecified hyperlipidemia type  Well controlled- no changes in medication today. Letter lipids  -     Lipid Panel; Future    Ischemic heart disease  No new symptoms keep current medications  -     CBC Auto Differential; Future  -     Comprehensive Metabolic Panel; Future  -     Lipid Panel; Future    Prostate cancer ([facility]    Appetite impaired  Trial of Megace for his appetite. He does have mild chronic anemia so far work-up is negative  Recheck labs  -     megestrol (MEGACE) 40 MG tablet; Take 1 tablet by mouth daily    - Patient was encouraged to call the office (and ask to see me) or be seen by other provider for any worsening or lack of improvement of the symptoms within a few days / weeks. - Pt was asked toschedule an appointment in 4 months, and to let me know of any scheduling difficulties. Additional patients instructions (if given): There are no Patient Instructions on file for this visit.     Esteban Cruz M.D.   3/10/2020, 1:59 PM

## 2020-03-11 ENCOUNTER — TELEPHONE (OUTPATIENT)
Dept: ORTHOPEDIC SURGERY | Age: 85
End: 2020-03-11

## 2020-03-11 LAB
ESTIMATED AVERAGE GLUCOSE: 128.4 MG/DL
HBA1C MFR BLD: 6.1 %

## 2020-03-12 RX ORDER — MULTIVITAMIN WITH FOLIC ACID 400 MCG
TABLET ORAL
Qty: 90 TABLET | Refills: 1 | Status: SHIPPED | OUTPATIENT
Start: 2020-03-12 | End: 2020-08-07

## 2020-03-14 RX ORDER — CEFUROXIME AXETIL 500 MG/1
TABLET ORAL
Qty: 90 TABLET | Refills: 5 | Status: SHIPPED | OUTPATIENT
Start: 2020-03-14 | End: 2020-08-19

## 2020-03-24 RX ORDER — DESMOPRESSIN ACETATE 0.2 MG/1
TABLET ORAL
Qty: 90 TABLET | Refills: 1 | Status: SHIPPED | OUTPATIENT
Start: 2020-03-24 | End: 2020-08-19

## 2020-03-31 RX ORDER — GALANTAMINE HYDROBROMIDE 24 MG/1
CAPSULE, EXTENDED RELEASE ORAL
Qty: 90 CAPSULE | Refills: 1 | Status: SHIPPED | OUTPATIENT
Start: 2020-03-31 | End: 2020-08-31

## 2020-05-04 RX ORDER — FLUTICASONE FUROATE AND VILANTEROL TRIFENATATE 100; 25 UG/1; UG/1
POWDER RESPIRATORY (INHALATION)
Qty: 1 EACH | Refills: 11 | Status: SHIPPED | OUTPATIENT
Start: 2020-05-04 | End: 2022-03-04 | Stop reason: SDUPTHER

## 2020-05-11 RX ORDER — ATENOLOL 25 MG/1
TABLET ORAL
Qty: 45 TABLET | Refills: 2 | Status: SHIPPED | OUTPATIENT
Start: 2020-05-11 | End: 2020-08-19

## 2020-05-11 RX ORDER — CLOTRIMAZOLE 1 %
CREAM (GRAM) TOPICAL
Qty: 30 G | Refills: 2 | Status: SHIPPED | OUTPATIENT
Start: 2020-05-11 | End: 2020-08-19

## 2020-05-14 ENCOUNTER — TELEPHONE (OUTPATIENT)
Dept: INTERNAL MEDICINE CLINIC | Age: 85
End: 2020-05-14

## 2020-05-14 RX ORDER — AMLODIPINE BESYLATE 5 MG/1
5 TABLET ORAL DAILY
Qty: 90 TABLET | Refills: 1 | Status: SHIPPED | OUTPATIENT
Start: 2020-05-14 | End: 2020-07-20

## 2020-05-14 NOTE — TELEPHONE ENCOUNTER
How is the Heart rate?  If more than 70, increase Atneolol to 25 mg daily and keep checking BP daily for 1 week and send me the results

## 2020-05-20 ENCOUNTER — TELEPHONE (OUTPATIENT)
Dept: INTERNAL MEDICINE CLINIC | Age: 85
End: 2020-05-20

## 2020-05-20 RX ORDER — LEVETIRACETAM 500 MG/1
500 TABLET ORAL 2 TIMES DAILY
Qty: 60 TABLET | Refills: 3 | Status: SHIPPED | OUTPATIENT
Start: 2020-05-20 | End: 2020-08-06

## 2020-05-20 NOTE — TELEPHONE ENCOUNTER
Julio Cesar Lyle , physical therapist,  called stating patient had a seizure today, after they gave him a bunch of orange juice, his blood sugar was 174. but they did give him 3-4 glasses of  Orange juice. Apparently, he has not been eating well, but he instructed him to eat 3 square meals a day. He believes this is way the patient is having these reoccuring seizures.      Please call with questions

## 2020-05-21 ENCOUNTER — TELEPHONE (OUTPATIENT)
Dept: INTERNAL MEDICINE CLINIC | Age: 85
End: 2020-05-21

## 2020-05-21 RX ORDER — BENZONATATE 100 MG/1
CAPSULE ORAL
Qty: 30 CAPSULE | Refills: 2 | Status: SHIPPED | OUTPATIENT
Start: 2020-05-21 | End: 2020-08-19

## 2020-06-13 RX ORDER — POTASSIUM CHLORIDE 750 MG/1
TABLET, EXTENDED RELEASE ORAL
Qty: 90 TABLET | Refills: 1 | Status: SHIPPED | OUTPATIENT
Start: 2020-06-13 | End: 2020-09-10

## 2020-06-22 RX ORDER — LOSARTAN POTASSIUM 100 MG/1
TABLET ORAL
Qty: 90 TABLET | Refills: 3 | Status: SHIPPED | OUTPATIENT
Start: 2020-06-22 | End: 2020-08-19

## 2020-07-01 ENCOUNTER — TELEPHONE (OUTPATIENT)
Dept: INTERNAL MEDICINE CLINIC | Age: 85
End: 2020-07-01

## 2020-07-08 ENCOUNTER — TELEPHONE (OUTPATIENT)
Dept: INTERNAL MEDICINE CLINIC | Age: 85
End: 2020-07-08

## 2020-07-14 RX ORDER — OMEPRAZOLE 20 MG/1
CAPSULE, DELAYED RELEASE ORAL
Qty: 90 CAPSULE | Refills: 0 | Status: SHIPPED | OUTPATIENT
Start: 2020-07-14 | End: 2020-08-19

## 2020-07-16 ENCOUNTER — TELEPHONE (OUTPATIENT)
Dept: INTERNAL MEDICINE CLINIC | Age: 85
End: 2020-07-16

## 2020-07-16 NOTE — TELEPHONE ENCOUNTER
Patient saw the foot doctor and was advised to see Dr. Marnia Ashford as soon as possible. Please call and advise.

## 2020-07-17 ENCOUNTER — TELEPHONE (OUTPATIENT)
Dept: PRIMARY CARE CLINIC | Age: 85
End: 2020-07-17

## 2020-07-17 ENCOUNTER — TELEPHONE (OUTPATIENT)
Dept: INTERNAL MEDICINE CLINIC | Age: 85
End: 2020-07-17

## 2020-07-17 NOTE — TELEPHONE ENCOUNTER
States sister is at her wits end unable to manage Mr Pratik Amaro her took keys and doing varies things in the home unsafe and afraid for her sister being unable to mange him I told her to call senior behavior health at [facility] she can also go through ER call 911 to transport       [address] [place] [address]   Call      [address], [address] [place] [address]   Call

## 2020-07-17 NOTE — TELEPHONE ENCOUNTER
Jeanine calling on behalf of family. Recommend she contact [phone] [employer] helpline to speak with an free eldercare  to get decision making loyd and do long term planning for pt and his spouse.

## 2020-07-20 ENCOUNTER — OFFICE VISIT (OUTPATIENT)
Dept: INTERNAL MEDICINE CLINIC | Age: 85
End: 2020-07-20
Payer: MEDICARE

## 2020-07-20 VITALS
SYSTOLIC BLOOD PRESSURE: 138 MMHG | WEIGHT: 150 LBS | TEMPERATURE: 98.4 F | DIASTOLIC BLOOD PRESSURE: 70 MMHG | HEIGHT: 75 IN | BODY MASS INDEX: 18.65 KG/M2

## 2020-07-20 DIAGNOSIS — R60.0 LEG EDEMA, RIGHT: ICD-10-CM

## 2020-07-20 DIAGNOSIS — I10 ESSENTIAL HYPERTENSION: Chronic | ICD-10-CM

## 2020-07-20 DIAGNOSIS — E11.9 TYPE 2 DIABETES MELLITUS WITHOUT COMPLICATION, WITHOUT LONG-TERM CURRENT USE OF INSULIN (HCC): Chronic | ICD-10-CM

## 2020-07-20 DIAGNOSIS — I25.9 ISCHEMIC HEART DISEASE: Chronic | ICD-10-CM

## 2020-07-20 LAB
A/G RATIO: 1.4 (ref 1.1–2.2)
ALBUMIN SERPL-MCNC: 3.8 G/DL (ref 3.4–5)
ALP BLD-CCNC: 76 U/L (ref 40–129)
ALT SERPL-CCNC: 18 U/L (ref 10–40)
ANION GAP SERPL CALCULATED.3IONS-SCNC: 11 MMOL/L (ref 3–16)
AST SERPL-CCNC: 26 U/L (ref 15–37)
BASOPHILS ABSOLUTE: 0.1 K/UL (ref 0–0.2)
BASOPHILS RELATIVE PERCENT: 0.9 %
BILIRUB SERPL-MCNC: 0.3 MG/DL (ref 0–1)
BUN BLDV-MCNC: 25 MG/DL (ref 7–20)
CALCIUM SERPL-MCNC: 8.5 MG/DL (ref 8.3–10.6)
CHLORIDE BLD-SCNC: 104 MMOL/L (ref 99–110)
CO2: 23 MMOL/L (ref 21–32)
CREAT SERPL-MCNC: 1.4 MG/DL (ref 0.8–1.3)
D DIMER: 719 NG/ML DDU (ref 0–229)
EOSINOPHILS ABSOLUTE: 0.2 K/UL (ref 0–0.6)
EOSINOPHILS RELATIVE PERCENT: 3 %
GFR AFRICAN AMERICAN: 59
GFR NON-AFRICAN AMERICAN: 48
GLOBULIN: 2.8 G/DL
GLUCOSE BLD-MCNC: 102 MG/DL (ref 70–99)
HCT VFR BLD CALC: 28.3 % (ref 40.5–52.5)
HEMOGLOBIN: 9.2 G/DL (ref 13.5–17.5)
LYMPHOCYTES ABSOLUTE: 0.7 K/UL (ref 1–5.1)
LYMPHOCYTES RELATIVE PERCENT: 10.5 %
MCH RBC QN AUTO: 28.4 PG (ref 26–34)
MCHC RBC AUTO-ENTMCNC: 32.6 G/DL (ref 31–36)
MCV RBC AUTO: 86.9 FL (ref 80–100)
MONOCYTES ABSOLUTE: 0.7 K/UL (ref 0–1.3)
MONOCYTES RELATIVE PERCENT: 11.5 %
NEUTROPHILS ABSOLUTE: 4.7 K/UL (ref 1.7–7.7)
NEUTROPHILS RELATIVE PERCENT: 74.1 %
PDW BLD-RTO: 13.3 % (ref 12.4–15.4)
PLATELET # BLD: 170 K/UL (ref 135–450)
PMV BLD AUTO: 10.8 FL (ref 5–10.5)
POTASSIUM SERPL-SCNC: 4.1 MMOL/L (ref 3.5–5.1)
RBC # BLD: 3.25 M/UL (ref 4.2–5.9)
SODIUM BLD-SCNC: 138 MMOL/L (ref 136–145)
TOTAL PROTEIN: 6.6 G/DL (ref 6.4–8.2)
WBC # BLD: 6.3 K/UL (ref 4–11)

## 2020-07-20 PROCEDURE — G8427 DOCREV CUR MEDS BY ELIG CLIN: HCPCS | Performed by: INTERNAL MEDICINE

## 2020-07-20 PROCEDURE — 4040F PNEUMOC VAC/ADMIN/RCVD: CPT | Performed by: INTERNAL MEDICINE

## 2020-07-20 PROCEDURE — 99214 OFFICE O/P EST MOD 30 MIN: CPT | Performed by: INTERNAL MEDICINE

## 2020-07-20 PROCEDURE — 1036F TOBACCO NON-USER: CPT | Performed by: INTERNAL MEDICINE

## 2020-07-20 PROCEDURE — G8420 CALC BMI NORM PARAMETERS: HCPCS | Performed by: INTERNAL MEDICINE

## 2020-07-20 PROCEDURE — 1123F ACP DISCUSS/DSCN MKR DOCD: CPT | Performed by: INTERNAL MEDICINE

## 2020-07-20 RX ORDER — CHLORTHALIDONE 25 MG/1
25 TABLET ORAL DAILY
Qty: 30 TABLET | Refills: 0 | Status: SHIPPED | OUTPATIENT
Start: 2020-07-20 | End: 2020-08-07

## 2020-07-20 RX ORDER — QUETIAPINE FUMARATE 25 MG/1
25 TABLET, FILM COATED ORAL NIGHTLY
Qty: 30 TABLET | Refills: 3 | Status: SHIPPED | OUTPATIENT
Start: 2020-07-20 | End: 2020-08-19

## 2020-07-20 ASSESSMENT — ENCOUNTER SYMPTOMS
CHEST TIGHTNESS: 0
NAUSEA: 0
VOMITING: 0
SHORTNESS OF BREATH: 0
ABDOMINAL PAIN: 0

## 2020-07-20 NOTE — PATIENT INSTRUCTIONS
Number Of Freeze-Thaw Cycles: 2 freeze-thaw cycles Please call the office (and ask to see me) or be seen by other provider for any worsening or lack of improvement of the symptoms within a few days / weeks. Please check your blood pressure twicea day for one week. send me results. If blood pressure is higher than 150/90 please let me know as soon as possible. Patient Education        chlorthalidone  Pronunciation:  narcisa ASHER i done  What is the most important information I should know about chlorthalidone? You should not use chlorthalidone if you are unable to urinate, or if you are allergic to sulfa drugs. What is chlorthalidone? Chlorthalidone is a thiazide diuretic (water pill) that helps prevent your body from absorbing too much salt, which can cause fluid retention. Chlorthalidone treats fluid retention (edema) in people with congestive heart failure, cirrhosis of the liver, or kidney disorders, or edema caused by taking steroids or estrogen. Chlorthalidone is also used to treat high blood pressure (hypertension). Chlorthalidone may also be used for purposes not listed in this medication guide. What should I discuss with my healthcare provider before taking chlorthalidone? You should not use chlorthalidone if you are allergic to it, or if:  · you are unable to urinate; or  · you are allergic to sulfa drugs. Tell your doctor if you have ever had:  · kidney disease;  · heart failure;  · gout;  · high cholesterol or triglycerides;  · diabetes; or  · if you are on a low-salt diet. Tell your doctor if you are pregnant or plan to become pregnant. Taking chlorthalidone during pregnancy may cause side effects in the  baby, such as jaundice (yellowing of the skin or eyes), bruising or bleeding, low blood sugar, or an electrolyte imbalance. Do not start or stop taking chlorthalidone during pregnancy without your doctor's advice.  Although chlorthalidone may cause side effects in a , having high blood pressure during pregnancy can cause Render Post-Care Instructions In Note?: no Duration Of Freeze Thaw-Cycle (Seconds): 5 fast from a sitting or lying position, or you may feel dizzy. Avoid becoming overheated or dehydrated during exercise, in hot weather, or by not drinking enough fluids. Follow your doctor's instructions about the type and amount of liquids you should drink. In some cases, drinking too much liquid can be as unsafe as not drinking enough. What are the possible side effects of chlorthalidone? Get emergency medical help if you have signs of an allergic reaction: hives; difficult breathing; swelling of your face, lips, tongue, or throat. Call your doctor at once if you have:  · a light-headed feeling, like you might pass out;  · low sodium --headache, confusion, slurred speech, severe weakness, vomiting, loss of coordination, feeling unsteady;  · low potassium --leg cramps, constipation, irregular heartbeats, fluttering in your chest, increased thirst or urination, numbness or tingling, muscle weakness or limp feeling;  · low magnesium --dizziness, irregular heartbeats, feeling jittery, muscle cramps, muscle spasms, cough or choking feeling; or  · kidney problems --little or no urination, swelling in your feet or ankles, feeling tired or short of breath. Common side effects may include:  · low blood pressure (feeling light-headed);  · kidney problems;  · dizziness; or  · an electrolyte imbalance (such as low levels of potassium, sodium, or magnesium in your blood). This is not a complete list of side effects and others may occur. Call your doctor for medical advice about side effects. You may report side effects to FDA at 9-946-FDA-1230. What other drugs will affect chlorthalidone? Using chlorthalidone with other drugs that make you light-headed can worsen this effect. Ask your doctor before using opioid medication, a sleeping pill, a muscle relaxer, or medicine for anxiety or seizures.   Tell your doctor about all your other medicines, especially:  · other blood pressure medications;  · lithium;  · digoxin, Post-Care Instructions: Apply Vaseline frequently. WCI given digitalis;  · insulin or oral diabetes medicine; or  · steroid medicine. This list is not complete. Other drugs may affect chlorthalidone, including prescription and over-the-counter medicines, vitamins, and herbal products. Not all possible drug interactions are listed here. Where can I get more information? Your pharmacist can provide more information about chlorthalidone. Remember, keep this and all other medicines out of the reach of children, never share your medicines with others, and use this medication only for the indication prescribed. Every effort has been made to ensure that the information provided by [address] is accurate, up-to-date, and complete, but no guarantee is made to that effect. Drug information contained herein may be time sensitive. [facility] information has been compiled for use by healthcare practitioners and consumers in the [place] and therefore [facility] does not warrant that uses outside of the [place] are appropriate, unless specifically indicated otherwise. [facility]'s drug information does not endorse drugs, diagnose patients or recommend therapy. [facility] drug information is an informational resource designed to assist licensed healthcare practitioners in caring for their patients and/or to serve consumers viewing this service as a supplement to, and not a substitute for, the expertise, skill, knowledge and judgment of healthcare practitioners. The absence of a warning for a given drug or drug combination in no way should be construed to indicate that the drug or drug combination is safe, effective or appropriate for any given patient. [facility] does not assume any responsibility for any aspect of healthcare administered with the aid of information [facility] provides. The information contained herein is not intended to cover all possible uses, directions, precautions, warnings, drug interactions, allergic reactions, or adverse effects.  If you have questions Aperture Size (Optional): C about the drugs you are taking, check with your doctor, nurse or pharmacist.  Copyright 3342-3848 [address]: 7.01. Revision date: 10/11/2019. Care instructions adapted under license by [facility] ([facility]). If you have questions about a medical condition or this instruction, always ask your healthcare professional. [place] [address] any warranty or liability for your use of this information. Consent: Verbal consent was obtained and risks were reviewed including, but not limited to, scarring, infection, bleeding, scabbing, and incomplete removal. Discussed wound care instruction Detail Level: Detailed

## 2020-07-20 NOTE — PROGRESS NOTES
Outpatient Note for established Patient - regular Visit     History Obtained From:  patient, electronic medical record  Is the patient new to me ? - No    HISTORY OF PRESENT ILLNESS:   The patient is a 82 y.o. male who is here today for :  Diagnoses and all orders for this visit:    Type 2 diabetes mellitus without complication, without long-term current use of insulin ([facility]  Lab Results   Component Value Date    LABA1C 6.1 03/10/2020     Lab Results   Component Value Date    .4 03/10/2020   he is on Metformin   No side effects  He does not check his FBG  Last eye exam- he does not remember    Essential hypertension  He does not check his BP at home  No meds side effects    Ischemic heart disease  Pt denies CP/SOB/palpitations/abdominal pain/N. No cough;/ fever  He does reports intermittent vomiting every 1-2 weeks a few episodes   No blood/ blood the stool. Hyperlipidemia, unspecified hyperlipidemia type  Lab Results   Component Value Date    CHOL 109 03/10/2020    CHOL 100 04/02/2019    CHOL 115 01/02/2019     Lab Results   Component Value Date    TRIG 39 03/10/2020    TRIG 63 04/02/2019    TRIG 64 01/02/2019     Lab Results   Component Value Date    HDL 62 (H) 03/10/2020    HDL 51 04/02/2019    HDL 54 01/02/2019     Lab Results   Component Value Date    LDLCALC 39 03/10/2020    LDLCALC 36 04/02/2019    LDLCALC 48 01/02/2019     Lab Results   Component Value Date    LABVLDL 8 03/10/2020    LABVLDL 13 04/02/2019    LABVLDL 13 01/02/2019     Peripheral vascular disease ([facility]    Mood is 'ok'  He is c/o insomnia  He does feels anxious   He also c;/o nocturia   He does not see a GI.    He si c/o R leg swelling  He saw the podiatrist.     Past Medical History:        Diagnosis Date    Anemia 1/9/2012    Anxiety and depression     Arthritis     bulging disc    BPH (benign prostatic hypertrophy) 5/16/2010    Constipation 6/16/2015    Diverticulosis 5/16/2010    Dyspnea on exertion 5/16/2010    Eczema 4/12/2011    Elevated PSA 10/30/2012    Erectile dysfunction 5/16/2010    Essential hypertension 8/26/2015    GERD (gastroesophageal reflux disease) 5/16/2010    Hemorrhoid 5/16/2010    Hyperlipidemia 1/10/2011    Hyperphosphatemia 7/3/2010    Hypertension 5/16/2010    Insomnia 6/16/2015    Memory loss 1/14/2015    Nausea 6/16/2015    Overactive bladder 7/12/2011    Paranoid schizophrenia ([facility] 5/16/2010    Perennial allergic rhinitis 11/30/2016    Prostate cancer ([facility] 1/8/2013    had radiation treatments    Type 2 diabetes mellitus without complication, without long-term current use of insulin ([facility] 8/31/2016       Social History:   TOBACCO:   reports that he quit smoking about 30 years ago. His smoking use included cigarettes. He started smoking about 47 years ago. He has a 18.00 pack-year smoking history. He has never used smokeless tobacco.    ETOH:   reports no history of alcohol use. Current Medications:    Prior to Admission medications    Medication Sig Start Date End Date Taking?  Authorizing Provider   chlorthalidone (HYGROTON) 25 MG tablet Take 1 tablet by mouth daily 7/20/20 8/19/20 Yes Surendra Adair MD   QUEtiapine (SEROQUEL) 25 MG tablet Take 1 tablet by mouth nightly 7/20/20  Yes Surendra Adair MD   omeprazole (PRILOSEC) 20 MG delayed release capsule TAKE 1 CAPSULE BY MOUTH EVERY DAY IN THE MORNING BEFORE BREAKFAST 7/14/20   Surendra Adair MD   losartan (COZAAR) 100 MG tablet TAKE 1 TABLET BY MOUTH EVERY DAY 6/22/20   Surendra Adair MD   KLOR-CON M10 10 MEQ extended release tablet TAKE 1 TABLET BY MOUTH EVERY DAY WITH FOOD 6/13/20   Surendra Adair MD   benzonatate (TESSALON) 100 MG capsule TAKE 1 CAPSULE BY MOUTH THREE TIMES A DAY AS NEEDED FOR COUGH 5/21/20   Surendra Adair MD   levETIRAcetam (KEPPRA) 500 MG tablet Take 1 tablet by mouth 2 times daily 5/20/20   Surendra Adair MD   clotrimazole (LOTRIMIN) 1 % cream APPLY TO AFFECTED AREA TWICE A DAY 5/11/20   Jennie Gomez MD   atenolol (TENORMIN) 25 MG tablet TAKE 1/2 TABLET BY MOUTH EVERY DAY 5/11/20   Jennie Gomez MD   BREO ELLIPTA 100-25 MCG/INH AEPB inhaler TAKE 1 PUFF BY MOUTH EVERY DAY 5/4/20   Elizabeth Joyce MD   aspirin 325 MG EC tablet TAKE 1 TABLET BY MOUTH EVERY DAY WITH FOOD 4/21/20   Jennie Gomez MD   galantamine (RAZADYNE ER) 24 MG extended release capsule TAKE 1 CAPSULE BY MOUTH EVERY DAY 3/31/20   Jennie Gomez MD   desmopressin (DDAVP) 0.2 MG tablet TAKE 1 TABLET BY MOUTH EVERYDAY AT BEDTIME 3/24/20   Jennie Gomez MD   cefUROXime (CEFTIN) 500 MG tablet TAKE 1 TABLET BY MOUTH DAILY 3/14/20   Jerrod Hunter MD   Multiple Vitamin (DAILY-TJ) TABS TAKE 1 TABLET BY MOUTH EVERY DAY 3/12/20   Jennie Gomez MD   megestrol (MEGACE) 40 MG tablet Take 1 tablet by mouth daily 3/10/20   Jennie Gomez MD   mirtazapine (REMERON) 7.5 MG tablet TAKE 1 TABLET BY MOUTH EVERY DAY AT [facility] 2/17/20   Jennie Gomez MD   atorvastatin (LIPITOR) 10 MG tablet TAKE 1 TABLET BY MOUTH EVERY DAY 2/6/20   Jennie Gomez MD   [facility] MELATONIN 3 MG TABS tablet TAKE 1 TABLET BY MOUTH EVERY DAY AT NIGHT 1/23/20   Jennie Gomez MD   polyethylene glycol (GLYCOLAX) powder Take 17 g by mouth daily as needed    Historical Provider, MD   dicyclomine (BENTYL) 20 MG tablet Take 20 mg by mouth 3 times daily (with meals)    Historical Provider, MD   montelukast (SINGULAIR) 10 MG tablet TAKE 1 TABLET BY MOUTH EVERY DAY AT NIGHT 10/21/19   Jennie Gomez MD   terazosin (HYTRIN) 10 MG capsule TAKE ONE CAPSULE BY MOUTH NIGHTLY 10/10/19   Jennie Gomez MD   ACCU-CHEK SOFTCLIX LANCETS MISC 1 each by Does not apply route daily 9/6/19   Jennie Gomez MD   blood glucose test strips (ACCU-CHEK ZEENAT PLUS) strip 1 each by In Vitro route daily as needed (symptoms of low or high blood sugar) 9/6/19   Jennie Gomez MD latanoprost (XALATAN) 0.005 % ophthalmic solution Place 1 drop into the left eye nightly  7/27/19   Historical Provider, MD   fluticasone (FLONASE) 50 MCG/ACT nasal spray USE 2 SPRAYS BY NASAL ROUTE DAILY AS NEEDED FOR RHINITIS 8/12/19   Chaim Hargrove MD   metFORMIN (GLUCOPHAGE) 500 MG tablet TAKE 1 TABLET EVERY DAY WITH FOOD 8/1/19   Cahim Hargrove MD   fluticasone-salmeterol (ADVAIR HFA) 115-21 MCG/ACT inhaler Inhale 2 puffs into the lungs 2 times daily 6/20/19   Kofi Britt MD   Spacer/Aero-Holding Chambers (AEROCHAMBER MV) MISC Use with inhaler as directed 6/20/19   Kofi Britt MD   oxybutynin (DITROPAN XL) 15 MG extended release tablet Take 1 tablet by mouth 2 times daily 4/2/19   Chaim Hargrove MD   albuterol sulfate HFA (PROAIR HFA) 108 (90 Base) MCG/ACT inhaler Inhale 2 puffs into the lungs every 6 hours as needed for Wheezing 1/2/19   Chaim Hargrove MD   OXcarbazepine (TRILEPTAL) 300 MG tablet TAKE 1 TABLET BY MOUTH 2 TIMES DAILY 8/14/18   Chaim Hargrove MD   ondansetron (ZOFRAN) 4 MG tablet Take 1 tablet by mouth daily as needed for Nausea 6/16/17   Luis Noguera MD   ferrous sulfate 325 (65 FE) MG tablet Take 1 tablet by mouth 2 times daily (with meals) 8/31/16   Luis Noguera MD   Multiple Vitamins-Minerals (CENTRUM SILVER ULTRA MENS) TABS Take 1 tablet by mouth daily 6/16/15   Luis Noguera MD   nitroGLYCERIN (NITROSTAT) 0.4 MG SL tablet Place 1 tablet under the tongue every 5 minutes as needed. 1/9/12   Luis Noguera MD       REVIEW OF SYSTEMS:  Review of Systems   Constitutional: Negative for activity change, appetite change, chills, fever and unexpected weight change. HENT: Negative for hearing loss. Eyes: Negative for visual disturbance. Respiratory: Negative for chest tightness and shortness of breath. Cardiovascular: Negative for chest pain. Gastrointestinal: Negative for abdominal pain, nausea and vomiting.    Genitourinary: Negative MD, Gastroenterology, [facility]    - Patient was encouraged to call the office (and ask to see me) or be seen by other provider for any worsening or lack of improvement of the symptoms within a few days / weeks. - Pt was asked toschedule an appointment in 2 months, and to let me know of any scheduling difficulties. Additional patients instructions (if given):   Patient Instructions   Please call the office (and ask to see me) or be seen by other provider for any worsening or lack of improvement of the symptoms within a few days / weeks. Please check your blood pressure twicea day for one week. send me results. If blood pressure is higher than 150/90 please let me know as soon as possible. Patient Education        chlorthalidone  Pronunciation:  narcisa ASHER i done  What is the most important information I should know about chlorthalidone? You should not use chlorthalidone if you are unable to urinate, or if you are allergic to sulfa drugs. What is chlorthalidone? Chlorthalidone is a thiazide diuretic (water pill) that helps prevent your body from absorbing too much salt, which can cause fluid retention. Chlorthalidone treats fluid retention (edema) in people with congestive heart failure, cirrhosis of the liver, or kidney disorders, or edema caused by taking steroids or estrogen. Chlorthalidone is also used to treat high blood pressure (hypertension). Chlorthalidone may also be used for purposes not listed in this medication guide. What should I discuss with my healthcare provider before taking chlorthalidone? You should not use chlorthalidone if you are allergic to it, or if:  · you are unable to urinate; or  · you are allergic to sulfa drugs. Tell your doctor if you have ever had:  · kidney disease;  · heart failure;  · gout;  · high cholesterol or triglycerides;  · diabetes; or  · if you are on a low-salt diet. Tell your doctor if you are pregnant or plan to become pregnant. dose. Do not take two doses at one time. What happens if I overdose? Seek emergency medical attention or call the Poison Help line at 1-105.537.3745. Overdose symptoms may include nausea, weakness, dizziness, drowsiness, extreme thirst, muscle pain, or rapid heartbeats. What should I avoid while taking chlorthalidone? Drinking alcohol with this medicine can cause side effects. Avoid getting up too fast from a sitting or lying position, or you may feel dizzy. Avoid becoming overheated or dehydrated during exercise, in hot weather, or by not drinking enough fluids. Follow your doctor's instructions about the type and amount of liquids you should drink. In some cases, drinking too much liquid can be as unsafe as not drinking enough. What are the possible side effects of chlorthalidone? Get emergency medical help if you have signs of an allergic reaction: hives; difficult breathing; swelling of your face, lips, tongue, or throat. Call your doctor at once if you have:  · a light-headed feeling, like you might pass out;  · low sodium --headache, confusion, slurred speech, severe weakness, vomiting, loss of coordination, feeling unsteady;  · low potassium --leg cramps, constipation, irregular heartbeats, fluttering in your chest, increased thirst or urination, numbness or tingling, muscle weakness or limp feeling;  · low magnesium --dizziness, irregular heartbeats, feeling jittery, muscle cramps, muscle spasms, cough or choking feeling; or  · kidney problems --little or no urination, swelling in your feet or ankles, feeling tired or short of breath. Common side effects may include:  · low blood pressure (feeling light-headed);  · kidney problems;  · dizziness; or  · an electrolyte imbalance (such as low levels of potassium, sodium, or magnesium in your blood). This is not a complete list of side effects and others may occur. Call your doctor for medical advice about side effects.  You may report side effects to FDA at 9-378-FDA-5520. What other drugs will affect chlorthalidone? Using chlorthalidone with other drugs that make you light-headed can worsen this effect. Ask your doctor before using opioid medication, a sleeping pill, a muscle relaxer, or medicine for anxiety or seizures. Tell your doctor about all your other medicines, especially:  · other blood pressure medications;  · lithium;  · digoxin, digitalis;  · insulin or oral diabetes medicine; or  · steroid medicine. This list is not complete. Other drugs may affect chlorthalidone, including prescription and over-the-counter medicines, vitamins, and herbal products. Not all possible drug interactions are listed here. Where can I get more information? Your pharmacist can provide more information about chlorthalidone. Remember, keep this and all other medicines out of the reach of children, never share your medicines with others, and use this medication only for the indication prescribed. Every effort has been made to ensure that the information provided by [address] is accurate, up-to-date, and complete, but no guarantee is made to that effect. Drug information contained herein may be time sensitive. [employer] information has been compiled for use by healthcare practitioners and consumers in the [place] and therefore [employer] does not warrant that uses outside of the [place] are appropriate, unless specifically indicated otherwise. [facility]'s drug information does not endorse drugs, diagnose patients or recommend therapy. [facility] drug information is an informational resource designed to assist licensed healthcare practitioners in caring for their patients and/or to serve consumers viewing this service as a supplement to, and not a substitute for, the expertise, skill, knowledge and judgment of healthcare practitioners.  The absence of a warning for a given drug or drug combination in no way should be construed to indicate that the drug or drug combination is safe, effective or appropriate for any given patient. [facility] does not assume any responsibility for any aspect of healthcare administered with the aid of information [facility] provides. The information contained herein is not intended to cover all possible uses, directions, precautions, warnings, drug interactions, allergic reactions, or adverse effects. If you have questions about the drugs you are taking, check with your doctor, nurse or pharmacist.  Copyright 9857-1437 167 [address]: 7.01. Revision date: 10/11/2019. Care instructions adapted under license by [facility] ([facility]). If you have questions about a medical condition or this instruction, always ask your healthcare professional. [place] [address] any warranty or liability for your use of this information. Ravindra Neal M.D.   7/20/2020, 9:19 AM      NOTE: This report was transcribed using voice recognition software. Every effort was made to ensure accuracy, however, inadvertent computerized transcription errors may be present.

## 2020-07-21 LAB
ESTIMATED AVERAGE GLUCOSE: 139.9 MG/DL
HBA1C MFR BLD: 6.5 %

## 2020-07-24 RX ORDER — OMEPRAZOLE MAGNESIUM 20 MG
CAPSULE,DELAYED RELEASE (ENTERIC COATED) ORAL
Qty: 90 TABLET | Refills: 1 | Status: SHIPPED | OUTPATIENT
Start: 2020-07-24 | End: 2020-08-19

## 2020-08-03 ENCOUNTER — TELEPHONE (OUTPATIENT)
Dept: INTERNAL MEDICINE CLINIC | Age: 85
End: 2020-08-03

## 2020-08-03 RX ORDER — DICYCLOMINE HCL 20 MG
20 TABLET ORAL
Qty: 120 TABLET | Refills: 1 | Status: SHIPPED | OUTPATIENT
Start: 2020-08-03 | End: 2020-08-19

## 2020-08-03 RX ORDER — ONDANSETRON 4 MG/1
4 TABLET, FILM COATED ORAL DAILY PRN
Qty: 90 TABLET | Refills: 1 | Status: SHIPPED | OUTPATIENT
Start: 2020-08-03 | End: 2020-08-19

## 2020-08-03 NOTE — TELEPHONE ENCOUNTER
He can get refill on the Zofran.   Before iron replacement let check ferritin iron and TIBC  He can have refill on Bentyl for IBS

## 2020-08-06 ENCOUNTER — TELEPHONE (OUTPATIENT)
Dept: INTERNAL MEDICINE CLINIC | Age: 85
End: 2020-08-06

## 2020-08-06 RX ORDER — LEVETIRACETAM 500 MG/1
TABLET ORAL
Qty: 180 TABLET | Refills: 1 | Status: SHIPPED | OUTPATIENT
Start: 2020-08-06 | End: 2020-10-14

## 2020-08-06 RX ORDER — MIRTAZAPINE 7.5 MG/1
TABLET, FILM COATED ORAL
Qty: 90 TABLET | Refills: 1 | Status: SHIPPED | OUTPATIENT
Start: 2020-08-06 | End: 2020-09-10 | Stop reason: SDUPTHER

## 2020-08-07 ENCOUNTER — TELEPHONE (OUTPATIENT)
Dept: INTERNAL MEDICINE CLINIC | Age: 85
End: 2020-08-07

## 2020-08-07 RX ORDER — MULTIVITAMIN WITH FOLIC ACID 400 MCG
TABLET ORAL
Qty: 90 TABLET | Refills: 1 | Status: SHIPPED | OUTPATIENT
Start: 2020-08-07 | End: 2020-11-10

## 2020-08-07 RX ORDER — CHLORTHALIDONE 25 MG/1
TABLET ORAL
Qty: 30 TABLET | Refills: 1 | Status: SHIPPED | OUTPATIENT
Start: 2020-08-07 | End: 2020-08-19

## 2020-08-10 RX ORDER — BLOOD SUGAR DIAGNOSTIC
1 STRIP MISCELLANEOUS DAILY PRN
Qty: 100 STRIP | Refills: 2 | Status: SHIPPED | OUTPATIENT
Start: 2020-08-10 | End: 2020-08-19

## 2020-08-10 RX ORDER — OXCARBAZEPINE 300 MG/1
300 TABLET, FILM COATED ORAL 2 TIMES DAILY
Qty: 180 TABLET | Refills: 3 | Status: SHIPPED | OUTPATIENT
Start: 2020-08-10 | End: 2021-11-29

## 2020-08-12 RX ORDER — QUETIAPINE FUMARATE 25 MG/1
TABLET, FILM COATED ORAL
Qty: 90 TABLET | Refills: 1 | OUTPATIENT
Start: 2020-08-12

## 2020-08-19 ENCOUNTER — OFFICE VISIT (OUTPATIENT)
Dept: INTERNAL MEDICINE CLINIC | Age: 85
End: 2020-08-19
Payer: MEDICARE

## 2020-08-19 VITALS
BODY MASS INDEX: 19.2 KG/M2 | DIASTOLIC BLOOD PRESSURE: 74 MMHG | WEIGHT: 151.6 LBS | TEMPERATURE: 99.6 F | HEART RATE: 76 BPM | OXYGEN SATURATION: 98 % | SYSTOLIC BLOOD PRESSURE: 128 MMHG

## 2020-08-19 PROBLEM — J45.909 MILD ASTHMA WITHOUT COMPLICATION: Status: ACTIVE | Noted: 2020-08-19

## 2020-08-19 LAB
CHP ED QC CHECK: NORMAL
GLUCOSE BLD-MCNC: 208 MG/DL

## 2020-08-19 PROCEDURE — 1123F ACP DISCUSS/DSCN MKR DOCD: CPT | Performed by: INTERNAL MEDICINE

## 2020-08-19 PROCEDURE — G8427 DOCREV CUR MEDS BY ELIG CLIN: HCPCS | Performed by: INTERNAL MEDICINE

## 2020-08-19 PROCEDURE — G8420 CALC BMI NORM PARAMETERS: HCPCS | Performed by: INTERNAL MEDICINE

## 2020-08-19 PROCEDURE — 1036F TOBACCO NON-USER: CPT | Performed by: INTERNAL MEDICINE

## 2020-08-19 PROCEDURE — 82962 GLUCOSE BLOOD TEST: CPT | Performed by: INTERNAL MEDICINE

## 2020-08-19 PROCEDURE — 99214 OFFICE O/P EST MOD 30 MIN: CPT | Performed by: INTERNAL MEDICINE

## 2020-08-19 PROCEDURE — 4040F PNEUMOC VAC/ADMIN/RCVD: CPT | Performed by: INTERNAL MEDICINE

## 2020-08-19 RX ORDER — LANCETS
1 EACH MISCELLANEOUS DAILY
Qty: 100 EACH | Refills: 1 | Status: SHIPPED | OUTPATIENT
Start: 2020-08-19

## 2020-08-19 RX ORDER — QUETIAPINE FUMARATE 25 MG/1
25 TABLET, FILM COATED ORAL NIGHTLY
Qty: 30 TABLET | Refills: 3 | Status: SHIPPED | OUTPATIENT
Start: 2020-08-19 | End: 2022-01-13 | Stop reason: SDUPTHER

## 2020-08-19 RX ORDER — BLOOD SUGAR DIAGNOSTIC
1 STRIP MISCELLANEOUS DAILY PRN
Qty: 100 STRIP | Refills: 2 | Status: SHIPPED | OUTPATIENT
Start: 2020-08-19

## 2020-08-19 RX ORDER — AMLODIPINE BESYLATE 10 MG/1
10 TABLET ORAL DAILY
COMMUNITY
Start: 2020-08-18 | End: 2020-09-09 | Stop reason: SDUPTHER

## 2020-08-19 ASSESSMENT — ENCOUNTER SYMPTOMS
VOMITING: 0
CHEST TIGHTNESS: 0
SHORTNESS OF BREATH: 0
NAUSEA: 0
ABDOMINAL PAIN: 0

## 2020-08-19 NOTE — PATIENT INSTRUCTIONS
Please call the office (and ask to see me) or be seen by other provider for any worsening or lack of improvement of the symptoms within a few days.      Please let me know if symptoms do not improve

## 2020-08-19 NOTE — PROGRESS NOTES
Outpatient Note for established Patient - regular Visit     History Obtained From:  patient, electronic medical record  Is the patient new to me ? - No    HISTORY OF PRESENT ILLNESS:   The patient is a 82 y.o. male who is here today for :  Matthew Gould was seen today for medication management. Diagnoses and all orders for this visit:    Type 2 diabetes mellitus with stage 3 chronic kidney disease, without long-term current use of insulin ([facility])  -     POCT Glucose  -     Accu-Chek Softclix Lancets MISC; 1 each by Does not apply route daily  -     blood glucose test strips (ACCU-CHEK ZEENAT PLUS) strip; 1 each by In Vitro route daily as needed (symptoms of low or high blood sugar)    Anxiety  -     QUEtiapine (SEROQUEL) 25 MG tablet; Take 1 tablet by mouth nightly    Paranoid schizophrenia ([address]    Partial symptomatic epilepsy with complex partial seizures, not intractable, without status epilepticus ([address]    Mild asthma without complication, unspecified whether persistent    pt is here for med check  He was in the ER 8/8, 8/9   Reading the note it is not clear which mes he was asked to stop as in one list it showed that he was recommended stop dc most of his med and in another list- to keep most of them. Except for yesterday he discontinued all his meds   Before his admission he was on Terazosin, Lipitor   Pt denies CP/SOB/palpitations/abdominal pain/N/V. No fever/ chills/ cough  No weakness.    No dizziness/ weakness/ fatigue  No depression/ anxiety     Yesterday he got (for one day) all his meds   Wife reports aggression (no phyiscal)  Pt denies     Past Medical History:        Diagnosis Date    Anemia 1/9/2012    Anxiety and depression     Arthritis     bulging disc    BPH (benign prostatic hypertrophy) 5/16/2010    Constipation 6/16/2015    Diverticulosis 5/16/2010    Dyspnea on exertion 5/16/2010    Eczema 4/12/2011    Elevated PSA 10/30/2012    Erectile dysfunction 5/16/2010    Essential hypertension 8/26/2015    GERD (gastroesophageal reflux disease) 5/16/2010    Hemorrhoid 5/16/2010    Hyperlipidemia 1/10/2011    Hyperphosphatemia 7/3/2010    Hypertension 5/16/2010    Insomnia 6/16/2015    Memory loss 1/14/2015    Nausea 6/16/2015    Overactive bladder 7/12/2011    Paranoid schizophrenia ([facility] 5/16/2010    Perennial allergic rhinitis 11/30/2016    Prostate cancer ([facility] 1/8/2013    had radiation treatments    Type 2 diabetes mellitus without complication, without long-term current use of insulin ([facility] 8/31/2016       Social History:   TOBACCO:   reports that he quit smoking about 30 years ago. His smoking use included cigarettes. He started smoking about 47 years ago. He has a 18.00 pack-year smoking history. He has never used smokeless tobacco.  ETOH:   reports no history of alcohol use. Current Medications:    Prior to Admission medications    Medication Sig Start Date End Date Taking?  Authorizing Provider   amLODIPine (NORVASC) 10 MG tablet Take 10 mg by mouth daily 8/18/20  Yes Historical Provider, MD   Accu-Chek Softclix Lancets MISC 1 each by Does not apply route daily 8/19/20  Yes Valente Carney MD   blood glucose test strips (ACCU-CHEK ZEENAT PLUS) strip 1 each by In Vitro route daily as needed (symptoms of low or high blood sugar) 8/19/20  Yes Valente Carney MD   QUEtiapine (SEROQUEL) 25 MG tablet Take 1 tablet by mouth nightly 8/19/20  Yes Valente Carney MD   OXcarbazepine (TRILEPTAL) 300 MG tablet Take 1 tablet by mouth 2 times daily 8/10/20  Yes Valente Carney MD   Multiple Vitamin (DAILY-TJ) TABS TAKE 1 TABLET BY MOUTH EVERY DAY 8/7/20  Yes Valente Carney MD   levETIRAcetam (KEPPRA) 500 MG tablet TAKE 1 TABLET BY MOUTH TWICE A DAY 8/6/20  Yes Valente Carney MD   metFORMIN (GLUCOPHAGE) 500 MG tablet TAKE 1 TABLET BY MOUTH EVERY DAY WITH FOOD 7/24/20  Yes Valente Carney MD   aspirin 325 MG EC tablet TAKE 1 TABLET BY MOUTH EVERY DAY WITH FOOD 20  Yes Silvano Silva MD   galantamine (RAZADYNE ER) 24 MG extended release capsule TAKE 1 CAPSULE BY MOUTH EVERY DAY 3/31/20  Yes Silvano Silva MD   atorvastatin (LIPITOR) 10 MG tablet TAKE 1 TABLET BY MOUTH EVERY DAY 20  Yes Silvano Silva MD   oxybutynin (DITROPAN XL) 15 MG extended release tablet Take 1 tablet by mouth 2 times daily 19  Yes Silvano Silva MD   mirtazapine (REMERON) 7.5 MG tablet TAKE 1 TABLET BY MOUTH EVERY DAY EVERY NIGHT  Patient not taking: Reported on 2020   Silvano Silva MD   KLOR-CON M10 10 MEQ extended release tablet TAKE 1 TABLET BY MOUTH EVERY DAY WITH FOOD  Patient not taking: Reported on 2020   Silvano Silva MD   BREO ELLIPTA 100-25 MCG/INH AEPB inhaler TAKE 1 PUFF BY MOUTH EVERY DAY  Patient not taking: Reported on 2020   Tania Blanco MD   polyethylene glycol (GLYCOLAX) powder Take 17 g by mouth daily as needed    Historical Provider, MD   latanoprost (XALATAN) 0.005 % ophthalmic solution Place 1 drop into the left eye nightly  19   Historical Provider, MD   fluticasone (FLONASE) 50 MCG/ACT nasal spray USE 2 SPRAYS BY NASAL ROUTE DAILY AS NEEDED FOR RHINITIS  Patient not taking: Reported on 2020   Silvano Silva MD   albuterol sulfate HFA (PROAIR HFA) 108 (90 Base) MCG/ACT inhaler Inhale 2 puffs into the lungs every 6 hours as needed for Wheezing  Patient not taking: Reported on 2020   Silvano Silva MD   ferrous sulfate 325 (65 FE) MG tablet Take 1 tablet by mouth 2 times daily (with meals)  Patient not taking: Reported on 2020   Melrose Apgar, MD   nitroGLYCERIN (NITROSTAT) 0.4 MG SL tablet Place 1 tablet under the tongue every 5 minutes as needed.   Patient not taking: Reported on 2020   Melrose Apgar, MD       REVIEW OF SYSTEMS:  Review of Systems   Constitutional: Negative for activity change, appetite submandibular adenopathy. Cervical: No cervical adenopathy. Right cervical: No superficial or deep cervical adenopathy. Left cervical: No superficial or deep cervical adenopathy. Skin:     Findings: No rash. Neurological:      Mental Status: He is alert and oriented to person, place, and time. GCS: GCS eye subscore is 4. GCS verbal subscore is 5. GCS motor subscore is 6. Motor: No abnormal muscle tone. Deep Tendon Reflexes: Reflexes are normal and symmetric. Psychiatric:         Behavior: Behavior normal.         Thought Content: Thought content normal.          Assessment/Plan:   Randy Burns was seen today for medication management. Diagnoses and all orders for this visit:    Type 2 diabetes mellitus with stage 3 chronic kidney disease, without long-term current use of insulin ([facility])  Controlled  Stop checking glucose at home  For now keep Metformin   -     POCT Glucose  -     Accu-Chek Softclix Lancets MISC; 1 each by Does not apply route daily  -     blood glucose test strips (ACCU-CHEK ZEENAT PLUS) strip; 1 each by In Vitro route daily as needed (symptoms of low or high blood sugar)    Anxiety  Stable  Pt with dementia  I will renew Seroquel at night  F/u I 3 weeks   -     QUEtiapine (SEROQUEL) 25 MG tablet; Take 1 tablet by mouth nightly    Paranoid schizophrenia ([address]  As abvoe-renew Seroquel   Monitor     Partial symptomatic epilepsy with complex partial seizures, not intractable, without status epilepticus ([address]  Keep current meds     Mild asthma without complication, unspecified whether persistent    Discussed for more than 30 minutes with wife and . Explained which medication should be taken which to stop  Medication were marked in divided different bags. Patient and patient wife stated understanding. Consults about Seroquel and aggression.   I will follow-up with him in 3 weeks    - Patient was encouraged to call the office (and ask to see me) or be seen by other provider for any worsening or lack of improvement of the symptoms within a few days / weeks. - Pt was asked toschedule an appointment in 3 weeks, and to let me know of any scheduling difficulties. Additional patients instructions (if given): There are no Patient Instructions on file for this visit. Chaim Hargrove M.D.   8/19/2020, 3:36 PM      NOTE: This report was transcribed using voice recognition software. Every effort was made to ensure accuracy, however, inadvertent computerized transcription errors may be present.

## 2020-08-26 ENCOUNTER — TELEPHONE (OUTPATIENT)
Dept: INTERNAL MEDICINE CLINIC | Age: 85
End: 2020-08-26

## 2020-08-31 RX ORDER — GALANTAMINE HYDROBROMIDE 24 MG/1
CAPSULE, EXTENDED RELEASE ORAL
Qty: 30 CAPSULE | Refills: 5 | Status: SHIPPED | OUTPATIENT
Start: 2020-08-31 | End: 2021-04-23 | Stop reason: SDUPTHER

## 2020-09-04 ENCOUNTER — TELEPHONE (OUTPATIENT)
Dept: INTERNAL MEDICINE CLINIC | Age: 85
End: 2020-09-04

## 2020-09-04 ENCOUNTER — CARE COORDINATION (OUTPATIENT)
Dept: CASE MANAGEMENT | Age: 85
End: 2020-09-04

## 2020-09-04 PROCEDURE — 1111F DSCHRG MED/CURRENT MED MERGE: CPT | Performed by: INTERNAL MEDICINE

## 2020-09-04 NOTE — TELEPHONE ENCOUNTER
Maynor Kelly states patient was discharged from [address] [place] [address] and was given prednisone for 4 days along with insulin, but no insulin needles. She is concerned of patient administering the injection himself. She is requesting insulin needles to be ordered and sent to [facility] so that she can go out to his home and administer his insulin for him. Please Advise.

## 2020-09-04 NOTE — CARE COORDINATION
community services the patient is currently using-9/2/2020    Care Transitions 24 Hour Call    Schedule Follow Up Appointment with PCP:  Completed  Do you have any ongoing symptoms?:  No  Do you have a copy of your discharge instructions?:  Yes  Do you have all of your prescriptions and are they filled?:  Yes  Have you been contacted by a [facility] Pharmacist?:  No  Have you scheduled your follow up appointment?:  Yes  How are you going to get to your appointment?:  Car - family or friend to transport  Were you discharged with any Home Care or Post Acute Services:  Yes  Post Acute Services:  [address] you feel like you have everything you need to keep you well at home?:  Yes  Care Transitions Interventions  No Identified Needs         Follow Up  Future Appointments   Date Time Provider Al Perkins   9/10/2020  3:30 PM MD AYSHA Waters [facility]   9/22/2020 11:00 AM MD AYSHA [facility]       Kem Jeffery LPN

## 2020-09-09 RX ORDER — AMLODIPINE BESYLATE 10 MG/1
10 TABLET ORAL DAILY
Qty: 30 TABLET | Refills: 3 | Status: SHIPPED | OUTPATIENT
Start: 2020-09-09 | End: 2020-09-10 | Stop reason: SDUPTHER

## 2020-09-10 ENCOUNTER — OFFICE VISIT (OUTPATIENT)
Dept: INTERNAL MEDICINE CLINIC | Age: 85
End: 2020-09-10
Payer: MEDICARE

## 2020-09-10 VITALS
DIASTOLIC BLOOD PRESSURE: 70 MMHG | TEMPERATURE: 97.8 F | SYSTOLIC BLOOD PRESSURE: 122 MMHG | HEIGHT: 75 IN | WEIGHT: 142 LBS | BODY MASS INDEX: 17.66 KG/M2

## 2020-09-10 DIAGNOSIS — D64.9 ANEMIA, UNSPECIFIED TYPE: Chronic | ICD-10-CM

## 2020-09-10 DIAGNOSIS — E11.22 TYPE 2 DIABETES MELLITUS WITH STAGE 3 CHRONIC KIDNEY DISEASE, WITHOUT LONG-TERM CURRENT USE OF INSULIN (HCC): ICD-10-CM

## 2020-09-10 DIAGNOSIS — I10 ESSENTIAL HYPERTENSION: ICD-10-CM

## 2020-09-10 DIAGNOSIS — N18.30 TYPE 2 DIABETES MELLITUS WITH STAGE 3 CHRONIC KIDNEY DISEASE, WITHOUT LONG-TERM CURRENT USE OF INSULIN (HCC): ICD-10-CM

## 2020-09-10 DIAGNOSIS — E87.6 HYPOKALEMIA: ICD-10-CM

## 2020-09-10 PROBLEM — J41.0 SIMPLE CHRONIC BRONCHITIS (HCC): Status: RESOLVED | Noted: 2019-09-30 | Resolved: 2020-09-10

## 2020-09-10 PROCEDURE — 99213 OFFICE O/P EST LOW 20 MIN: CPT | Performed by: INTERNAL MEDICINE

## 2020-09-10 PROCEDURE — G8427 DOCREV CUR MEDS BY ELIG CLIN: HCPCS | Performed by: INTERNAL MEDICINE

## 2020-09-10 PROCEDURE — 90694 VACC AIIV4 NO PRSRV 0.5ML IM: CPT | Performed by: INTERNAL MEDICINE

## 2020-09-10 PROCEDURE — 1123F ACP DISCUSS/DSCN MKR DOCD: CPT | Performed by: INTERNAL MEDICINE

## 2020-09-10 PROCEDURE — 4040F PNEUMOC VAC/ADMIN/RCVD: CPT | Performed by: INTERNAL MEDICINE

## 2020-09-10 PROCEDURE — G8419 CALC BMI OUT NRM PARAM NOF/U: HCPCS | Performed by: INTERNAL MEDICINE

## 2020-09-10 PROCEDURE — 1036F TOBACCO NON-USER: CPT | Performed by: INTERNAL MEDICINE

## 2020-09-10 PROCEDURE — G0008 ADMIN INFLUENZA VIRUS VAC: HCPCS | Performed by: INTERNAL MEDICINE

## 2020-09-10 RX ORDER — POTASSIUM CHLORIDE 20 MEQ/1
20 TABLET, EXTENDED RELEASE ORAL DAILY
Qty: 90 TABLET | Refills: 0 | Status: SHIPPED | OUTPATIENT
Start: 2020-09-10 | End: 2020-12-17

## 2020-09-10 RX ORDER — AMLODIPINE BESYLATE 5 MG/1
5 TABLET ORAL DAILY
Qty: 30 TABLET | Refills: 2 | Status: SHIPPED | OUTPATIENT
Start: 2020-09-10 | End: 2020-12-10 | Stop reason: SDUPTHER

## 2020-09-10 RX ORDER — MIRTAZAPINE 15 MG/1
15 TABLET, FILM COATED ORAL NIGHTLY
Qty: 30 TABLET | Refills: 2 | Status: SHIPPED | OUTPATIENT
Start: 2020-09-10 | End: 2020-10-14

## 2020-09-10 ASSESSMENT — ENCOUNTER SYMPTOMS
CHEST TIGHTNESS: 0
SHORTNESS OF BREATH: 0
ABDOMINAL PAIN: 0
NAUSEA: 0
VOMITING: 0

## 2020-09-10 NOTE — PROGRESS NOTES
Outpatient Note for established Patient - regular Visit     History Obtained From:  patient, electronic medical record  Is the patient new to me ? - No    HISTORY OF PRESENT ILLNESS:   The patient is a 82 y.o. male who is here today for :  Emy Dheeraj was seen today for diabetes. Diagnoses and all orders for this visit:    Type 2 diabetes mellitus with stage 3 chronic kidney disease, without long-term current use of insulin (HCC)  -     CBC Auto Differential; Future  -     Comprehensive Metabolic Panel; Future  -     Hemoglobin A1C; Future  -     Lipid Panel; Future    Hypokalemia  -     Comprehensive Metabolic Panel; Future    Essential hypertension  -     CBC Auto Differential; Future  -     Comprehensive Metabolic Panel; Future  -     Lipid Panel; Future    Other orders  -     amLODIPine (NORVASC) 5 MG tablet; Take 1 tablet by mouth daily  -     potassium chloride (KLOR-CON M) 20 MEQ extended release tablet; Take 1 tablet by mouth daily      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 He feels better- no Sx. Pt denies CP/SOB/palpitations/abdominal pain/N/V. No headache. No itch / pain/ rash. No fever/ chills/ cough   He tales all his meds  No side effects. No falls.    He does c/o weakness     Past Medical History:        Diagnosis Date    Anemia 1/9/2012    Anxiety and depression     Arthritis     bulging disc    BPH (benign prostatic hypertrophy) 5/16/2010    Constipation 6/16/2015    Diverticulosis 5/16/2010    Dyspnea on exertion 5/16/2010    Eczema 4/12/2011    Elevated PSA 10/30/2012    Erectile dysfunction 5/16/2010    Essential hypertension 8/26/2015    GERD (gastroesophageal reflux disease) 5/16/2010    Hemorrhoid 5/16/2010    Hyperlipidemia 1/10/2011    Hyperphosphatemia 7/3/2010    Hypertension 5/16/2010    Insomnia 6/16/2015    Memory loss 1/14/2015    Nausea 6/16/2015    Overactive bladder 7/12/2011    Paranoid schizophrenia ([facility] 5/16/2010    Perennial allergic rhinitis 11/30/2016    Prostate cancer ([facility] 1/8/2013    had radiation treatments    Type 2 diabetes mellitus without complication, without long-term current use of insulin ([facility] 8/31/2016       Social History:   TOBACCO:   reports that he quit smoking about 30 years ago. His smoking use included cigarettes. He started smoking about 47 years ago. He has a 18.00 pack-year smoking history. He has never used smokeless tobacco.  ETOH:   reports no history of alcohol use. Current Medications:    Prior to Admission medications    Medication Sig Start Date End Date Taking?  Authorizing Provider   amLODIPine (NORVASC) 5 MG tablet Take 1 tablet by mouth daily 9/10/20  Yes Ozzie Dyer MD   potassium chloride (KLOR-CON M) 20 MEQ extended release tablet Take 1 tablet by mouth daily 9/10/20  Yes Ozzie Dyer MD   galantamine (RAZADYNE ER) 24 MG extended release capsule TAKE 1 CAPSULE BY MOUTH EVERY DAY  Patient taking differently: Take 8 mg by mouth 2 times daily (with meals)  8/31/20  Yes Ozzie Dyer MD   Accu-Chek Softclix Lancets MISC 1 each by Does not apply route daily 8/19/20  Yes Ozzie Dyer MD   blood glucose test strips (ACCU-CHEK ZEENAT PLUS) strip 1 each by In Vitro route daily as needed (symptoms of low or high blood sugar) 8/19/20  Yes Ozzie Dyer MD   QUEtiapine (SEROQUEL) 25 MG tablet Take 1 tablet by mouth nightly 8/19/20  Yes Ozzie Dyer MD   OXcarbazepine (TRILEPTAL) 300 MG tablet Take 1 tablet by mouth 2 times daily 8/10/20  Yes Ozzie Dyer MD   Multiple Vitamin (DAILY-TJ) TABS TAKE 1 TABLET BY MOUTH EVERY DAY 8/7/20  Yes Ozzie Dyer MD   mirtazapine (REMERON) 7.5 MG tablet TAKE 1 TABLET BY MOUTH EVERY DAY EVERY NIGHT 8/6/20  Yes Ozzie Dyer MD   levETIRAcetam (KEPPRA) 500 MG tablet TAKE 1 TABLET BY MOUTH TWICE A DAY 8/6/20  Yes Ozzie Dyer MD   metFORMIN (GLUCOPHAGE) 500 MG tablet TAKE 1 TABLET BY MOUTH EVERY DAY WITH FOOD 7/24/20  Yes Valente Carney MD   BREO ELLIPTA 100-25 MCG/INH AEPB inhaler TAKE 1 PUFF BY MOUTH EVERY DAY 5/4/20  Yes Sonam Marlow MD   aspirin 325 MG EC tablet TAKE 1 TABLET BY MOUTH EVERY DAY WITH FOOD  Patient taking differently: 81 mg  4/21/20  Yes Valente Carney MD   atorvastatin (LIPITOR) 10 MG tablet TAKE 1 TABLET BY MOUTH EVERY DAY 2/6/20  Yes Valente Carney MD   polyethylene glycol (GLYCOLAX) powder Take 17 g by mouth daily as needed   Yes Historical Provider, MD   latanoprost (XALATAN) 0.005 % ophthalmic solution Place 1 drop into the left eye nightly  7/27/19  Yes Historical Provider, MD   fluticasone (FLONASE) 50 MCG/ACT nasal spray USE 2 SPRAYS BY NASAL ROUTE DAILY AS NEEDED FOR RHINITIS 8/12/19  Yes Valente Carney MD   oxybutynin (DITROPAN XL) 15 MG extended release tablet Take 1 tablet by mouth 2 times daily  Patient taking differently: Take 5 mg by mouth 2 times daily  4/2/19  Yes Valente Carney MD   nitroGLYCERIN (NITROSTAT) 0.4 MG SL tablet Place 1 tablet under the tongue every 5 minutes as needed. 1/9/12  Yes Marylee Look, MD       REVIEW OF SYSTEMS:  Review of Systems   Constitutional: Negative for activity change, appetite change, chills, fever and unexpected weight change. HENT: Negative for hearing loss. Eyes: Negative for visual disturbance. Respiratory: Negative for chest tightness and shortness of breath. Cardiovascular: Negative for chest pain. Gastrointestinal: Negative for abdominal pain, nausea and vomiting. Genitourinary: Negative for hematuria. Skin: Negative for rash. Allergic/Immunologic: Negative for immunocompromised state. Neurological: Negative for dizziness and headaches. Psychiatric/Behavioral: Negative for dysphoric mood and suicidal ideas.          Physical Exam:      Vitals: /70   Temp 97.8 °F (36.6 °C)   Ht 6' 2.5\" (1.892 m)   Wt 142 lb (64.4 kg)   BMI 17.99 kg/m²     Body mass inadvertent computerized transcription errors may be present.

## 2020-09-10 NOTE — PROGRESS NOTES
Vaccine Information Sheet, \"Influenza - Inactivated\"  given to Edmund Castellon, or parent/legal guardian of  Edmund Castellon and verbalized understanding. Patient responses:    Have you ever had a reaction to a flu vaccine? No  Do you have any current illness? No  Have you ever had [place] Syndrome? No  Do you have a serious allergy to any of the follow: Neomycin, Polymyxin, Thimerosal, eggs or egg products? No    Flu vaccine given per order. Please see immunization tab. Risks and benefits explained. Current VIS given.       Immunizations Administered     Name Date Dose Route    Influenza, Quadv, adjuvanted, 65 yrs +, IM, PF (Fluad) 9/10/2020 0.5 mL Intramuscular    Site: Deltoid- Left    Lot: [id number]    NDC: 54105-433-67

## 2020-09-11 DIAGNOSIS — R63.0 LOSS OF APPETITE: ICD-10-CM

## 2020-09-11 DIAGNOSIS — R63.4 LOSS OF WEIGHT: ICD-10-CM

## 2020-09-11 LAB
A/G RATIO: 1.5 (ref 1.1–2.2)
ALBUMIN SERPL-MCNC: 3.8 G/DL (ref 3.4–5)
ALP BLD-CCNC: 70 U/L (ref 40–129)
ALT SERPL-CCNC: 16 U/L (ref 10–40)
ANION GAP SERPL CALCULATED.3IONS-SCNC: 13 MMOL/L (ref 3–16)
AST SERPL-CCNC: 19 U/L (ref 15–37)
BASOPHILS ABSOLUTE: 0 K/UL (ref 0–0.2)
BASOPHILS RELATIVE PERCENT: 0.5 %
BILIRUB SERPL-MCNC: 0.3 MG/DL (ref 0–1)
BUN BLDV-MCNC: 24 MG/DL (ref 7–20)
CALCIUM SERPL-MCNC: 9 MG/DL (ref 8.3–10.6)
CHLORIDE BLD-SCNC: 103 MMOL/L (ref 99–110)
CHOLESTEROL, TOTAL: 119 MG/DL (ref 0–199)
CO2: 24 MMOL/L (ref 21–32)
CREAT SERPL-MCNC: 1.5 MG/DL (ref 0.8–1.3)
EOSINOPHILS ABSOLUTE: 0.1 K/UL (ref 0–0.6)
EOSINOPHILS RELATIVE PERCENT: 0.8 %
FERRITIN: 229.8 NG/ML (ref 30–400)
GFR AFRICAN AMERICAN: 54
GFR NON-AFRICAN AMERICAN: 45
GLOBULIN: 2.6 G/DL
GLUCOSE BLD-MCNC: 123 MG/DL (ref 70–99)
HCT VFR BLD CALC: 32.4 % (ref 40.5–52.5)
HDLC SERPL-MCNC: 75 MG/DL (ref 40–60)
HEMOGLOBIN: 10.7 G/DL (ref 13.5–17.5)
IRON SATURATION: 18 % (ref 20–50)
IRON: 40 UG/DL (ref 59–158)
LDL CHOLESTEROL CALCULATED: 36 MG/DL
LIPASE: 36 U/L (ref 13–60)
LYMPHOCYTES ABSOLUTE: 0.4 K/UL (ref 1–5.1)
LYMPHOCYTES RELATIVE PERCENT: 5.3 %
MCH RBC QN AUTO: 28.9 PG (ref 26–34)
MCHC RBC AUTO-ENTMCNC: 33 G/DL (ref 31–36)
MCV RBC AUTO: 87.6 FL (ref 80–100)
MONOCYTES ABSOLUTE: 0.9 K/UL (ref 0–1.3)
MONOCYTES RELATIVE PERCENT: 12.6 %
NEUTROPHILS ABSOLUTE: 5.9 K/UL (ref 1.7–7.7)
NEUTROPHILS RELATIVE PERCENT: 80.8 %
PDW BLD-RTO: 12.7 % (ref 12.4–15.4)
PLATELET # BLD: 232 K/UL (ref 135–450)
PMV BLD AUTO: 9.3 FL (ref 5–10.5)
POTASSIUM SERPL-SCNC: 3.7 MMOL/L (ref 3.5–5.1)
RBC # BLD: 3.69 M/UL (ref 4.2–5.9)
SODIUM BLD-SCNC: 140 MMOL/L (ref 136–145)
TOTAL IRON BINDING CAPACITY: 224 UG/DL (ref 260–445)
TOTAL PROTEIN: 6.4 G/DL (ref 6.4–8.2)
TRIGL SERPL-MCNC: 39 MG/DL (ref 0–150)
VLDLC SERPL CALC-MCNC: 8 MG/DL
WBC # BLD: 7.3 K/UL (ref 4–11)

## 2020-09-12 LAB
ESTIMATED AVERAGE GLUCOSE: 137 MG/DL
HBA1C MFR BLD: 6.4 %

## 2020-09-22 ENCOUNTER — OFFICE VISIT (OUTPATIENT)
Dept: INTERNAL MEDICINE CLINIC | Age: 85
End: 2020-09-22
Payer: MEDICARE

## 2020-09-22 VITALS
HEART RATE: 81 BPM | OXYGEN SATURATION: 97 % | WEIGHT: 146 LBS | BODY MASS INDEX: 18.15 KG/M2 | HEIGHT: 75 IN | TEMPERATURE: 97.8 F | DIASTOLIC BLOOD PRESSURE: 70 MMHG | SYSTOLIC BLOOD PRESSURE: 132 MMHG

## 2020-09-22 PROCEDURE — G8419 CALC BMI OUT NRM PARAM NOF/U: HCPCS | Performed by: INTERNAL MEDICINE

## 2020-09-22 PROCEDURE — 1036F TOBACCO NON-USER: CPT | Performed by: INTERNAL MEDICINE

## 2020-09-22 PROCEDURE — 99213 OFFICE O/P EST LOW 20 MIN: CPT | Performed by: INTERNAL MEDICINE

## 2020-09-22 PROCEDURE — 1123F ACP DISCUSS/DSCN MKR DOCD: CPT | Performed by: INTERNAL MEDICINE

## 2020-09-22 PROCEDURE — G8427 DOCREV CUR MEDS BY ELIG CLIN: HCPCS | Performed by: INTERNAL MEDICINE

## 2020-09-22 PROCEDURE — 4040F PNEUMOC VAC/ADMIN/RCVD: CPT | Performed by: INTERNAL MEDICINE

## 2020-09-22 ASSESSMENT — ENCOUNTER SYMPTOMS
CHEST TIGHTNESS: 0
SHORTNESS OF BREATH: 0
VOMITING: 0
ABDOMINAL PAIN: 0
NAUSEA: 0

## 2020-09-22 NOTE — PROGRESS NOTES
Outpatient Note for established Patient - regular Visit     History Obtained From:  patient, electronic medical record  Is the patient new to me ? - No    HISTORY OF PRESENT ILLNESS:   The patient is a 82 y.o. male who is here today for :  Greg Marie was seen today for results. Diagnoses and all orders for this visit:    Essential hypertension    Anemia, unspecified type    Paranoid schizophrenia ([address]    Chronic kidney disease, stage 3 ([address]    Neuropathic pain        Pt denies CP/SOB/palpitations/abdominal pain/N/V. No fever/ chills/ cough   No abdominal painN/V/.   weight is stable   No bleeding. No acute depression / anxiety   No side effects. He had ear ace (L ) last week which has resolved  He still has L facial pain  He was diagnosed with Raymond hunt syndrome a while ago     Past Medical History:        Diagnosis Date    Anemia 1/9/2012    Anxiety and depression     Arthritis     bulging disc    BPH (benign prostatic hypertrophy) 5/16/2010    Constipation 6/16/2015    Diverticulosis 5/16/2010    Dyspnea on exertion 5/16/2010    Eczema 4/12/2011    Elevated PSA 10/30/2012    Erectile dysfunction 5/16/2010    Essential hypertension 8/26/2015    GERD (gastroesophageal reflux disease) 5/16/2010    Hemorrhoid 5/16/2010    Hyperlipidemia 1/10/2011    Hyperphosphatemia 7/3/2010    Hypertension 5/16/2010    Insomnia 6/16/2015    Memory loss 1/14/2015    Nausea 6/16/2015    Overactive bladder 7/12/2011    Paranoid schizophrenia ([address] 5/16/2010    Perennial allergic rhinitis 11/30/2016    Prostate cancer ([address] 1/8/2013    had radiation treatments    Type 2 diabetes mellitus without complication, without long-term current use of insulin ([address] 8/31/2016       Social History:   TOBACCO:   reports that he quit smoking about 30 years ago. His smoking use included cigarettes. He started smoking about 47 years ago. He has a 18.00 pack-year smoking history.  He has never used smokeless tobacco.      ETOH:   reports no history of alcohol use. Current Medications:    Prior to Admission medications    Medication Sig Start Date End Date Taking?  Authorizing Provider   amLODIPine (NORVASC) 5 MG tablet Take 1 tablet by mouth daily 9/10/20  Yes Ravindra Neal MD   potassium chloride (KLOR-CON M) 20 MEQ extended release tablet Take 1 tablet by mouth daily 9/10/20  Yes Ravindra Neal MD   mirtazapine (REMERON) 15 MG tablet Take 1 tablet by mouth nightly 9/10/20  Yes Ravindra Neal MD   galantamine (RAZADYNE ER) 24 MG extended release capsule TAKE 1 CAPSULE BY MOUTH EVERY DAY  Patient taking differently: Take 8 mg by mouth 2 times daily (with meals)  8/31/20  Yes Ravindra Neal MD   Accu-Chek Softclix Lancets MISC 1 each by Does not apply route daily 8/19/20  Yes Ravindra Neal MD   blood glucose test strips (ACCU-CHEK ZEENAT PLUS) strip 1 each by In Vitro route daily as needed (symptoms of low or high blood sugar) 8/19/20  Yes Ravindra Neal MD   QUEtiapine (SEROQUEL) 25 MG tablet Take 1 tablet by mouth nightly 8/19/20  Yes Ravindra Neal MD   OXcarbazepine (TRILEPTAL) 300 MG tablet Take 1 tablet by mouth 2 times daily 8/10/20  Yes Ravindra Neal MD   Multiple Vitamin (DAILY-TJ) TABS TAKE 1 TABLET BY MOUTH EVERY DAY 8/7/20  Yes Ravindra Neal MD   levETIRAcetam (KEPPRA) 500 MG tablet TAKE 1 TABLET BY MOUTH TWICE A DAY 8/6/20  Yes Ravindra Neal MD   metFORMIN (GLUCOPHAGE) 500 MG tablet TAKE 1 TABLET BY MOUTH EVERY DAY WITH FOOD 7/24/20  Yes Ravindra Neal MD   BREO ELLIPTA 100-25 MCG/INH AEPB inhaler TAKE 1 PUFF BY MOUTH EVERY DAY 5/4/20  Yes Jonathon Bauman MD   aspirin 325 MG EC tablet TAKE 1 TABLET BY MOUTH EVERY DAY WITH FOOD  Patient taking differently: 81 mg  4/21/20  Yes Ravindra Neal MD   atorvastatin (LIPITOR) 10 MG tablet TAKE 1 TABLET BY MOUTH EVERY DAY 2/6/20  Yes Ravindra Neal MD   polyethylene glycol (GLYCOLAX) powder Take 17 g by mouth daily as needed   Yes Historical Provider, MD   latanoprost (XALATAN) 0.005 % ophthalmic solution Place 1 drop into the left eye nightly  7/27/19  Yes Historical Provider, MD   fluticasone (FLONASE) 50 MCG/ACT nasal spray USE 2 SPRAYS BY NASAL ROUTE DAILY AS NEEDED FOR RHINITIS 8/12/19  Yes Trevor Rodriguez MD   oxybutynin (DITROPAN XL) 15 MG extended release tablet Take 1 tablet by mouth 2 times daily  Patient taking differently: Take 5 mg by mouth 2 times daily  4/2/19  Yes Trevor Rodriguez MD   nitroGLYCERIN (NITROSTAT) 0.4 MG SL tablet Place 1 tablet under the tongue every 5 minutes as needed. 1/9/12  Yes Dipak George MD       REVIEW OF SYSTEMS:  Review of Systems   Constitutional: Negative for activity change, appetite change, chills, fever and unexpected weight change. Eyes: Negative for visual disturbance. Respiratory: Negative for chest tightness and shortness of breath. Cardiovascular: Negative for chest pain. Gastrointestinal: Negative for abdominal pain, nausea and vomiting. Genitourinary: Negative for hematuria. Skin: Negative for rash. Allergic/Immunologic: Negative for immunocompromised state. Neurological: Negative for dizziness and headaches. Psychiatric/Behavioral: Negative for dysphoric mood and suicidal ideas. Physical Exam:      Vitals: /70 (Site: Left Upper Arm)   Pulse 81   Temp 97.8 °F (36.6 °C)   Ht 6' 2.5\" (1.892 m)   Wt 146 lb (66.2 kg)   SpO2 97%   BMI 18.49 kg/m²     Body mass index is 18.49 kg/m². Wt Readings from Last 3 Encounters:   09/22/20 146 lb (66.2 kg)   09/10/20 142 lb (64.4 kg)   08/19/20 151 lb 9.6 oz (68.8 kg)     Physical Exam  Constitutional:       General: He is not in acute distress. Appearance: He is well-developed. He is not diaphoretic. HENT:      Head: Normocephalic and atraumatic. Mouth/Throat:      Pharynx: No oropharyngeal exudate.    Eyes:      General: No

## 2020-09-22 NOTE — PATIENT INSTRUCTIONS
Please call the office (and ask to see me) or be seen by other provider for any worsening or lack of improvement of the symptoms within 2 weeks

## 2020-09-23 ENCOUNTER — TELEPHONE (OUTPATIENT)
Dept: INTERNAL MEDICINE CLINIC | Age: 85
End: 2020-09-23

## 2020-09-24 ENCOUNTER — CARE COORDINATION (OUTPATIENT)
Dept: CARE COORDINATION | Age: 85
End: 2020-09-24

## 2020-09-24 NOTE — CARE COORDINATION
RNLETA Note:    The RN, ACM called to offer Care Coordination to the pt and explained what Care Coordination is . The pt stated he is active with [facility] and is happy with his HHN. The pt declined Care coordination at this time.

## 2020-10-14 RX ORDER — MIRTAZAPINE 15 MG/1
TABLET, FILM COATED ORAL
Qty: 30 TABLET | Refills: 2 | Status: SHIPPED | OUTPATIENT
Start: 2020-10-14 | End: 2021-01-31 | Stop reason: SDUPTHER

## 2020-10-14 RX ORDER — LEVETIRACETAM 500 MG/1
TABLET ORAL
Qty: 180 TABLET | Refills: 0 | Status: SHIPPED | OUTPATIENT
Start: 2020-10-14 | End: 2021-02-15 | Stop reason: SDUPTHER

## 2020-10-14 RX ORDER — CLOTRIMAZOLE 1 %
CREAM (GRAM) TOPICAL
Qty: 30 G | Refills: 2 | Status: SHIPPED | OUTPATIENT
Start: 2020-10-14 | End: 2021-01-04

## 2020-10-15 ENCOUNTER — OFFICE VISIT (OUTPATIENT)
Dept: INTERNAL MEDICINE CLINIC | Age: 85
End: 2020-10-15
Payer: MEDICARE

## 2020-10-15 VITALS
TEMPERATURE: 97.8 F | BODY MASS INDEX: 18.05 KG/M2 | WEIGHT: 145.2 LBS | HEART RATE: 85 BPM | SYSTOLIC BLOOD PRESSURE: 160 MMHG | DIASTOLIC BLOOD PRESSURE: 92 MMHG | HEIGHT: 75 IN | OXYGEN SATURATION: 98 %

## 2020-10-15 PROCEDURE — 1123F ACP DISCUSS/DSCN MKR DOCD: CPT | Performed by: INTERNAL MEDICINE

## 2020-10-15 PROCEDURE — 1036F TOBACCO NON-USER: CPT | Performed by: INTERNAL MEDICINE

## 2020-10-15 PROCEDURE — G8419 CALC BMI OUT NRM PARAM NOF/U: HCPCS | Performed by: INTERNAL MEDICINE

## 2020-10-15 PROCEDURE — G8427 DOCREV CUR MEDS BY ELIG CLIN: HCPCS | Performed by: INTERNAL MEDICINE

## 2020-10-15 PROCEDURE — G8484 FLU IMMUNIZE NO ADMIN: HCPCS | Performed by: INTERNAL MEDICINE

## 2020-10-15 PROCEDURE — 4040F PNEUMOC VAC/ADMIN/RCVD: CPT | Performed by: INTERNAL MEDICINE

## 2020-10-15 PROCEDURE — 99213 OFFICE O/P EST LOW 20 MIN: CPT | Performed by: INTERNAL MEDICINE

## 2020-10-15 RX ORDER — OXYBUTYNIN CHLORIDE 15 MG/1
15 TABLET, EXTENDED RELEASE ORAL 2 TIMES DAILY
Qty: 60 TABLET | Refills: 3 | Status: SHIPPED | OUTPATIENT
Start: 2020-10-15 | End: 2021-02-22 | Stop reason: SDUPTHER

## 2020-10-15 RX ORDER — GABAPENTIN 100 MG/1
100 CAPSULE ORAL 2 TIMES DAILY
Qty: 60 CAPSULE | Refills: 0 | Status: SHIPPED | OUTPATIENT
Start: 2020-10-15 | End: 2021-04-15 | Stop reason: ALTCHOICE

## 2020-10-15 ASSESSMENT — ENCOUNTER SYMPTOMS
VOMITING: 0
CHEST TIGHTNESS: 0
SHORTNESS OF BREATH: 0
ABDOMINAL PAIN: 0
NAUSEA: 0

## 2020-10-15 NOTE — PATIENT INSTRUCTIONS
Please call the office (and ask to see me) or be seen by other provider for any worsening or lack of improvement of the symptoms within a few days / weeks. Patient Education        Bell's Palsy: Care Instructions  Your Care Instructions     Bell's palsy is paralysis or weakness of the muscles on one side of the face. Often people with Bell's palsy have a droop on one side of the mouth and have trouble completely shutting the eye on the same side. Bell's palsy can also interfere with the sense of taste. These things happen when a nerve in your face becomes inflamed. Bell's palsy is not caused by a stroke. The cause of the nerve inflammation is not known. But some experts think that a virus may cause it. Because of this, doctors sometimes prescribe antiviral medicine to treat it. You also may get medicine to reduce swelling. Bell's palsy usually gets better on its own in a few weeks or months. Follow-up care is a key part of your treatment and safety. Be sure to make and go to all appointments, and call your doctor if you are having problems. It's also a good idea to know your test results and keep a list of the medicines you take. How can you care for yourself at home? · Take your medicines exactly as prescribed. Call your doctor if you think you are having a problem with your medicine. You will get more details on the specific medicines your doctor prescribes. · Use artificial tears or ointment if your eyes are too dry. Bell's palsy can make your lower eyelid droop, causing a dry eye. · If you cannot completely close your eye, consider using an eye patch while you sleep. · Help yourself blink by using your finger to close and open your eyelid. This may help keep your eye moist.  · Wear glasses or goggles to keep dust and dirt out of your eye. · As feeling comes back to your face, massage your forehead, cheeks, and lips. Massage may make the muscles in your face stronger.   · Brush and floss your teeth often to help prevent tooth decay. Bell's palsy can dry up the spit on one side of your mouth. This increases the risk of tooth decay. When should you call for help? Call 911 anytime you think you may need emergency care. For example, call if:    · You have symptoms of a stroke. These may include:  ? Sudden numbness, tingling, weakness, or loss of movement in your face, arm, or leg, especially on only one side of your body. ? Sudden vision changes. ? Sudden trouble speaking. ? Sudden confusion or trouble understanding simple statements. ? Sudden problems with walking or balance. ? A sudden, severe headache that is different from past headaches. Call your doctor now or seek immediate medical care if:    · You have numbness or weakness that spreads beyond one side of your face.     · You have a skin rash or eye pain or redness, or light bothers your eyes.     · You have a new or worse headache. Watch closely for changes in your health, and be sure to contact your doctor if:    · You do not get better as expected. Where can you learn more? Go to [email or web address] and sign in to your [employer] account. Enter P168 in the [employer] box to learn more about \"Bell's Palsy: Care Instructions. \"     If you do not have an account, please click on the \"Sign Up Now\" link. Current as of: November 20, 2019               Content Version: 12.6  © 5254-3592 [employer]. Care instructions adapted under license by [facility] ([facility]). If you have questions about a medical condition or this instruction, always ask your healthcare professional. [place] [address] any warranty or liability for your use of this information.

## 2020-10-15 NOTE — PROGRESS NOTES
Outpatient Note for established Patient - regular Visit     History Obtained From:  patient, electronic medical record  Is the patient new to me ? - No    HISTORY OF PRESENT ILLNESS:   The patient is a 82 y.o. male who is here today for :  Gabo Valdez was seen today for diabetes and hypertension. Diagnoses and all orders for this visit:    Bell's palsy  -     gabapentin (NEURONTIN) 100 MG capsule; Take 1 capsule by mouth 2 times daily for 30 days. Intended supply: 90 days  -     Amb External Referral To Internal Medicine    Chronic nonintractable headache, unspecified headache type  -     gabapentin (NEURONTIN) 100 MG capsule; Take 1 capsule by mouth 2 times daily for 30 days. Intended supply: 90 days  -     Amb External Referral To Internal Medicine    Overactive bladder  -     oxybutynin (DITROPAN XL) 15 MG extended release tablet; Take 1 tablet by mouth 2 times daily    Type 2 diabetes mellitus without complication, without long-term current use of insulin (HCC)  -     oxybutynin (DITROPAN XL) 15 MG extended release tablet; Take 1 tablet by mouth 2 times daily      Pt is still c/o L facial pain that has not been resoilved since beginning of Septber  Pt denies CP/SOB/palpitations/abdominal pain/N/V. He was diagnosed 9/1/2020 with Patricia Stein syndrm  MRI 9/1/2020 was neg for acute findings.    Pain  is constant and   Past Medical History:        Diagnosis Date    Anemia 1/9/2012    Anxiety and depression     Arthritis     bulging disc    BPH (benign prostatic hypertrophy) 5/16/2010    Constipation 6/16/2015    Diverticulosis 5/16/2010    Dyspnea on exertion 5/16/2010    Eczema 4/12/2011    Elevated PSA 10/30/2012    Erectile dysfunction 5/16/2010    Essential hypertension 8/26/2015    GERD (gastroesophageal reflux disease) 5/16/2010    Hemorrhoid 5/16/2010    Hyperlipidemia 1/10/2011    Hyperphosphatemia 7/3/2010    Hypertension 5/16/2010    Insomnia 6/16/2015    Memory loss 1/14/2015    Nausea 6/16/2015    Overactive bladder 7/12/2011    Paranoid schizophrenia ([facility] 5/16/2010    Perennial allergic rhinitis 11/30/2016    Prostate cancer ([facility] 1/8/2013    had radiation treatments    Type 2 diabetes mellitus without complication, without long-term current use of insulin ([facility] 8/31/2016       Social History:   TOBACCO:   reports that he quit smoking about 30 years ago. His smoking use included cigarettes. He started smoking about 48 years ago. He has a 18.00 pack-year smoking history. He has never used smokeless tobacco.    ETOH:   reports no history of alcohol use. Current Medications:    Prior to Admission medications    Medication Sig Start Date End Date Taking? Authorizing Provider   gabapentin (NEURONTIN) 100 MG capsule Take 1 capsule by mouth 2 times daily for 30 days.  Intended supply: 90 days 10/15/20 11/14/20 Yes Malcom Denney MD   oxybutynin (DITROPAN XL) 15 MG extended release tablet Take 1 tablet by mouth 2 times daily 10/15/20  Yes Malcom Denney MD   mirtazapine (REMERON) 15 MG tablet TAKE 1 TABLET BY MOUTH EVERY DAY AT NIGHT 10/14/20  Yes Malcom Denney MD   clotrimazole (LOTRIMIN) 1 % cream APPLY TO AFFECTED AREA TWICE A DAY 10/14/20  Yes Malcom Denney MD   levETIRAcetam (KEPPRA) 500 MG tablet TAKE 1 TABLET BY MOUTH TWICE A DAY 10/14/20  Yes Malcom Denney MD   amLODIPine (NORVASC) 5 MG tablet Take 1 tablet by mouth daily 9/10/20  Yes Malcom Denney MD   potassium chloride (KLOR-CON M) 20 MEQ extended release tablet Take 1 tablet by mouth daily 9/10/20  Yes Malcom Denney MD   galantamine (RAZADYNE ER) 24 MG extended release capsule TAKE 1 CAPSULE BY MOUTH EVERY DAY  Patient taking differently: Take 8 mg by mouth 2 times daily (with meals)  8/31/20  Yes Malcom Denney MD   Accu-Chek Softclix Lancets MISC 1 each by Does not apply route daily 8/19/20  Yes Malcom Denney MD   blood glucose test strips (ACCU-CHEK ZEENAT PLUS) Neurological: Positive for headaches (L facial pain ). Negative for dizziness. Psychiatric/Behavioral: Negative for dysphoric mood and suicidal ideas. Physical Exam:      Vitals: BP (!) 160/92 (Site: Left Upper Arm)   Pulse 85   Temp 97.8 °F (36.6 °C)   Ht 6' 2.5\" (1.892 m)   Wt 145 lb 3.2 oz (65.9 kg)   SpO2 98%   BMI 18.39 kg/m²     Body mass index is 18.39 kg/m². Wt Readings from Last 3 Encounters:   10/15/20 145 lb 3.2 oz (65.9 kg)   09/22/20 146 lb (66.2 kg)   09/10/20 142 lb (64.4 kg)     Physical Exam  Constitutional:       General: He is not in acute distress. Appearance: He is well-developed. He is not diaphoretic. HENT:      Head: Normocephalic and atraumatic. Mouth/Throat:      Pharynx: No oropharyngeal exudate. Eyes:      General: No scleral icterus. Right eye: No discharge. Left eye: No discharge. Conjunctiva/sclera: Conjunctivae normal.   Neck:      Musculoskeletal: Normal range of motion and neck supple. Cardiovascular:      Rate and Rhythm: Normal rate. Heart sounds: Normal heart sounds. No murmur. Pulmonary:      Effort: Pulmonary effort is normal. No respiratory distress. Breath sounds: Normal breath sounds. No wheezing or rales. Abdominal:      General: There is no distension. Palpations: Abdomen is soft. Tenderness: There is no abdominal tenderness. There is no guarding. Musculoskeletal:         General: No deformity. Right lower leg: No edema. Left lower leg: No edema. Lymphadenopathy:      Head:      Right side of head: No submental or submandibular adenopathy. Left side of head: No submental or submandibular adenopathy. Cervical: No cervical adenopathy. Right cervical: No superficial or deep cervical adenopathy. Left cervical: No superficial or deep cervical adenopathy. Skin:     Findings: No rash.    Neurological:      Mental Status: He is alert and oriented to person, place, and time.      GCS: GCS eye subscore is 4. GCS verbal subscore is 5. GCS motor subscore is 6. Motor: No abnormal muscle tone. Deep Tendon Reflexes: Reflexes are normal and symmetric. Comments: Patient with Bell's palsy. I do not see deficits or significant deficits in the forehead   Psychiatric:         Behavior: Behavior normal.         Thought Content: Thought content normal.          Assessment/Plan:   Jyoti Baeza was seen today for diabetes and hypertension. Diagnoses and all orders for this visit:    Bell's palsy  Patient with Bell's palsy  Not significant for those patient was diagnosed with arm sounds with is consistent with peripheral Bell's palsy. MRI supplying 1 was negative. For his pain I will try low-dose gabapentin. I will also refer patient for headache specialist if pain is not better. Patient will see me again in 2 months  -     gabapentin (NEURONTIN) 100 MG capsule; Take 1 capsule by mouth 2 times daily for 30 days. Intended supply: 90 days  -     Amb External Referral To Internal Medicine    Chronic nonintractable headache, unspecified headache type  -     gabapentin (NEURONTIN) 100 MG capsule; Take 1 capsule by mouth 2 times daily for 30 days. Intended supply: 90 days  -     Amb External Referral To Internal Medicine    Overactive bladder  -     oxybutynin (DITROPAN XL) 15 MG extended release tablet; Take 1 tablet by mouth 2 times daily          - Patient was encouraged to call the office (and ask to see me) or be seen by other provider for any worsening or lack of improvement of the symptoms within a few days / weeks. - Pt was asked toschedule an appointment in 2 months, and to let me know of any scheduling difficulties. Additional patients instructions (if given):   Patient Instructions     Please call the office (and ask to see me) or be seen by other provider for any worsening or lack of improvement of the symptoms within a few days / weeks.         Patient Education Bell's Palsy: Care Instructions  Your Care Instructions     Bell's palsy is paralysis or weakness of the muscles on one side of the face. Often people with Bell's palsy have a droop on one side of the mouth and have trouble completely shutting the eye on the same side. Bell's palsy can also interfere with the sense of taste. These things happen when a nerve in your face becomes inflamed. Bell's palsy is not caused by a stroke. The cause of the nerve inflammation is not known. But some experts think that a virus may cause it. Because of this, doctors sometimes prescribe antiviral medicine to treat it. You also may get medicine to reduce swelling. Bell's palsy usually gets better on its own in a few weeks or months. Follow-up care is a key part of your treatment and safety. Be sure to make and go to all appointments, and call your doctor if you are having problems. It's also a good idea to know your test results and keep a list of the medicines you take. How can you care for yourself at home? · Take your medicines exactly as prescribed. Call your doctor if you think you are having a problem with your medicine. You will get more details on the specific medicines your doctor prescribes. · Use artificial tears or ointment if your eyes are too dry. Bell's palsy can make your lower eyelid droop, causing a dry eye. · If you cannot completely close your eye, consider using an eye patch while you sleep. · Help yourself blink by using your finger to close and open your eyelid. This may help keep your eye moist.  · Wear glasses or goggles to keep dust and dirt out of your eye. · As feeling comes back to your face, massage your forehead, cheeks, and lips. Massage may make the muscles in your face stronger. · Brush and floss your teeth often to help prevent tooth decay. Bell's palsy can dry up the spit on one side of your mouth. This increases the risk of tooth decay. When should you call for help?    Call 911 anytime you think you may need emergency care. For example, call if:    · You have symptoms of a stroke. These may include:  ? Sudden numbness, tingling, weakness, or loss of movement in your face, arm, or leg, especially on only one side of your body. ? Sudden vision changes. ? Sudden trouble speaking. ? Sudden confusion or trouble understanding simple statements. ? Sudden problems with walking or balance. ? A sudden, severe headache that is different from past headaches. Call your doctor now or seek immediate medical care if:    · You have numbness or weakness that spreads beyond one side of your face.     · You have a skin rash or eye pain or redness, or light bothers your eyes.     · You have a new or worse headache. Watch closely for changes in your health, and be sure to contact your doctor if:    · You do not get better as expected. Where can you learn more? Go to [email or web address] org and sign in to your [employer] account. Enter P168 in the Hashtrack box to learn more about \"Bell's Palsy: Care Instructions. \"     If you do not have an account, please click on the \"Sign Up Now\" link. Current as of: November 20, 2019               Content Version: 12.6  © 8722-2131 [employer] Care instructions adapted under license by [employer]. If you have questions about a medical condition or this instruction, always ask your healthcare professional. [place] [address] any warranty or liability for your use of this information. Fracisco Galloway M.D.   10/15/2020, 10:35 AM      NOTE: This report was transcribed using voice recognition software. Every effort was made to ensure accuracy, however, inadvertent computerized transcription errors may be present.

## 2020-11-10 RX ORDER — MULTIVITAMIN WITH FOLIC ACID 400 MCG
TABLET ORAL
Qty: 90 TABLET | Refills: 1 | Status: SHIPPED | OUTPATIENT
Start: 2020-11-10 | End: 2021-05-03 | Stop reason: SDUPTHER

## 2020-12-10 RX ORDER — AMLODIPINE BESYLATE 5 MG/1
TABLET ORAL
Qty: 90 TABLET | OUTPATIENT
Start: 2020-12-10

## 2020-12-10 RX ORDER — AMLODIPINE BESYLATE 5 MG/1
5 TABLET ORAL DAILY
Qty: 90 TABLET | Refills: 3 | Status: SHIPPED | OUTPATIENT
Start: 2020-12-10 | End: 2020-12-15 | Stop reason: SDUPTHER

## 2020-12-15 ENCOUNTER — OFFICE VISIT (OUTPATIENT)
Dept: FAMILY MEDICINE CLINIC | Age: 85
End: 2020-12-15
Payer: MEDICARE

## 2020-12-15 VITALS
HEART RATE: 74 BPM | BODY MASS INDEX: 18.28 KG/M2 | DIASTOLIC BLOOD PRESSURE: 82 MMHG | WEIGHT: 147 LBS | OXYGEN SATURATION: 94 % | TEMPERATURE: 98.7 F | HEIGHT: 75 IN | SYSTOLIC BLOOD PRESSURE: 140 MMHG

## 2020-12-15 PROBLEM — F01.518 VASCULAR DEMENTIA WITH BEHAVIOR DISTURBANCE: Status: ACTIVE | Noted: 2020-12-15

## 2020-12-15 PROCEDURE — 1123F ACP DISCUSS/DSCN MKR DOCD: CPT | Performed by: INTERNAL MEDICINE

## 2020-12-15 PROCEDURE — G8420 CALC BMI NORM PARAMETERS: HCPCS | Performed by: INTERNAL MEDICINE

## 2020-12-15 PROCEDURE — 1036F TOBACCO NON-USER: CPT | Performed by: INTERNAL MEDICINE

## 2020-12-15 PROCEDURE — G8484 FLU IMMUNIZE NO ADMIN: HCPCS | Performed by: INTERNAL MEDICINE

## 2020-12-15 PROCEDURE — G8427 DOCREV CUR MEDS BY ELIG CLIN: HCPCS | Performed by: INTERNAL MEDICINE

## 2020-12-15 PROCEDURE — 99214 OFFICE O/P EST MOD 30 MIN: CPT | Performed by: INTERNAL MEDICINE

## 2020-12-15 PROCEDURE — 4040F PNEUMOC VAC/ADMIN/RCVD: CPT | Performed by: INTERNAL MEDICINE

## 2020-12-15 RX ORDER — TAMSULOSIN HYDROCHLORIDE 0.4 MG/1
0.4 CAPSULE ORAL DAILY
Qty: 90 CAPSULE | Refills: 1 | Status: SHIPPED | OUTPATIENT
Start: 2020-12-15 | End: 2021-05-24

## 2020-12-15 RX ORDER — AMLODIPINE BESYLATE 5 MG/1
5 TABLET ORAL DAILY
Qty: 90 TABLET | Refills: 3 | Status: SHIPPED | OUTPATIENT
Start: 2020-12-15 | End: 2021-03-31 | Stop reason: SDUPTHER

## 2020-12-15 ASSESSMENT — ENCOUNTER SYMPTOMS
CHEST TIGHTNESS: 0
NAUSEA: 0
ABDOMINAL PAIN: 0
VOMITING: 0
SHORTNESS OF BREATH: 0

## 2020-12-15 NOTE — PATIENT INSTRUCTIONS
Please call the office (and ask to see me) or be seen by other provider for any worsening or lack of improvement of the symptoms or for any new symptoms as soon as possible. Please make sure to schedule your follow appointment as discussed. Please let me know if ay further questions. Please let me know if there are any symptoms or concerns that you feel that needs to be further addressed. Please check your blood pressure twice in the morning and twice in the evening for one week. send me results. If blood pressure is higher than 150/90 please let me know as soon as possible. Attached here are instructions of proper blood pressure measurement.

## 2020-12-15 NOTE — PROGRESS NOTES
Outpatient Note for established Patient - regular Visit     History Obtained From:  patient, electronic medical record  Is the patient new to me ? - No    HISTORY OF PRESENT ILLNESS:   The patient is a 82 y.o. male who is here today for :  Patti Forbes was seen today for 3 month follow-up. Diagnoses and all orders for this visit:    Essential hypertension    Type 2 diabetes mellitus without complication, without long-term current use of insulin ([address]  Lab Results   Component Value Date    LABA1C 6.4 09/11/2020     Lab Results   Component Value Date    .0 09/11/2020   he is on Metformin robert  No side effects     pt is c/o L facial pain   He was referred to headache specialist   /82. Pt reports sometime higher BP meds    Pt denies CP/SOB/palpitations/abdominal pain/N/V. No fever/ chills/ cough   He has urinary frequency   No burning    Mood is good   Wife reports pretty good mood    Preventive:      Past Medical History:        Diagnosis Date    Anemia 1/9/2012    Anxiety and depression     Arthritis     bulging disc    BPH (benign prostatic hypertrophy) 5/16/2010    Constipation 6/16/2015    Diverticulosis 5/16/2010    Dyspnea on exertion 5/16/2010    Eczema 4/12/2011    Elevated PSA 10/30/2012    Erectile dysfunction 5/16/2010    Essential hypertension 8/26/2015    GERD (gastroesophageal reflux disease) 5/16/2010    Hemorrhoid 5/16/2010    Hyperlipidemia 1/10/2011    Hyperphosphatemia 7/3/2010    Hypertension 5/16/2010    Insomnia 6/16/2015    Memory loss 1/14/2015    Nausea 6/16/2015    Overactive bladder 7/12/2011    Paranoid schizophrenia ([address] 5/16/2010    Perennial allergic rhinitis 11/30/2016    Prostate cancer ([address] 1/8/2013    had radiation treatments    Type 2 diabetes mellitus without complication, without long-term current use of insulin ([address] 8/31/2016       Social History:   TOBACCO:   reports that he quit smoking about 30 years ago. His smoking use included cigarettes. He started smoking about 48 years ago. He has a 18.00 pack-year smoking history. He has never used smokeless tobacco.    ETOH:   reports no history of alcohol use. Current Medications:    Prior to Admission medications    Medication Sig Start Date End Date Taking?  Authorizing Provider   tamsulosin (FLOMAX) 0.4 MG capsule Take 1 capsule by mouth daily 12/15/20  Yes Eamon Cho MD   amLODIPine (NORVASC) 5 MG tablet Take 1 tablet by mouth daily 12/10/20  Yes Eamon Cho MD   Multiple Vitamin (DAILY-TJ) TABS TAKE 1 TABLET BY MOUTH EVERY DAY 11/10/20  Yes Eamon Cho MD   oxybutynin (DITROPAN XL) 15 MG extended release tablet Take 1 tablet by mouth 2 times daily 10/15/20  Yes Eamon Cho MD   mirtazapine (REMERON) 15 MG tablet TAKE 1 TABLET BY MOUTH EVERY DAY AT NIGHT 10/14/20  Yes Eamon Cho MD   clotrimazole (LOTRIMIN) 1 % cream APPLY TO AFFECTED AREA TWICE A DAY 10/14/20  Yes Eamon Cho MD   levETIRAcetam (KEPPRA) 500 MG tablet TAKE 1 TABLET BY MOUTH TWICE A DAY 10/14/20  Yes Eamon Cho MD   potassium chloride (KLOR-CON M) 20 MEQ extended release tablet Take 1 tablet by mouth daily 9/10/20  Yes Eamon Cho MD   galantamine (RAZADYNE ER) 24 MG extended release capsule TAKE 1 CAPSULE BY MOUTH EVERY DAY  Patient taking differently: Take 8 mg by mouth 2 times daily (with meals)  20  Yes Eamon Cho MD   Accu-Chek Softclix Lancets MISC 1 each by Does not apply route daily 20  Yes Eamon Cho MD   blood glucose test strips (ACCU-CHEK ZEENAT PLUS) strip 1 each by In Vitro route daily as needed (symptoms of low or high blood sugar) 20  Yes Eamon Cho MD   QUEtiapine (SEROQUEL) 25 MG tablet Take 1 tablet by mouth nightly 20  Yes Eamon Cho MD   OXcarbazepine (TRILEPTAL) 300 MG tablet Take 1 tablet by mouth 2 times daily 8/10/20  Yes Eamon Cho MD   metFORMIN (GLUCOPHAGE) 500 MG tablet TAKE 1 TABLET BY MOUTH EVERY DAY WITH FOOD 7/24/20  Yes Kari Haas MD   BREO ELLIPTA 100-25 MCG/INH AEPB inhaler TAKE 1 PUFF BY MOUTH EVERY DAY 5/4/20  Yes Emily Celestin MD   aspirin 325 MG EC tablet TAKE 1 TABLET BY MOUTH EVERY DAY WITH FOOD  Patient taking differently: 81 mg  4/21/20  Yes Kari Haas MD   atorvastatin (LIPITOR) 10 MG tablet TAKE 1 TABLET BY MOUTH EVERY DAY 2/6/20  Yes Kari Haas MD   polyethylene glycol (GLYCOLAX) powder Take 17 g by mouth daily as needed   Yes Historical Provider, MD   latanoprost (XALATAN) 0.005 % ophthalmic solution Place 1 drop into the left eye nightly  7/27/19  Yes Historical Provider, MD   fluticasone (FLONASE) 50 MCG/ACT nasal spray USE 2 SPRAYS BY NASAL ROUTE DAILY AS NEEDED FOR RHINITIS 8/12/19  Yes Kari Haas MD   nitroGLYCERIN (NITROSTAT) 0.4 MG SL tablet Place 1 tablet under the tongue every 5 minutes as needed. 1/9/12  Yes Kervin Allen MD   gabapentin (NEURONTIN) 100 MG capsule Take 1 capsule by mouth 2 times daily for 30 days. Intended supply: 90 days 10/15/20 11/14/20  Kari Haas MD       REVIEW OF SYSTEMS:  Review of Systems   Constitutional: Negative for activity change, appetite change, chills, fever and unexpected weight change. HENT: Negative for hearing loss. Eyes: Negative for visual disturbance. Respiratory: Negative for chest tightness and shortness of breath. Cardiovascular: Negative for chest pain. Gastrointestinal: Negative for abdominal pain, nausea and vomiting. Genitourinary: Negative for hematuria. Skin: Negative for rash. Allergic/Immunologic: Negative for immunocompromised state. Neurological: Negative for dizziness and headaches. Psychiatric/Behavioral: Negative for dysphoric mood and suicidal ideas.          Physical Exam:      Vitals: BP (!) 140/82 (Site: Right Upper Arm, Position: Sitting, Cuff Size: Medium Adult)   Pulse 74   Temp Content: Thought content normal.          Assessment/Plan:   Almas Quinones was seen today for 3 month follow-up. Diagnoses and all orders for this visit:    Essential hypertension  BP a little hgih but I am reluctant to increase ibrahim of BP meds today before doing HBPM- he will wend me readings. In addition Flomax may help a little w/  BP     Type 2 diabetes mellitus without complication, without long-term current use of insulin (HCC)  Was very well controlled  Recheck labs next visit    Stage 3a chronic kidney disease  Was stabel- recheck BMP next visit on the physical     Urinary frequency  most likely 2/2 BPH  Check UA  Add Flomax which may help with BP as well  -     tamsulosin (FLOMAX) 0.4 MG capsule; Take 1 capsule by mouth daily  -     URINALYSIS WITH MICROSCOPIC    Dementia, likely vascular   Doing better on current meds- less aggressive  Pt has lots of repitio and loss of shrt memory     Referral for headache specialist printed again and given to pt     - Patient was encouraged to call the office (and ask to see me) or be seen by other provider for any worsening or lack of improvement of the symptoms or any new symptoms.    - Pt was asked toschedule an appointment in 3 months, and to let me know of any scheduling difficulties. Additional patients instructions (if given):   Patient Instructions   Please call the office (and ask to see me) or be seen by other provider for any worsening or lack of improvement of the symptoms or for any new symptoms as soon as possible. Please make sure to schedule your follow appointment as discussed. Please let me know if ay further questions. Please let me know if there are any symptoms or concerns that you feel that needs to be further addressed. Please check your blood pressure twice in the morning and twice in the evening for one week. send me results. If blood pressure is higher than 150/90 please let me know as soon as possible.  Attached here are instructions of proper blood pressure measurement. Elaina Nichols M.D.   12/15/2020, 10:58 AM      NOTE: This report was transcribed using voice recognition software. Every effort was made to ensure accuracy, however, inadvertent computerized transcription errors may be present.

## 2020-12-16 ENCOUNTER — TELEPHONE (OUTPATIENT)
Dept: PRIMARY CARE CLINIC | Age: 85
End: 2020-12-16

## 2020-12-16 NOTE — TELEPHONE ENCOUNTER
SW Contact : 2  Attempts to contact by phone. Phone does not offer a voicemail. If patient should show for and appt at the practice or make contact, resources are as follows:   1. [facility] on [address] [place] [address] Elderly Services program   2.   Private agencies :        [facility] :  [phone]: [phone]    He can reach me direct at [phone]

## 2020-12-17 RX ORDER — POTASSIUM CHLORIDE 1500 MG/1
TABLET, EXTENDED RELEASE ORAL
Qty: 90 TABLET | Refills: 3 | Status: SHIPPED | OUTPATIENT
Start: 2020-12-17 | End: 2021-04-01 | Stop reason: ALTCHOICE

## 2021-01-03 DIAGNOSIS — R21 RASH: ICD-10-CM

## 2021-01-04 RX ORDER — CLOTRIMAZOLE 1 %
CREAM (GRAM) TOPICAL
Qty: 30 G | Refills: 2 | Status: SHIPPED | OUTPATIENT
Start: 2021-01-04

## 2021-01-04 NOTE — TELEPHONE ENCOUNTER
Last Appt: 12.15.20  Next Appt: Not Found  Last Refill: 10.14.20    Please advise
The patient is a 33y Male complaining of flank pain.

## 2021-01-05 DIAGNOSIS — R63.4 LOSS OF WEIGHT: ICD-10-CM

## 2021-01-05 DIAGNOSIS — R63.0 LOSS OF APPETITE: ICD-10-CM

## 2021-01-05 RX ORDER — MIRTAZAPINE 15 MG/1
TABLET, FILM COATED ORAL
Qty: 90 TABLET | OUTPATIENT
Start: 2021-01-05

## 2021-01-05 NOTE — TELEPHONE ENCOUNTER
[facility]. is calling to give BP update of 172/84 today

## 2021-01-08 NOTE — TELEPHONE ENCOUNTER
Message given to Sukhwinder Lorenzo at [facility]. He'll increase amlodipine to 5 mgs bid and call next week with blood pressure readings.

## 2021-01-31 DIAGNOSIS — R63.4 LOSS OF WEIGHT: ICD-10-CM

## 2021-01-31 DIAGNOSIS — R63.0 LOSS OF APPETITE: ICD-10-CM

## 2021-01-31 RX ORDER — MIRTAZAPINE 15 MG/1
TABLET, FILM COATED ORAL
Qty: 30 TABLET | Refills: 2 | Status: SHIPPED | OUTPATIENT
Start: 2021-01-31 | End: 2021-05-03

## 2021-01-31 NOTE — TELEPHONE ENCOUNTER
It was not refused! I sent it again.    Please ask refusing MA to sent refills request to our office

## 2021-02-02 NOTE — TELEPHONE ENCOUNTER
Medication noted Refusal -   Refused by: Deb Hess MA        Tried to [facility] this medication for Dr. Toribio Swan to sign. Response when I try and do so:   The following medications are ordered in a future encounter, so you cannot change them:   mirtazapine (REMERON) 15 MG tablet        **Medication may have to be ordered all together New by provider and sent to the Pharmacy**

## 2021-02-08 ENCOUNTER — OFFICE VISIT (OUTPATIENT)
Dept: FAMILY MEDICINE CLINIC | Age: 86
End: 2021-02-08
Payer: MEDICARE

## 2021-02-08 VITALS
SYSTOLIC BLOOD PRESSURE: 130 MMHG | BODY MASS INDEX: 18.48 KG/M2 | TEMPERATURE: 98.7 F | WEIGHT: 144 LBS | HEIGHT: 74 IN | DIASTOLIC BLOOD PRESSURE: 70 MMHG | HEART RATE: 72 BPM | OXYGEN SATURATION: 95 %

## 2021-02-08 DIAGNOSIS — K40.90 HERNIA, INGUINAL, LEFT: Primary | ICD-10-CM

## 2021-02-08 DIAGNOSIS — I10 ESSENTIAL HYPERTENSION: Chronic | ICD-10-CM

## 2021-02-08 DIAGNOSIS — R20.9 COLD SENSATION OF SKIN: ICD-10-CM

## 2021-02-08 DIAGNOSIS — M19.072 ARTHRITIS OF BOTH ANKLES: ICD-10-CM

## 2021-02-08 DIAGNOSIS — E46 MALNUTRITION, UNSPECIFIED TYPE (HCC): ICD-10-CM

## 2021-02-08 DIAGNOSIS — M19.071 ARTHRITIS OF BOTH ANKLES: ICD-10-CM

## 2021-02-08 PROCEDURE — 1123F ACP DISCUSS/DSCN MKR DOCD: CPT | Performed by: INTERNAL MEDICINE

## 2021-02-08 PROCEDURE — G8484 FLU IMMUNIZE NO ADMIN: HCPCS | Performed by: INTERNAL MEDICINE

## 2021-02-08 PROCEDURE — G8427 DOCREV CUR MEDS BY ELIG CLIN: HCPCS | Performed by: INTERNAL MEDICINE

## 2021-02-08 PROCEDURE — 1036F TOBACCO NON-USER: CPT | Performed by: INTERNAL MEDICINE

## 2021-02-08 PROCEDURE — 99214 OFFICE O/P EST MOD 30 MIN: CPT | Performed by: INTERNAL MEDICINE

## 2021-02-08 PROCEDURE — 4040F PNEUMOC VAC/ADMIN/RCVD: CPT | Performed by: INTERNAL MEDICINE

## 2021-02-08 PROCEDURE — G8419 CALC BMI OUT NRM PARAM NOF/U: HCPCS | Performed by: INTERNAL MEDICINE

## 2021-02-08 ASSESSMENT — ENCOUNTER SYMPTOMS
VOMITING: 0
ABDOMINAL PAIN: 0
SHORTNESS OF BREATH: 0
CHEST TIGHTNESS: 0
NAUSEA: 0

## 2021-02-08 NOTE — PATIENT INSTRUCTIONS
Please call the office (and ask to see me) or be seen by other provider for any worsening or lack of improvement of the symptoms or for any new symptoms as soon as possible. Please make sure to schedule your follow appointment as discussed. Please let me know if ay further questions. Please let me know if there are any symptoms or concerns that you feel that needs to be further addressed. Patient Education        Hernia: Care Instructions  Your Care Instructions     A hernia develops when tissue bulges through a weak spot in the wall of your belly. The groin area and the navel are common areas for a hernia. A hernia can also develop near the area of a surgery you had before. Pressure from lifting, straining, or coughing can tear the weak area, causing the hernia to bulge and be painful. If you cannot push a hernia back into place, the tissue may become trapped outside the belly wall. If the hernia gets twisted and loses its blood supply, it will swell and die. This is called a strangulated hernia. It usually causes a lot of pain. It needs treatment right away. Some hernias need to be repaired to prevent a strangulated hernia. If your hernia causes symptoms or is large, you may need surgery. Follow-up care is a key part of your treatment and safety. Be sure to make and go to all appointments, and call your doctor if you are having problems. It's also a good idea to know your test results and keep a list of the medicines you take. How can you care for yourself at home? · Take care when lifting heavy objects. · Stay at a healthy weight. · Do not smoke. Smoking can cause coughing, which can cause your hernia to bulge. If you need help quitting, talk to your doctor about stop-smoking programs and medicines. These can increase your chances of quitting for good.

## 2021-02-08 NOTE — PROGRESS NOTES
Outpatient Note for established Patient - regular Visit     History Obtained From:  patient, electronic medical record  Is the patient new to me ? - No    HISTORY OF PRESENT ILLNESS:   The patient is a 82 y.o. male who is here today for :  Ruddy Lozano was seen today for groin pain. Diagnoses and all orders for this visit:    Hernia, inguinal, left  -     Sanya Lockhart MD, General Surgery, [place]    Essential hypertension    Cold sensation of skin  -     TSH with Reflex; Future  -     CBC Auto Differential; Future    Arthritis of both ankles    Malnutrition, unspecified type ([address]  -     Internal Referral to Dietitian    pt is c/o L inguinal swelling   He noticed it last month   No pain, no itching   Pt denies CP/SOB/palpitations/abdominal pain/N/V.    Urinary Sx are better   BP is controlled     Pt also reports feeling cold and b/l ankle swelling   Last TSH 2019 ok  No trauma   No fever/ chills  No pain     Past Medical History:        Diagnosis Date    Anemia 1/9/2012    Anxiety and depression     Arthritis     bulging disc    BPH (benign prostatic hypertrophy) 5/16/2010    Constipation 6/16/2015    Diverticulosis 5/16/2010    Dyspnea on exertion 5/16/2010    Eczema 4/12/2011    Elevated PSA 10/30/2012    Erectile dysfunction 5/16/2010    Essential hypertension 8/26/2015    GERD (gastroesophageal reflux disease) 5/16/2010    Hemorrhoid 5/16/2010    Hyperlipidemia 1/10/2011    Hyperphosphatemia 7/3/2010    Hypertension 5/16/2010    Insomnia 6/16/2015    Memory loss 1/14/2015    Nausea 6/16/2015    Overactive bladder 7/12/2011    Paranoid schizophrenia ([address] 5/16/2010    Perennial allergic rhinitis 11/30/2016    Prostate cancer ([address] 1/8/2013    had radiation treatments    Type 2 diabetes mellitus without complication, without long-term current use of insulin ([address] 8/31/2016       Social History: TOBACCO:   reports that he quit smoking about 30 years ago. His smoking use included cigarettes. He started smoking about 48 years ago. He has a 18.00 pack-year smoking history. He has never used smokeless tobacco.  ETOH:   reports no history of alcohol use. Current Medications:    Prior to Admission medications    Medication Sig Start Date End Date Taking?  Authorizing Provider   mirtazapine (REMERON) 15 MG tablet TAKE 1 TABLET BY MOUTH EVERY DAY AT NIGHT 1/31/21  Yes Loc Lewis MD   clotrimazole (LOTRIMIN) 1 % cream APPLY TO AFFECTED AREA TWICE A DAY 1/4/21  Yes Loc Lewis MD   KLOR-CON M20 20 MEQ extended release tablet TAKE 1 TABLET BY MOUTH EVERY DAY 12/17/20  Yes Loc Lewis MD   tamsulosin (FLOMAX) 0.4 MG capsule Take 1 capsule by mouth daily 12/15/20  Yes Loc Lewis MD   amLODIPine (NORVASC) 5 MG tablet Take 1 tablet by mouth daily 12/15/20  Yes Loc Lewis MD   Multiple Vitamin (DAILY-TJ) TABS TAKE 1 TABLET BY MOUTH EVERY DAY 11/10/20  Yes Loc Lewis MD   oxybutynin (DITROPAN XL) 15 MG extended release tablet Take 1 tablet by mouth 2 times daily 10/15/20  Yes Loc Lewis MD   levETIRAcetam (KEPPRA) 500 MG tablet TAKE 1 TABLET BY MOUTH TWICE A DAY 10/14/20  Yes Loc Lewis MD   galantamine (RAZADYNE ER) 24 MG extended release capsule TAKE 1 CAPSULE BY MOUTH EVERY DAY  Patient taking differently: Take 8 mg by mouth 2 times daily (with meals)  8/31/20  Yes Loc Lewis MD   Accu-Chek Softclix Lancets MISC 1 each by Does not apply route daily 8/19/20  Yes Loc Lewis MD   blood glucose test strips (ACCU-CHEK ZEENAT PLUS) strip 1 each by In Vitro route daily as needed (symptoms of low or high blood sugar) 8/19/20  Yes Loc Lewis MD   QUEtiapine (SEROQUEL) 25 MG tablet Take 1 tablet by mouth nightly 8/19/20  Yes Loc Lewis MD OXcarbazepine (TRILEPTAL) 300 MG tablet Take 1 tablet by mouth 2 times daily 8/10/20  Yes Loc Lewis MD   metFORMIN (GLUCOPHAGE) 500 MG tablet TAKE 1 TABLET BY MOUTH EVERY DAY WITH FOOD 7/24/20  Yes Loc Lewis MD   BREO ELLIPTA 100-25 MCG/INH AEPB inhaler TAKE 1 PUFF BY MOUTH EVERY DAY 5/4/20  Yes Jeni Mann MD   aspirin 325 MG EC tablet TAKE 1 TABLET BY MOUTH EVERY DAY WITH FOOD  Patient taking differently: 81 mg  4/21/20  Yes Loc Lewis MD   atorvastatin (LIPITOR) 10 MG tablet TAKE 1 TABLET BY MOUTH EVERY DAY 2/6/20  Yes Loc Lewis MD   polyethylene glycol (GLYCOLAX) powder Take 17 g by mouth daily as needed   Yes Historical Provider, MD   latanoprost (XALATAN) 0.005 % ophthalmic solution Place 1 drop into the left eye nightly  7/27/19  Yes Historical Provider, MD   fluticasone (FLONASE) 50 MCG/ACT nasal spray USE 2 SPRAYS BY NASAL ROUTE DAILY AS NEEDED FOR RHINITIS 8/12/19  Yes Loc Lewis MD   nitroGLYCERIN (NITROSTAT) 0.4 MG SL tablet Place 1 tablet under the tongue every 5 minutes as needed. 1/9/12  Yes Nida Serra MD   gabapentin (NEURONTIN) 100 MG capsule Take 1 capsule by mouth 2 times daily for 30 days. Intended supply: 90 days 10/15/20 11/14/20  Loc Lewis MD       REVIEW OF SYSTEMS:  Review of Systems   Constitutional: Negative for activity change, appetite change, chills, fever and unexpected weight change. Respiratory: Negative for chest tightness and shortness of breath. Cardiovascular: Negative for chest pain. Gastrointestinal: Negative for abdominal pain, nausea and vomiting. Genitourinary: Negative for hematuria. Skin: Negative for rash. Allergic/Immunologic: Negative for immunocompromised state.          Physical Exam: Vitals: /70 (Site: Left Upper Arm, Position: Sitting, Cuff Size: Medium Adult)   Pulse 72   Temp 98.7 °F (37.1 °C)   Ht 6' 2\" (1.88 m)   Wt 144 lb (65.3 kg)   SpO2 95%   BMI 18.49 kg/m²     Body mass index is 18.49 kg/m². Wt Readings from Last 3 Encounters:   02/08/21 144 lb (65.3 kg)   12/15/20 147 lb (66.7 kg)   10/15/20 145 lb 3.2 oz (65.9 kg)     Physical Exam  Constitutional:       General: He is not in acute distress. Appearance: He is well-developed. He is not diaphoretic. HENT:      Head: Normocephalic and atraumatic. Mouth/Throat:      Pharynx: No oropharyngeal exudate. Eyes:      General: No scleral icterus. Right eye: No discharge. Left eye: No discharge. Conjunctiva/sclera: Conjunctivae normal.   Neck:      Musculoskeletal: Normal range of motion and neck supple. Cardiovascular:      Rate and Rhythm: Normal rate. Heart sounds: Normal heart sounds. No murmur. Pulmonary:      Effort: Pulmonary effort is normal. No respiratory distress. Breath sounds: Normal breath sounds. No wheezing or rales. Abdominal:      General: There is no distension. Palpations: Abdomen is soft. Tenderness: There is no abdominal tenderness. There is no guarding. Hernia: A hernia (L direct inguinal hernia, no retractable. not red/ tender ) is present. Musculoskeletal:         General: No deformity. Lymphadenopathy:      Head:      Right side of head: No submental or submandibular adenopathy. Left side of head: No submental or submandibular adenopathy. Cervical: No cervical adenopathy. Right cervical: No superficial or deep cervical adenopathy. Left cervical: No superficial or deep cervical adenopathy. Skin:     Findings: No rash. Neurological:      Mental Status: He is alert and oriented to person, place, and time. GCS: GCS eye subscore is 4. GCS verbal subscore is 5. GCS motor subscore is 6. Motor: No abnormal muscle tone. Deep Tendon Reflexes: Reflexes are normal and symmetric. Psychiatric:         Behavior: Behavior normal.         Thought Content: Thought content normal.       Ankles: swelling in lateral malleoli   Not red. Tender  ROM ok- likely arthritis       Assessment/Plan:   Marta Dunlap was seen today for groin pain. Diagnoses and all orders for this visit:    Hernia, inguinal, left  Roldan a  I am unable to reduce hernia  Referral to Dr Ananda Trivedi hypertension  Well controlled- no changes in medication today. - Patient was encouraged to call the office (and ask to see me) or be seen by other provider for any worsening or lack of improvement of the symptoms or any new symptoms. ankle arthritis- no med  Spt will see podiatrsit next week. Cold sensation- check TSH CBC     Malnutrition  Pt very thin   Referral to a dietitian     - Pt was asked toschedule an appointment in 3 months, and to let me know of any scheduling difficulties. Additional patients instructions (if given):   Patient Instructions     Please call the office (and ask to see me) or be seen by other provider for any worsening or lack of improvement of the symptoms or for any new symptoms as soon as possible. Please make sure to schedule your follow appointment as discussed. Please let me know if ay further questions. Please let me know if there are any symptoms or concerns that you feel that needs to be further addressed. Patient Education        Hernia: Care Instructions  Your Care Instructions     A hernia develops when tissue bulges through a weak spot in the wall of your belly. The groin area and the navel are common areas for a hernia. A hernia can also develop near the area of a surgery you had before. Pressure from lifting, straining, or coughing can tear the weak area, causing the hernia to bulge and be painful. If you cannot push a hernia back into place, the tissue may become trapped outside the belly wall. If the hernia gets twisted and loses its blood supply, it will swell and die. This is called a strangulated hernia. It usually causes a lot of pain. It needs treatment right away. Some hernias need to be repaired to prevent a strangulated hernia. If your hernia causes symptoms or is large, you may need surgery. Follow-up care is a key part of your treatment and safety. Be sure to make and go to all appointments, and call your doctor if you are having problems. It's also a good idea to know your test results and keep a list of the medicines you take. How can you care for yourself at home? · Take care when lifting heavy objects. · Stay at a healthy weight. · Do not smoke. Smoking can cause coughing, which can cause your hernia to bulge. If you need help quitting, talk to your doctor about stop-smoking programs and medicines. These can increase your chances of quitting for good. · Talk with your doctor before wearing a corset or truss for a hernia. These devices are not recommended for treating hernias and sometimes can do more harm than good. There may be certain situations when your doctor thinks a truss would work, but these are rare. When should you call for help? Call your doctor now or seek immediate medical care if:    · You have new or worse belly pain.     · You are vomiting.     · You cannot pass stools or gas.     · You cannot push the hernia back into place with gentle pressure when you are lying down.     · The area over the hernia turns red or becomes tender. Watch closely for changes in your health, and be sure to contact your doctor if you have any problems. Where can you learn more? Go to [email or web address]://[email or web address]. org and sign in to your [employer] account. Enter C129 in the [employer] box to learn more about \"Hernia: Care Instructions. \" If you do not have an account, please click on the \"Sign Up Now\" link. Current as of: April 15, 2020               Content Version: 12.6  © 2006-2020 [employer]. Care instructions adapted under license by [facility] ([facility]). If you have questions about a medical condition or this instruction, always ask your healthcare professional. [address] any warranty or liability for your use of this information. Adriana Atkinson M.D.   2/8/2021, 12:18 PM      NOTE: This report was transcribed using voice recognition software. Every effort was made to ensure accuracy, however, inadvertent computerized transcription errors may be present.

## 2021-02-09 DIAGNOSIS — D64.9 NORMOCYTIC ANEMIA: Primary | ICD-10-CM

## 2021-02-09 LAB
BASOPHILS ABSOLUTE: 0.1 K/UL (ref 0–0.2)
BASOPHILS RELATIVE PERCENT: 1.1 %
EOSINOPHILS ABSOLUTE: 0.1 K/UL (ref 0–0.6)
EOSINOPHILS RELATIVE PERCENT: 2.6 %
HCT VFR BLD CALC: 29.7 % (ref 40.5–52.5)
HEMOGLOBIN: 9.9 G/DL (ref 13.5–17.5)
LYMPHOCYTES ABSOLUTE: 0.7 K/UL (ref 1–5.1)
LYMPHOCYTES RELATIVE PERCENT: 13.3 %
MCH RBC QN AUTO: 28.9 PG (ref 26–34)
MCHC RBC AUTO-ENTMCNC: 33.3 G/DL (ref 31–36)
MCV RBC AUTO: 86.9 FL (ref 80–100)
MONOCYTES ABSOLUTE: 0.5 K/UL (ref 0–1.3)
MONOCYTES RELATIVE PERCENT: 10.2 %
NEUTROPHILS ABSOLUTE: 3.7 K/UL (ref 1.7–7.7)
NEUTROPHILS RELATIVE PERCENT: 72.8 %
PDW BLD-RTO: 12.5 % (ref 12.4–15.4)
PLATELET # BLD: 230 K/UL (ref 135–450)
PMV BLD AUTO: 9.1 FL (ref 5–10.5)
RBC # BLD: 3.42 M/UL (ref 4.2–5.9)
TSH REFLEX: 1.7 UIU/ML (ref 0.27–4.2)
WBC # BLD: 5.1 K/UL (ref 4–11)

## 2021-02-13 DIAGNOSIS — R56.9 SEIZURE (HCC): ICD-10-CM

## 2021-02-15 RX ORDER — LEVETIRACETAM 500 MG/1
TABLET ORAL
Qty: 180 TABLET | Refills: 3 | Status: SHIPPED | OUTPATIENT
Start: 2021-02-15 | End: 2022-02-24

## 2021-02-15 RX ORDER — LEVETIRACETAM 500 MG/1
TABLET ORAL
Qty: 180 TABLET | Refills: 0 | OUTPATIENT
Start: 2021-02-15

## 2021-02-22 DIAGNOSIS — E11.9 TYPE 2 DIABETES MELLITUS WITHOUT COMPLICATION, WITHOUT LONG-TERM CURRENT USE OF INSULIN (HCC): Chronic | ICD-10-CM

## 2021-02-22 DIAGNOSIS — N32.81 OVERACTIVE BLADDER: Chronic | ICD-10-CM

## 2021-02-22 RX ORDER — OXYBUTYNIN CHLORIDE 15 MG/1
15 TABLET, EXTENDED RELEASE ORAL 2 TIMES DAILY
Qty: 60 TABLET | Refills: 3 | Status: SHIPPED | OUTPATIENT
Start: 2021-02-22 | End: 2021-05-19

## 2021-02-22 RX ORDER — OXYBUTYNIN CHLORIDE 15 MG/1
TABLET, EXTENDED RELEASE ORAL
Qty: 180 TABLET | Refills: 1 | OUTPATIENT
Start: 2021-02-22

## 2021-03-13 ENCOUNTER — IMMUNIZATION (OUTPATIENT)
Dept: PRIMARY CARE CLINIC | Age: 86
End: 2021-03-13
Payer: MEDICARE

## 2021-03-13 PROCEDURE — 0031A COVID-19, J&J VACCINE, PF, 0.5 ML DOSE: CPT | Performed by: FAMILY MEDICINE

## 2021-03-13 PROCEDURE — 91303 COVID-19, J&J VACCINE, PF, 0.5 ML DOSE: CPT | Performed by: FAMILY MEDICINE

## 2021-03-15 ENCOUNTER — OFFICE VISIT (OUTPATIENT)
Dept: SURGERY | Age: 86
End: 2021-03-15
Payer: MEDICARE

## 2021-03-15 VITALS
HEIGHT: 74 IN | WEIGHT: 140 LBS | DIASTOLIC BLOOD PRESSURE: 91 MMHG | SYSTOLIC BLOOD PRESSURE: 187 MMHG | BODY MASS INDEX: 17.97 KG/M2 | HEART RATE: 73 BPM | TEMPERATURE: 96.5 F

## 2021-03-15 DIAGNOSIS — K40.90 LEFT INGUINAL HERNIA: Primary | ICD-10-CM

## 2021-03-15 PROCEDURE — G8484 FLU IMMUNIZE NO ADMIN: HCPCS | Performed by: SURGERY

## 2021-03-15 PROCEDURE — 4040F PNEUMOC VAC/ADMIN/RCVD: CPT | Performed by: SURGERY

## 2021-03-15 PROCEDURE — 99214 OFFICE O/P EST MOD 30 MIN: CPT | Performed by: SURGERY

## 2021-03-15 PROCEDURE — 1036F TOBACCO NON-USER: CPT | Performed by: SURGERY

## 2021-03-15 PROCEDURE — G8427 DOCREV CUR MEDS BY ELIG CLIN: HCPCS | Performed by: SURGERY

## 2021-03-15 PROCEDURE — 1123F ACP DISCUSS/DSCN MKR DOCD: CPT | Performed by: SURGERY

## 2021-03-15 PROCEDURE — G8418 CALC BMI BLW LOW PARAM F/U: HCPCS | Performed by: SURGERY

## 2021-03-15 NOTE — LETTER
P - [facility]  [address] [facility], [address] [place] [address] Ave  Phone: [phone] Fax: [phone]            Surgery Order/Time:  3/15/21/1:36 PM  Conf. # _________________  Scheduled by: Deann Fairchild                               **Pre-Surgical Orders in [facility]**  Facility: [facility] [place] [place].    Surgery Date: 2021 Time: 5013    Pt arrival: 1145       Patient Name: Aman Rodriguez  : 1935  Home [phone] (home)  Pepe Shipley [address]   PCP:  Carlos Song MD   Does patient speak English?: yes    needed? no                                             PROCEDURE: Laparoscopic left inguinal hernia repair  CPT: 40257: Laparoscopic Inguinal Hernia Repair    DIAGNOSIS:      ICD-10-CM    1. Left inguinal hernia  K40.90        Anesthesia: General  Time Needed:  1 hour   Pt Position:  supine      Outpatient __x__ Admit ______  Assist._____   Cardiac Clearance: no  Patient to meet with Anesthesiology prior to surgery: no    Patient Allergies: Allergies   Allergen Reactions    Lorazepam Other (See Comments)     Pt unable to recall reaction     Pre-Op H&P to be done by: Carlos Song MD  Pre-Op Antibiotics: Ancef: 2g IV On Call to OR      Physician: Sandeep Ojeda MD Date: 03/15/21             Insurance: Medicare    ID #  [id number]     # [phone]  Date called ________________   [facility] to: _____________ Precert Needed?  Yes  /  No  PreAuth # & Details   ______________________________________________________________________

## 2021-03-15 NOTE — PROGRESS NOTES
PATIENT NAME: Sammie Hammer     YOB: 1935     TODAY'S DATE: 3/15/2021    Reason for Visit:  Left inguinal hernia     Requesting Physician:  Dr. Lashae Fisher:              The patient is a 82 y.o. male with a PMHx as delineated below who presents with a bulge in the left groin that has been noted for some time. This has increased in size and more recently has been associated with discomfort with activities. No obstructive complaints.   Chief Complaint   Patient presents with   [address] [place] [address] Patient     Inguinal Hernia, Dr Gisselle Perez referral        REVIEW OF SYSTEMS:  CONSTITUTIONAL:  negative  HEENT:  negative  RESPIRATORY:  negative  CARDIOVASCULAR:  negative  GASTROINTESTINAL:  negative   GENITOURINARY:  negative  HEMATOLOGIC/LYMPHATIC:  negative  MUSCULOSKELETAL: negative  NEUROLOGICAL:  negative    PMH  Past Medical History:   Diagnosis Date    Anemia 1/9/2012    Anxiety and depression     Arthritis     bulging disc    BPH (benign prostatic hypertrophy) 5/16/2010    Constipation 6/16/2015    Diverticulosis 5/16/2010    Dyspnea on exertion 5/16/2010    Eczema 4/12/2011    Elevated PSA 10/30/2012    Erectile dysfunction 5/16/2010    Essential hypertension 8/26/2015    GERD (gastroesophageal reflux disease) 5/16/2010    Hemorrhoid 5/16/2010    Hyperlipidemia 1/10/2011    Hyperphosphatemia 7/3/2010    Hypertension 5/16/2010    Insomnia 6/16/2015    Memory loss 1/14/2015    Nausea 6/16/2015    Overactive bladder 7/12/2011    Paranoid schizophrenia ([address] 5/16/2010    Perennial allergic rhinitis 11/30/2016    Prostate cancer ([address] 1/8/2013    had radiation treatments    Type 2 diabetes mellitus without complication, without long-term current use of insulin ([address] 8/31/2016       PSH  Past Surgical History:   Procedure Laterality Date    CHEST WALL RESECTION N/A 1/21/2020    EXCISION CHEST LESION (3 x 2.8cm) AND RIGHT UPPER ABDOMEN LESION (3 x .1), COMPLEX CLOSURE 1.9CM;  ADJACENT TISSUE TRANSFER; performed by Enmanuel Garces MD at [address], LAPAROSCOPIC  September 13, 2012    Dr. Rufina Lundberg  December 1, 2011    Dr. Aron Franks  June 16, 2011    COLONOSCOPY N/A 10/1/2019    COLONOSCOPY DIAGNOSTIC performed by Maikol Meza MD at [address], COLON, DIAGNOSTIC      HIP SURGERY Left June 23, 2015    Dr. Hazel Carranza - incision and drainage of left hip infected hematoma     OTHER SURGICAL HISTORY  11/06/2017     ESOPHAGOGASTRODUODENOSCOPY                OTHER SURGICAL HISTORY  11/06/2017     LAPAROSCOPIC HELLER MYOTOMY [address], EGD    PROSTATE BIOPSY  November 2012    Dr. Diane Roque  May 8, 2001    TURP    TOTAL HIP ARTHROPLASTY Left May 17, 2015    Dr. Severa Bury  November 7, 2011    UPPER GASTROINTESTINAL ENDOSCOPY  February 4, 2016    Dr. Trini Ricks ENDOSCOPY N/A 04/15/2016    Esophagogastroduodenoscopy with Botulinum toxin injection of the lower esophageal sphincter (LES)    UPPER GASTROINTESTINAL ENDOSCOPY  04/25/2017    dilatation; and botox injection 4 units    UPPER GASTROINTESTINAL ENDOSCOPY N/A 6/14/2019    EGD SUBMUCOSAL/BOTOX INJECTION performed by Maikol Meza MD at [address] N/A 6/14/2019    EGD DILATION BALLOON performed by Maikol Meza MD at [address] History     Socioeconomic History    Marital status:      Spouse name:  Chente Delores Chel Godoy)    Number of children: 4    Years of education: Not on file    Highest education level: Not on file   Occupational History    Occupation: retired - January 2000     Comment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   Social Needs    Financial resource strain: Not hard at all   Lazaro-Julia insecurity     Worry: to auscultation  CARDIOVASCULAR:  regular rate and rhythm and no murmur noted  ABDOMEN:  no scars, normal bowel sounds, soft, non-distended, non-tender, voluntary guarding absent, no masses palpated and hernia present in the left groin  MUSCULOSKELETAL:  0+ pitting edema lower extremities  NEUROLOGIC:  Mental Status Exam:  Level of Alertness:   awake  Orientation:   person, place, time  SKIN:  no bruising or bleeding and normal skin color, texture, turgor    IMPRESSION/RECOMMENDATIONS:    Patient with a symptomatic left inguinal hernia for which I have recommended repair. We discussed the surgical options and have elected to proceed with laparoscopic repair. We discussed the risk, benefits, and expected recovery from surgery and he wishes to proceed.       Riky Cifuentes

## 2021-03-17 ENCOUNTER — TELEPHONE (OUTPATIENT)
Dept: SURGERY | Age: 86
End: 2021-03-17

## 2021-03-18 NOTE — TELEPHONE ENCOUNTER
Patient scheduled. No email address so pre-op packet mailed to address. All information explained to patient and spouse. Date, time, arrival time, pre-op history and physical and date for Covid 19 test all explained. Patient verbalized understanding.

## 2021-03-22 ENCOUNTER — TELEPHONE (OUTPATIENT)
Dept: SURGERY | Age: 86
End: 2021-03-22

## 2021-03-22 NOTE — TELEPHONE ENCOUNTER
Patient is scheduled for COVID testing for preop. Was confused about something about having a CT? Please call patient.       [phone]

## 2021-03-22 NOTE — TELEPHONE ENCOUNTER
Jhonny Carrillo (wife), HIPAA verified, called in stating that she has questions about the patient's surgery    Please contact Jhonny Carrillo at [phone]

## 2021-03-31 ENCOUNTER — OFFICE VISIT (OUTPATIENT)
Dept: FAMILY MEDICINE CLINIC | Age: 86
End: 2021-03-31
Payer: MEDICARE

## 2021-03-31 VITALS
BODY MASS INDEX: 18.61 KG/M2 | TEMPERATURE: 96.7 F | SYSTOLIC BLOOD PRESSURE: 160 MMHG | HEIGHT: 74 IN | WEIGHT: 145 LBS | DIASTOLIC BLOOD PRESSURE: 70 MMHG

## 2021-03-31 DIAGNOSIS — E11.9 TYPE 2 DIABETES MELLITUS WITHOUT COMPLICATION, WITHOUT LONG-TERM CURRENT USE OF INSULIN (HCC): Chronic | ICD-10-CM

## 2021-03-31 DIAGNOSIS — N18.31 STAGE 3A CHRONIC KIDNEY DISEASE (HCC): ICD-10-CM

## 2021-03-31 DIAGNOSIS — Z01.818 PRE-OP EXAM: ICD-10-CM

## 2021-03-31 DIAGNOSIS — I10 ESSENTIAL HYPERTENSION: Primary | Chronic | ICD-10-CM

## 2021-03-31 DIAGNOSIS — I10 ESSENTIAL HYPERTENSION: Chronic | ICD-10-CM

## 2021-03-31 PROCEDURE — 4040F PNEUMOC VAC/ADMIN/RCVD: CPT | Performed by: INTERNAL MEDICINE

## 2021-03-31 PROCEDURE — 93000 ELECTROCARDIOGRAM COMPLETE: CPT | Performed by: INTERNAL MEDICINE

## 2021-03-31 PROCEDURE — 1123F ACP DISCUSS/DSCN MKR DOCD: CPT | Performed by: INTERNAL MEDICINE

## 2021-03-31 PROCEDURE — 99214 OFFICE O/P EST MOD 30 MIN: CPT | Performed by: INTERNAL MEDICINE

## 2021-03-31 PROCEDURE — G8484 FLU IMMUNIZE NO ADMIN: HCPCS | Performed by: INTERNAL MEDICINE

## 2021-03-31 PROCEDURE — G8420 CALC BMI NORM PARAMETERS: HCPCS | Performed by: INTERNAL MEDICINE

## 2021-03-31 PROCEDURE — G8427 DOCREV CUR MEDS BY ELIG CLIN: HCPCS | Performed by: INTERNAL MEDICINE

## 2021-03-31 PROCEDURE — 1036F TOBACCO NON-USER: CPT | Performed by: INTERNAL MEDICINE

## 2021-03-31 RX ORDER — PSYLLIUM HUSK/CALCIUM CARB 1 G-60 MG
1 CAPSULE ORAL 2 TIMES DAILY
Qty: 180 CAPSULE | Refills: 2 | Status: SHIPPED | OUTPATIENT
Start: 2021-03-31

## 2021-03-31 RX ORDER — AMLODIPINE BESYLATE 5 MG/1
5 TABLET ORAL DAILY
Qty: 90 TABLET | Refills: 3 | Status: SHIPPED | OUTPATIENT
Start: 2021-03-31 | End: 2021-09-14 | Stop reason: SDUPTHER

## 2021-03-31 NOTE — PROGRESS NOTES
Constipation 6/16/2015    Diverticulosis 5/16/2010    Dyspnea on exertion 5/16/2010    Eczema 4/12/2011    Elevated PSA 10/30/2012    Erectile dysfunction 5/16/2010    Essential hypertension 8/26/2015    GERD (gastroesophageal reflux disease) 5/16/2010    Hemorrhoid 5/16/2010    Hyperlipidemia 1/10/2011    Hyperphosphatemia 7/3/2010    Hypertension 5/16/2010    Insomnia 6/16/2015    Memory loss 1/14/2015    Nausea 6/16/2015    Overactive bladder 7/12/2011    Paranoid schizophrenia ([address] 5/16/2010    Perennial allergic rhinitis 11/30/2016    Prostate cancer ([facility] 1/8/2013    had radiation treatments    Type 2 diabetes mellitus without complication, without long-term current use of insulin ([facility] 8/31/2016       Past Surgical History:   Procedure Laterality Date    CHEST WALL RESECTION N/A 1/21/2020    EXCISION CHEST LESION (3 x 2.8cm) AND RIGHT UPPER ABDOMEN LESION (3 x .1), COMPLEX CLOSURE 1.9CM;  ADJACENT TISSUE TRANSFER; performed by Jose Cruz Bartholomew MD at [facility], [facility]  September 13, 2012    Dr. Prince Mckenna  December 1, 2011    Dr. Mare Gaitan  June 16, 2011    COLONOSCOPY N/A 10/1/2019    COLONOSCOPY DIAGNOSTIC performed by Sherri Jones MD at [address] [place] [address], COLON, DIAGNOSTIC      HIP SURGERY Left June 23, 2015    Dr. Haylie Miller - incision and drainage of left hip infected hematoma     OTHER SURGICAL HISTORY  11/06/2017     ESOPHAGOGASTRODUODENOSCOPY                OTHER SURGICAL HISTORY  11/06/2017     LAPAROSCOPIC HELLER MYOTOMY 1 WRAP, EGD    PROSTATE BIOPSY  November 2012    Dr. Lagos How  May 8, 2001    TURP    TOTAL HIP ARTHROPLASTY Left May 17, 2015    Dr. Namrata Sky  November 7, 2011    UPPER GASTROINTESTINAL ENDOSCOPY  February 4, 2016    Dr. Grover Canales daily as needed (symptoms of low or high blood sugar) 100 strip 2    QUEtiapine (SEROQUEL) 25 MG tablet Take 1 tablet by mouth nightly 30 tablet 3    OXcarbazepine (TRILEPTAL) 300 MG tablet Take 1 tablet by mouth 2 times daily 180 tablet 3    metFORMIN (GLUCOPHAGE) 500 MG tablet TAKE 1 TABLET BY MOUTH EVERY DAY WITH FOOD 90 tablet 3    BREO ELLIPTA 100-25 MCG/INH AEPB inhaler TAKE 1 PUFF BY MOUTH EVERY DAY 1 each 11    aspirin 325 MG EC tablet TAKE 1 TABLET BY MOUTH EVERY DAY WITH FOOD (Patient taking differently: 81 mg ) 90 tablet 3    atorvastatin (LIPITOR) 10 MG tablet TAKE 1 TABLET BY MOUTH EVERY DAY 90 tablet 3    polyethylene glycol (GLYCOLAX) powder Take 17 g by mouth daily as needed      latanoprost (XALATAN) 0.005 % ophthalmic solution Place 1 drop into the left eye nightly       fluticasone (FLONASE) 50 MCG/ACT nasal spray USE 2 SPRAYS BY NASAL ROUTE DAILY AS NEEDED FOR RHINITIS 1 Bottle 1    nitroGLYCERIN (NITROSTAT) 0.4 MG SL tablet Place 1 tablet under the tongue every 5 minutes as needed. 25 tablet 12       Family History   Problem Relation Age of Onset    Cancer Mother         colon cancer    Cancer Brother         colon cancer, stomach cancer       Social History     Socioeconomic History    Marital status:      Spouse name:  Elva Avery Tuan Rossana)    Number of children: 4    Years of education: Not on file    Highest education level: Not on file   Occupational History    Occupation: retired - 2000     Comment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   Social Needs    Financial resource strain: Not hard at all   Criders-Julia insecurity     Worry: Never true     Inability: Never true   [employer] needs     Medical: No     Non-medical: No   Tobacco Use    Smoking status: Former Smoker     Packs/day: 1.00     Years: 18.00     Pack years: 18.00     Types: Cigarettes     Start date: 10/22/1972     Quit date: 1990     Years since quittin.9    Smokeless tobacco: Never Used    Tobacco nourished, no acute distress, non-toxic appearance   Eyes:  PERRL, conjunctiva normal   HENT:  Atraumatic, external ears normal, nose normal, oropharynx moist, no pharyngeal exudates. Neck- normal range of motion, no tenderness, supple   Respiratory:  No respiratory distress, normal breath sounds, no rales, no wheezing   Cardiovascular:  Normal rate, normal rhythm, no murmurs, no gallops, no rubs   GI:  Soft, nondistended, normal bowel sounds, nontender, no organomegaly, no mass, no rebound, no guarding   :  No costovertebral angle tenderness   Musculoskeletal:  No edema, no tenderness, no deformities. Back- no tenderness  Integument:  Well hydrated, no rash   Lymphatic:  No lymphadenopathy noted   Neurologic:  Alert & oriented x 3, CN 2-12 normal, normal motor function, normal sensory function, no focal deficits noted   Psychiatric:  Speech and behavior appropriate          Assessment and plan:   Cherry Dodson was seen today for pre-op exam.    Diagnoses and all orders for this visit:    Essential hypertension  -     amLODIPine (NORVASC) 5 MG tablet; Take 1 tablet by mouth daily  -     CBC Auto Differential; Future  -     Comprehensive Metabolic Panel; Future  -     Lipid Panel; Future    Pre-op exam  EKG ok   Pt is asymptomatic  He has multiple  risk factors but no CI for surge yr   -     EKG 12 lead; Future  -     EKG 12 lead  -     CBC Auto Differential; Future  -     Comprehensive Metabolic Panel; Future    Type 2 diabetes mellitus without complication, without long-term current use of insulin (HCC)  Controlled? Recheck A1C  -     CBC Auto Differential; Future  -     Comprehensive Metabolic Panel; Future  -     Hemoglobin A1C; Future  -     Lipid Panel; Future    Stage 3a chronic kidney disease  Recheck kidney function   -     Comprehensive Metabolic Panel; Future    Other orders  -     Psyllium-Calcium (METAMUCIL PLUS CALCIUM) CAPS; Take 1 tablet by mouth 2 times daily Take with 8 oz water.       Pre-op evaluation and plan-  Known risk factors for perioperative complications: IHD, PVD, CKD   Suspected acute ACS/CHF/severe arhythmia? No  No contraindications to planned surgery  Risk of surgery is acceptable assuming that the preoperative work up was ok. Perioperative recomendations  1. Preoperative workup as follows: CBC CMP  2. Change in medication regimen before surgery: On the morning of the surgery, please do not take the following medications: Metformin. 3. Prophylaxis for cardiac events with perioperative beta-blockers needed No  4. Deep vein thrombosis prophylaxis:  Only if no early ambulation     Patient instructions:   Patient Instructions   1. Do not take Metformin on the morning of surgery  2. Please discuss with your surgeon when to stop Aspirin (typically we stop it 5 days before surgery)  3. Check you blood pressure and heart rate today and call in with the results     Please call the office (and ask to see me) or be seen by other provider for any worsening or lack of improvement of the symptoms or for any new symptoms as soon as possible. Please make sure to schedule your follow appointment as discussed. Please let me know if ay further questions. Please let me know if there are any symptoms or concerns that you feel that needs to be further addressed. Patient was encouraged to call the office or be seen with MD for any worsening or lack of improvement in his symptoms. Aliyah Joyce M.D.   3/31/2021, 9:00 AM      NOTE: This report was transcribed using voice recognition software. Every effort was made to ensure accuracy, however, inadvertent computerized transcription errors may be present.

## 2021-03-31 NOTE — PATIENT INSTRUCTIONS
1. Do not take Metformin on the morning of surgery  2. Please discuss with your surgeon when to stop Aspirin (typically we stop it 5 days before surgery)  3. Check you blood pressure and heart rate today and call in with the results     Please call the office (and ask to see me) or be seen by other provider for any worsening or lack of improvement of the symptoms or for any new symptoms as soon as possible. Please make sure to schedule your follow appointment as discussed. Please let me know if ay further questions. Please let me know if there are any symptoms or concerns that you feel that needs to be further addressed.

## 2021-04-01 DIAGNOSIS — N17.9 AKI (ACUTE KIDNEY INJURY) (HCC): Primary | ICD-10-CM

## 2021-04-01 LAB
A/G RATIO: 1.3 (ref 1.1–2.2)
ALBUMIN SERPL-MCNC: 4.3 G/DL (ref 3.4–5)
ALP BLD-CCNC: 89 U/L (ref 40–129)
ALT SERPL-CCNC: 10 U/L (ref 10–40)
ANION GAP SERPL CALCULATED.3IONS-SCNC: 13 MMOL/L (ref 3–16)
AST SERPL-CCNC: 17 U/L (ref 15–37)
BASOPHILS ABSOLUTE: 0.1 K/UL (ref 0–0.2)
BASOPHILS RELATIVE PERCENT: 1.3 %
BILIRUB SERPL-MCNC: <0.2 MG/DL (ref 0–1)
BUN BLDV-MCNC: 39 MG/DL (ref 7–20)
CALCIUM SERPL-MCNC: 9.5 MG/DL (ref 8.3–10.6)
CHLORIDE BLD-SCNC: 104 MMOL/L (ref 99–110)
CHOLESTEROL, TOTAL: 176 MG/DL (ref 0–199)
CO2: 28 MMOL/L (ref 21–32)
CREAT SERPL-MCNC: 2.2 MG/DL (ref 0.8–1.3)
EOSINOPHILS ABSOLUTE: 0.3 K/UL (ref 0–0.6)
EOSINOPHILS RELATIVE PERCENT: 5.5 %
ESTIMATED AVERAGE GLUCOSE: 125.5 MG/DL
GFR AFRICAN AMERICAN: 35
GFR NON-AFRICAN AMERICAN: 29
GLOBULIN: 3.2 G/DL
GLUCOSE BLD-MCNC: 92 MG/DL (ref 70–99)
HBA1C MFR BLD: 6 %
HCT VFR BLD CALC: 30 % (ref 40.5–52.5)
HDLC SERPL-MCNC: 69 MG/DL (ref 40–60)
HEMOGLOBIN: 10.1 G/DL (ref 13.5–17.5)
LDL CHOLESTEROL CALCULATED: 89 MG/DL
LYMPHOCYTES ABSOLUTE: 0.9 K/UL (ref 1–5.1)
LYMPHOCYTES RELATIVE PERCENT: 19 %
MCH RBC QN AUTO: 29.4 PG (ref 26–34)
MCHC RBC AUTO-ENTMCNC: 33.8 G/DL (ref 31–36)
MCV RBC AUTO: 87.2 FL (ref 80–100)
MONOCYTES ABSOLUTE: 0.5 K/UL (ref 0–1.3)
MONOCYTES RELATIVE PERCENT: 10 %
NEUTROPHILS ABSOLUTE: 3.1 K/UL (ref 1.7–7.7)
NEUTROPHILS RELATIVE PERCENT: 64.2 %
PDW BLD-RTO: 12.9 % (ref 12.4–15.4)
PLATELET # BLD: 221 K/UL (ref 135–450)
PMV BLD AUTO: 9.7 FL (ref 5–10.5)
POTASSIUM SERPL-SCNC: 4.8 MMOL/L (ref 3.5–5.1)
RBC # BLD: 3.44 M/UL (ref 4.2–5.9)
SODIUM BLD-SCNC: 145 MMOL/L (ref 136–145)
TOTAL PROTEIN: 7.5 G/DL (ref 6.4–8.2)
TRIGL SERPL-MCNC: 88 MG/DL (ref 0–150)
VLDLC SERPL CALC-MCNC: 18 MG/DL
WBC # BLD: 4.9 K/UL (ref 4–11)

## 2021-04-02 ENCOUNTER — TELEPHONE (OUTPATIENT)
Dept: SURGERY | Age: 86
End: 2021-04-02

## 2021-04-02 ENCOUNTER — HOSPITAL ENCOUNTER (OUTPATIENT)
Dept: ULTRASOUND IMAGING | Age: 86
Discharge: HOME | End: 2021-04-02
Payer: MEDICARE

## 2021-04-02 ENCOUNTER — OFFICE VISIT (OUTPATIENT)
Dept: PRIMARY CARE CLINIC | Age: 86
End: 2021-04-02
Payer: MEDICARE

## 2021-04-02 DIAGNOSIS — N17.9 AKI (ACUTE KIDNEY INJURY) (HCC): ICD-10-CM

## 2021-04-02 DIAGNOSIS — Z01.818 PREOP EXAMINATION: Primary | ICD-10-CM

## 2021-04-02 LAB — SARS-COV-2: NOT DETECTED

## 2021-04-02 PROCEDURE — G8428 CUR MEDS NOT DOCUMENT: HCPCS | Performed by: NURSE PRACTITIONER

## 2021-04-02 PROCEDURE — G8420 CALC BMI NORM PARAMETERS: HCPCS | Performed by: NURSE PRACTITIONER

## 2021-04-02 PROCEDURE — 99211 OFF/OP EST MAY X REQ PHY/QHP: CPT | Performed by: NURSE PRACTITIONER

## 2021-04-02 PROCEDURE — 76770 US EXAM ABDO BACK WALL COMP: CPT

## 2021-04-02 NOTE — TELEPHONE ENCOUNTER
Attempted to call patient, surgery with Dr Nichole Ochoa must be canceled for this coming Thursday 4/8/21. Will keep trying, only number listed does not leave option for voicemail to be left.

## 2021-04-05 NOTE — TELEPHONE ENCOUNTER
Spoke with patient's wife Ainsley Brito, informed her that surgery is canceled at this time until further notice due to need for nephrology clearance.   Taylor verbalized understanding

## 2021-04-15 ENCOUNTER — HOSPITAL ENCOUNTER (INPATIENT)
Age: 86
LOS: 1 days | Discharge: HOME | DRG: 726 | End: 2021-04-16
Attending: EMERGENCY MEDICINE | Admitting: INTERNAL MEDICINE
Payer: MEDICARE

## 2021-04-15 DIAGNOSIS — N13.30 HYDRONEPHROSIS, UNSPECIFIED HYDRONEPHROSIS TYPE: ICD-10-CM

## 2021-04-15 DIAGNOSIS — N17.9 AKI (ACUTE KIDNEY INJURY) (HCC): Primary | ICD-10-CM

## 2021-04-15 LAB
ANION GAP SERPL CALCULATED.3IONS-SCNC: 11 MMOL/L (ref 3–16)
BACTERIA: ABNORMAL /HPF
BILIRUBIN URINE: NEGATIVE
BLOOD, URINE: ABNORMAL
BUN BLDV-MCNC: 29 MG/DL (ref 7–20)
CALCIUM SERPL-MCNC: 9 MG/DL (ref 8.3–10.6)
CHLORIDE BLD-SCNC: 104 MMOL/L (ref 99–110)
CLARITY: CLEAR
CO2: 27 MMOL/L (ref 21–32)
COLOR: YELLOW
CREAT SERPL-MCNC: 2.2 MG/DL (ref 0.8–1.3)
EPITHELIAL CELLS, UA: ABNORMAL /HPF (ref 0–5)
GFR AFRICAN AMERICAN: 35
GFR NON-AFRICAN AMERICAN: 29
GLUCOSE BLD-MCNC: 125 MG/DL (ref 70–99)
GLUCOSE BLD-MCNC: 168 MG/DL (ref 70–99)
GLUCOSE URINE: NEGATIVE MG/DL
KETONES, URINE: NEGATIVE MG/DL
LEUKOCYTE ESTERASE, URINE: NEGATIVE
MICROSCOPIC EXAMINATION: YES
NITRITE, URINE: NEGATIVE
PERFORMED ON: ABNORMAL
PH UA: 7 (ref 5–8)
POTASSIUM REFLEX MAGNESIUM: 3.7 MMOL/L (ref 3.5–5.1)
PROTEIN UA: 100 MG/DL
RBC UA: ABNORMAL /HPF (ref 0–4)
SODIUM BLD-SCNC: 142 MMOL/L (ref 136–145)
SODIUM URINE: 99 MMOL/L
SPECIFIC GRAVITY UA: 1.01 (ref 1–1.03)
URINE REFLEX TO CULTURE: ABNORMAL
URINE TYPE: ABNORMAL
UROBILINOGEN, URINE: 0.2 E.U./DL
WBC UA: ABNORMAL /HPF (ref 0–5)

## 2021-04-15 PROCEDURE — 80048 BASIC METABOLIC PNL TOTAL CA: CPT

## 2021-04-15 PROCEDURE — 99283 EMERGENCY DEPT VISIT LOW MDM: CPT

## 2021-04-15 PROCEDURE — 6360000002 HC RX W HCPCS: Performed by: STUDENT IN AN ORGANIZED HEALTH CARE EDUCATION/TRAINING PROGRAM

## 2021-04-15 PROCEDURE — 51798 US URINE CAPACITY MEASURE: CPT

## 2021-04-15 PROCEDURE — 99222 1ST HOSP IP/OBS MODERATE 55: CPT | Performed by: INTERNAL MEDICINE

## 2021-04-15 PROCEDURE — 36415 COLL VENOUS BLD VENIPUNCTURE: CPT

## 2021-04-15 PROCEDURE — 82570 ASSAY OF URINE CREATININE: CPT

## 2021-04-15 PROCEDURE — 2580000003 HC RX 258: Performed by: STUDENT IN AN ORGANIZED HEALTH CARE EDUCATION/TRAINING PROGRAM

## 2021-04-15 PROCEDURE — 84156 ASSAY OF PROTEIN URINE: CPT

## 2021-04-15 PROCEDURE — 6370000000 HC RX 637 (ALT 250 FOR IP): Performed by: STUDENT IN AN ORGANIZED HEALTH CARE EDUCATION/TRAINING PROGRAM

## 2021-04-15 PROCEDURE — 81001 URINALYSIS AUTO W/SCOPE: CPT

## 2021-04-15 PROCEDURE — 84300 ASSAY OF URINE SODIUM: CPT

## 2021-04-15 PROCEDURE — 1200000000 HC SEMI PRIVATE

## 2021-04-15 RX ORDER — LEVETIRACETAM 500 MG/1
500 TABLET ORAL 2 TIMES DAILY
Status: DISCONTINUED | OUTPATIENT
Start: 2021-04-15 | End: 2021-04-16 | Stop reason: HOSPADM

## 2021-04-15 RX ORDER — INSULIN LISPRO 100 [IU]/ML
0-6 INJECTION, SOLUTION INTRAVENOUS; SUBCUTANEOUS
Status: DISCONTINUED | OUTPATIENT
Start: 2021-04-15 | End: 2021-04-16 | Stop reason: HOSPADM

## 2021-04-15 RX ORDER — ONDANSETRON 2 MG/ML
4 INJECTION INTRAMUSCULAR; INTRAVENOUS EVERY 6 HOURS PRN
Status: DISCONTINUED | OUTPATIENT
Start: 2021-04-15 | End: 2021-04-16 | Stop reason: HOSPADM

## 2021-04-15 RX ORDER — QUETIAPINE FUMARATE 25 MG/1
25 TABLET, FILM COATED ORAL NIGHTLY
Status: DISCONTINUED | OUTPATIENT
Start: 2021-04-15 | End: 2021-04-16 | Stop reason: HOSPADM

## 2021-04-15 RX ORDER — ATORVASTATIN CALCIUM 10 MG/1
10 TABLET, FILM COATED ORAL DAILY
Status: DISCONTINUED | OUTPATIENT
Start: 2021-04-16 | End: 2021-04-16 | Stop reason: HOSPADM

## 2021-04-15 RX ORDER — GALANTAMINE HYDROBROMIDE 24 MG/1
24 CAPSULE, EXTENDED RELEASE ORAL DAILY
Status: DISCONTINUED | OUTPATIENT
Start: 2021-04-16 | End: 2021-04-16 | Stop reason: HOSPADM

## 2021-04-15 RX ORDER — ACETAMINOPHEN 325 MG/1
650 TABLET ORAL EVERY 6 HOURS PRN
Status: DISCONTINUED | OUTPATIENT
Start: 2021-04-15 | End: 2021-04-16 | Stop reason: HOSPADM

## 2021-04-15 RX ORDER — DEXTROSE MONOHYDRATE 50 MG/ML
100 INJECTION, SOLUTION INTRAVENOUS PRN
Status: DISCONTINUED | OUTPATIENT
Start: 2021-04-15 | End: 2021-04-16 | Stop reason: HOSPADM

## 2021-04-15 RX ORDER — HEPARIN SODIUM 5000 [USP'U]/ML
5000 INJECTION, SOLUTION INTRAVENOUS; SUBCUTANEOUS EVERY 8 HOURS SCHEDULED
Status: DISCONTINUED | OUTPATIENT
Start: 2021-04-15 | End: 2021-04-16 | Stop reason: HOSPADM

## 2021-04-15 RX ORDER — AMLODIPINE BESYLATE 5 MG/1
5 TABLET ORAL DAILY
Status: DISCONTINUED | OUTPATIENT
Start: 2021-04-16 | End: 2021-04-16 | Stop reason: HOSPADM

## 2021-04-15 RX ORDER — OXCARBAZEPINE 300 MG/1
300 TABLET, FILM COATED ORAL 2 TIMES DAILY
Status: DISCONTINUED | OUTPATIENT
Start: 2021-04-15 | End: 2021-04-16 | Stop reason: HOSPADM

## 2021-04-15 RX ORDER — INSULIN LISPRO 100 [IU]/ML
0-3 INJECTION, SOLUTION INTRAVENOUS; SUBCUTANEOUS NIGHTLY
Status: DISCONTINUED | OUTPATIENT
Start: 2021-04-15 | End: 2021-04-16 | Stop reason: HOSPADM

## 2021-04-15 RX ORDER — SODIUM CHLORIDE 9 MG/ML
25 INJECTION, SOLUTION INTRAVENOUS PRN
Status: DISCONTINUED | OUTPATIENT
Start: 2021-04-15 | End: 2021-04-16 | Stop reason: HOSPADM

## 2021-04-15 RX ORDER — PROMETHAZINE HYDROCHLORIDE 25 MG/1
12.5 TABLET ORAL EVERY 6 HOURS PRN
Status: DISCONTINUED | OUTPATIENT
Start: 2021-04-15 | End: 2021-04-16 | Stop reason: HOSPADM

## 2021-04-15 RX ORDER — FAMOTIDINE 20 MG/1
40 TABLET, FILM COATED ORAL EVERY EVENING
Status: DISCONTINUED | OUTPATIENT
Start: 2021-04-15 | End: 2021-04-16 | Stop reason: HOSPADM

## 2021-04-15 RX ORDER — DEXTROSE MONOHYDRATE 25 G/50ML
12.5 INJECTION, SOLUTION INTRAVENOUS PRN
Status: DISCONTINUED | OUTPATIENT
Start: 2021-04-15 | End: 2021-04-16 | Stop reason: HOSPADM

## 2021-04-15 RX ORDER — ACETAMINOPHEN 650 MG/1
650 SUPPOSITORY RECTAL EVERY 6 HOURS PRN
Status: DISCONTINUED | OUTPATIENT
Start: 2021-04-15 | End: 2021-04-16 | Stop reason: HOSPADM

## 2021-04-15 RX ORDER — MIRTAZAPINE 15 MG/1
15 TABLET, FILM COATED ORAL NIGHTLY
Status: DISCONTINUED | OUTPATIENT
Start: 2021-04-15 | End: 2021-04-16 | Stop reason: HOSPADM

## 2021-04-15 RX ORDER — SODIUM CHLORIDE 0.9 % (FLUSH) 0.9 %
5-40 SYRINGE (ML) INJECTION EVERY 12 HOURS SCHEDULED
Status: DISCONTINUED | OUTPATIENT
Start: 2021-04-15 | End: 2021-04-16 | Stop reason: HOSPADM

## 2021-04-15 RX ORDER — NICOTINE POLACRILEX 4 MG
15 LOZENGE BUCCAL PRN
Status: DISCONTINUED | OUTPATIENT
Start: 2021-04-15 | End: 2021-04-16 | Stop reason: HOSPADM

## 2021-04-15 RX ORDER — GABAPENTIN 100 MG/1
100 CAPSULE ORAL 2 TIMES DAILY
COMMUNITY
End: 2022-03-04

## 2021-04-15 RX ORDER — TAMSULOSIN HYDROCHLORIDE 0.4 MG/1
0.4 CAPSULE ORAL DAILY
Status: DISCONTINUED | OUTPATIENT
Start: 2021-04-16 | End: 2021-04-16 | Stop reason: HOSPADM

## 2021-04-15 RX ORDER — SODIUM CHLORIDE 0.9 % (FLUSH) 0.9 %
5-40 SYRINGE (ML) INJECTION PRN
Status: DISCONTINUED | OUTPATIENT
Start: 2021-04-15 | End: 2021-04-16 | Stop reason: HOSPADM

## 2021-04-15 RX ADMIN — HEPARIN SODIUM 5000 UNITS: 5000 INJECTION INTRAVENOUS; SUBCUTANEOUS at 23:03

## 2021-04-15 RX ADMIN — LEVETIRACETAM 500 MG: 500 TABLET ORAL at 23:02

## 2021-04-15 RX ADMIN — Medication 10 ML: at 23:03

## 2021-04-15 RX ADMIN — FAMOTIDINE 40 MG: 20 TABLET ORAL at 23:03

## 2021-04-15 RX ADMIN — QUETIAPINE FUMARATE 25 MG: 25 TABLET ORAL at 23:02

## 2021-04-15 RX ADMIN — MIRTAZAPINE 15 MG: 15 TABLET, FILM COATED ORAL at 23:02

## 2021-04-15 ASSESSMENT — ENCOUNTER SYMPTOMS
NAUSEA: 0
DIARRHEA: 0
VOMITING: 0
ABDOMINAL DISTENTION: 0
ABDOMINAL PAIN: 0
SHORTNESS OF BREATH: 0

## 2021-04-15 ASSESSMENT — PAIN SCALES - GENERAL
PAINLEVEL_OUTOF10: 0
PAINLEVEL_OUTOF10: 0

## 2021-04-15 NOTE — H&P
Internal Medicine  PGY 1  History & Physical      CC : hydronephrosis    History Obtained From:  patient    HISTORY OF PRESENT ILLNESS:    Patient is an 82-year-old male with past medical history of BPH status post radiation, CKD, T2DM, HTN presenting to the nephrologist office because of difficulty urinating hydronephrosis on recent ultrasound. Patient reports that he urinates frequently at night and has not had any problems urinating. Patient sees Avery Jade outpatient and he recommends that patient be admitted for further work-up per urology secondary to patient's recent renal ultrasound showing bilateral hydronephrosis and THERESE with creatinine of 2.2. Patient denies any dysuria, abdominal pain, chest pain, back pain, fevers. In the ED a Lucas was attempted x2 and not able to pass through. Was able to urinate while in the ED.     Past Medical History:        Diagnosis Date    Anemia 1/9/2012    Anxiety and depression     Arthritis     bulging disc    BPH (benign prostatic hypertrophy) 5/16/2010    Constipation 6/16/2015    Diverticulosis 5/16/2010    Dyspnea on exertion 5/16/2010    Eczema 4/12/2011    Elevated PSA 10/30/2012    Erectile dysfunction 5/16/2010    Essential hypertension 8/26/2015    GERD (gastroesophageal reflux disease) 5/16/2010    Hemorrhoid 5/16/2010    Hyperlipidemia 1/10/2011    Hyperphosphatemia 7/3/2010    Hypertension 5/16/2010    Insomnia 6/16/2015    Memory loss 1/14/2015    Nausea 6/16/2015    Overactive bladder 7/12/2011    Paranoid schizophrenia ([address] 5/16/2010    Perennial allergic rhinitis 11/30/2016    Prostate cancer ([address] 1/8/2013    had radiation treatments    Type 2 diabetes mellitus without complication, without long-term current use of insulin ([address] 8/31/2016   ·     Past Surgical History:        Procedure Laterality Date    CHEST WALL RESECTION N/A 1/21/2020    EXCISION CHEST LESION (3 x 2.8cm) AND RIGHT UPPER ABDOMEN LESION (3 x .1), COMPLEX CLOSURE 1.9CM;  ADJACENT TISSUE TRANSFER; performed by Linda Wiseman MD at [address], LAPAROSCOPIC  September 13, 2012    Dr. Baron Morin  December 1, 2011    Dr. Jason Olivares  June 16, 2011    COLONOSCOPY N/A 10/1/2019    COLONOSCOPY DIAGNOSTIC performed by Nasra Baeza MD at [address], COLON, DIAGNOSTIC      HIP SURGERY Left June 23, 2015    Dr. Vineet Johnson - incision and drainage of left hip infected hematoma     OTHER SURGICAL HISTORY  11/06/2017     ESOPHAGOGASTRODUODENOSCOPY                OTHER SURGICAL HISTORY  11/06/2017     LAPAROSCOPIC HELLER MYOTOMY 270 WRAP, EGD    PROSTATE BIOPSY  November 2012    Dr. Chayito Velasco  May 8, 2001    TURP    TOTAL HIP ARTHROPLASTY Left May 17, 2015    Dr. Sallie Ngo  November 7, 2011    UPPER GASTROINTESTINAL ENDOSCOPY  February 4, 2016    Dr. Vogel Marion ENDOSCOPY N/A 04/15/2016    Esophagogastroduodenoscopy with Botulinum toxin injection of the lower esophageal sphincter (LES)    UPPER GASTROINTESTINAL ENDOSCOPY  04/25/2017    dilatation; and botox injection 4 units    UPPER GASTROINTESTINAL ENDOSCOPY N/A 6/14/2019    EGD SUBMUCOSAL/BOTOX INJECTION performed by Nasra Baeza MD at [address] N/A 6/14/2019    EGD DILATION BALLOON performed by Nasra Baeza MD at [address] [place] [address]   ·     Medications Priorto Admission:    · Not in a hospital admission. Allergies:  Lorazepam    Social History:   · TOBACCO:   reports that he quit smoking about 31 years ago. His smoking use included cigarettes. He started smoking about 48 years ago. He has a 18.00 pack-year smoking history. He has never used smokeless tobacco.  · ETOH:   reports no history of alcohol use.   · DRUGS : none  · Patient

## 2021-04-15 NOTE — ED NOTES
Pt voided 250ml clear yellow urine into urinal.    Bladder scanned post-void showed 70ml residual.     Evelina Dove RN  04/15/21 0300

## 2021-04-15 NOTE — PROGRESS NOTES
Clinical Pharmacy Progress Note  Medication History     Admit Date: 4/15/21    List of of current medications patient is taking is complete. Home Medication list in EPIC updated to reflect changes noted below. Source of information: Patient, Patient's spouse, chart review, [employer]    Please note:   Patient reports last taking all AM medications this morning prior to arrival at [facility]      Complete Home Medication List:  Current Outpatient Medications on File Prior to Encounter   Medication Sig    gabapentin (NEURONTIN) 100 MG capsule Take 100 mg by mouth 2 times daily. aspirin 325 MG EC tablet TAKE 1 TABLET BY MOUTH EVERY DAY WITH FOOD    famotidine (PEPCID) 40 MG tablet Take 1 tablet by mouth every evening    amLODIPine (NORVASC) 5 MG tablet Take 1 tablet by mouth daily    Psyllium-Calcium (METAMUCIL PLUS CALCIUM) CAPS Take 1 tablet by mouth 2 times daily Take with 8 oz water.     oxybutynin (DITROPAN XL) 15 MG extended release tablet Take 1 tablet by mouth 2 times daily    levETIRAcetam (KEPPRA) 500 MG tablet TAKE 1 TABLET BY MOUTH TWICE A DAY    mirtazapine (REMERON) 15 MG tablet TAKE 1 TABLET BY MOUTH EVERY DAY AT NIGHT    clotrimazole (LOTRIMIN) 1 % cream APPLY TO AFFECTED AREA TWICE A DAY    tamsulosin (FLOMAX) 0.4 MG capsule Take 1 capsule by mouth daily    Multiple Vitamin (DAILY-TJ) TABS TAKE 1 TABLET BY MOUTH EVERY DAY    galantamine (RAZADYNE ER) 24 MG extended release capsule TAKE 1 CAPSULE BY MOUTH EVERY DAY     QUEtiapine (SEROQUEL) 25 MG tablet Take 1 tablet by mouth nightly    OXcarbazepine (TRILEPTAL) 300 MG tablet Take 1 tablet by mouth 2 times daily    metFORMIN (GLUCOPHAGE) 500 MG tablet TAKE 1 TABLET BY MOUTH EVERY DAY WITH FOOD    BREO ELLIPTA 100-25 MCG/INH AEPB inhaler TAKE 1 PUFF BY MOUTH EVERY DAY    atorvastatin (LIPITOR) 10 MG tablet TAKE 1 TABLET BY MOUTH EVERY DAY    polyethylene glycol (GLYCOLAX) 17 g packet Take 17 g by mouth daily as needed     latanoprost (XALATAN) 0.005 % ophthalmic solution Place 1 drop into the left eye nightly     fluticasone (FLONASE) 50 MCG/ACT nasal spray USE 2 SPRAYS BY NASAL ROUTE DAILY AS NEEDED FOR RHINITIS    nitroGLYCERIN (NITROSTAT) 0.4 MG SL tablet Place 1 tablet under the tongue every 5 minutes as needed. Please call with questions!     Caitlin QuanD, BCPS  Mobile: [address] [place] [address]: [phone]

## 2021-04-15 NOTE — ED PROVIDER NOTES
[id number]          ATTENDING PHYSICIAN NOTE       Date of evaluation: 4/15/2021    Chief Complaint     Urinary Retention (Sent by doctor for catheter)      History of Present Illness     Keyanna Mai is a 82 y.o. male history of BPH and chronic kidney disease who presents from his nephrologist office because of difficulty urinating and hydronephrosis on recent ultrasound. The patient denies any difficulty to me but states that he urinates frequently at night and also without difficulty through the day. He urinated just before he came here. There was apparently concern about blockage from prostatic enlargement as the patient has undergone surgery and radiation for prostate cancer in the past and there was concern that he might need a Lucas catheter because of hydronephrosis seen on recent renal ultrasound. Patient sees Dr. Greta Pacheco for nephrology and Dr. Leia Arana for urology. Patient does not complain of abdominal pain or distention. Review of Systems     Review of Systems   Constitutional: Negative for chills and fever. Respiratory: Negative for shortness of breath. Gastrointestinal: Negative for abdominal distention, abdominal pain, diarrhea, nausea and vomiting. Genitourinary: Negative for difficulty urinating and dysuria. All other systems reviewed and are negative.       Past Medical, Surgical, Family, and Social History     He has a past medical history of Anemia, Anxiety and depression, Arthritis, BPH (benign prostatic hypertrophy), Constipation, Diverticulosis, Dyspnea on exertion, Eczema, Elevated PSA, Erectile dysfunction, Essential hypertension, GERD (gastroesophageal reflux disease), Hemorrhoid, Hyperlipidemia, Hyperphosphatemia, Hypertension, Insomnia, Memory loss, Nausea, Overactive bladder, Paranoid schizophrenia ([address] Perennial allergic rhinitis, Prostate cancer ([address] and Type 2 diabetes mellitus without complication, without long-term current use of insulin ([facility] He has a past surgical history that includes Prostate surgery (May 8, 2001); Colonoscopy (December 1, 2011); Upper gastrointestinal endoscopy (November 7, 2011); Colonoscopy (June 16, 2011); Cholecystectomy, laparoscopic (September 13, 2012); Prostate biopsy (November 2012); Total hip arthroplasty (Left, May 17, 2015); hip surgery (Left, June 23, 2015); Upper gastrointestinal endoscopy (February 4, 2016); Upper gastrointestinal endoscopy (N/A, 04/15/2016); Upper gastrointestinal endoscopy (04/25/2017); other surgical history (11/06/2017); other surgical history (11/06/2017); Upper gastrointestinal endoscopy (N/A, 6/14/2019); Upper gastrointestinal endoscopy (N/A, 6/14/2019); Endoscopy, colon, diagnostic; Colonoscopy (N/A, 10/1/2019); and Chest Wall Resection (N/A, 1/21/2020). His family history includes Cancer in his brother and mother. He reports that he quit smoking about 31 years ago. His smoking use included cigarettes. He started smoking about 48 years ago. He has a 18.00 pack-year smoking history. He has never used smokeless tobacco. He reports that he does not drink alcohol or use drugs.     Medications     Previous Medications    ACCU-CHEK SOFTCLIX LANCETS MISC    1 each by Does not apply route daily    AMLODIPINE (NORVASC) 5 MG TABLET    Take 1 tablet by mouth daily    ASPIRIN 325 MG EC TABLET    TAKE 1 TABLET BY MOUTH EVERY DAY WITH FOOD    ATORVASTATIN (LIPITOR) 10 MG TABLET    TAKE 1 TABLET BY MOUTH EVERY DAY    BLOOD GLUCOSE TEST STRIPS (ACCU-CHEK ZEENAT PLUS) STRIP    1 each by In Vitro route daily as needed (symptoms of low or high blood sugar)    BREO ELLIPTA 100-25 MCG/INH AEPB INHALER    TAKE 1 PUFF BY MOUTH EVERY DAY    CLOTRIMAZOLE (LOTRIMIN) 1 % CREAM    APPLY TO AFFECTED AREA TWICE A DAY    FAMOTIDINE (PEPCID) 40 MG TABLET    Take 1 tablet by mouth every evening    FLUTICASONE (FLONASE) 50 MCG/ACT NASAL SPRAY    USE 2 SPRAYS BY NASAL ROUTE DAILY AS NEEDED FOR RHINITIS    GABAPENTIN (NEURONTIN) 100 MG CAPSULE    Take 1 capsule by mouth 2 times daily for 30 days. Intended supply: 90 days    GALANTAMINE (RAZADYNE ER) 24 MG EXTENDED RELEASE CAPSULE    TAKE 1 CAPSULE BY MOUTH EVERY DAY    LATANOPROST (XALATAN) 0.005 % OPHTHALMIC SOLUTION    Place 1 drop into the left eye nightly     LEVETIRACETAM (KEPPRA) 500 MG TABLET    TAKE 1 TABLET BY MOUTH TWICE A DAY    METFORMIN (GLUCOPHAGE) 500 MG TABLET    TAKE 1 TABLET BY MOUTH EVERY DAY WITH FOOD    MIRTAZAPINE (REMERON) 15 MG TABLET    TAKE 1 TABLET BY MOUTH EVERY DAY AT NIGHT    MULTIPLE VITAMIN (DAILY-TJ) TABS    TAKE 1 TABLET BY MOUTH EVERY DAY    NITROGLYCERIN (NITROSTAT) 0.4 MG SL TABLET    Place 1 tablet under the tongue every 5 minutes as needed. OXCARBAZEPINE (TRILEPTAL) 300 MG TABLET    Take 1 tablet by mouth 2 times daily    OXYBUTYNIN (DITROPAN XL) 15 MG EXTENDED RELEASE TABLET    Take 1 tablet by mouth 2 times daily    POLYETHYLENE GLYCOL (GLYCOLAX) POWDER    Take 17 g by mouth daily as needed    PSYLLIUM-CALCIUM (METAMUCIL PLUS CALCIUM) CAPS    Take 1 tablet by mouth 2 times daily Take with 8 oz water. QUETIAPINE (SEROQUEL) 25 MG TABLET    Take 1 tablet by mouth nightly    TAMSULOSIN (FLOMAX) 0.4 MG CAPSULE    Take 1 capsule by mouth daily       Allergies     He is allergic to lorazepam.    Physical Exam     INITIAL VITALS: BP: (!) 187/84, Temp: 97.4 °F (36.3 °C), Pulse: 66, Resp: 18, SpO2: 100 %   Physical Exam  Vitals signs and nursing note reviewed. Constitutional:       General: He is not in acute distress. Appearance: He is well-developed. He is not diaphoretic. Cardiovascular:      Rate and Rhythm: Normal rate and regular rhythm. Pulmonary:      Effort: Pulmonary effort is normal.      Breath sounds: Normal breath sounds. Abdominal:      General: Bowel sounds are normal. There is no distension. Palpations: Abdomen is soft. Tenderness: There is no abdominal tenderness. Musculoskeletal:      Right lower leg: No edema. Left lower leg: No edema. Skin:     General: Skin is warm and dry. Neurological:      Mental Status: He is alert and oriented to person, place, and time. Psychiatric:         Behavior: Behavior normal.         Diagnostic Results     RADIOLOGY:  No orders to display       LABS:   Results for orders placed or performed during the hospital encounter of 06/89/44   Basic Metabolic Panel w/ Reflex to MG   Result Value Ref Range    Sodium 142 136 - 145 mmol/L    Potassium reflex Magnesium 3.7 3.5 - 5.1 mmol/L    Chloride 104 99 - 110 mmol/L    CO2 27 21 - 32 mmol/L    Anion Gap 11 3 - 16    Glucose 125 (H) 70 - 99 mg/dL    BUN 29 (H) 7 - 20 mg/dL    CREATININE 2.2 (H) 0.8 - 1.3 mg/dL    GFR Non-African American 29 (A) >60    GFR  35 (A) >60    Calcium 9.0 8.3 - 10.6 mg/dL   Urinalysis Reflex to Culture    Specimen: Urine, clean catch   Result Value Ref Range    Color, UA Yellow Straw/Yellow    Clarity, UA Clear Clear    Glucose, Ur Negative Negative mg/dL    Bilirubin Urine Negative Negative    Ketones, Urine Negative Negative mg/dL    Specific Gravity, UA 1.015 1.005 - 1.030    Blood, Urine TRACE-INTACT (A) Negative    pH, UA 7.0 5.0 - 8.0    Protein,  (A) Negative mg/dL    Urobilinogen, Urine 0.2 <2.0 E.U./dL    Nitrite, Urine Negative Negative    Leukocyte Esterase, Urine Negative Negative    Microscopic Examination YES     Urine Type NotGiven     Urine Reflex to Culture Not Indicated    Microscopic Urinalysis   Result Value Ref Range    WBC, UA None seen 0 - 5 /HPF    RBC, UA 3-4 0 - 4 /HPF    Epithelial Cells, UA 0-1 0 - 5 /HPF    Bacteria, UA Rare (A) None Seen /HPF       RECENT VITALS:  BP: (!) 167/77,Temp: 97.4 °F (36.3 °C), Pulse: 65, Resp: 16, SpO2: 100 %     ED Course     Nursing Notes, Past Medical Hx, Past Surgical Hx, Social Hx,Allergies, and Family Hx were reviewed.     patient was given the following medications:  No orders of the defined types were placed in this encounter. CONSULTS:  IP CONSULT TO NEPHROLOGY  IP CONSULT TO PRIMARY CARE PROVIDER    MEDICAL DECISIONMAKING / ASSESSMENT / Michele Debra is a 82 y.o. male presenting from his nephrologist office with worsening renal function and recent renal ultrasound that showed bilateral hydronephrosis. He was sent here out of concern for obstructive process. The patient voided 250 here in the emergency department and post void residual was noted to be about 70 mL. A Lucas catheter had been attempted as well as a coudé catheter attempted without success due to urethral obstruction of some sort. I spoke with the patient's nephrologist as he felt the patient would need to be admitted to determine if there is an obstructive process either urethral or ureteral through urology consultation. Primary care was contacted for admission. Creatinine is 2.2 which is up from a normal baseline but stable from earlier this month. Clinical Impression     1.  THERESE (acute kidney injury) ([facility]    2. Hydronephrosis, unspecified hydronephrosis type        Disposition     DISPOSITION Decision To Admit 04/15/2021 04:54:35 PM       Anastasia Chaudhari MD  04/15/21 [id number] [facility] MD Evelyn  04/15/21 [id number]

## 2021-04-15 NOTE — ED NOTES
Two RN's attempted to catheterize pt. Due to blockage, unable to place Lucas. Dr. Naomi Alexander updated.      Jamari Quan RN  04/15/21 3465

## 2021-04-16 VITALS
HEART RATE: 70 BPM | SYSTOLIC BLOOD PRESSURE: 177 MMHG | WEIGHT: 156 LBS | RESPIRATION RATE: 16 BRPM | BODY MASS INDEX: 19.4 KG/M2 | DIASTOLIC BLOOD PRESSURE: 86 MMHG | OXYGEN SATURATION: 100 % | HEIGHT: 75 IN | TEMPERATURE: 98.3 F

## 2021-04-16 PROBLEM — N17.9 ACUTE KIDNEY INJURY (HCC): Status: ACTIVE | Noted: 2021-04-16

## 2021-04-16 LAB
ALBUMIN SERPL-MCNC: 3.4 G/DL (ref 3.4–5)
ALBUMIN SERPL-MCNC: 3.4 G/DL (ref 3.4–5)
ALBUMIN SERPL-MCNC: 3.5 G/DL (ref 3.4–5)
ANION GAP SERPL CALCULATED.3IONS-SCNC: 10 MMOL/L (ref 3–16)
ANION GAP SERPL CALCULATED.3IONS-SCNC: 10 MMOL/L (ref 3–16)
ANION GAP SERPL CALCULATED.3IONS-SCNC: 11 MMOL/L (ref 3–16)
BUN BLDV-MCNC: 24 MG/DL (ref 7–20)
BUN BLDV-MCNC: 25 MG/DL (ref 7–20)
BUN BLDV-MCNC: 26 MG/DL (ref 7–20)
CALCIUM SERPL-MCNC: 8.8 MG/DL (ref 8.3–10.6)
CALCIUM SERPL-MCNC: 8.9 MG/DL (ref 8.3–10.6)
CALCIUM SERPL-MCNC: 8.9 MG/DL (ref 8.3–10.6)
CHLORIDE BLD-SCNC: 103 MMOL/L (ref 99–110)
CHLORIDE BLD-SCNC: 103 MMOL/L (ref 99–110)
CHLORIDE BLD-SCNC: 105 MMOL/L (ref 99–110)
CO2: 24 MMOL/L (ref 21–32)
CO2: 25 MMOL/L (ref 21–32)
CO2: 26 MMOL/L (ref 21–32)
CREAT SERPL-MCNC: 2 MG/DL (ref 0.8–1.3)
CREAT SERPL-MCNC: 2.1 MG/DL (ref 0.8–1.3)
CREAT SERPL-MCNC: 2.2 MG/DL (ref 0.8–1.3)
CREATININE URINE: 19.7 MG/DL (ref 39–259)
GFR AFRICAN AMERICAN: 35
GFR AFRICAN AMERICAN: 37
GFR AFRICAN AMERICAN: 39
GFR NON-AFRICAN AMERICAN: 29
GFR NON-AFRICAN AMERICAN: 30
GFR NON-AFRICAN AMERICAN: 32
GLUCOSE BLD-MCNC: 122 MG/DL (ref 70–99)
GLUCOSE BLD-MCNC: 125 MG/DL (ref 70–99)
GLUCOSE BLD-MCNC: 151 MG/DL (ref 70–99)
GLUCOSE BLD-MCNC: 83 MG/DL (ref 70–99)
GLUCOSE BLD-MCNC: 84 MG/DL (ref 70–99)
GLUCOSE BLD-MCNC: 91 MG/DL (ref 70–99)
PERFORMED ON: ABNORMAL
PERFORMED ON: NORMAL
PERFORMED ON: NORMAL
PHOSPHORUS: 3.2 MG/DL (ref 2.5–4.9)
PHOSPHORUS: 3.3 MG/DL (ref 2.5–4.9)
PHOSPHORUS: 4 MG/DL (ref 2.5–4.9)
POTASSIUM SERPL-SCNC: 3.2 MMOL/L (ref 3.5–5.1)
POTASSIUM SERPL-SCNC: 3.8 MMOL/L (ref 3.5–5.1)
POTASSIUM SERPL-SCNC: 4.3 MMOL/L (ref 3.5–5.1)
PROTEIN PROTEIN: 90.4 MG/DL
PROTEIN/CREAT RATIO: 4.6 MG/DL
SODIUM BLD-SCNC: 138 MMOL/L (ref 136–145)
SODIUM BLD-SCNC: 139 MMOL/L (ref 136–145)
SODIUM BLD-SCNC: 140 MMOL/L (ref 136–145)

## 2021-04-16 PROCEDURE — 6370000000 HC RX 637 (ALT 250 FOR IP): Performed by: STUDENT IN AN ORGANIZED HEALTH CARE EDUCATION/TRAINING PROGRAM

## 2021-04-16 PROCEDURE — 80069 RENAL FUNCTION PANEL: CPT

## 2021-04-16 PROCEDURE — 2580000003 HC RX 258: Performed by: STUDENT IN AN ORGANIZED HEALTH CARE EDUCATION/TRAINING PROGRAM

## 2021-04-16 PROCEDURE — 6360000002 HC RX W HCPCS: Performed by: STUDENT IN AN ORGANIZED HEALTH CARE EDUCATION/TRAINING PROGRAM

## 2021-04-16 PROCEDURE — 97530 THERAPEUTIC ACTIVITIES: CPT

## 2021-04-16 PROCEDURE — 2580000003 HC RX 258: Performed by: SURGERY

## 2021-04-16 PROCEDURE — 97166 OT EVAL MOD COMPLEX 45 MIN: CPT

## 2021-04-16 PROCEDURE — 83883 ASSAY NEPHELOMETRY NOT SPEC: CPT

## 2021-04-16 PROCEDURE — 6370000000 HC RX 637 (ALT 250 FOR IP): Performed by: INTERNAL MEDICINE

## 2021-04-16 PROCEDURE — G0378 HOSPITAL OBSERVATION PER HR: HCPCS

## 2021-04-16 PROCEDURE — 99223 1ST HOSP IP/OBS HIGH 75: CPT | Performed by: INTERNAL MEDICINE

## 2021-04-16 PROCEDURE — 97162 PT EVAL MOD COMPLEX 30 MIN: CPT

## 2021-04-16 PROCEDURE — 97167 OT EVAL HIGH COMPLEX 60 MIN: CPT

## 2021-04-16 PROCEDURE — 36415 COLL VENOUS BLD VENIPUNCTURE: CPT

## 2021-04-16 RX ORDER — SODIUM CHLORIDE 9 MG/ML
INJECTION, SOLUTION INTRAVENOUS CONTINUOUS
Status: DISCONTINUED | OUTPATIENT
Start: 2021-04-16 | End: 2021-04-16

## 2021-04-16 RX ORDER — POTASSIUM CHLORIDE 7.45 MG/ML
10 INJECTION INTRAVENOUS
Status: DISCONTINUED | OUTPATIENT
Start: 2021-04-16 | End: 2021-04-16

## 2021-04-16 RX ADMIN — POTASSIUM BICARBONATE 40 MEQ: 782 TABLET, EFFERVESCENT ORAL at 12:07

## 2021-04-16 RX ADMIN — TAMSULOSIN HYDROCHLORIDE 0.4 MG: 0.4 CAPSULE ORAL at 09:55

## 2021-04-16 RX ADMIN — AMLODIPINE BESYLATE 5 MG: 5 TABLET ORAL at 09:55

## 2021-04-16 RX ADMIN — SODIUM CHLORIDE: 9 INJECTION, SOLUTION INTRAVENOUS at 09:07

## 2021-04-16 RX ADMIN — Medication 10 ML: at 09:07

## 2021-04-16 RX ADMIN — LEVETIRACETAM 500 MG: 500 TABLET ORAL at 09:55

## 2021-04-16 RX ADMIN — OXCARBAZEPINE 300 MG: 300 TABLET, FILM COATED ORAL at 09:56

## 2021-04-16 RX ADMIN — ATORVASTATIN CALCIUM 10 MG: 10 TABLET, FILM COATED ORAL at 09:55

## 2021-04-16 RX ADMIN — HEPARIN SODIUM 5000 UNITS: 5000 INJECTION INTRAVENOUS; SUBCUTANEOUS at 13:31

## 2021-04-16 RX ADMIN — POTASSIUM BICARBONATE 40 MEQ: 782 TABLET, EFFERVESCENT ORAL at 09:55

## 2021-04-16 RX ADMIN — HEPARIN SODIUM 5000 UNITS: 5000 INJECTION INTRAVENOUS; SUBCUTANEOUS at 07:10

## 2021-04-16 ASSESSMENT — PAIN SCALES - GENERAL
PAINLEVEL_OUTOF10: 0

## 2021-04-16 NOTE — DISCHARGE INSTR - COC
Continuity of Care Form    Patient Name: Sim Mchugh   :  1935  MRN:  [id number]    Admit date:  4/15/2021  Discharge date:  ***    Code Status Order: Full Code   Advance Directives:   [facility] Documentation     Date/Time Healthcare Directive Type of Healthcare Directive Copy in [facility] [address] Agent's Name Healthcare Agent's Phone Number    04/15/21 2025  No, patient does not have an advance directive for healthcare treatment -- -- -- -- --          Admitting Physician:  Teresita Kwan MD  PCP: Teresita Kwan MD    Discharging Nurse: [facility] Unit/Room#: 4375/3809-34  Discharging Unit Phone Number: ***    Emergency Contact:   Extended Emergency Contact Information  Primary Emergency Contact: [facility] Phone: [phone]  Relation: Child   needed? No  Secondary Emergency Contact: Pascale Yuan, [address] [place] [address] Phone: [phone]  Relation: Child   needed?  No    Past Surgical History:  Past Surgical History:   Procedure Laterality Date    CHEST WALL RESECTION N/A 2020    EXCISION CHEST LESION (3 x 2.8cm) AND RIGHT UPPER ABDOMEN LESION (3 x .1), COMPLEX CLOSURE 1.9CM;  ADJACENT TISSUE TRANSFER; performed by Mora Thorpe MD at [address], LAPAROSCOPIC  2012    Dr. Dodson Goes  2011    Dr. Suzanna Schwab  2011    COLONOSCOPY N/A 10/1/2019    COLONOSCOPY DIAGNOSTIC performed by Marlo Trimble MD at [address], COLON, DIAGNOSTIC      HIP SURGERY Left 2015    Dr. Morena Bhatia - incision and drainage of left hip infected hematoma     OTHER SURGICAL HISTORY  2017     ESOPHAGOGASTRODUODENOSCOPY                OTHER SURGICAL HISTORY  2017     LAPAROSCOPIC HELLER MYOTOMY [address] WRAP, EGD    PROSTATE BIOPSY  2012    Dr. Dea Verma SURGERY  May 8, 2001    TURP    TOTAL HIP ARTHROPLASTY Left May 17, 2015    Dr. Cherrie Chisholm ENDOSCOPY  November 7, 2011   [address] [place] [address] UPPER GASTROINTESTINAL ENDOSCOPY  February 4, 2016    Dr. Maurie Essex ENDOSCOPY N/A 04/15/2016    Esophagogastroduodenoscopy with Botulinum toxin injection of the lower esophageal sphincter (LES)    UPPER GASTROINTESTINAL ENDOSCOPY  04/25/2017    dilatation; and botox injection 4 units    UPPER GASTROINTESTINAL ENDOSCOPY N/A 6/14/2019    EGD SUBMUCOSAL/BOTOX INJECTION performed by James Fraser MD at [place] [address] N/A 6/14/2019    EGD DILATION BALLOON performed by James Fraser MD at [address] [place] [address] ENDOSCOPY       Immunization History:   Immunization History   Administered Date(s) Administered    COVID-19, J&J, PF, 0.5 mL 03/13/2021    Influenza 10/09/2013    Influenza A (E8L2-22) Vaccine IM 01/05/2010    Influenza A (S2Y5-54) Vaccine PF IM 01/05/2010    Influenza Vaccine, unspecified formulation 10/09/2013, 10/15/2014, 11/25/2015    Influenza, High Dose (Fluzone 65 yrs and older) 10/01/2010, 10/10/2011, 10/09/2012, 10/15/2014, 11/25/2015, 11/30/2016, 09/19/2017, 09/11/2018, 01/02/2019    Influenza, Quadv, adjuvanted, 65 yrs +, IM, PF (Fluad) 09/10/2020    Influenza, Triv, inactivated, subunit, adjuvanted, IM (Fluad 65 yrs and older) 09/05/2019    PPD Test 05/21/2015, 05/31/2015    Pneumococcal Conjugate 13-valent ([id number]) 11/25/2015    Pneumococcal Polysaccharide (Ekxenlqrl65) 11/13/2006, 10/10/2011    Tdap (Boostrix, Adacel) 04/09/2013       Active Problems:  Patient Active Problem List   Diagnosis Code    Ischemic heart disease I25.9    Paranoid schizophrenia ([facility] F20.0    Complex partial seizure disorder ([facility] G40.209    Diverticulosis K57.90    Erectile dysfunction N52.9    Back pain M54.9    Dyspnea on exertion R06.00    Peripheral vascular disease (HCC) I73.9    Hemorrhoid K64.9    Other specified anxiety disorders F41.8    Hyperlipidemia E78.5    Eczema L30.9    Overactive bladder N32.81    Fatigue R53.83    Anemia D64.9    Chronic cough R05    Elevated PSA R97.20    Prostate cancer (HCC) C61    Bradycardia R00.1    Memory loss R41.3    Closed left hip fracture ([facility]) S72.002A    Depression F32.9    Constipation K59.00    Insomnia G47.00    Left hip pain M25.552    S/P hip replacement Z96.649    Hypertension I10    Abnormal weight loss R63.4    Former smoker Z87.891    Abnormal chest CT R93.89    Chronic kidney disease, stage 3 (HCC) N18.30    Type 2 diabetes mellitus with stage 3 chronic kidney disease (HCC) E11.22, N18.30    Gastroesophageal reflux disease without esophagitis K21.9    Type 2 diabetes mellitus without complication, without long-term current use of insulin (HCC) E11.9    Perennial allergic rhinitis J30.89    Patulous lower esophageal sphincter K22.8    Achalasia K22.0    Adenopathy R59.1    Skin lesion L98.9    Mild asthma without complication O24.099    Vascular dementia with behavior disturbance ([facility]) F01.51    THERESE (acute kidney injury) (HCC) N17.9    Acute kidney injury (HCC) N17.9       Isolation/Infection:   Isolation          No Isolation        Patient Infection Status     None to display          Nurse Assessment:  Last Vital Signs: BP (!) 175/87   Pulse 77   Temp 98 °F (36.7 °C) (Oral)   Resp 15   Ht 6' 2.5\" (1.892 m)   Wt 156 lb (70.8 kg)   SpO2 100%   BMI 19.76 kg/m²     Last documented pain score (0-10 scale): Pain Level: 0  Last Weight:   Wt Readings from Last 1 Encounters:   04/15/21 156 lb (70.8 kg)     Mental Status:  {IP PT MENTAL STATUS:00313}    IV Access:  {[facility] ACCESS:083631182}    Nursing Mobility/ADLs:  Walking   {CHP DME FELIX:318538165}  Transfer  {CHP DME AJHB:437589565}  Bathing  {CHP DME GTYI:985352037}  Dressing  {CHP DME XBBN:057728898}  Toileting  {CHP DME GRHL:802196976}  Feeding  {CHP DME DPFV:368247641}  Med Admin  {P DME WFDV:595245265}  Med Delivery   { [facility] MED Delivery:616135784}    Wound Care Documentation and Therapy:        Elimination:  Continence:   · Bowel: {YES / WE:39396}  · Bladder: {YES / QK:88901}  Urinary Catheter: {Urinary Catheter:492304843}   Colostomy/Ileostomy/Ileal Conduit: {YES / MI:79724}       Date of Last BM: ***    Intake/Output Summary (Last 24 hours) at 2021 1254  Last data filed at 2021 1207  Gross per 24 hour   Intake 822.1 ml   Output 2125 ml   Net -1302.9 ml     I/O last 3 completed shifts:   In: 200 [P.O.:200]  Out: 1200 [Urine:1200]    Safety Concerns:     [employer] Safety Concerns:821843922}    Impairments/Disabilities:      [employer] Impairments/Disabilities:959769836}    Nutrition Therapy:  Current Nutrition Therapy:   [employer] Diet List:287139369}    Routes of Feeding: {[facility] DME Other Feedings:117615330}  Liquids: {Slp liquid thickness:44678}  Daily Fluid Restriction: {P DME Yes amt example:273695313}  Last Modified Barium Swallow with Video (Video Swallowing Test): {Done Not Done CIPI:482709408}    Treatments at the Time of Hospital Discharge:   Respiratory Treatments: ***  Oxygen Therapy:  {Therapy; copd oxygen:67766}  Ventilator:    { CC Vent JGZJ:440909812}    Rehab Therapies: {THERAPEUTIC INTERVENTION:5335458205}  Weight Bearing Status/Restrictions: [employer]  Weight Bearin}  Other Medical Equipment (for information only, NOT a DME order):  {EQUIPMENT:888417666}  Other Treatments: ***    Patient's personal belongings (please select all that are sent with patient):  {[facility] DME Belongings:383427669}    RN SIGNATURE:  {Esignature:016299179}    CASE MANAGEMENT/SOCIAL WORK SECTION    Inpatient Status Date: 4/15/21     Readmission Risk Assessment Score:  Readmission Risk              Risk of Unplanned Readmission:        26           Discharging to [facility]. Phone: [phone]  Fax: [phone]  / signature: Electronically signed by YANNICK Frye on 4/16/21 at 12:54 PM EDT    PHYSICIAN SECTION    Prognosis: {Prognosis:0648910454}    Condition at Discharge: Roman Farrar Patient Condition:152917409}    Rehab Potential (if transferring to Rehab): {Prognosis:3149756355}    Recommended Labs or Other Treatments After Discharge: ***    Physician Certification: I certify the above information and transfer of Sim Mchugh  is necessary for the continuing treatment of the diagnosis listed and that he requires {Admit to Appropriate Level of Care:26899} for {GREATER/LESS:892473347} 30 days.      Update Admission H&P: [facility]229[facility]}    PHYSICIAN SIGNATURE:  {Esignature:497759044}

## 2021-04-16 NOTE — PROGRESS NOTES
Discharge order received. Patient informed of discharge order. Discharge instructions reviewed with patient and wife rasheed. Pt and wife walked though how to care and clean tobar to prevent CAUTI three times. Pt walked through how to clean, care for and empty catheter this am as well. Both verbalized understanding, supplies given to assist with cleaning. Copy of discharge instructions given to patient. Patient and wife verbalized understanding, denies needs or questions at this time. IV removed. All patient belongings packed and sent with patient upon discharge. Patient left in private vehicle and transported home by wife. RN also educated pt's daughter as well.

## 2021-04-16 NOTE — PROGRESS NOTES
Pt states his point of contact is rasheed his wife phone number [phone].  rn to update her on plan of care

## 2021-04-16 NOTE — DISCHARGE SUMMARY
INTERNAL MEDICINE DEPARTMENT AT [address] [place] [address]  DISCHARGE SUMMARY    Patient ID: Venkat Gramajo                                             Discharge Date: 4/16/2021   Patient's PCP: Rebekah Cervantes MD                                          Discharge Physician: Raphael Pitt MD  Admit Date: 4/15/2021   Admitting Physician: Rebekah Cervantes MD    PROBLEMS DURING HOSPITALIZATION:  Present on Admission:   THERESE (acute kidney injury) ([address]   Type 2 diabetes mellitus without complication, without long-term current use of insulin ([address]   Type 2 diabetes mellitus with stage 3 chronic kidney disease ([address]   Prostate cancer ([address]   Hypertension   Chronic kidney disease, stage 3 ([address]   Acute kidney injury ([address]      DISCHARGE DIAGNOSES:  - THERESE on CKD 3  - Hydronephrosis  - Acute urinary retention   - BPH s/p radiation  - Type 2 DM  - Hypokalemia    HOSPITAL COURSE    Mr. Yahir Schofield is an 19-year-old male with PMHx of BPH s/p radiation, CKD 3, T2DM, and HTN who presented to his nephrologist's office (Yi Connors) because of difficulty urinating. [facility] referred patient to ER for further work-up per urology. Recent renal ultrasound showing bilateral hydronephrosis on 4/2, and THERESE with creatinine of 2.2 (baseline ~1.4). Patient was still able to urinate, but post-void residual scans showed significant urinary retention. Lucas was inserted by urology team during admission. He reports feeling well and is excreting adequate urine. Patient is OK for discharge with Lucas in place. Physical Exam:  BP (!) 177/86   Pulse 70   Temp 98.3 °F (36.8 °C) (Oral)   Resp 16   Ht 6' 2.5\" (1.892 m)   Wt 156 lb (70.8 kg)   SpO2 100%   BMI 19.76 kg/m²     General appearance: No apparent distress, appears stated age and cooperative. HEENT: Pupils equal, round, and reactive to light. Dry mucous memranes  Respiratory:  Normal respiratory effort.  Clear to auscultation, bilaterally without cream  Commonly known as: LOTRIMIN  APPLY TO AFFECTED AREA TWICE A DAY     Daily-Concepcion Tabs  TAKE 1 TABLET BY MOUTH EVERY DAY     famotidine 40 MG tablet  Commonly known as: PEPCID  Take 1 tablet by mouth every evening     fluticasone 50 MCG/ACT nasal spray  Commonly known as: FLONASE  USE 2 SPRAYS BY NASAL ROUTE DAILY AS NEEDED FOR RHINITIS     latanoprost 0.005 % ophthalmic solution  Commonly known as: XALATAN     levETIRAcetam 500 MG tablet  Commonly known as: KEPPRA  TAKE 1 TABLET BY MOUTH TWICE A DAY     Metamucil Plus Calcium Caps  Take 1 tablet by mouth 2 times daily Take with 8 oz water. metFORMIN 500 MG tablet  Commonly known as: GLUCOPHAGE  TAKE 1 TABLET BY MOUTH EVERY DAY WITH FOOD     mirtazapine 15 MG tablet  Commonly known as: REMERON  TAKE 1 TABLET BY MOUTH EVERY DAY AT NIGHT     nitroGLYCERIN 0.4 MG SL tablet  Commonly known as: NITROSTAT  Place 1 tablet under the tongue every 5 minutes as needed.      OXcarbazepine 300 MG tablet  Commonly known as: TRILEPTAL  Take 1 tablet by mouth 2 times daily     oxybutynin 15 MG extended release tablet  Commonly known as: DITROPAN XL  Take 1 tablet by mouth 2 times daily     polyethylene glycol 17 g packet  Commonly known as: GLYCOLAX     QUEtiapine 25 MG tablet  Commonly known as: SEROquel  Take 1 tablet by mouth nightly     tamsulosin 0.4 MG capsule  Commonly known as: FLOMAX  Take 1 capsule by mouth daily          Activity: activity as tolerated  Diet: renal diet  Wound Care: routine catheter care and none needed    Time Spent on discharge is more than 30 minutes    Signed:  Jero Garcia MD, PGY-1   4/16/2021

## 2021-04-16 NOTE — PROGRESS NOTES
Internal Medicine PGY- 1 Resident Progress Note    PCP: Alpesh Mckeon MD    Date of Admission: 4/15/2021    Chief Complaint: Difficulty urinating, elevated Creatinine    Subjective: No acute events overnight. Patient urinated 1.2L yesterday. Recent post -void residual volume of 400 mL today. Urology seen at the bedside. They successfully placed urinary catheter. Patient denies abdominal pain, SOB, CP, fevers, N/V. Medications:  Reviewed    Infusion Medications    sodium chloride 100 mL/hr at 04/16/21 8087    dextrose      sodium chloride       Scheduled Medications    potassium bicarb-citric acid  40 mEq Oral Once    amLODIPine  5 mg Oral Daily    aspirin  325 mg Oral Daily    atorvastatin  10 mg Oral Daily    famotidine  40 mg Oral QPM    galantamine  24 mg Oral Daily    levETIRAcetam  500 mg Oral BID    mirtazapine  15 mg Oral Nightly    OXcarbazepine  300 mg Oral BID    QUEtiapine  25 mg Oral Nightly    tamsulosin  0.4 mg Oral Daily    insulin lispro  0-6 Units Subcutaneous TID WC    insulin lispro  0-3 Units Subcutaneous Nightly    sodium chloride flush  5-40 mL Intravenous 2 times per day    heparin (porcine)  5,000 Units Subcutaneous 3 times per day     PRN Meds: glucose, dextrose, glucagon (rDNA), dextrose, sodium chloride flush, sodium chloride, promethazine **OR** ondansetron, acetaminophen **OR** acetaminophen      Intake/Output Summary (Last 24 hours) at 4/16/2021 0935  Last data filed at 4/16/2021 9027  Gross per 24 hour   Intake 200 ml   Output 1625 ml   Net -1425 ml       Physical Exam Performed:    BP (!) 175/87   Pulse 77   Temp 98 °F (36.7 °C) (Oral)   Resp 15   Ht 6' 2.5\" (1.892 m)   Wt 156 lb (70.8 kg)   SpO2 100%   BMI 19.76 kg/m²     General appearance: No apparent distress, appears stated age and cooperative. HEENT: Pupils equal, round, and reactive to light. Dry mucous memranes  Respiratory:  Normal respiratory effort.  Clear to auscultation, bilaterally without Rales/Wheezes/Rhonchi. Cardiovascular: Regular rate and rhythm with normal S1/S2 without murmurs, rubs or gallops. Abdomen: Soft, non-tender, non-distended with normal bowel sounds. Direct inguinal hernia on the left side present in suprapubic area   Musculoskeletal: No clubbing, cyanosis or edema bilaterally. Neurologic:  Neurovascularly intact without any focal sensory/motor deficits. Cranial nerves: II-XII intact, grossly non-focal.  Psychiatric: Alert and oriented, thought content appropriate, normal insight  Peripheral Pulses: +2 palpable, equal bilaterally     Labs:   No results for input(s): WBC, HGB, HCT, PLT in the last 72 hours. Recent Labs     04/15/21  1539 04/16/21  0710    140   K 3.7 3.2*    105   CO2 27 25   BUN 29* 26*   CREATININE 2.2* 2.0*   CALCIUM 9.0 8.9   PHOS  --  4.0     No results for input(s): AST, ALT, BILIDIR, BILITOT, ALKPHOS in the last 72 hours. No results for input(s): INR in the last 72 hours. No results for input(s): [place] in the last 72 hours. Urinalysis:      Lab Results   Component Value Date    NITRU Negative 04/15/2021    WBCUA None seen 04/15/2021    BACTERIA Rare 04/15/2021    RBCUA 3-4 04/15/2021    BLOODU TRACE-INTACT 04/15/2021    SPECGRAV 1.015 04/15/2021    GLUCOSEU Negative 04/15/2021    GLUCOSEU NEGATIVE 01/09/2012       Radiology:  No orders to display         Assessment/Plan:    Knute All, 82 y.o. male w/ HTN, prostate ca w/ hx of radiation, b/l hydronephrosis p/w elevated Cr and difficulty urinating to Dr. Erendira Jimenez office.      Active Hospital Problems    Diagnosis Date Noted    Type 2 diabetes mellitus without complication, without long-term current use of insulin ([facility] [E11.9] 08/31/2016     Priority: High     Class: Chronic    Hypertension [I10] 08/26/2015     Priority: High     Class: Chronic    Prostate cancer ([facility] Alayna Nurse 01/08/2013     Priority: Low     Class: Chronic    THERESE (acute kidney injury) ([facility] [N17.9] 04/15/2021    Type 2 diabetes mellitus with stage 3 chronic kidney disease (HCC) [E11.22, N18.30] 06/02/2016    Chronic kidney disease, stage 3 ([facility]) [N18.30] 06/02/2016     ASSESSMENT AND PLAN:     THERESE on CKD 3 2/2 to BPH  Likely 2/2 obstructive nephropathy. B/L hydronephrosis on renal U/S 4/2/21. Elevated protein/creatinine ratio of 90.4. Urine Na wnl. Baseline Cr ~1.4. Unsuccessful coude and tobar catheter placement in ED.   - Cont home flomax 0.4 mg  - Nephrology consulted, appreciate recs  - Urology consulted, appreciate recs - catheter successfully placed, start flomax, CT abdomen/pelvis (noncon) and BMP in 1 week   - IV fluids @ 100  - f/u renal function panel ~4PM today     HTN  - Cont home norvasc     HLD  - Cont home lipitor     Dementia  - Cont home galantamine, remeron, seroquel     Seizure disorder  - Cont home keppra, trileptal     T2DM  Takes metformin at home, hold.   - LDSSI  - POCT glucose    DVT Prophylaxis: Heparin  Diet: DIET GENERAL; Carb Control: 3 carb choices (45 gms)/meal  Code Status: Full Code    Discussed the patient with MD Leonid Noyola MD  Internal Medicine Resident PGY-1  Contact via [employer]

## 2021-04-16 NOTE — PROGRESS NOTES
Physical Therapy    Facility/Department: [address]  Initial Assessment and Treatment    NAME: Alexandra Leal  : 1935  MRN: [id number]    Date of Service: 2021    Discharge Recommendations:    Alexandra Leal scored a 14/24 on the AM-PAC short mobility form. Current research shows that an AM-PAC score of 17 or less is typically not associated with a discharge to the patient's home setting. Based on the patient's AM-PAC score and their current functional mobility deficits, it is recommended that the patient have 3-5 sessions per week of Physical Therapy at d/c to increase the patient's independence. Please see assessment section for further patient specific details. If patient discharges prior to next session this note will serve as a discharge summary. Please see below for the latest assessment towards goals. PT Equipment Recommendations  Equipment Needed: No    Assessment   Body structures, Functions, Activity limitations: Decreased functional mobility ; Decreased strength;Decreased safe awareness;Decreased cognition;Decreased endurance;Decreased balance  Assessment: Pt with decreased independent mobility from reported baseline. Pt reports normally independent with cane in house and walker when goes out. Currently needing SBA for bed mobility, min assist x 2 for transfers and to ambulate a few steps. At risk for falls and not safe to ambulate alone. Pt plans to return home with wife and already has home care (RN, PT, OT). Rec home with 24 hr assist and home PT to maximize mobilty and independence  Treatment Diagnosis: impaired functional mobilty 2/2 decreased balance and endurance  Decision Making: Medium Complexity  PT Education: Goals;PT Role;Plan of Care;General Safety; Functional Mobility Training  Patient Education: Pt will need reinforcement  REQUIRES PT FOLLOW UP: Yes       Patient Diagnosis(es): The primary encounter diagnosis was THERESE (acute kidney injury) ([facility]  A diagnosis of Hydronephrosis, unspecified hydronephrosis type was also pertinent to this visit. has a past medical history of Anemia, Anxiety and depression, Arthritis, BPH (benign prostatic hypertrophy), Constipation, Diverticulosis, Dyspnea on exertion, Eczema, Elevated PSA, Erectile dysfunction, Essential hypertension, GERD (gastroesophageal reflux disease), Hemorrhoid, Hyperlipidemia, Hyperphosphatemia, Hypertension, Insomnia, Memory loss, Nausea, Overactive bladder, Paranoid schizophrenia ([facility] Perennial allergic rhinitis, Prostate cancer ([facility] and Type 2 diabetes mellitus without complication, without long-term current use of insulin ([facility] has a past surgical history that includes Prostate surgery (May 8, 2001); Colonoscopy (December 1, 2011); Upper gastrointestinal endoscopy (November 7, 2011); Colonoscopy (June 16, 2011); Cholecystectomy, laparoscopic (September 13, 2012); Prostate biopsy (November 2012); Total hip arthroplasty (Left, May 17, 2015); hip surgery (Left, June 23, 2015); Upper gastrointestinal endoscopy (February 4, 2016); Upper gastrointestinal endoscopy (N/A, 04/15/2016); Upper gastrointestinal endoscopy (04/25/2017); other surgical history (11/06/2017); other surgical history (11/06/2017); Upper gastrointestinal endoscopy (N/A, 6/14/2019); Upper gastrointestinal endoscopy (N/A, 6/14/2019); Endoscopy, colon, diagnostic; Colonoscopy (N/A, 10/1/2019); and Chest Wall Resection (N/A, 1/21/2020). Restrictions  Position Activity Restriction  Other position/activity restrictions: up with assist  Vision/Hearing  Vision: Within Functional Limits  Hearing: Within functional limits     Subjective  General  Chart Reviewed: Yes  Additional Pertinent Hx: Pt is an 82 y.o. male adm 4/15 with acute kidney injury. Pt presented from his nephrologist office because of difficulty urinating and hydronephrosis on recent ultrasound.   PMH:  arthritis, hyperlipidemia, HTN, prostate CA, DM, anxiety and education      Plan   Plan  Times per week: 2-5  Current Treatment Recommendations: Strengthening, Balance Training, Functional Mobility Training, Gait Training, Patient/Caregiver Education & Training, Safety Education & Training, Endurance Training  Safety Devices  Type of devices: Call light within reach, Chair alarm in place, Nurse notified, Left in chair    G-Code       OutComes Score                                                  AM-PAC Score  AM-PAC Inpatient Mobility Raw Score : 14 (04/16/21 1242)  AM-PAC Inpatient T-Scale Score : 38.1 (04/16/21 1242)  Mobility Inpatient CMS 0-100% Score: 61.29 (04/16/21 1242)  Mobility Inpatient CMS G-Code Modifier : CL (04/16/21 1242)          Goals  Short term goals  Time Frame for Short term goals: By discharge  Short term goal 1: Sup to sit supervision  Short term goal 2: Pt will transfer sit to stand SBA  Short term goal 3: Pt will amb >50' with LRAD SBA       Therapy Time   Individual Concurrent Group Co-treatment   Time In 1017         Time Out 1055         Minutes 38              Timed Code Treatment Minutes:23       Total Treatment Minutes:  [address], PT

## 2021-04-16 NOTE — PLAN OF CARE
Pt free from falls this shift. Fall precautions in place at all times. Call light always within reach. Pt able and agreeable to contact for safety appropriately. Monitoring creatinine at this time.

## 2021-04-16 NOTE — CONSULTS
Urology Attending Consult Note      Reason for Consultation: Difficult tobar placement, bilateral hydro    History: 83 yo M with history of PCA sp radiation and BPH who was directed to [facility] after Dr. Katharyn Blizzard found bilateral hydronephrosis with THERESE to 2.2. He was admitted for further workup. Nurses in ED were unable to place tobar. PVR this morning ~400cc, patient has been able to void small amounts. On flomax. Family History, Social History, Review of Systems:  Reviewed and agreed to as per chart    Vitals:  BP (!) 175/87   Pulse 77   Temp 98 °F (36.7 °C) (Oral)   Resp 15   Ht 6' 2.5\" (1.892 m)   Wt 156 lb (70.8 kg)   SpO2 100%   BMI 19.76 kg/m²   Temp  Av °F (36.7 °C)  Min: 97.4 °F (36.3 °C)  Max: 98.4 °F (36.9 °C)    Intake/Output Summary (Last 24 hours) at 2021 0950  Last data filed at 2021 2635  Gross per 24 hour   Intake 200 ml   Output 1625 ml   Net -1425 ml         Physical:   Well developed, well nourished in no acute distress   Mood indicates no abnormalities. Pt doesnt appear depressed   Orientated to time and place   Neck is supple, trachea is midline   Respiratory effort is normal   Cardiovascular show no extremity swelling   Abdomen no masses or hernias are palpated, there is no tenderness. Liver and Spleen appear normal.   Skin show no abnormal lesions   Lymph nodes are not palpated in the inguinal, neck, or axillary area.      Male :  Voiding clear urine    Labs:  WBC:    Lab Results   Component Value Date    WBC 4.9 2021     Hemoglobin/Hematocrit:    Lab Results   Component Value Date    HGB 10.1 2021    HCT 30.0 2021     BMP:    Lab Results   Component Value Date     2021    K 3.2 2021    K 3.7 04/15/2021     2021    CO2 25 2021    BUN 26 2021    LABALBU 3.5 2021    CREATININE 2.0 2021    CALCIUM 8.9 2021    GFRAA 39 2021    GFRAA >60 2013    LABGLOM 32

## 2021-04-16 NOTE — PROGRESS NOTES
Patient is A&O x4.  RA, sat 100%. No complaints of pain or SOB. Respirations appear to easy and unlabored. Lungs clear. Respirations easy with no complaints of cough. No complaints of nausea/vomiting/diarrhea. Up with minimal assist to the bathroom/BSC as needed. Left FA PIV intact and flushed. Voids per urinal.    Tolerating diabetic diet. NPO after midnight for AM testing. Plan of care and safety measures reviewed with the patient. Call light in reach and bed alarm in place. Will continue to monitor.   Electronically signed by Yahaira Johnston RN on 4/16/2021 at 1:56 AM

## 2021-04-16 NOTE — PROGRESS NOTES
Occupational Therapy   Occupational Therapy Initial Assessment and Treatment  Late Entry for 1055  Date: 2021   Patient Name: Ryan Nielson  MRN: [id number]     : 1935    Date of Service: 2021    Discharge Recommendations:  Ryan Nielson scored a 17/24 on the AM-PAC ADL Inpatient form. Current research shows that an AM-PAC score of 17 or less is typically not associated with a discharge to the patient's home setting. Based on the patient's AM-PAC score and their current ADL deficits, it is recommended that the patient have 3-5 sessions per week of Occupational Therapy at d/c to increase the patient's independence. Please see assessment section for further patient specific details. If patient discharges prior to next session this note will serve as a discharge summary. Please see below for the latest assessment towards goals. OT Equipment Recommendations  Equipment Needed: (continue to assess for needs)    Assessment   Performance deficits / Impairments: Decreased functional mobility ; Decreased ADL status; Decreased safe awareness;Decreased balance;Decreased endurance  Assessment: Pt unsteady w/ bed to chair transfer. Pt encouraged to attempt ambulation w/o success - agreeable to up to chair w/ signficant encouragement. Pt perseverating about his tobar catheter and IV in his arm. Pt reports he is active w/ home care and states he plans to return home. If home, will require 24 hr hands on assist as pt is a fall risk at this time. If 24 hr A is not an option, would benefit from contined IP OT. Continue to progress as tolerated. Pt encouraged to ambulate w/ staff - may need encouragement.   Treatment Diagnosis: impaired ADLs/transfers  Decision Making: High Complexity  OT Education: OT Role;Plan of Care  Patient Education: ? pt's full comprehension of understanding of OT evaluation  REQUIRES OT FOLLOW UP: Yes  Activity Tolerance  Activity Tolerance: Treatment limited secondary to decreased cognition  Safety Devices  Safety Devices in place: Yes  Type of devices: Nurse notified; Left in chair;Chair alarm in place;Call light within reach           Patient Diagnosis(es): The primary encounter diagnosis was THERESE (acute kidney injury) ([facility] [address] A diagnosis of Hydronephrosis, unspecified hydronephrosis type was also pertinent to this visit. has a past medical history of Anemia, Anxiety and depression, Arthritis, BPH (benign prostatic hypertrophy), Constipation, Diverticulosis, Dyspnea on exertion, Eczema, Elevated PSA, Erectile dysfunction, Essential hypertension, GERD (gastroesophageal reflux disease), Hemorrhoid, Hyperlipidemia, Hyperphosphatemia, Hypertension, Insomnia, Memory loss, Nausea, Overactive bladder, Paranoid schizophrenia ([address] Perennial allergic rhinitis, Prostate cancer ([facility] [address] and Type 2 diabetes mellitus without complication, without long-term current use of insulin ([facility] [address] has a past surgical history that includes Prostate surgery (May 8, 2001); Colonoscopy (December 1, 2011); Upper gastrointestinal endoscopy (November 7, 2011); Colonoscopy (June 16, 2011); Cholecystectomy, laparoscopic (September 13, 2012); Prostate biopsy (November 2012); Total hip arthroplasty (Left, May 17, 2015); hip surgery (Left, June 23, 2015); Upper gastrointestinal endoscopy (February 4, 2016); Upper gastrointestinal endoscopy (N/A, 04/15/2016); Upper gastrointestinal endoscopy (04/25/2017); other surgical history (11/06/2017); other surgical history (11/06/2017); Upper gastrointestinal endoscopy (N/A, 6/14/2019); Upper gastrointestinal endoscopy (N/A, 6/14/2019); Endoscopy, colon, diagnostic; Colonoscopy (N/A, 10/1/2019); and Chest Wall Resection (N/A, 1/21/2020). Treatment Diagnosis: impaired ADLs/transfers      Restrictions  Position Activity Restriction  Other position/activity restrictions: up with assist    Subjective   General  Chart Reviewed: Yes  Additional Pertinent Hx: 82 y.o.  M who presents from his nephrologist office because of difficulty urinating and hydronephrosis on recent ultrasound. Hospital Course: tobar inserted by urology team.    PMH: BPH status post radiation, CKD, T2DM, HTN, Paranoid Schizophrenia, Insomnia, Memory Loss, Anxiety, Depression. Family / Caregiver Present: No  Referring Practitioner: Alley Weinstein MD  Diagnosis: Acute Kidney Injury    Subjective  Subjective: In bed on entry. Nurse attempting IV - and pt interviewed at this time. Pt declining mobility/ADLs 2* perseveration on IV line and tobar catheter. \"[facility] will take care of it. \" (referring to home care services that come to his home)      Social/Functional History  Social/Functional History  Lives With: Spouse  Type of Home: House  Home Layout: Able to Live on Main level with bedroom/bathroom, Laundry in basement  Bathroom Shower/Tub: Walk-in shower  Bathroom Toilet: Handicap height(has toilet riser)  Bathroom Equipment: Grab bars in shower, Shower chair, Hand-held shower, Grab bars around toilet  Home Equipment: Rolling walker, Sock aid  ADL Assistance: Needs assistance(aide assists with showers, independent with dressing)  Homemaking Assistance: (wife does laundry)  Ambulation Assistance: Independent(cane in house, walker when goes out)  Transfer Assistance: Independent  Active : No(wife drives)  Additional Comments: Was getting [address], aide 5 days/week for 2 hrs/day.        Objective   Vision: Within Functional Limits  Hearing: Within functional limits      Orientation  Overall Orientation Status: (oriented to self; not formally questioned)        Balance  Sitting Balance: Stand by assistance  Standing Balance: (Min A of 2)    Standing Balance  Time: ~1 minute  Activity: static standing and transfer to chair  Comment: declined further activity (perseverating on tobar catheter and new IV)    Toilet Transfers  Toilet - Technique: Stand step  Equipment Used: Standard bedside commode(simulated w/ bedside chair)  Toilet Transfer: (Min A of 2)    ADL  Additional Comments: Note: limited evaluation - continue to assess ADLs as tolerated (new IV started and pt perseverating on IV/tobar catheter and declining further activity); becoming mildly agitated w/ encouragement              Transfers  Stand Step Transfers: (Min A of 2 (bed>chair - unsteady, reaching out for furniture, shuffled))  Sit to stand: (Min A of 2)  Stand to sit: (Min A of 2)        Cognition  Overall Cognitive Status: Exceptions  Attention Span: Difficulty attending to directions  Memory: Decreased short term memory  Safety Judgement: Decreased awareness of need for assistance;Decreased awareness of need for safety  Insights: Decreased awareness of deficits                    LUE AROM (degrees)  LUE AROM : WFL  RUE AROM (degrees)  RUE AROM : WFL              Pt seen by OT for eval and treat.  Treatment included: bed mobility, unsupported sitting, transfer, education            Plan   Plan  Times per week: 2-5  Times per day: Daily  Current Treatment Recommendations: Strengthening, Balance Training, Functional Mobility Training, Safety Education & Training, Self-Care / ADL                                                      AM-PAC Score        -[facility] Inpatient Daily Activity Raw Score: 17 (04/16/21 1540)  -PAC Inpatient ADL T-Scale Score : 37.26 (04/16/21 1540)  ADL Inpatient CMS 0-100% Score: 50.11 (04/16/21 1540)  ADL Inpatient CMS G-Code Modifier : CK (04/16/21 1540)    Goals  Short term goals  Time Frame for Short term goals: Discharge  Short term goal 1: simulated BSC transfer w/ Min A  Short term goal 2: increase standing tolerance to 3 minutes for ADL participation  Short term goal 3: tolerate LB dressing assessment  Patient Goals   Patient goals : to return home       Therapy Time   Individual Concurrent Group Co-treatment   Time In 1017         Time Out 1055         Minutes 38              Timed Code Treatment Minutes:   23    Total Treatment Minutes:  Surinder DUARTER/L #[id number]

## 2021-04-16 NOTE — CARE COORDINATION
Case Management Assessment           Initial Evaluation                Date / Time of Evaluation: 4/16/2021 12:47 PM                 Assessment Completed by: Miranda Brown    Patient Name: Carley Kuhn     YOB: 1935  Diagnosis: Acute kidney injury [facility]) [N17.9]     Date / Time: 4/15/2021  2:35 PM    Patient Admission Status: Inpatient    If patient is discharged prior to next notation, then this note serves as note for discharge by case management. Current PCP: Tana Anaya MD  Clinic Patient: No    Chart Reviewed: Yes  Patient/ Family Interviewed: Yes    Initial assessment completed at bedside with: Patient     Hospitalization in the last 30 days: No     Emergency Contacts:  Extended Emergency Contact Information  Primary Emergency Contact: [address] Phone: [phone]  Relation: Child   needed? No  Secondary Emergency Contact: Rock Strickland, [address] Phone: [phone]  Relation: Child   needed? No    Advance Directives:   Code Status: Full Code    Healthcare Power of : No    Financial:  Payor: MEDICARE / Plan: MEDICARE PART A AND B / Product Type: *No Product type* /     Pre-cert required for SNF: No    Pharmacy:    [facility]/pharmacy [address] [place] [address] [phone]  [address] [place] [address]  Phone: [phone] Fax: [phone]      Potential assistance Purchasing Medications: Potential Assistance Purchasing Medications: No  Does Patient want to participate in local refill/ meds to beds program?: No    Meds To Beds General Rules:  1. Can ONLY be done Monday- Friday between 8:30am-5pm  2. Prescription(s) must be in pharmacy by 3pm to be filled same day  3. Copy of patient's insurance/ prescription drug card and patient face sheet must be sent along with the prescription(s)  4.  Cost of Rx cannot be added to hospital bill. If financial assistance is needed, please contact unit  or ;  or  CANNOT provide pharmacy voucher for patients co-pays  5. Patients can then  the prescription on their way out of the hospital at discharge, or pharmacy can deliver to the bedside if staff is available. (payment due at time of pick-up or delivery - cash, check, or card accepted)     Able to afford home medications/ co-pay costs: Yes    ADLS:  Support Systems: Spouse/Significant Other, Children, Family Members    PT AM-PAC: 14 /24  OT AM-PAC:   /24    Housing:  Home Environment: From home with wife of 60 years   Steps: yes     Plans to RETURN to current housing: Yes  Barrier(s) to RETURNING to current housing: Clearance, resumption of home health care. Home Care Information:  Currently ACTIVE with Home Health Care: Yes  [address]: [facility] [phone] Fax; [phone]      Currently ACTIVE with [facility]: No    Durable Medical Equipment:  DME Provider: None   Equipment: Cane inside the home. Rolling Walker outside the home     Home Oxygen and Respiratory Equipment:  Has HOME OXYGEN prior to admission: No      Dialysis:  Active with HD/PD prior to admission: No    DISCHARGE PLAN:  Disposition: Home with resumption of home health care: [facility] ([place], [place]) will add PT and OT. Transportation PLAN for discharge: family     Factors facilitating achievement of predicted outcomes: Family support, Motivated, Cooperative, Pleasant and Has needed Durable Medical Equipment at home    Barriers to discharge: Limited safety awareness and Limited insight into deficits    Additional Case Management Notes:   MAR met with patient at bedside on this date. Patient is from home with his wife of 61 years. Patient reported he uses a cane for mobility in the home usually and a rolling walker outside of the home.  Patient plans to return home with his wife at discharge. He reported being active with [facility] home health care and would like to keep them for services. MAR placed [facility]. MAR confirmed patient was active for nursing and an aide. Plan is to add therapy at at discharge. Wilber will follow for new orders.  team to follow. The Plan for Transition of Care is related to the following treatment goals Acute kidney injury [facility]) [N17.9]      The Patient and/or patient representative Patient was provided with a choice of provider and agrees with the discharge plan Yes    Freedom of choice list was provided with basic dialogue that supports the patient's individualized plan of care/goals and shares the quality data associated with the providers.  Yes    Care Transition patient: Yes    YANNICK Daniel  [facility]   Case Management Department  Ph: [phone]

## 2021-04-16 NOTE — PROGRESS NOTES
Pt arrived to 6321 via stretcher from ED. All personal belongings transported with pt. Pt a/o x4 and VSS at this time. No tele orders at this time. Pt oriented to room. Bed in lowest/locked position with bed alarm on. Call light/bedside table/personal belongings with in reach. Pt encouraged to call out with any needs. Verbalized understanding.

## 2021-04-16 NOTE — CONSULTS
dysfunction 5/16/2010    Essential hypertension 8/26/2015    GERD (gastroesophageal reflux disease) 5/16/2010    Hemorrhoid 5/16/2010    Hyperlipidemia 1/10/2011    Hyperphosphatemia 7/3/2010    Hypertension 5/16/2010    Insomnia 6/16/2015    Memory loss 1/14/2015    Nausea 6/16/2015    Overactive bladder 7/12/2011    Paranoid schizophrenia ([facility] [address] 5/16/2010    Perennial allergic rhinitis 11/30/2016    Prostate cancer ([facility] [address] 1/8/2013    had radiation treatments    Type 2 diabetes mellitus without complication, without long-term current use of insulin ([address] 8/31/2016       Past Surgical History:   Procedure Laterality Date    CHEST WALL RESECTION N/A 1/21/2020    EXCISION CHEST LESION (3 x 2.8cm) AND RIGHT UPPER ABDOMEN LESION (3 x .1), COMPLEX CLOSURE 1.9CM;  ADJACENT TISSUE TRANSFER; performed by Bi Reyes MD at [address], LAPAROSCOPIC  September 13, 2012    Dr. Chi Jack  December 1, 2011    Dr. Jayro Meng  June 16, 2011    COLONOSCOPY N/A 10/1/2019    COLONOSCOPY DIAGNOSTIC performed by Bridgette Patino MD at [address], COLON, DIAGNOSTIC      HIP SURGERY Left June 23, 2015    Dr. Sheeba Verma - incision and drainage of left hip infected hematoma     OTHER SURGICAL HISTORY  11/06/2017     ESOPHAGOGASTRODUODENOSCOPY                OTHER SURGICAL HISTORY  11/06/2017     LAPAROSCOPIC HELLER MYOTOMY 1 WRAP, EGD    PROSTATE BIOPSY  November 2012    Dr. Marianna Gomez  May 8, 2001    TURP    TOTAL HIP ARTHROPLASTY Left May 17, 2015    Dr. Yumiko Tracy  November 7, 2011    UPPER GASTROINTESTINAL ENDOSCOPY  February 4, 2016    Dr. Anu Kothari ENDOSCOPY N/A 04/15/2016    Esophagogastroduodenoscopy with Botulinum toxin injection of the lower esophageal sphincter (LES)    UPPER GASTROINTESTINAL ENDOSCOPY  04/25/2017    dilatation; and botox injection 4 units    UPPER GASTROINTESTINAL ENDOSCOPY N/A 6/14/2019    EGD SUBMUCOSAL/BOTOX INJECTION performed by Erin Pacheco MD at [address] [facility] N/A 6/14/2019    EGD DILATION BALLOON performed by Erin Pacheco MD at [address]       Family History   Problem Relation Age of Onset    Cancer Mother         colon cancer    Cancer Brother         colon cancer, stomach cancer        reports that he quit smoking about 31 years ago. His smoking use included cigarettes. He started smoking about 48 years ago. He has a 18.00 pack-year smoking history. He has never used smokeless tobacco. He reports that he does not drink alcohol or use drugs.     Allergies:  Lorazepam    Current Medications:    0.9 % sodium chloride infusion, Continuous  potassium bicarb-citric acid (EFFER-K) effervescent tablet 40 mEq, Once  amLODIPine (NORVASC) tablet 5 mg, Daily  aspirin EC tablet 325 mg, Daily  atorvastatin (LIPITOR) tablet 10 mg, Daily  famotidine (PEPCID) tablet 40 mg, QPM  galantamine (RAZADYNE ER) extended release capsule 24 mg - PATIENT SUPPLIED, Daily  levETIRAcetam (KEPPRA) tablet 500 mg, BID  mirtazapine (REMERON) tablet 15 mg, Nightly  OXcarbazepine (TRILEPTAL) tablet 300 mg, BID  QUEtiapine (SEROQUEL) tablet 25 mg, Nightly  tamsulosin (FLOMAX) capsule 0.4 mg, Daily  glucose (GLUTOSE) 40 % oral gel 15 g, PRN  dextrose 50 % IV solution, PRN  glucagon (rDNA) injection 1 mg, PRN  dextrose 5 % solution, PRN  insulin lispro (1 Unit Dial) 0-6 Units, TID WC  insulin lispro (1 Unit Dial) 0-3 Units, Nightly  sodium chloride flush 0.9 % injection 5-40 mL, 2 times per day  sodium chloride flush 0.9 % injection 5-40 mL, PRN  0.9 % sodium chloride infusion, PRN  promethazine (PHENERGAN) tablet 12.5 mg, Q6H PRN    Or  ondansetron (ZOFRAN) injection 4 mg, Q6H PRN  acetaminophen (TYLENOL) tablet 650 mg, Q6H PRN Or  acetaminophen (TYLENOL) suppository 650 mg, Q6H PRN  heparin (porcine) injection 5,000 Units, 3 times per day        Review of Systems:   14 point ROS obtained but were negative except mentioned in HPI      Physical exam:     Vitals:  BP (!) 175/87   Pulse 77   Temp 98 °F (36.7 °C) (Oral)   Resp 15   Ht 6' 2.5\" (1.892 m)   Wt 156 lb (70.8 kg)   SpO2 100%   BMI 19.76 kg/m²   Constitutional:  OAA X3 NAD  Skin: no rash, turgor wnl  Heent:  eomi, mmm  Neck: no bruits or jvd noted  Cardiovascular:  S1, S2 without m/r/g  Respiratory: CTA B without w/r/r  Abdomen:  +bs, soft, nt, nd  Ext: no lower extremity edema  Psychiatric: mood and affect appropriate  Musculoskeletal:  Rom, muscular strength intact    Data:   Labs:  CBC: No results for input(s): WBC, HGB, PLT in the last 72 hours. BMP:    Recent Labs     04/15/21  1539 04/16/21  0710    140   K 3.7 3.2*    105   CO2 27 25   BUN 29* 26*   CREATININE 2.2* 2.0*   GLUCOSE 125* 84     Ca/Mg/Phos:   Recent Labs     04/15/21  1539 04/16/21  0710   CALCIUM 9.0 8.9   PHOS  --  4.0     Hepatic: No results for input(s): AST, ALT, ALB, BILITOT, ALKPHOS in the last 72 hours. Troponin: No results for input(s): TROPONINI in the last 72 hours. BNP: No results for input(s): BNP in the last 72 hours. Lipids: No results for input(s): CHOL, TRIG, HDL, LDLCALC, LABVLDL in the last 72 hours. ABGs: No results for input(s): PHART, PO2ART, WCA9AKX in the last 72 hours. INR: No results for input(s): INR in the last 72 hours.   UA:  Recent Labs     04/15/21  1803   COLORU Yellow   CLARITYU Clear   GLUCOSEU Negative   BILIRUBINUR Negative   KETUA Negative   SPECGRAV 1.015   BLOODU TRACE-INTACT*   PHUR 7.0   PROTEINU 100*   UROBILINOGEN 0.2   NITRU Negative   LEUKOCYTESUR Negative   LABMICR YES   URINETYPE NotGiven      Urine Microscopic:   Recent Labs     04/15/21  1803   BACTERIA Rare*   WBCUA None seen   RBCUA 3-4   EPIU 0-1     Urine Culture: No results for

## 2021-04-17 ENCOUNTER — CARE COORDINATION (OUTPATIENT)
Dept: CASE MANAGEMENT | Age: 86
End: 2021-04-17

## 2021-04-17 NOTE — CARE COORDINATION
[facility] Transitions Initial Follow Up Call    Call within 2 business days of discharge: Yes    Patient: Judson Hernández Patient : 1935   MRN: <[id number]>  Reason for Admission: THERESE  Discharge Date: 21 RARS: Readmission Risk Score: 26      Last Discharge [facility]       Complaint Diagnosis Description Type Department Provider    4/15/21 Urinary Retention THERESE (acute kidney injury) ([facility] [address] . .. ED to Hosp-Admission (Discharged) (ADMITTED) [facility] Tushar Palmer MD; Keagan Delong. .. Spoke with: N/A    Facility: [facility].    Non-face-to-face services provided:  Reviewed encounter information for continuity of care prior to follow up Care Transitions phone call - chart notes, consults, progress notes, test results, med list, appointments, AVS, other information. Care Transitions 24 Hour Call    Do you have all of your prescriptions and are they filled?: Yes  Post Acute Services:  [facility] Transitions Interventions         Follow Up  Future Appointments   Date Time Provider Al Perkins   5/10/2021 10:30 AM Tushar Palmer MD [facility] [address] [facility]     Attempted to make contact with patient/caregiver for an initial follow up call post discharge without success. Unable to leave a message regarding intent of call and call back information. Multiple rings, no answer. CTN to try again at a later time.      Emily Romero RN

## 2021-04-18 ENCOUNTER — HOSPITAL ENCOUNTER (EMERGENCY)
Age: 86
Discharge: HOME | End: 2021-04-18
Attending: EMERGENCY MEDICINE
Payer: MEDICARE

## 2021-04-18 VITALS
DIASTOLIC BLOOD PRESSURE: 87 MMHG | RESPIRATION RATE: 13 BRPM | SYSTOLIC BLOOD PRESSURE: 181 MMHG | TEMPERATURE: 99.1 F | HEART RATE: 83 BPM | OXYGEN SATURATION: 98 %

## 2021-04-18 DIAGNOSIS — T83.011A MALFUNCTION OF FOLEY CATHETER, INITIAL ENCOUNTER (HCC): Primary | ICD-10-CM

## 2021-04-18 PROCEDURE — 51702 INSERT TEMP BLADDER CATH: CPT

## 2021-04-18 PROCEDURE — 99283 EMERGENCY DEPT VISIT LOW MDM: CPT

## 2021-04-18 ASSESSMENT — ENCOUNTER SYMPTOMS
SHORTNESS OF BREATH: 0
ABDOMINAL PAIN: 0

## 2021-04-18 NOTE — ED PROVIDER NOTES
[address]          ATTENDING PHYSICIAN NOTE       Date of evaluation: 4/18/2021    Chief Complaint     Urinary Catheter Problem (pt states his catheter is leaking)      History of Present Illness     Sun Carney is a 82 y.o. male with a past medical history of chronic kidney disease, diabetes type 2, hypertension, and BPH status post radiation with complicated acute urinary retention requiring Lucas placement that presents today for evaluation of Lucas malfunction. Patient is accompanied by his wife. The wife states that the patient woke up in the bed was wet. She thinks that the Lucas may be leaking. She states that when she went to bed that the urine that was draining was yellow in color, however now she states that it is a light-red. He denies any pain. Denies any fevers. Review of Systems     Review of Systems   Constitutional: Negative for fever. Respiratory: Negative for shortness of breath. Cardiovascular: Negative for chest pain. Gastrointestinal: Negative for abdominal pain. Past Medical, Surgical, Family, and Social History     He has a past medical history of Anemia, Anxiety and depression, Arthritis, BPH (benign prostatic hypertrophy), Constipation, Diverticulosis, Dyspnea on exertion, Eczema, Elevated PSA, Erectile dysfunction, Essential hypertension, GERD (gastroesophageal reflux disease), Hemorrhoid, Hyperlipidemia, Hyperphosphatemia, Hypertension, Insomnia, Memory loss, Nausea, Overactive bladder, Paranoid schizophrenia ([address] Perennial allergic rhinitis, Prostate cancer ([address] and Type 2 diabetes mellitus without complication, without long-term current use of insulin ([address] He has a past surgical history that includes Prostate surgery (May 8, 2001); Colonoscopy (December 1, 2011); Upper gastrointestinal endoscopy (November 7, 2011); Colonoscopy (June 16, 2011); Cholecystectomy, laparoscopic (September 13, 2012);  Prostate biopsy (November 2012); Total hip arthroplasty (Left, May 17, 2015); hip surgery (Left, June 23, 2015); Upper gastrointestinal endoscopy (February 4, 2016); Upper gastrointestinal endoscopy (N/A, 04/15/2016); Upper gastrointestinal endoscopy (04/25/2017); other surgical history (11/06/2017); other surgical history (11/06/2017); Upper gastrointestinal endoscopy (N/A, 6/14/2019); Upper gastrointestinal endoscopy (N/A, 6/14/2019); Endoscopy, colon, diagnostic; Colonoscopy (N/A, 10/1/2019); and Chest Wall Resection (N/A, 1/21/2020). His family history includes Cancer in his brother and mother. He reports that he quit smoking about 31 years ago. His smoking use included cigarettes. He started smoking about 48 years ago. He has a 18.00 pack-year smoking history. He has never used smokeless tobacco. He reports that he does not drink alcohol or use drugs. Medications     Previous Medications    ACCU-CHEK SOFTCLIX LANCETS MISC    1 each by Does not apply route daily    AMLODIPINE (NORVASC) 5 MG TABLET    Take 1 tablet by mouth daily    ASPIRIN 325 MG EC TABLET    TAKE 1 TABLET BY MOUTH EVERY DAY WITH FOOD    ATORVASTATIN (LIPITOR) 10 MG TABLET    TAKE 1 TABLET BY MOUTH EVERY DAY    BLOOD GLUCOSE TEST STRIPS (ACCU-CHEK ZEENAT PLUS) STRIP    1 each by In Vitro route daily as needed (symptoms of low or high blood sugar)    BREO ELLIPTA 100-25 MCG/INH AEPB INHALER    TAKE 1 PUFF BY MOUTH EVERY DAY    CLOTRIMAZOLE (LOTRIMIN) 1 % CREAM    APPLY TO AFFECTED AREA TWICE A DAY    FAMOTIDINE (PEPCID) 40 MG TABLET    Take 1 tablet by mouth every evening    FLUTICASONE (FLONASE) 50 MCG/ACT NASAL SPRAY    USE 2 SPRAYS BY NASAL ROUTE DAILY AS NEEDED FOR RHINITIS    GABAPENTIN (NEURONTIN) 100 MG CAPSULE    Take 100 mg by mouth 2 times daily.     GALANTAMINE (RAZADYNE ER) 24 MG EXTENDED RELEASE CAPSULE    TAKE 1 CAPSULE BY MOUTH EVERY DAY    LATANOPROST (XALATAN) 0.005 % OPHTHALMIC SOLUTION    Place 1 drop into the left eye nightly LEVETIRACETAM (KEPPRA) 500 MG TABLET    TAKE 1 TABLET BY MOUTH TWICE A DAY    METFORMIN (GLUCOPHAGE) 500 MG TABLET    TAKE 1 TABLET BY MOUTH EVERY DAY WITH FOOD    MIRTAZAPINE (REMERON) 15 MG TABLET    TAKE 1 TABLET BY MOUTH EVERY DAY AT NIGHT    MULTIPLE VITAMIN (DAILY-TJ) TABS    TAKE 1 TABLET BY MOUTH EVERY DAY    NITROGLYCERIN (NITROSTAT) 0.4 MG SL TABLET    Place 1 tablet under the tongue every 5 minutes as needed. OXCARBAZEPINE (TRILEPTAL) 300 MG TABLET    Take 1 tablet by mouth 2 times daily    OXYBUTYNIN (DITROPAN XL) 15 MG EXTENDED RELEASE TABLET    Take 1 tablet by mouth 2 times daily    POLYETHYLENE GLYCOL (GLYCOLAX) 17 G PACKET    Take 17 g by mouth daily as needed     PSYLLIUM-CALCIUM (METAMUCIL PLUS CALCIUM) CAPS    Take 1 tablet by mouth 2 times daily Take with 8 oz water. QUETIAPINE (SEROQUEL) 25 MG TABLET    Take 1 tablet by mouth nightly    TAMSULOSIN (FLOMAX) 0.4 MG CAPSULE    Take 1 capsule by mouth daily       Allergies     He is allergic to lorazepam.    Physical Exam     INITIAL VITALS: BP: (!) 181/87, Temp: 99.1 °F (37.3 °C), Pulse: 83, Resp: 13, SpO2: 98 %   Physical Exam  Exam conducted with a chaperone present. Constitutional:       Appearance: Normal appearance. HENT:      Mouth/Throat:      Mouth: Mucous membranes are moist.   Eyes:      Pupils: Pupils are equal, round, and reactive to light. Pulmonary:      Effort: No respiratory distress. Genitourinary:     Penis: Normal and uncircumcised. Testes: Normal.      Comments: There is no blood at the meatus. There is a Lucas catheter in place. There is some drainage of the urine in the Lucas catheter. Skin:     General: Skin is warm. Capillary Refill: Capillary refill takes less than 2 seconds. Neurological:      General: No focal deficit present. Mental Status: He is alert and oriented to person, place, and time.            Diagnostic Results     EKG   None performed    RADIOLOGY:  No orders to display LABS:   No results found for this visit on 04/18/21. ED BEDSIDE ULTRASOUND:  None performed    RECENT VITALS:  BP: (!) 181/87,Temp: 99.1 °F (37.3 °C), Pulse: 83, Resp: 13, SpO2: 98 %     Procedures      Urethral Catheterization    Date/Time: 4/18/2021 1:19 PM  Performed by: Jamal Carroll RN  Authorized by: Wyatt Mendez MD     Consent:     Consent obtained:  Verbal    Consent given by:  Patient    Risks discussed:  False passage and pain    Alternatives discussed:  No treatment  Pre-procedure details:     Procedure purpose:  Therapeutic    Preparation: Patient was prepped and draped in usual sterile fashion    Procedure details:     Provider performed due to:  Complicated insertion    Catheter insertion:  Indwelling    Catheter type:  Coude    Catheter size:  14 Fr    Bladder irrigation: yes      Number of attempts:  1    Urine characteristics:  Blood-tinged  Post-procedure details:     Patient tolerance of procedure: Tolerated well, no immediate complications        ED Course     Nursing Notes, Past Medical Hx, Past Surgical Hx, Social Hx,Allergies, and Family Hx were reviewed. patient was given the following medications:  No orders of the defined types were placed in this encounter. CONSULTS:  None    ED Course:       MEDICAL DECISIONMAKING / ASSESSMENT / Carlos Mason is a 82 y.o. male with a past medical history of BPH status post Lucas catheter placement that presents today for evaluation of possible Lucas catheter malfunction      Medical Decision Making: This is a pleasant 53-year-old male that comes in today for evaluation of possible Lucas catheter malfunction. The wife is concerned that he woke up with a large wet area in the bed this morning. Patient has no complaints. I did examine the Lucas catheter, it appears to be intact, but there is some blood-tinged urine that is coming from the Lucas so it is possible that it may have been inadvertently pulled.   For this reason, I did elect to have the Lucas catheter exchanged for a [address] [place] coudé catheter which is what the patient had in. We were able to flush it without difficulty. At this point I think the patient is stable for discharge home. Patient was told to follow-up with urologist as already scheduled. Return for worsening symptoms. Wife and patient grateful for care. Clinical Impression     1.  Malfunction of Lucas catheter, initial encounter [facility])        Disposition     PATIENT REFERRED TO:  Ja Oden MD  [facility] [address]  [phone]            DISCHARGE MEDICATIONS:  New Prescriptions    No medications on file       DISPOSITION Decision To Discharge 04/18/2021 12:53:44 PM           Roberto Vazquez MD  04/18/21 6095

## 2021-04-18 NOTE — ED NOTES
Patient prepared for and ready to be discharged. Patient discharged at this time in no acute distress after verbalizing understanding of discharge instructions. Patient left after receiving After Visit Summary instructions.       Zuleyma Florence RN  04/18/21 0134

## 2021-04-18 NOTE — ED NOTES
14 fr tobar cath replaced per Dr Gerry Muñiz. Pt tolerated well urine draining well. Pt tolerated well.      Dolly Arriaza RN  04/18/21 [id number]

## 2021-04-19 ENCOUNTER — CARE COORDINATION (OUTPATIENT)
Dept: CASE MANAGEMENT | Age: 86
End: 2021-04-19

## 2021-04-19 DIAGNOSIS — N17.9 AKI (ACUTE KIDNEY INJURY) (HCC): Primary | ICD-10-CM

## 2021-04-19 NOTE — CARE COORDINATION
Was patient discharged with a pulse oximeter? No Discussed and confirmed pulse oximeter discharge instructions and when to notify provider or seek emergency care. Discussed follow-up appointments. If no appointment was previously scheduled, appointment scheduling offered: Yes. Is follow up appointment scheduled within 7 days of discharge? No  Non-[facility] follow up appointment(s): 05/10/2021    Plan for follow-up call in 3-5 days based on severity of symptoms and risk factors. Plan for next call: symptom management-. CTN provided contact information for future needs. CTN spoke with patient's Spouse this am for initial 24 hour CTN call, as well as ED Follow up. Spouse states patient is doing well, reports patient is not having any fever, chills, nausea, vomiting, chest pain, SOB or cough. Urine present in tobar catheter bag, per Spouse. Urine is partially clear, no blood or clots noted. CTN encouraged spouse to continue to monitor urine output for amount, color, sediment, odor or blood. No reports of any difficulty with urination, BM's or appetite. 1111f not completed, spouse states medications are managed by SN with [address] [place] [address]. States SN normally comes on Mondays. CTN will follow up with [facility] to confirm orders were received. CTN provided education on s/s that require medical attention and when to seek medical attention. CTN advised Pt of use urgent care or physicians 24 hr access line if assistance is needed after hours or on the weekend. Pt denies any needs or concerns and is agreeable with additional calls. CTN spoke with intake department with [facility], orders were not received. CTN will send message to PCP, as well as [facility] for intake department to pull from Epic.         Thank Delpha Prader, RN  Care Transition Coordinator  Contact XUTFWX:[phone]

## 2021-04-20 ENCOUNTER — OFFICE VISIT (OUTPATIENT)
Dept: FAMILY MEDICINE CLINIC | Age: 86
End: 2021-04-20
Payer: MEDICARE

## 2021-04-20 ENCOUNTER — CARE COORDINATION (OUTPATIENT)
Dept: CASE MANAGEMENT | Age: 86
End: 2021-04-20

## 2021-04-20 VITALS
WEIGHT: 135 LBS | HEIGHT: 75 IN | SYSTOLIC BLOOD PRESSURE: 132 MMHG | DIASTOLIC BLOOD PRESSURE: 78 MMHG | BODY MASS INDEX: 16.78 KG/M2 | OXYGEN SATURATION: 98 % | HEART RATE: 101 BPM

## 2021-04-20 DIAGNOSIS — R33.9 URINARY RETENTION: Primary | ICD-10-CM

## 2021-04-20 DIAGNOSIS — N34.2 ULCER OF URETHRAL MEATUS: ICD-10-CM

## 2021-04-20 PROCEDURE — 1036F TOBACCO NON-USER: CPT | Performed by: NURSE PRACTITIONER

## 2021-04-20 PROCEDURE — G8427 DOCREV CUR MEDS BY ELIG CLIN: HCPCS | Performed by: NURSE PRACTITIONER

## 2021-04-20 PROCEDURE — 99214 OFFICE O/P EST MOD 30 MIN: CPT | Performed by: NURSE PRACTITIONER

## 2021-04-20 PROCEDURE — 4040F PNEUMOC VAC/ADMIN/RCVD: CPT | Performed by: NURSE PRACTITIONER

## 2021-04-20 PROCEDURE — G8418 CALC BMI BLW LOW PARAM F/U: HCPCS | Performed by: NURSE PRACTITIONER

## 2021-04-20 PROCEDURE — 1123F ACP DISCUSS/DSCN MKR DOCD: CPT | Performed by: NURSE PRACTITIONER

## 2021-04-20 PROCEDURE — 1111F DSCHRG MED/CURRENT MED MERGE: CPT | Performed by: NURSE PRACTITIONER

## 2021-04-20 NOTE — ASSESSMENT & PLAN NOTE
After discussion with patient's PCP decision has been made to remove the catheter. Lucas bladder emptied coudé catheter removed without any difficulty. Urine immediately flowed from tip of meatus. Dr. Yojana Moreno wife to apply a small amount of Vaseline to the ulceration the tip of the urinary meatu/urethra s left by the catheter track and to watch patient for urine production. If it anytime she notes his urinary production has decreased and he has not made any urine in a 4-hour. She is to return patient to emergency room for evaluation. Appointment has been made for follow-up with urology in 2 days. She reports understanding of these instructions.

## 2021-04-20 NOTE — PROGRESS NOTES
Amada Hirsch (:  1935) is a 82 y.o. male,Established patient, here for evaluation of the following chief complaint(s):  Follow-Up from Hospital      ASSESSMENT/PLAN:  1. Urinary retention  -     CA DISCHARGE MEDS RECONCILED W/ CURRENT OUTPATIENT MED LIST  2. Ulcer of urethral meatus  Assessment & Plan:  After discussion with patient's PCP decision has been made to remove the catheter. Lucas bladder emptied coudé catheter removed without any difficulty. Urine immediately flowed from tip of meatus. Dr. William Laws wife to apply a small amount of Vaseline to the ulceration the tip of the urinary meatu/urethra s left by the catheter track and to watch patient for urine production. If it anytime she notes his urinary production has decreased and he has not made any urine in a 4-hour. She is to return patient to emergency room for evaluation. Appointment has been made for follow-up with urology in 2 days. She reports understanding of these instructions. Patient has an appointment in 2 weeks with his PCP. If at anytime he has any further questions or problems he is to call office    No follow-ups on file. SUBJECTIVE/OBJECTIVE:  Joshua Macario is an 17-year-old male who presents today for follow-up from emergency room visit he has a history significant for a multitude of problems most recently for urinary retention. He was in the emergency room 2 days ago recently for what his wife felt was a malfunction of the catheter and Lucas bag. While in the emergency room he was noted to have some bleeding from the urinary meatus although he did have urine in the bag. At that time the decision was made to change the catheter to a daily and reattached to the bag. Since being home he has had continual bleeding from the urinary meatus causing the tip of his penis to stick to the inside of his depends.   This is very distressing to the wife when she changes his depends as she has to traumatize the skin further to separate it.  She is concerned that she is bleeding from his penis and that has something to do with his kidneys. He does report he is making good urine production requiring the bag to be emptied every couple of hours. Current Outpatient Medications   Medication Sig Dispense Refill    gabapentin (NEURONTIN) 100 MG capsule Take 100 mg by mouth 2 times daily.  aspirin 325 MG EC tablet TAKE 1 TABLET BY MOUTH EVERY DAY WITH FOOD 90 tablet 3    famotidine (PEPCID) 40 MG tablet Take 1 tablet by mouth every evening 30 tablet 3    amLODIPine (NORVASC) 5 MG tablet Take 1 tablet by mouth daily 90 tablet 3    Psyllium-Calcium (METAMUCIL PLUS CALCIUM) CAPS Take 1 tablet by mouth 2 times daily Take with 8 oz water.  180 capsule 2    oxybutynin (DITROPAN XL) 15 MG extended release tablet Take 1 tablet by mouth 2 times daily 60 tablet 3    levETIRAcetam (KEPPRA) 500 MG tablet TAKE 1 TABLET BY MOUTH TWICE A  tablet 3    mirtazapine (REMERON) 15 MG tablet TAKE 1 TABLET BY MOUTH EVERY DAY AT NIGHT 30 tablet 2    clotrimazole (LOTRIMIN) 1 % cream APPLY TO AFFECTED AREA TWICE A DAY 30 g 2    tamsulosin (FLOMAX) 0.4 MG capsule Take 1 capsule by mouth daily 90 capsule 1    Multiple Vitamin (DAILY-TJ) TABS TAKE 1 TABLET BY MOUTH EVERY DAY 90 tablet 1    galantamine (RAZADYNE ER) 24 MG extended release capsule TAKE 1 CAPSULE BY MOUTH EVERY DAY (Patient taking differently: Take 24 mg by mouth daily (with breakfast) ) 30 capsule 5    Accu-Chek Softclix Lancets MISC 1 each by Does not apply route daily 100 each 1    blood glucose test strips (ACCU-CHEK ZEENAT PLUS) strip 1 each by In Vitro route daily as needed (symptoms of low or high blood sugar) 100 strip 2    QUEtiapine (SEROQUEL) 25 MG tablet Take 1 tablet by mouth nightly 30 tablet 3    OXcarbazepine (TRILEPTAL) 300 MG tablet Take 1 tablet by mouth 2 times daily 180 tablet 3    metFORMIN (GLUCOPHAGE) 500 MG tablet TAKE 1 TABLET BY MOUTH EVERY DAY WITH FOOD 90 tablet 3    BREO ELLIPTA 100-25 MCG/INH AEPB inhaler TAKE 1 PUFF BY MOUTH EVERY DAY 1 each 11    atorvastatin (LIPITOR) 10 MG tablet TAKE 1 TABLET BY MOUTH EVERY DAY 90 tablet 3    polyethylene glycol (GLYCOLAX) 17 g packet Take 17 g by mouth daily as needed       latanoprost (XALATAN) 0.005 % ophthalmic solution Place 1 drop into the left eye nightly       fluticasone (FLONASE) 50 MCG/ACT nasal spray USE 2 SPRAYS BY NASAL ROUTE DAILY AS NEEDED FOR RHINITIS 1 Bottle 1    nitroGLYCERIN (NITROSTAT) 0.4 MG SL tablet Place 1 tablet under the tongue every 5 minutes as needed. 25 tablet 12     No current facility-administered medications for this visit. Review of Systems   Genitourinary: Positive for discharge, penile pain and urgency. Vitals:    04/20/21 1343   BP: 132/78   Site: Left Upper Arm   Position: Sitting   Cuff Size: Medium Adult   Pulse: 101   SpO2: 98%   Weight: 135 lb (61.2 kg)   Height: 6' 2.5\" (1.892 m)       Physical Exam  Genitourinary:     Comments: Lucas catheter attached to leg bag in place. Urine in bag appears clear yellow. Urinary meatus with a 4 mm fissure at the 2 o'clock position, catheter laying in that tract. At the fissure there is some very mild bleeding noted he does have bleeding noted on his depends requiring manual separation of the foreskin from the depends. This appears to be the source of the bleeding this is very distressing to the wife. An electronic signature was used to authenticate this note.     --DAVID Ferro - CNP

## 2021-04-21 ENCOUNTER — CARE COORDINATION (OUTPATIENT)
Dept: CASE MANAGEMENT | Age: 86
End: 2021-04-21

## 2021-04-21 LAB
KAPPA, FREE LIGHT CHAINS, SERUM: 87.74 MG/L (ref 3.3–19.4)
KAPPA/LAMBDA RATIO: 2.63 (ref 0.26–1.65)
KAPPA/LAMBDA TEST COMMENT: ABNORMAL
LAMBDA, FREE LIGHT CHAINS, SERUM: 33.4 MG/L (ref 5.71–26.3)

## 2021-04-21 NOTE — CARE COORDINATION
[facility] Transitions Follow Up Call    2021    Patient: Asaf Yeboah  Patient : 1935   MRN: [id number]   Reason for Admission: THERESE  Discharge Date: 21 RARS: Readmission Risk Score: 32         Spoke with: Spouse- [facility] Transitions Subsequent and Final Call    Schedule Follow Up Appointment with PCP: Declined  Subsequent and Final Calls  Do you have any ongoing symptoms?: No  Have your medications changed?: No  Do you have any questions related to your medications?: No  Do you currently have any active services?: Yes  Are you currently active with any services?: Home Health  Do you have any needs or concerns that I can assist you with?: No  Identified Barriers: None  Care Transitions Interventions  No Identified Needs  Other Interventions:         Summary  CTN spoke with patients Spouse Erica Watts this afternoon for follow up CTN call. Spouse states patient is doing well, reporting no fever, chills, nausea, vomiting, chest pain, SOB or cough. No difficulty with urination, BM's or appetite. Spouse states patient had tobar catheter removed, he is urinating without difficulty. CTN encouraged patient to continue to monitor himself for any of the above s/s, reporting any that may present to MD immediately. No other issues or concerns, no new or changed medications at this time. Patient has follow up appointment next week. CTN provided education on s/s that require medical attention and when to seek medical attention. CTN advised Pt of use urgent care or physicians 24 hr access line if assistance is needed after hours or on the weekend. Pt denies any needs or concerns and is agreeable with additional calls.     Follow Up  Future Appointments   Date Time Provider Al Perkins   2021  2:00 PM Nayely Davis MD [facility]   5/10/2021 10:30 AM Jammie Betancourt MD [facility] [address] [facility]       Douglas Avelar RN

## 2021-04-23 ENCOUNTER — CARE COORDINATION (OUTPATIENT)
Dept: CASE MANAGEMENT | Age: 86
End: 2021-04-23

## 2021-04-23 DIAGNOSIS — R41.3 MEMORY LOSS: Chronic | ICD-10-CM

## 2021-04-23 RX ORDER — POLYETHYLENE GLYCOL 3350 17 G/17G
POWDER, FOR SOLUTION ORAL
Qty: 510 G | Refills: 5 | OUTPATIENT
Start: 2021-04-23

## 2021-04-23 RX ORDER — GALANTAMINE HYDROBROMIDE 24 MG/1
CAPSULE, EXTENDED RELEASE ORAL
Qty: 90 CAPSULE | Refills: 1 | OUTPATIENT
Start: 2021-04-23

## 2021-04-23 NOTE — CARE COORDINATION
[facility] Transitions Follow Up Call    2021    Patient: Asaf Yeboah  Patient : 1935   MRN: [id number]   Reason for Admission: THERESE  Discharge Date: 21 RARS: Readmission Risk Score: 26         Spoke with: Spencer Upton Transitions Subsequent and Final Call    Schedule Follow Up Appointment with PCP: Declined  Subsequent and Final Calls  Do you have any ongoing symptoms?: No  Have your medications changed?: No  Do you have any questions related to your medications?: No  Do you currently have any active services?: Yes  Are you currently active with any services?: Home Health  Do you have any needs or concerns that I can assist you with?: No  Identified Barriers: None  Care Transitions Interventions  No Identified Needs  Other Interventions:         Summary  CTN spoke with patients spouse Erica Watts this afternoon for follow up CTN call. Spouse states patient is doing well, reporting no difficulty with urination, BM's or appetite. Patient has had no fever, chills, nausea, vomiting, chest pain, SOB  or cough. CTN encouraged spouse to continue to monitor patient for any of the above s/s, reporting any that may present to MD immediately. No other issues or concerns at this time. CTN provided education on s/s that require medical attention and when to seek medical attention. CTN advised Pt of use urgent care or physicians 24 hr access line if assistance is needed after hours or on the weekend. Pt denies any needs or concerns and is agreeable with additional calls.     Follow Up  Future Appointments   Date Time Provider Al Perkins   2021  2:00 PM Nayely Davis MD [facility]   5/10/2021 10:30 AM [facility]       Douglas Avelar RN

## 2021-04-24 RX ORDER — GALANTAMINE HYDROBROMIDE 24 MG/1
24 CAPSULE, EXTENDED RELEASE ORAL
Qty: 90 CAPSULE | Refills: 0 | Status: SHIPPED | OUTPATIENT
Start: 2021-04-24 | End: 2021-05-10

## 2021-04-24 RX ORDER — POLYETHYLENE GLYCOL 3350 17 G/17G
17 POWDER, FOR SOLUTION ORAL DAILY PRN
Qty: 527 G | Refills: 1 | Status: SHIPPED | OUTPATIENT
Start: 2021-04-24

## 2021-04-26 ENCOUNTER — CARE COORDINATION (OUTPATIENT)
Dept: CASE MANAGEMENT | Age: 86
End: 2021-04-26

## 2021-04-26 NOTE — CARE COORDINATION
[facility] Transitions Follow Up Call    2021    Patient: Bulmaro Webster  Patient : 1935   MRN: [id number]   Reason for Admission: THERESE  Discharge Date: 21 RARS: Readmission Risk Score: 26         Spoke with: Lorri Riojas Transitions Subsequent and Final Call    Schedule Follow Up Appointment with PCP: Declined  Subsequent and Final Calls  Do you have any ongoing symptoms?: No  Have your medications changed?: No  Do you have any questions related to your medications?: No  Do you currently have any active services?: Yes  Are you currently active with any services?: Home Health  Do you have any needs or concerns that I can assist you with?: No  Identified Barriers: None  Care Transitions Interventions  No Identified Needs  Other Interventions:         Summary  CTN spoke with patient this afternoon for follow up CTN call. Patient states he is doing well, no reports of any fever, chills, nausea, vomiting, chest pain, SOB or cough. Patient states he is urinating without any difficulty, no other issues or concerns. CTN encouraged patient to continue to monitor himself for any of the above s/s, reporting any that may present to MD immediately, rest as needed and take all medications as prescribed. CTN provided education on s/s that require medical attention and when to seek medical attention. CTN advised Pt of use urgent care or physicians 24 hr access line if assistance is needed after hours or on the weekend. Pt denies any needs or concerns and is agreeable with additional calls.     Follow Up  Future Appointments   Date Time Provider Al Perkins   2021  2:00 PM Miryam Hussein MD [facility]   5/10/2021 10:30 AM Manuel Loco MD [facility] [address] [place], [place]

## 2021-04-28 ENCOUNTER — CARE COORDINATION (OUTPATIENT)
Dept: CASE MANAGEMENT | Age: 86
End: 2021-04-28

## 2021-04-28 NOTE — CARE COORDINATION
[facility] Transitions Follow Up Call    2021    Patient: Angela Parr  Patient : 1935   MRN: [id number]   Reason for Admission: THERESE  Discharge Date: 21 RARS: Readmission Risk Score: 26         Spoke with: Aman Estrada Transitions Subsequent and Final Call    Schedule Follow Up Appointment with PCP: Declined  Subsequent and Final Calls  Do you have any ongoing symptoms?: No  Have your medications changed?: No  Do you have any questions related to your medications?: No  Do you currently have any active services?: Yes  Are you currently active with any services?: Home Health  Do you have any needs or concerns that I can assist you with?: No  Identified Barriers: None  Care Transitions Interventions  No Identified Needs  Other Interventions:         Summary  CTN spoke with patient this afternoon for follow up CTN call. Patient states he is doing well, no reports of any fever, chills, nausea, vomiting, chest pain, SOB or cough. No difficulty with urination, BM's or appetite. Patient instructed to continue to monitor herself for any of the above s/s, reporting any that may present to MD immediately. Patient also instructed to continue to monitor urine output for amount, sediment, odor, or any blood, reporting to MD immediately. No new or changed medications, no other issues or concerns at this time. Patient states SN, PT/OT with [place] [address] agency are still following him, in home. CTN provided education on s/s that require medical attention and when to seek medical attention. CTN advised Pt of use urgent care or physicians 24 hr access line if assistance is needed after hours or on the weekend. Pt denies any needs or concerns and is agreeable with additional calls.     Follow Up  Future Appointments   Date Time Provider Al Perkins   5/10/2021 10:30 AM Jorge Luis Parrish MD [address]   2021  1:30 PM MD KIERSTEN Smith [facility]       Evaristo Guzman

## 2021-04-30 ENCOUNTER — CARE COORDINATION (OUTPATIENT)
Dept: CASE MANAGEMENT | Age: 86
End: 2021-04-30

## 2021-04-30 NOTE — CARE COORDINATION
You Patient resolved from the Care Transitions episode on 04/30/2021    Patient/family has been provided the following resources and education related to COVID-19:                         Signs, symptoms and red flags related to COVID-19            CDC exposure and quarantine guidelines            [employer] exposure contact - [phone]            Contact for their local Department of Health                 Patient currently reports that the following symptoms have improved:  no new/worsening symptoms. Patient doing well, no reports of any issues or concerns. Spouse states patient is urinating well, no difficulty. Spouse also reporting no fever, chills, nausea, vomiting, chest pain, SOB or cough. CTN encouraged spouse to continue to monitor urine output, as well as monitoring for any of the above s/s, reporting any that may present to MD immediately. CTN will continue to monitor patient as a regular PHP, with ongoing follow up calls to home 2-3 times per week.     Thank Justin Benz RN  Care Transition Coordinator  Contact DDQYVK:[phone]

## 2021-05-01 DIAGNOSIS — R63.0 LOSS OF APPETITE: ICD-10-CM

## 2021-05-01 DIAGNOSIS — R63.4 LOSS OF WEIGHT: ICD-10-CM

## 2021-05-03 RX ORDER — MIRTAZAPINE 15 MG/1
TABLET, FILM COATED ORAL
Qty: 90 TABLET | Refills: 1 | Status: SHIPPED | OUTPATIENT
Start: 2021-05-03 | End: 2021-11-16

## 2021-05-03 RX ORDER — MULTIVITAMIN WITH FOLIC ACID 400 MCG
TABLET ORAL
Qty: 90 TABLET | Refills: 1 | Status: SHIPPED | OUTPATIENT
Start: 2021-05-03 | End: 2022-02-24

## 2021-05-03 RX ORDER — MULTIVITAMIN WITH FOLIC ACID 400 MCG
TABLET ORAL
Qty: 90 TABLET | Refills: 1 | OUTPATIENT
Start: 2021-05-03

## 2021-05-04 ENCOUNTER — CARE COORDINATION (OUTPATIENT)
Dept: CASE MANAGEMENT | Age: 86
End: 2021-05-04

## 2021-05-04 NOTE — CARE COORDINATION
[facility] Transitions Follow Up Call    2021    Patient: Kristina Etienne  Patient : 1935   MRN: [id number]   Reason for Admission: THERESE  Discharge Date: 21 RARS: Readmission Risk Score: 32         Spoke with: Spouse     Care Transitions Subsequent and Final Call    Schedule Follow Up Appointment with PCP: Declined  Subsequent and Final Calls  Do you have any ongoing symptoms?: No  Have your medications changed?: No  Do you have any questions related to your medications?: No  Do you currently have any active services?: Yes  Are you currently active with any services?: Home Health  Do you have any needs or concerns that I can assist you with?: No  Identified Barriers: None  Care Transitions Interventions  No Identified Needs  Other Interventions:         Summary  CTN spoke with patients spouse this am for follow up CTN call. Spouse states patient is doing well, not having any trouble with urination, BM's or appetite. No  fever, chills, nausea, vomiting, chest pain, SOB or cough. Spouse instructed to continue to monitor patient for any of the above s/s, reporting any that may present to DMD immediately. No other issues or concerns, no new or changed medications at this time. CTN provided education on s/s that require medical attention and when to seek medical attention. CTN advised Pt of use urgent care or physicians 24 hr access line if assistance is needed after hours or on the weekend. Pt denies any needs or concerns and is agreeable with additional calls.     Follow Up  Future Appointments   Date Time Provider Al Perkins   5/10/2021 10:30 AM Rohit Gonzalez MD [address]   2021  1:30 PM Raegan Coughlin MD [facility]       Aliyah Kemp RN

## 2021-05-07 ENCOUNTER — CARE COORDINATION (OUTPATIENT)
Dept: CASE MANAGEMENT | Age: 86
End: 2021-05-07

## 2021-05-07 NOTE — CARE COORDINATION
[facility] Transitions Follow Up Call    2021    Patient: Angela Parr  Patient : 1935   MRN: [id number]   Reason for Admission: THERESE  Discharge Date: 21 RARS: Readmission Risk Score: 26         Spoke with: [address] [place] [address] Transitions Subsequent and Final Call    Schedule Follow Up Appointment with PCP: Declined  Subsequent and Final Calls  Do you have any ongoing symptoms?: No  Have your medications changed?: No  Do you have any questions related to your medications?: No  Do you currently have any active services?: Yes  Are you currently active with any services?: Home Health  Do you have any needs or concerns that I can assist you with?: No  Identified Barriers: None  Care Transitions Interventions  No Identified Needs  Other Interventions:         Summary  CTN spoke with patient this afternoon for follow up CTN call. Patient states he is doing well, reporting no complaints of any fever, chills, nausea, vomiting, chest pain, SOB or cough. No reports of any difficulty with urination, BM's or appetite. CTN encouraged patient to continue to monitor for any of the above s/s, reporting any that may present to MD immediately, rest as needed and take all medications as prescribed. Patient states SN, PT/OT with [facility] agency are all still coming to home. No new or changed medications, no other issues or concerns at this time. CTN provided education on s/s that require medical attention and when to seek medical attention. CTN advised Pt of use urgent care or physicians 24 hr access line if assistance is needed after hours or on the weekend. Pt denies any needs or concerns and is agreeable with additional calls.     Follow Up  Future Appointments   Date Time Provider Al Perkins   5/10/2021 10:30 AM Jorge Luis Parrish MD [address]   2021  1:30 PM Yulia Charles MD [facility]       Ezra Joseph RN

## 2021-05-10 ENCOUNTER — CARE COORDINATION (OUTPATIENT)
Dept: CASE MANAGEMENT | Age: 86
End: 2021-05-10

## 2021-05-10 ENCOUNTER — OFFICE VISIT (OUTPATIENT)
Dept: FAMILY MEDICINE CLINIC | Age: 86
End: 2021-05-10
Payer: MEDICARE

## 2021-05-10 VITALS
DIASTOLIC BLOOD PRESSURE: 86 MMHG | HEART RATE: 81 BPM | SYSTOLIC BLOOD PRESSURE: 146 MMHG | OXYGEN SATURATION: 96 % | WEIGHT: 141 LBS | BODY MASS INDEX: 17.53 KG/M2 | HEIGHT: 75 IN

## 2021-05-10 DIAGNOSIS — I10 HYPERTENSION, UNSPECIFIED TYPE: Primary | ICD-10-CM

## 2021-05-10 DIAGNOSIS — F01.518 VASCULAR DEMENTIA WITH BEHAVIOR DISTURBANCE: ICD-10-CM

## 2021-05-10 DIAGNOSIS — E11.9 TYPE 2 DIABETES MELLITUS WITHOUT COMPLICATION, WITHOUT LONG-TERM CURRENT USE OF INSULIN (HCC): Chronic | ICD-10-CM

## 2021-05-10 DIAGNOSIS — N18.31 STAGE 3A CHRONIC KIDNEY DISEASE (HCC): ICD-10-CM

## 2021-05-10 DIAGNOSIS — K40.90 UNILATERAL INGUINAL HERNIA WITHOUT OBSTRUCTION OR GANGRENE, RECURRENCE NOT SPECIFIED: ICD-10-CM

## 2021-05-10 DIAGNOSIS — E78.5 HYPERLIPIDEMIA, UNSPECIFIED HYPERLIPIDEMIA TYPE: Chronic | ICD-10-CM

## 2021-05-10 PROCEDURE — 1036F TOBACCO NON-USER: CPT | Performed by: INTERNAL MEDICINE

## 2021-05-10 PROCEDURE — 1111F DSCHRG MED/CURRENT MED MERGE: CPT | Performed by: INTERNAL MEDICINE

## 2021-05-10 PROCEDURE — 4040F PNEUMOC VAC/ADMIN/RCVD: CPT | Performed by: INTERNAL MEDICINE

## 2021-05-10 PROCEDURE — G8418 CALC BMI BLW LOW PARAM F/U: HCPCS | Performed by: INTERNAL MEDICINE

## 2021-05-10 PROCEDURE — 1123F ACP DISCUSS/DSCN MKR DOCD: CPT | Performed by: INTERNAL MEDICINE

## 2021-05-10 PROCEDURE — G8427 DOCREV CUR MEDS BY ELIG CLIN: HCPCS | Performed by: INTERNAL MEDICINE

## 2021-05-10 PROCEDURE — 99214 OFFICE O/P EST MOD 30 MIN: CPT | Performed by: INTERNAL MEDICINE

## 2021-05-10 RX ORDER — MEMANTINE HYDROCHLORIDE 5 MG/1
2.5 TABLET ORAL DAILY
Qty: 45 TABLET | Refills: 1 | Status: SHIPPED | OUTPATIENT
Start: 2021-05-10 | End: 2021-11-16

## 2021-05-10 ASSESSMENT — ENCOUNTER SYMPTOMS
SHORTNESS OF BREATH: 0
ABDOMINAL PAIN: 0
NAUSEA: 0
CHEST TIGHTNESS: 0
VOMITING: 0

## 2021-05-10 NOTE — PROGRESS NOTES
Outpatient Note for established Patient - regular Visit     History Obtained From:  patient, electronic medical record  Is the patient new to me ? - No    HISTORY OF PRESENT ILLNESS:   The patient is a 82 y.o. male who is here today for :  Kalina Walker was seen today for 3 month follow-up. Diagnoses and all orders for this visit:    Hypertension, unspecified type  /86  Pt is on Norvasc  No side effects. Pt denies CP/SOB/palpitations/abdominal pain/N/V. No fever/ chills/ cough   No headache. Stage 3a chronic kidney disease  Lab Results   Component Value Date     04/16/2021    K 4.3 04/16/2021    K 3.7 04/15/2021     04/16/2021    CO2 24 04/16/2021    BUN 24 04/16/2021    CREATININE 2.1 04/16/2021    GLUCOSE 125 04/16/2021    CALCIUM 8.9 04/16/2021    GFR stable   He follows Dr Meg Browning  Pt had THERESE (obstructive) and was referred to see a urologist whom he saw last week. Type 2 diabetes mellitus without complication, without long-term current use of insulin (HCC)  Lab Results   Component Value Date    LABA1C 6.0 03/31/2021     Lab Results   Component Value Date    .5 03/31/2021   pt does check his glucose levels at home <120 mg %    Hyperlipidemia, unspecified hyperlipidemia type  Lab Results   Component Value Date    CHOL 176 03/31/2021    CHOL 119 09/11/2020    CHOL 109 03/10/2020     Lab Results   Component Value Date    TRIG 88 03/31/2021    TRIG 39 09/11/2020    TRIG 39 03/10/2020     Lab Results   Component Value Date    HDL 69 (H) 03/31/2021    HDL 75 (H) 09/11/2020    HDL 62 (H) 03/10/2020     Lab Results   Component Value Date    LDLCALC 89 03/31/2021    LDLCALC 36 09/11/2020    LDLCALC 39 03/10/2020     Lab Results   Component Value Date    LABVLDL 18 03/31/2021    LABVLDL 8 09/11/2020    LABVLDL 8 03/10/2020   pt is on statin  No side effects. Vascular dementia with behavior disturbance (HCC)  No new focal weakness in extremites. He does PT at home.        Past Medical History:        Diagnosis Date    Anemia 1/9/2012    Anxiety and depression     Arthritis     bulging disc    BPH (benign prostatic hypertrophy) 5/16/2010    Constipation 6/16/2015    Diverticulosis 5/16/2010    Dyspnea on exertion 5/16/2010    Eczema 4/12/2011    Elevated PSA 10/30/2012    Erectile dysfunction 5/16/2010    Essential hypertension 8/26/2015    GERD (gastroesophageal reflux disease) 5/16/2010    Hemorrhoid 5/16/2010    Hyperlipidemia 1/10/2011    Hyperphosphatemia 7/3/2010    Hypertension 5/16/2010    Insomnia 6/16/2015    Memory loss 1/14/2015    Nausea 6/16/2015    Overactive bladder 7/12/2011    Paranoid schizophrenia ([facility] 5/16/2010    Perennial allergic rhinitis 11/30/2016    Prostate cancer ([facility] 1/8/2013    had radiation treatments    Type 2 diabetes mellitus without complication, without long-term current use of insulin ([facility] 8/31/2016       Social History:   TOBACCO:   reports that he quit smoking about 31 years ago. His smoking use included cigarettes. He started smoking about 48 years ago. He has a 18.00 pack-year smoking history. He has never used smokeless tobacco.    ETOH:   reports no history of alcohol use. Current Medications:    Prior to Admission medications    Medication Sig Start Date End Date Taking? Authorizing Provider   mirtazapine (REMERON) 15 MG tablet TAKE 1 TABLET BY MOUTH EVERY DAY AT NIGHT 5/3/21  Yes Manuel Loco MD   Multiple Vitamin (DAILY-TJ) TABS TAKE 1 TABLET BY MOUTH EVERY DAY 5/3/21  Yes Manuel Loco MD   galantamine (RAZADYNE ER) 24 MG extended release capsule Take 1 capsule by mouth daily (with breakfast) 4/24/21  Yes Manuel Loco MD   polyethylene glycol (GLYCOLAX) 17 g packet Take 17 g by mouth daily as needed for Constipation 4/24/21  Yes Manuel Loco MD   gabapentin (NEURONTIN) 100 MG capsule Take 100 mg by mouth 2 times daily.    Yes Historical Provider, MD   aspirin 325 MG EC tablet TAKE 1 TABLET BY MOUTH EVERY DAY WITH FOOD 4/10/21  Yes Ja Oden MD   famotidine (PEPCID) 40 MG tablet Take 1 tablet by mouth every evening 4/1/21  Yes Fabrice Roblero MD   amLODIPine (NORVASC) 5 MG tablet Take 1 tablet by mouth daily 3/31/21  Yes Ja Oden MD   Psyllium-Calcium (METAMUCIL PLUS CALCIUM) CAPS Take 1 tablet by mouth 2 times daily Take with 8 oz water.  3/31/21  Yes Ja Oden MD   oxybutynin (DITROPAN XL) 15 MG extended release tablet Take 1 tablet by mouth 2 times daily 2/22/21  Yes Ja Oden MD   levETIRAcetam (KEPPRA) 500 MG tablet TAKE 1 TABLET BY MOUTH TWICE A DAY 2/15/21  Yes Ja Oden MD   clotrimazole (LOTRIMIN) 1 % cream APPLY TO AFFECTED AREA TWICE A DAY 1/4/21  Yes Ja Oden MD   tamsulosin (FLOMAX) 0.4 MG capsule Take 1 capsule by mouth daily 12/15/20  Yes Ja Oden MD   Accu-Chek Softclix Lancets MISC 1 each by Does not apply route daily 8/19/20  Yes Ja Oden MD   blood glucose test strips (ACCU-CHEK ZEENAT PLUS) strip 1 each by In Vitro route daily as needed (symptoms of low or high blood sugar) 8/19/20  Yes Ja Oden MD   QUEtiapine (SEROQUEL) 25 MG tablet Take 1 tablet by mouth nightly 8/19/20  Yes Ja Oden MD   OXcarbazepine (TRILEPTAL) 300 MG tablet Take 1 tablet by mouth 2 times daily 8/10/20  Yes Ja Oden MD   metFORMIN (GLUCOPHAGE) 500 MG tablet TAKE 1 TABLET BY MOUTH EVERY DAY WITH FOOD 7/24/20  Yes Ja Oden MD   BREO ELLIPTA 100-25 MCG/INH AEPB inhaler TAKE 1 PUFF BY MOUTH EVERY DAY 5/4/20  Yes Wendy Lira MD   atorvastatin (LIPITOR) 10 MG tablet TAKE 1 TABLET BY MOUTH EVERY DAY 2/6/20  Yes Ja Oden MD   latanoprost (XALATAN) 0.005 % ophthalmic solution Place 1 drop into the left eye nightly  7/27/19  Yes Historical Provider, MD   fluticasone (FLONASE) 50 MCG/ACT nasal spray USE 2 SPRAYS BY NASAL ROUTE DAILY AS NEEDED FOR RHINITIS 8/12/19  Yes Hua West MD   nitroGLYCERIN (NITROSTAT) 0.4 MG SL tablet Place 1 tablet under the tongue every 5 minutes as needed. 1/9/12  Yes Lula Hall MD       REVIEW OF SYSTEMS:  Review of Systems   Constitutional: Negative for activity change, appetite change, chills, fever and unexpected weight change. HENT: Negative for hearing loss. Eyes: Negative for visual disturbance. Respiratory: Negative for chest tightness and shortness of breath. Cardiovascular: Negative for chest pain. Gastrointestinal: Negative for abdominal pain, nausea and vomiting. Genitourinary: Negative for hematuria. Skin: Negative for rash. Allergic/Immunologic: Negative for immunocompromised state. Neurological: Negative for dizziness and headaches. Psychiatric/Behavioral: Negative for dysphoric mood and suicidal ideas. Physical Exam:      Vitals: BP (!) 146/86 (Site: Left Upper Arm, Position: Sitting, Cuff Size: Medium Adult)   Pulse 81   Ht 6' 2.5\" (1.892 m)   Wt 141 lb (64 kg)   SpO2 96%   BMI 17.86 kg/m²     Body mass index is 17.86 kg/m². Wt Readings from Last 3 Encounters:   05/10/21 141 lb (64 kg)   04/27/21 155 lb 12.8 oz (70.7 kg)   04/20/21 135 lb (61.2 kg)     Physical Exam  Constitutional:       General: He is not in acute distress. Appearance: He is well-developed. He is not diaphoretic. HENT:      Head: Normocephalic and atraumatic. Mouth/Throat:      Pharynx: No oropharyngeal exudate. Eyes:      General: No scleral icterus. Right eye: No discharge. Left eye: No discharge. Conjunctiva/sclera: Conjunctivae normal.   Neck:      Musculoskeletal: Normal range of motion and neck supple. Cardiovascular:      Rate and Rhythm: Normal rate. Pulses: Normal pulses. Heart sounds: Normal heart sounds. No murmur. Pulmonary:      Effort: Pulmonary effort is normal. No respiratory distress. Breath sounds: Normal breath sounds.  No wheezing or rales. Abdominal:      General: There is no distension. Palpations: Abdomen is soft. Tenderness: There is no abdominal tenderness. There is no guarding. Musculoskeletal:         General: No deformity. Right lower leg: No edema. Left lower leg: No edema. Lymphadenopathy:      Head:      Right side of head: No submental or submandibular adenopathy. Left side of head: No submental or submandibular adenopathy. Cervical: No cervical adenopathy. Right cervical: No superficial or deep cervical adenopathy. Left cervical: No superficial or deep cervical adenopathy. Skin:     Findings: No rash. Neurological:      Mental Status: He is alert and oriented to person, place, and time. GCS: GCS eye subscore is 4. GCS verbal subscore is 5. GCS motor subscore is 6. Motor: No abnormal muscle tone. Deep Tendon Reflexes: Reflexes are normal and symmetric. Psychiatric:         Behavior: Behavior normal.         Thought Content: Thought content normal.         Assessment/Plan:   Eliezer Bell was seen today for 3 month follow-up. Diagnoses and all orders for this visit:    Hypertension, unspecified type  Elevated but pt did not take his BP today. Pt will take his med at home and call me later w/ BP readings     Stage 3a chronic kidney disease  GFR stabilized  Keep nephro follow up   Repeat lbs in 2 month CBC CMP     Type 2 diabetes mellitus without complication, without long-term current use of insulin (HCC)  Seems to be well controlled  Labs in 2 months   Keep Metformin dose- no changes  Labs in 2 months  CBC CMP A1C lipids    Hyperlipidemia, unspecified hyperlipidemia type  Condition well controlled. Pt is tolerating well the medication without side effects. I will keep current medications dose which seems to be helping. We will monitor symptoms and condition periodically. - no changes in medication today.    Recheck lipids in 2 moths    Vascular dementia with behavior disturbance (HCC)    Unilateral inguinal hernia without obstruction or gangrene, recurrence not specified  I will discuss w/ nephro  I think hat pt can proceed w/ surgery   He will call to reschedule surgery  currently asymptomatic    - Patient was encouraged to call the office (and ask to see me) or be seen by other provider for any worsening or lack of improvement of the symptoms or any new symptoms.    - Pt was asked toschedule an appointment in 3 months, and to let me know of any scheduling difficulties. Additional patients instructions (if given): There are no Patient Instructions on file for this visit. Meir Michelle M.D.   5/10/2021, 10:56 AM      NOTE: This report was transcribed using voice recognition software. Every effort was made to ensure accuracy, however, inadvertent computerized transcription errors may be present.

## 2021-05-10 NOTE — PATIENT INSTRUCTIONS
Lucero check your blood pressure at home after taking Amlodipine and call with results     Please check your blood pressure twice in the morning and twice in the evening for one week. send me results. If blood pressure is higher than 150/90 please let me know as soon as possible. Please call to reschedule surgery with Dr Adin Rojas      Please call the office (and ask to see me) or be seen by other provider for any worsening or lack of improvement of the symptoms or for any new symptoms as soon as possible. Please make sure to schedule your follow appointment as discussed. Please let me know if ay further questions. Please let me know if there are any symptoms or concerns that you feel that needs to be further addressed. Patient Education        memantine  Pronunciation:  giselle MAN teen  Brand:  Namenda, Namenda XR  What is the most important information I should know about memantine? Follow all directions on your medicine label and package. Tell each of your healthcare providers about all your medical conditions, allergies, and all medicines you use. What is memantine? Memantine reduces the actions of chemicals in the brain that may contribute to the symptoms of Alzheimer's disease. Memantine is used to treat moderate to severe dementia of the Alzheimer's type. Memantine may also be used for purposes not listed in this medication guide. What should I discuss with my healthcare provider before taking memantine? You should not use memantine if you are allergic to it. To make sure memantine is safe for you, tell your doctor if you have:  · epilepsy or other seizure disorder;  · liver disease;  · kidney disease;  · urination problems; or  · a bladder or kidney infection. This medicine is not expected to harm an unborn baby. Tell your doctor if you are pregnant or plan to become pregnant. It is not known whether memantine passes into breast milk or if it could harm a nursing baby.  Tell your doctor if you are breast-feeding a baby. How should I take memantine? Follow all directions on your prescription label. Your doctor may occasionally change your dose to make sure you get the best results. Do not take this medicine in larger or smaller amounts or for longer than recommended. Memantine can be taken with or without food. Do not crush, chew, break, or open an extended-release capsule. Swallow it whole. To make swallowing easier, you may open the extended-release capsule and sprinkle the medicine into a spoonful of applesauce. Swallow right away without chewing. Do not save the mixture for later use. Measure liquid medicine (oral solution) with the dosing syringe provided, or with a special dose-measuring spoon or medicine cup. If you do not have a dose-measuring device, ask your pharmacist for one. Do not mix the oral solution with any other liquids. Rinse the empty oral syringe with clean water and allow it to air dry after every use. Use memantine regularly to get the most benefit. Get your prescription refilled before you run out of medicine completely. Your doctor will need to check your progress while you are using memantine. Store memantine at room temperature away from moisture and heat. Keep the liquid medicine bottle tightly closed with the cap provided. Do not store the bottle with the oral syringe in it. Read all patient information, medication guides, and instruction sheets provided to you. Ask your doctor or pharmacist if you have any questions. What happens if I miss a dose? Take the missed dose as soon as you remember. Skip the missed dose if it is almost time for your next scheduled dose. Do not take extra medicine to make up the missed dose. If you miss doses or forget to take your medicine for several days, call your doctor before starting the medicine again. What happens if I overdose? Seek emergency medical attention or call the Poison Help line at 1-599.637.7385. What should I avoid while taking memantine? Memantine can cause side effects that may impair your thinking or reactions. Be careful if you drive or do anything that requires you to be awake and alert. What are the possible side effects of memantine? Get emergency medical help if you have signs of an allergic reaction: hives; difficulty breathing; swelling of your face, lips, tongue, or throat. Call your doctor at once if you have:  · severe headache, blurred vision, pounding in your neck or ears;  · seizure (convulsions); or  · unusual changes in mood or behavior. Common side effects may include:  · diarrhea;  · dizziness; or  · headache. This is not a complete list of side effects and others may occur. Call your doctor for medical advice about side effects. You may report side effects to FDA at 1-831-FDA-1358. What other drugs will affect memantine? Tell your doctor about all your current medicines and any you start or stop using, especially:  · amantadine;  · zonisamide;  · cough medicine that contains dextromethorphan (Delsym, Robitussin Maximum Strength, Vicks 44, and others);  · medicines to make the urine alkaline --urine sodium bicarbonate, potassium citrate (K-Lyte, Urocit-K), sodium citrate and citric acid (Bicitra, Oracit), or sodium citrate and potassium (Citrolith, Polycitra); or  · medicine to treat glaucoma or increased pressure inside the eyes --acetazolamide, methazolamide. This list is not complete. Other drugs may interact with memantine, including prescription and over-the-counter medicines, vitamins, and herbal products. Not all possible interactions are listed in this medication guide. Where can I get more information? Your pharmacist can provide more information about memantine. Remember, keep this and all other medicines out of the reach of children, never share your medicines with others, and use this medication only for the indication prescribed.    Every effort has been made to ensure that the information provided by [address] is accurate, up-to-date, and complete, but no guarantee is made to that effect. Drug information contained herein may be time sensitive. [facility] information has been compiled for use by healthcare practitioners and consumers in the [place] and therefore [facility] does not warrant that uses outside of the [place] are appropriate, unless specifically indicated otherwise. [facility]'s drug information does not endorse drugs, diagnose patients or recommend therapy. [facility]'s drug information is an informational resource designed to assist licensed healthcare practitioners in caring for their patients and/or to serve consumers viewing this service as a supplement to, and not a substitute for, the expertise, skill, knowledge and judgment of healthcare practitioners. The absence of a warning for a given drug or drug combination in no way should be construed to indicate that the drug or drug combination is safe, effective or appropriate for any given patient. [facility] does not assume any responsibility for any aspect of healthcare administered with the aid of information [facility] provides. The information contained herein is not intended to cover all possible uses, directions, precautions, warnings, drug interactions, allergic reactions, or adverse effects. If you have questions about the drugs you are taking, check with your doctor, nurse or pharmacist.  Copyright 6863-8304 [address]. Version: 3.01. Revision date: 2/11/2015. Care instructions adapted under license by [facility] ([facility]). If you have questions about a medical condition or this instruction, always ask your healthcare professional. [place] [address] any warranty or liability for your use of this information.

## 2021-05-14 ENCOUNTER — CARE COORDINATION (OUTPATIENT)
Dept: CASE MANAGEMENT | Age: 86
End: 2021-05-14

## 2021-05-17 ENCOUNTER — TELEPHONE (OUTPATIENT)
Dept: SURGERY | Age: 86
End: 2021-05-17

## 2021-05-17 NOTE — TELEPHONE ENCOUNTER
Patient was scheduled in April and surgery was cancelled due to Nephrology clearance. Routing to MD to review and determine if patient is ok to move forward with surgery.

## 2021-05-17 NOTE — TELEPHONE ENCOUNTER
[facility], patient's wife, would like to schedule the patient's surgery    Please contact [facility] at [phone]

## 2021-05-18 DIAGNOSIS — N32.81 OVERACTIVE BLADDER: Chronic | ICD-10-CM

## 2021-05-18 DIAGNOSIS — E11.9 TYPE 2 DIABETES MELLITUS WITHOUT COMPLICATION, WITHOUT LONG-TERM CURRENT USE OF INSULIN (HCC): Chronic | ICD-10-CM

## 2021-05-18 NOTE — TELEPHONE ENCOUNTER
Patient needs to have nephrology clearance and we can use the old letter per . Spoke with patient who states he is scheduled to see Dr. Nakia Reyes in a couple of weeks and will ask for clearance then.

## 2021-05-19 RX ORDER — OXYBUTYNIN CHLORIDE 15 MG/1
15 TABLET, EXTENDED RELEASE ORAL 2 TIMES DAILY
Qty: 180 TABLET | Refills: 1 | Status: SHIPPED | OUTPATIENT
Start: 2021-05-19 | End: 2021-11-16

## 2021-05-24 DIAGNOSIS — R35.0 URINARY FREQUENCY: ICD-10-CM

## 2021-05-24 RX ORDER — TAMSULOSIN HYDROCHLORIDE 0.4 MG/1
CAPSULE ORAL
Qty: 90 CAPSULE | Refills: 1 | Status: SHIPPED | OUTPATIENT
Start: 2021-05-24 | End: 2021-09-14 | Stop reason: SDUPTHER

## 2021-06-04 ENCOUNTER — TELEPHONE (OUTPATIENT)
Dept: SURGERY | Age: 86
End: 2021-06-04

## 2021-06-04 ENCOUNTER — OFFICE VISIT (OUTPATIENT)
Dept: SURGERY | Age: 86
End: 2021-06-04
Payer: MEDICARE

## 2021-06-04 VITALS
OXYGEN SATURATION: 98 % | HEART RATE: 71 BPM | WEIGHT: 141 LBS | BODY MASS INDEX: 17.53 KG/M2 | SYSTOLIC BLOOD PRESSURE: 151 MMHG | DIASTOLIC BLOOD PRESSURE: 85 MMHG | HEIGHT: 75 IN

## 2021-06-04 DIAGNOSIS — K40.90 LEFT INGUINAL HERNIA: Primary | ICD-10-CM

## 2021-06-04 PROCEDURE — 1036F TOBACCO NON-USER: CPT | Performed by: SURGERY

## 2021-06-04 PROCEDURE — G8427 DOCREV CUR MEDS BY ELIG CLIN: HCPCS | Performed by: SURGERY

## 2021-06-04 PROCEDURE — 1123F ACP DISCUSS/DSCN MKR DOCD: CPT | Performed by: SURGERY

## 2021-06-04 PROCEDURE — 4040F PNEUMOC VAC/ADMIN/RCVD: CPT | Performed by: SURGERY

## 2021-06-04 PROCEDURE — G8418 CALC BMI BLW LOW PARAM F/U: HCPCS | Performed by: SURGERY

## 2021-06-04 PROCEDURE — 99214 OFFICE O/P EST MOD 30 MIN: CPT | Performed by: SURGERY

## 2021-06-04 NOTE — TELEPHONE ENCOUNTER
Patient seen today in office by Dr Gigi Moritz. Surgery letter received: Laparoscopic left inguinal hernia repair 1 hour of OR time needed. Called patient to schedule. Scheduled for surgery on 6/16/21 at 12:00pm. Arrival at 10:00am. NPO at [address] [place] [address]. Patient aware to HOLD aspirin 5 days prior to surgery. Patient aware of need for Pre Op Physical and EKG with Internal medicine. Added to MD's calender and outlook.

## 2021-06-04 NOTE — PROGRESS NOTES
PATIENT NAME: Branden Pitts     YOB: 1935     TODAY'S DATE: 6/4/2021    Reason for Visit: Inguinal hernia    Requesting Physician:  Dr. Jamaal Stone:              The patient is a 82 y.o. male with a PMHx of type 2 diabetes, hypertension, paranoid schizophrenia, diverticulosis, achalasia, erectile dysfunction, GERD, BPH, prostate cancer (s/p XRT treatment), constipation, hemorrhoids, hyperlipidemia, eczema, overactive bladder, anemia, memory loss who presents from his PCPs office for evaluation of a possible inguinal hernia. He states that he noticed a bulge in his left groin several months ago. He denies any pain in the area. However he does state that with walking for long periods of time it becomes uncomfortable. He denies any obstructive symptoms. He states that the bulge has been staying the same size.     Past surgical history: EGD for an impacted meat bolus in 2011, TURP, laparoscopic cholecystectomy (2012, Dr. Tracee Norton), laparoscopic Heller myotomy with hiatal hernia repair, Nissen fundoplication and left chest tube placement (2017, Dr. Mariusz Vasquez), excision of chest wall masses (2020, Dr. Bety Godinez)  Medications: Flomax, Ditropan, Namenda, Remeron, MiraLAX, gabapentin, aspirin 325 mg, Pepcid, Norvasc, Metamucil, Keppra, Seroquel, Trileptal, Metformin, inhalers, Lipitor, Flonase, nitroglycerin    REVIEW OF SYSTEMS:  CONSTITUTIONAL:  negative  HEENT:  negative  RESPIRATORY:  negative  CARDIOVASCULAR:  negative  GASTROINTESTINAL:  negative except for: see HPI  GENITOURINARY:  negative  HEMATOLOGIC/LYMPHATIC:  negative  NEUROLOGICAL:  negative    PMH  Past Medical History:   Diagnosis Date    Anemia 1/9/2012    Anxiety and depression     Arthritis     bulging disc    BPH (benign prostatic hypertrophy) 5/16/2010    Constipation 6/16/2015    Diverticulosis 5/16/2010    Dyspnea on exertion 5/16/2010    Eczema 4/12/2011    Elevated PSA 10/30/2012    Erectile dysfunction 5/16/2010    Essential hypertension 8/26/2015    GERD (gastroesophageal reflux disease) 5/16/2010    Hemorrhoid 5/16/2010    Hyperlipidemia 1/10/2011    Hyperphosphatemia 7/3/2010    Hypertension 5/16/2010    Insomnia 6/16/2015    Memory loss 1/14/2015    Nausea 6/16/2015    Overactive bladder 7/12/2011    Paranoid schizophrenia ([facility] [address] 5/16/2010    Perennial allergic rhinitis 11/30/2016    Prostate cancer ([facility] 1/8/2013    had radiation treatments    Type 2 diabetes mellitus without complication, without long-term current use of insulin ([facility] [address] 8/31/2016       [address]  Past Surgical History:   Procedure Laterality Date    CHEST WALL RESECTION N/A 1/21/2020    EXCISION CHEST LESION (3 x 2.8cm) AND RIGHT UPPER ABDOMEN LESION (3 x .1), COMPLEX CLOSURE 1.9CM;  ADJACENT TISSUE TRANSFER; performed by Amelia Lala MD at [address], LAPAROSCOPIC  September 13, 2012    Dr. Esteban Marques  December 1, 2011    Dr. Harini Taylor  June 16, 2011    COLONOSCOPY N/A 10/1/2019    COLONOSCOPY DIAGNOSTIC performed by Loren Alcala MD at [address], COLON, DIAGNOSTIC      HIP SURGERY Left June 23, 2015    Dr. Nichol Driver - incision and drainage of left hip infected hematoma     OTHER SURGICAL HISTORY  11/06/2017     ESOPHAGOGASTRODUODENOSCOPY                OTHER SURGICAL HISTORY  11/06/2017     LAPAROSCOPIC HELLER MYOTOMY 1 WRAP, EGD    PROSTATE BIOPSY  November 2012    Dr. Sandip Miner  May 8, 2001    TURP    TOTAL HIP ARTHROPLASTY Left May 17, 2015    Dr. Darius Garcia  November 7, 2011    UPPER GASTROINTESTINAL ENDOSCOPY  February 4, 2016    Dr. Jessica Nelson ENDOSCOPY N/A 04/15/2016    Esophagogastroduodenoscopy with Botulinum toxin injection of the lower esophageal sphincter (LES)    UPPER GASTROINTESTINAL ENDOSCOPY  2017    dilatation; and botox injection 4 units    UPPER GASTROINTESTINAL ENDOSCOPY N/A 2019    EGD SUBMUCOSAL/BOTOX INJECTION performed by Mack York MD at [address] N/A 2019    EGD DILATION BALLOON performed by Mack York MD at [address] History  Social History     Socioeconomic History    Marital status:      Spouse name: Joe Guzman    Number of children: 4    Years of education: Not on file    Highest education level: Not on file   Occupational History    Occupation: retired - 2000     Comment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   Tobacco Use    Smoking status: Former Smoker     Packs/day: 1.00     Years: 18.00     Pack years: 18.00     Types: Cigarettes     Start date: 10/22/1972     Quit date: 1990     Years since quittin.1    Smokeless tobacco: Never Used    Tobacco comment: quit smoking in     Vaping Use    Vaping Use: Never used   Substance and Sexual Activity    Alcohol use: No    Drug use: Never    Sexual activity: Yes     Partners: Female     Comment:  - Joe Mclaughlin) -    Other Topics Concern    Not on file   Social History Narrative    Not on file     Social Determinants of Health     Financial Resource Strain:     Difficulty of Paying Living Expenses:    Food Insecurity:     Worried About Running Out of Food in the Last Year:     [address] in the Last Year:    Transportation Needs:     Lack of Transportation (Medical):      Lack of Transportation (Non-Medical):    Physical Activity:     Days of Exercise per Week:     Minutes of Exercise per Session:    Stress:     Feeling of Stress :    Social Connections:     Frequency of Communication with Friends and Family:     Frequency of Social Gatherings with Friends and Family:     Attends Faith Services:     Active Member of Clubs or Organizations:     Attends Club or Organization Meetings:     Marital Status:    Intimate Partner Violence:     Fear of Current or Ex-Partner:     Emotionally Abused:     Physically Abused:     Sexually Abused: Allergy:   Allergies   Allergen Reactions    Lorazepam Other (See Comments)     Pt unable to recall reaction       PHYSICAL EXAM:  VITALS:  There were no vitals taken for this visit. CONSTITUTIONAL:  alert, no apparent distress   EYES:  sclera clear  ENT:  normocepalic, without obvious abnormality  NECK:  supple, symmetrical, trachea midline   LUNGS: Nonlabored respirations, symmetrical chest rise  CARDIOVASCULAR:  regular rate and rhythm  ABDOMEN: Soft, nondistended, nontender, left groin hernia noted  MUSCULOSKELETAL: No obvious deformities  NEUROLOGIC:  Mental Status Exam:  Level of Alertness:   awake  Orientation:   person, place, time  SKIN:  no bruising or bleeding    DATA:  Radiology Review:    CT abdomen and pelvis with contrast on 10/15/2019       HISTORY: Weight loss, poor appetite   COMPARISON: 11/20/2012   CONTRAST:  Oral and 80 mellitus Isovue-370 intravenous     TECHNIQUE: Individualized dose optimization technique was used in order to meet ALARA standards for radiation dose reduction. In addition to vendor specific dose reduction algorithms, the dose reduction techniques vary based on the specific scanner    utilized but frequently include automated exposure control, adjustment of the mA and/or kV according to patient size, and use of iterative reconstruction technique.       COMMENTS:        Degenerative changes in the spine. Left hip arthroplasty noted. Tree-in-bud centrilobular nodules are seen in the lower lobes compatible with bronchiolitis. 14 mm lipoma is seen in the anterior chest wall on image 17. There is colonic diverticulosis. No    evidence of bowel obstruction. Normal appendix. No lymphadenopathy. Gallbladder surgically absent.  Calcified hepatic and splenic granulomas are incidentally noted. Mild intrahepatic ductal dilatation noted similar to previous. 2.0 cm left inferior renal    cyst incidentally noted. Solid abdominal organs are otherwise unremarkable. Bladder is unremarkable. Prostate radiation seeds are noted. RETROPERITONEAL ULTRASOUND on 4/2/2021   History : renal failure   Comparison : none       COMMENTS :       Kidney size : Right 9.8 cm, Left 9.0 cm   Renal parenchyma : Normal   Masses/Cysts : 2.2 and 2.0 cm left renal cysts. Hydronephrosis : Mild right and mild-moderate left hydronephrosis   Bladder : Normal       Mild prostatomegaly. ECHO Complete 2D W Doppler W Color  4/7/2014     Summary    Left ventricular function is normal with ejection fraction estimated at 60    %. There is basal septal hypertrophy. Left ventricle size is normal. There    is reversal of E/A inflow velocities across the mitral valve suggesting    impaired left ventricular relaxation. There is mild tricuspid regurgitation    with RVSP estimated at 34 mmHg. Pathology review:   1/21/2020   FINAL DIAGNOSIS:        A. Skin of chest, excision:        - Features consistent with ruptured follicular cyst.      B. Skin of abdomen, excision:        - Dermoid cyst.    BARJA/BARJA     IMPRESSION/RECOMMENDATIONS:    This is an 68-year-old male with multiple medical comorbidities who presents with symptomatic left inguinal hernia    -We will schedule for hernia repair  -He will need medical and cardiac clearance    Kaleigh Newell MD    I have seen, examined, and reviewed the patients chart. I agree with the residents assessment and have made appropriate changes.     Hermelinda Joseph

## 2021-06-04 NOTE — LETTER
[facility] - [facility] of Felicita Mckinney M.D. [facility]  [address] [address] [facility]. [address]  Phone: [phone] Fax: [phone]            Surgery Order/Time:  12:01 PM  Conf. # _________________  Scheduled by: Tal Puga LPN                                **Pre-Surgical Orders in [facility]**  [facility]    Surgery Date: 2021 Time: 1200    Pt arrival: 1000  Second Surgeon: N/A        Patient Name: Amanda Mason  : 1935  Home [phone] (home)  [facility] [address]  PCP:  Rashaad Rivas MD   Does patient speak English?: yes    needed? no                                             PROCEDURE: Laparoscopic left inguinal hernia repair  CPT: 16785: Laparoscopic Inguinal Hernia Repair    DIAGNOSIS:      ICD-10-CM    1. Left inguinal hernia  K40.90        Anesthesia: General  Time Needed:  1 hour   Pt Position:  supine      Outpatient __x__ Admit ______  Assist._____   Cardiac Clearance: no  Patient to meet with Anesthesiology prior to surgery: no    Patient Allergies: Allergies   Allergen Reactions    Lorazepam Other (See Comments)     Pt unable to recall reaction     Pre-Op H&P to be done by: Rashaad Rivas MD  Pre-Op Antibiotics: Ancef: 2g IV On Call to OR      Physician: Gigi Lesch, MD Date: 21             Insurance: ***    ID #  ***    Ph # ***  Date called ________________   Chanelle Din to: _____________ Precert Needed?  Yes  /  No  PreAuth # & Details   ______________________________________________________________________

## 2021-06-09 NOTE — PROGRESS NOTES
[address] [facility] patients having surgery or anesthesia are required to be Covid tested OR to have been vaccinated at least 14 days prior to your procedure. It is very important to return our call to [phone] to notify the staff of your last vaccination date or you will be required to complete Covid testing within the 5-6 days prior to surgery & quarantine. We recommend coming to the [facility] [employer] flu tent to complete. It is open Monday-Friday 8am-3pm currently. If you go outside [facility] [employer] you need to ensure you have a PCR Covid test and fax results to PreAdmission Testing at [phone]. PRIOR TO PROCEDURE DATE:        1. PLEASE FOLLOW ANY  GUIDELINES/ INSTRUCTIONS PRIOR TO YOUR PROCEDURE AS ADVISED BY YOUR SURGEON. 2. Arrange for someone to drive you home and be with you for the first 24 hours after discharge for your safety after your procedure for which you received sedation. Ensure it is someone we can share information with regarding your discharge. 3. You must contact your surgeon for instructions IF:   You are taking any blood thinners, aspirin, anti-inflammatory or vitamin E.   There is a change in your physical condition such as a cold, fever, rash, cuts, sores or any other infection, especially near your surgical site. 4. Do not drink alcohol the day before or day of your procedure. 5. A Pre-op History and Physical for surgery MUST be completed by your Physician or Urgent Care within 30 days of your procedure date. Please bring a copy with you on the day of your procedure and along with any other testing performed. THE DAY OF YOUR PROCEDURE:  1. Follow instructions for ARRIVAL TIME as DIRECTED BY YOUR SURGEON. 2. Enter the MAIN entrance from [address] and follow the signs to the free [employer] or [email or web address] parking (offered free of charge 6am-5pm).             3. Enter the Main Dentures, glasses, or contacts will need to be removed before your procedure. Bring cases for your glasses, contacts, dentures, or hearing aids to protect them while you are in surgery. 10. If you use a CPAP, please bring it with you on the day of your procedure. 11. We recommend that valuable personal  belongings such as cash, cell phones, e-tablets or jewelry, be left at home during your stay. The hospital will not be responsible for valuables that are not secured in the hospital safe. However, if your insurance requires a co-pay, you may want to bring a method of payment, i.e. Check or credit card, if you wish to pay your co-pay the day of surgery. 12. If you are to stay overnight, you may bring a bag with personal items. Please have any large items you may need brought in by your family after your arrival to your hospital room. 15. If you have a Living Will or Durable Power of , please bring a copy on the day of your procedure. 15. With your permission, one family member may accompany you while you are being prepared for surgery. Once you are ready, additional family members may join you. HOW WE KEEP YOU SAFE and WORK TO PREVENT SURGICAL SITE INFECTIONS:  1. Health care workers should always check your ID bracelet to verify your name and birth date. You will be asked many times to state your name, date of birth, and allergies. 2. Health care workers should always clean their hands with soap or alcohol gel before providing care to you. It is okay to ask anyone if they cleaned their hands before they touch you. 3. You will be actively involved in verifying the type of procedure you are having and ensuring the correct surgical site. This will be confirmed multiple times prior to your procedure. Do NOT elmira your surgery site UNLESS instructed to by your surgeon. 4. Do not shave or wax for 72 hours prior to procedure near your operative site.  Shaving with a razor can irritate your skin and make it easier to develop an infection. On the day of your procedure, any hair that needs to be removed near the surgical site will be clipped by a healthcare worker using a special clippers designed to avoid skin irritation. 5. When you are in the operating room, your surgical site will be cleansed with a special soap, and in most cases, you will be given an antibiotic before the surgery begins. What to expect AFTER YOUR PROCEDURE:  1. Immediately following your procedure, your will be taken to the PACU for the first phase of your recovery. Your nurse will help you recover from any potential side effects of anesthesia, such as extreme drowsiness, changes in your vital signs or breathing patterns. Nausea, headache, muscle aches, or sore throat may also occur after anesthesia. Your nurse will help you manage these potential side effects. 2. For comfort and safety, arrange to have someone at home with you for the first 24 hours after discharge. 3. You and your family will be given written instructions about your diet, activity, dressing care, medications, and return visits. 4. Once at home, should issues with nausea, pain, or bleeding occur, or should you notice any signs of infection, you should call your surgeon. 5. Always clean your hands before and after caring for your wound. Do not let your family touch your surgery site without cleaning their hands. 6. Narcotic pain medications can cause significant constipation. You may want to add a stool softener to your postoperative medication schedule or speak to your surgeon on how best to manage this SIDE EFFECT. SPECIAL INSTRUCTIONS     Thank you for allowing us to care for you. We strive to exceed your expectations in the delivery of care and service provided to you and your family.      If you need to contact the [facility] staff for any reason, please call us at [phone]    Instructions reviewed with

## 2021-06-14 ENCOUNTER — OFFICE VISIT (OUTPATIENT)
Dept: INTERNAL MEDICINE CLINIC | Age: 86
End: 2021-06-14
Payer: MEDICARE

## 2021-06-14 VITALS
DIASTOLIC BLOOD PRESSURE: 78 MMHG | OXYGEN SATURATION: 98 % | TEMPERATURE: 98.1 F | BODY MASS INDEX: 17.45 KG/M2 | HEIGHT: 74 IN | SYSTOLIC BLOOD PRESSURE: 132 MMHG | HEART RATE: 76 BPM | WEIGHT: 136 LBS

## 2021-06-14 DIAGNOSIS — N18.31 STAGE 3A CHRONIC KIDNEY DISEASE (HCC): ICD-10-CM

## 2021-06-14 DIAGNOSIS — N18.30 TYPE 2 DIABETES MELLITUS WITH STAGE 3 CHRONIC KIDNEY DISEASE, WITHOUT LONG-TERM CURRENT USE OF INSULIN, UNSPECIFIED WHETHER STAGE 3A OR 3B CKD (HCC): ICD-10-CM

## 2021-06-14 DIAGNOSIS — G40.209 PARTIAL SYMPTOMATIC EPILEPSY WITH COMPLEX PARTIAL SEIZURES, NOT INTRACTABLE, WITHOUT STATUS EPILEPTICUS (HCC): ICD-10-CM

## 2021-06-14 DIAGNOSIS — I73.9 PERIPHERAL VASCULAR DISEASE (HCC): ICD-10-CM

## 2021-06-14 DIAGNOSIS — E11.22 TYPE 2 DIABETES MELLITUS WITH STAGE 3 CHRONIC KIDNEY DISEASE, WITHOUT LONG-TERM CURRENT USE OF INSULIN, UNSPECIFIED WHETHER STAGE 3A OR 3B CKD (HCC): ICD-10-CM

## 2021-06-14 DIAGNOSIS — Z01.818 PRE-OP EXAMINATION: Primary | ICD-10-CM

## 2021-06-14 DIAGNOSIS — K40.90 UNILATERAL INGUINAL HERNIA WITHOUT OBSTRUCTION OR GANGRENE, RECURRENCE NOT SPECIFIED: ICD-10-CM

## 2021-06-14 PROCEDURE — 99214 OFFICE O/P EST MOD 30 MIN: CPT | Performed by: INTERNAL MEDICINE

## 2021-06-14 PROCEDURE — G8418 CALC BMI BLW LOW PARAM F/U: HCPCS | Performed by: INTERNAL MEDICINE

## 2021-06-14 PROCEDURE — 1036F TOBACCO NON-USER: CPT | Performed by: INTERNAL MEDICINE

## 2021-06-14 PROCEDURE — G8427 DOCREV CUR MEDS BY ELIG CLIN: HCPCS | Performed by: INTERNAL MEDICINE

## 2021-06-14 PROCEDURE — 4040F PNEUMOC VAC/ADMIN/RCVD: CPT | Performed by: INTERNAL MEDICINE

## 2021-06-14 PROCEDURE — 93000 ELECTROCARDIOGRAM COMPLETE: CPT | Performed by: INTERNAL MEDICINE

## 2021-06-14 PROCEDURE — 1123F ACP DISCUSS/DSCN MKR DOCD: CPT | Performed by: INTERNAL MEDICINE

## 2021-06-14 SDOH — ECONOMIC STABILITY: FOOD INSECURITY: WITHIN THE PAST 12 MONTHS, YOU WORRIED THAT YOUR FOOD WOULD RUN OUT BEFORE YOU GOT MONEY TO BUY MORE.: NEVER TRUE

## 2021-06-14 SDOH — ECONOMIC STABILITY: FOOD INSECURITY: WITHIN THE PAST 12 MONTHS, THE FOOD YOU BOUGHT JUST DIDN'T LAST AND YOU DIDN'T HAVE MONEY TO GET MORE.: NEVER TRUE

## 2021-06-14 ASSESSMENT — SOCIAL DETERMINANTS OF HEALTH (SDOH): HOW HARD IS IT FOR YOU TO PAY FOR THE VERY BASICS LIKE FOOD, HOUSING, MEDICAL CARE, AND HEATING?: NOT HARD AT ALL

## 2021-06-14 NOTE — PROGRESS NOTES
[facility] / [facility] ([facility]) [place], [address]    Acknowledgment of Informed Consent for Surgical or Medical Procedure and Sedation  I agree to allow doctor(s) SUDHIR NEWELL and his/her associates or assistants, including residents and/or other qualified medical practitioner to perform the following medical treatment or procedure and to administer or direct the administration of sedation as necessary:  Procedure(s): [address]   My doctor has explained the following regarding the proposed procedure:   the explanation of the procedure   the benefits of the procedure   the potential problems that might occur during recuperation   the risks and side effects of the procedure which could include but are not limited to severe blood loss, infection, stroke or death   the benefits, risks and side effect of alternative procedures including the consequences of declining this procedure or any alternative procedures   the likelihood of achieving satisfactory results. I acknowledge no guarantee or assurance has been made to me regarding the results. I understand that during the course of this treatment/procedure, unforeseen conditions can occur which require an additional or different procedure. I agree to allow my physician or assistants to perform such extension of the original procedure as they may find necessary. I understand that sedation will often result in temporary impairment of memory and fine motor skills and that sedation can occasionally progress to a state of deep sedation or general anesthesia. I understand the risks of anesthesia for surgery include, but are not limited to, sore throat, hoarseness, injury to face, mouth, or teeth; nausea; headache; injury to blood vessels or nerves; death, brain damage, or paralysis.     I understand that if I have a Limitation of Treatment order in effect during my hospitalization, the order may or may not be in effect during this procedure. I give my doctor permission to give me blood or blood products. I understand that there are risks with receiving blood such as hepatitis, AIDS, fever, or allergic reaction. I acknowledge that the risks, benefits, and alternatives of this treatment have been explained to me and that no express or implied warranty has been given by the hospital, any blood bank, or any person or entity as to the blood or blood components transfused. At the discretion of my doctor, I agree to allow observers, equipment/product representatives and allow photographing, and/or televising of the procedure, provided my name or identity is maintained confidentially. I agree the hospital may dispose of or use for scientific or educational purposes any tissue, fluid, or body parts which may be removed.     ________________________________Date________Time______ am/pm  ([place] [facility])  Patient or Signature of Closest Relative or Legal Guardian    ________________________________Date________Time______am/pm      Page 1 of  1  Witness

## 2021-06-14 NOTE — PATIENT INSTRUCTIONS
Stop aspirin 5 days prior to surgery  Take the levetiracetam and oxcarbazepine the morning of surgery with a small sip of water

## 2021-06-14 NOTE — PROGRESS NOTES
Preoperative Consultation      Venkat Gramajo  YOB: 1935    Date of Service:  6/14/2021    Vitals:    06/14/21 1358   BP: 132/78   Site: Left Upper Arm   Pulse: 76   Temp: 98.1 °F (36.7 °C)   SpO2: 98%   Weight: 136 lb (61.7 kg)   Height: 6' 2\" (1.88 m)      Wt Readings from Last 2 Encounters:   06/14/21 136 lb (61.7 kg)   06/04/21 141 lb (64 kg)     BP Readings from Last 3 Encounters:   06/14/21 132/78   06/04/21 (!) 151/85   05/10/21 (!) 146/86        Chief Complaint   Patient presents with   [facility] Patient    Pre-op Exam     hernia surgery, 6/16/2021     Allergies   Allergen Reactions    Lorazepam Other (See Comments)     Pt unable to recall reaction     Outpatient Medications Marked as Taking for the 6/14/21 encounter (Office Visit) with Janessa Balderrama MD   Medication Sig Dispense Refill    tamsulosin (FLOMAX) 0.4 MG capsule TAKE 1 CAPSULE BY MOUTH EVERY DAY 90 capsule 1    oxybutynin (DITROPAN XL) 15 MG extended release tablet TAKE 1 TABLET BY MOUTH 2 TIMES DAILY 180 tablet 1    memantine (NAMENDA) 5 MG tablet Take 0.5 tablets by mouth daily 45 tablet 1    mirtazapine (REMERON) 15 MG tablet TAKE 1 TABLET BY MOUTH EVERY DAY AT NIGHT 90 tablet 1    Multiple Vitamin (DAILY-TJ) TABS TAKE 1 TABLET BY MOUTH EVERY DAY 90 tablet 1    polyethylene glycol (GLYCOLAX) 17 g packet Take 17 g by mouth daily as needed for Constipation 527 g 1    gabapentin (NEURONTIN) 100 MG capsule Take 100 mg by mouth 2 times daily.  aspirin 325 MG EC tablet TAKE 1 TABLET BY MOUTH EVERY DAY WITH FOOD 90 tablet 3    famotidine (PEPCID) 40 MG tablet Take 1 tablet by mouth every evening 30 tablet 3    amLODIPine (NORVASC) 5 MG tablet Take 1 tablet by mouth daily 90 tablet 3    Psyllium-Calcium (METAMUCIL PLUS CALCIUM) CAPS Take 1 tablet by mouth 2 times daily Take with 8 oz water.  180 capsule 2    levETIRAcetam (KEPPRA) 500 MG tablet TAKE 1 TABLET BY MOUTH TWICE A  tablet 3    clotrimazole (LOTRIMIN) 1 % cream APPLY TO AFFECTED AREA TWICE A DAY 30 g 2    Accu-Chek Softclix Lancets MISC 1 each by Does not apply route daily 100 each 1    blood glucose test strips (ACCU-CHEK ZEENAT PLUS) strip 1 each by In Vitro route daily as needed (symptoms of low or high blood sugar) 100 strip 2    QUEtiapine (SEROQUEL) 25 MG tablet Take 1 tablet by mouth nightly 30 tablet 3    OXcarbazepine (TRILEPTAL) 300 MG tablet Take 1 tablet by mouth 2 times daily 180 tablet 3    metFORMIN (GLUCOPHAGE) 500 MG tablet TAKE 1 TABLET BY MOUTH EVERY DAY WITH FOOD 90 tablet 3    BREO ELLIPTA 100-25 MCG/INH AEPB inhaler TAKE 1 PUFF BY MOUTH EVERY DAY 1 each 11    atorvastatin (LIPITOR) 10 MG tablet TAKE 1 TABLET BY MOUTH EVERY DAY 90 tablet 3    latanoprost (XALATAN) 0.005 % ophthalmic solution Place 1 drop into the left eye nightly       fluticasone (FLONASE) 50 MCG/ACT nasal spray USE 2 SPRAYS BY NASAL ROUTE DAILY AS NEEDED FOR RHINITIS 1 Bottle 1    nitroGLYCERIN (NITROSTAT) 0.4 MG SL tablet Place 1 tablet under the tongue every 5 minutes as needed. 25 tablet 12       This patient presents to the office today for a preoperative consultation at the request of surgeon, Dr. Hamilton Decker, who plans on performing laparoscopic left inguinal hernia repair on June 16 at [place] [address].  He has had persistent discomfort from the hernia. He has a history of controlled type 2 diabetes. He has chronic kidney disease which has been stable most recently, he sees the nephrologist regularly. He has a prescription for nitro for chest pain but has never required the medication. He does take an aspirin every day. He ambulates around the house with a cane. He is not able to do heavy housework due to mobility problems but is able to climb a flight of stairs without chest pain or shortness of breath.     Planned anesthesia: General   Known anesthesia problems: None   Bleeding risk: No recent or remote history of abnormal bleeding Personal or FH of DVT/PE: No      Patient Active Problem List   Diagnosis    Ischemic heart disease    Paranoid schizophrenia ([address]    Complex partial seizure disorder ([address]    Diverticulosis    Erectile dysfunction    Back pain    Dyspnea on exertion    Peripheral vascular disease ([address]    Hemorrhoid    Other specified anxiety disorders    Hyperlipidemia    Eczema    Overactive bladder    Fatigue    Anemia    Chronic cough    Elevated PSA    Prostate cancer (HCC)    Bradycardia    Memory loss    Closed left hip fracture (HCC)    Depression    Constipation    Insomnia    Left hip pain    S/P hip replacement    Hypertension    Abnormal weight loss    Former smoker    Abnormal chest CT    Chronic kidney disease, stage 3 (HCC)    Type 2 diabetes mellitus with stage 3 chronic kidney disease (HCC)    Gastroesophageal reflux disease without esophagitis    Type 2 diabetes mellitus without complication, without long-term current use of insulin (HCC)    Perennial allergic rhinitis    Patulous lower esophageal sphincter    Achalasia    Adenopathy    Skin lesion    Mild asthma without complication    Vascular dementia with behavior disturbance ([address]    THERESE (acute kidney injury) ([address]    Acute kidney injury ([facility]    Ulcer of urethral meatus    Inguinal hernia       Past Medical History:   Diagnosis Date    Anemia 1/9/2012    Anxiety and depression     Arthritis     bulging disc    BPH (benign prostatic hypertrophy) 5/16/2010    Constipation 6/16/2015    Diverticulosis 5/16/2010    Dyspnea on exertion 5/16/2010    Eczema 4/12/2011    Elevated PSA 10/30/2012    Erectile dysfunction 5/16/2010    Essential hypertension 8/26/2015    GERD (gastroesophageal reflux disease) 5/16/2010    Hemorrhoid 5/16/2010    Hyperlipidemia 1/10/2011    Hyperphosphatemia 7/3/2010    Hypertension 5/16/2010    Insomnia 6/16/2015    Memory loss 1/14/2015    Nausea 6/16/2015    Overactive bladder 7/12/2011    Paranoid schizophrenia ([facility] 5/16/2010    Perennial allergic rhinitis 11/30/2016    Prostate cancer ([facility] 1/8/2013    had radiation treatments    Seizures ([facility] long ago    Type 2 diabetes mellitus without complication, without long-term current use of insulin ([facility] [address] [facility] 8/31/2016     Past Surgical History:   Procedure Laterality Date    CHEST WALL RESECTION N/A 1/21/2020    EXCISION CHEST LESION (3 x 2.8cm) AND RIGHT UPPER ABDOMEN LESION (3 x .1), COMPLEX CLOSURE 1.9CM;  ADJACENT TISSUE TRANSFER; performed by Kinga Beck MD at [address], [facility]  September 13, 2012    Dr. Magnolia Marinelli  December 1, 2011    Dr. Tierra Bennett  June 16, 2011    COLONOSCOPY N/A 10/1/2019    COLONOSCOPY DIAGNOSTIC performed by Erin Pacheco MD at [address], COLON, DIAGNOSTIC      HIP SURGERY Left June 23, 2015    Dr. Kalpana Marshall - incision and drainage of left hip infected hematoma     OTHER SURGICAL HISTORY  11/06/2017     ESOPHAGOGASTRODUODENOSCOPY                OTHER SURGICAL HISTORY  11/06/2017     LAPAROSCOPIC HELLER MYOTOMY 1 WRAP, EGD    PROSTATE BIOPSY  November 2012    Dr. Stef Aquino  May 8, 2001    TURP    TOTAL HIP ARTHROPLASTY Left May 17, 2015    Dr. Jeremy Zamora  November 7, 2011    UPPER GASTROINTESTINAL ENDOSCOPY  February 4, 2016    Dr. Isidro Cabrales ENDOSCOPY N/A 04/15/2016    Esophagogastroduodenoscopy with Botulinum toxin injection of the lower esophageal sphincter (LES)    UPPER GASTROINTESTINAL ENDOSCOPY  04/25/2017    dilatation; and botox injection 4 units    UPPER GASTROINTESTINAL ENDOSCOPY N/A 6/14/2019    EGD SUBMUCOSAL/BOTOX INJECTION performed by Erin Pacheco MD at [address] N/A 6/14/2019    EGD DILATION BALLOON performed by Mack York MD at [address] [place] [address]     Family History   Problem Relation Age of Onset    Cancer Mother         colon cancer    Cancer Brother         colon cancer, stomach cancer     Social History     Socioeconomic History    Marital status:      Spouse name: Joe Guzman    Number of children: 4    Years of education: Not on file    Highest education level: Not on file   Occupational History    Occupation: retired - 2000     Comment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   Tobacco Use    Smoking status: Former Smoker     Packs/day: 1.00     Years: 18.00     Pack years: 18.00     Types: Cigarettes     Start date: 10/22/1972     Quit date: 1990     Years since quittin.1    Smokeless tobacco: Never Used    Tobacco comment: quit smoking in     Vaping Use    Vaping Use: Never used   Substance and Sexual Activity    Alcohol use: No    Drug use: Never    Sexual activity: Yes     Partners: Female     Comment:  - Joe Guzman -    Other Topics Concern    Not on file   Social History Narrative    Not on file     Social Determinants of Health     Financial Resource Strain: Low Risk     Difficulty of Paying Living Expenses: Not hard at all   Food Insecurity: No Food Insecurity    Worried About [address] in the Last Year: Never true    [facility] in the Last Year: Never true   Transportation Needs:     Lack of Transportation (Medical):      Lack of Transportation (Non-Medical):    Physical Activity:     Days of Exercise per Week:     Minutes of Exercise per Session:    Stress:     Feeling of Stress :    Social Connections:     Frequency of Communication with Friends and Family:     Frequency of Social Gatherings with Friends and Family:     Attends Yarsanism Services:     Active Member of Clubs or Organizations:     Attends Club or Organization Meetings:     Marital Status:    Intimate Partner Violence:     Fear of Current or Ex-Partner:     Emotionally Abused:     Physically Abused:     Sexually Abused:        Review of Systems  A comprehensive review of systems was negative except for what was noted in the HPI. Physical Exam   Constitutional: He is oriented to person, place, and time. He appears well-developed and well-nourished. No distress. HENT:   Head: Normocephalic and atraumatic. Eyes: Conjunctivae and EOM are normal. Pupils are equal, round, and reactive to light. Neck: Trachea normal and normal range of motion. Neck supple. No JVD present. Carotid bruit is not present. No mass and no thyromegaly present. Cardiovascular: Normal rate, regular rhythm, normal heart sounds and intact distal pulses. Exam reveals no gallop and no friction rub. No murmur heard. Pulmonary/Chest: Effort normal and breath sounds normal. No respiratory distress. He has no wheezes. He has no rales. Abdominal: Soft. Normal aorta and bowel sounds are normal. He exhibits no distension and no mass. There is no hepatosplenomegaly. No tenderness. Musculoskeletal: He exhibits no edema and no tenderness. Neurological: He is alert and oriented to person, place, and time. He has normal strength. No cranial nerve deficit or sensory deficit. Coordination and gait normal.   Skin: Skin is warm and dry. No rash noted. No erythema. Psychiatric: He has a normal mood and affect. His behavior is normal.     EKG Interpretation:  normal EKG, normal sinus rhythm, unchanged from previous tracings.     Lab Review   Admission on 04/15/2021, Discharged on 04/16/2021   Component Date Value    Sodium 04/15/2021 142     Potassium reflex Magnesi* 04/15/2021 3.7     Chloride 04/15/2021 104     CO2 04/15/2021 27     Anion Gap 04/15/2021 11     Glucose 04/15/2021 125*    BUN 04/15/2021 29*    CREATININE 04/15/2021 2.2*    GFR Non- 04/15/2021 29*    GFR  04/15/2021 35*    Calcium 04/15/2021 9.0     Color, UA 04/15/2021 Yellow     Clarity, UA 04/15/2021 Clear     Glucose, Ur 04/15/2021 Negative     Bilirubin Urine 04/15/2021 Negative     Ketones, Urine 04/15/2021 Negative     Specific Gravity, UA 04/15/2021 1.015     Blood, Urine 04/15/2021 TRACE-INTACT*    pH, UA 04/15/2021 7.0     Protein, UA 04/15/2021 100*    Urobilinogen, Urine 04/15/2021 0.2     Nitrite, Urine 04/15/2021 Negative     Leukocyte Esterase, Urine 04/15/2021 Negative     Microscopic Examination 04/15/2021 YES     Urine Type 04/15/2021 NotGiven     Urine Reflex to Culture 04/15/2021 Not Indicated     WBC, UA 04/15/2021 None seen     RBC, UA 04/15/2021 3-4     Epithelial Cells, UA 04/15/2021 0-1     Bacteria, UA 04/15/2021 Rare*    Sodium, Ur 04/15/2021 99     Creatinine, Ur 04/15/2021 19.7*    Protein, Ur 04/15/2021 90.40*    Protein/Creat Ratio 04/15/2021 4.6     POC Glucose 04/15/2021 168*    Performed on 04/15/2021 ACCU-CHEK     KAPPA, FREE LIGHT CHAINS* 04/16/2021 87.74*    LAMBDA, FREE LIGHT CHAIN* 04/16/2021 33.40*    K/L RATIO 04/16/2021 2.63*    KAPPA/LAMBDA TEST COMMENT 04/16/2021 see below     Sodium 04/16/2021 140     Potassium 04/16/2021 3.2*    Chloride 04/16/2021 105     CO2 04/16/2021 25     Anion Gap 04/16/2021 10     Glucose 04/16/2021 84     BUN 04/16/2021 26*    CREATININE 04/16/2021 2.0*    GFR Non- 04/16/2021 32*    GFR  04/16/2021 39*    Calcium 04/16/2021 8.9     Phosphorus 04/16/2021 4.0     Albumin 04/16/2021 3.5     POC Glucose 04/16/2021 83     Performed on 04/16/2021 ACCU-CHEK     Sodium 04/16/2021 139     Potassium 04/16/2021 3.8     Chloride 04/16/2021 103     CO2 04/16/2021 26     Anion Gap 04/16/2021 10     Glucose 04/16/2021 151*    BUN 04/16/2021 25*    CREATININE 04/16/2021 2.2*    GFR Non- 04/16/2021 29*    GFR  04/16/2021 35*    Calcium 04/16/2021 8.8     Phosphorus 04/16/2021 3.2     Albumin 04/16/2021 3.4     POC Glucose 04/16/2021 91     Performed on 04/16/2021 ACCU-CHEK     Sodium 04/16/2021 138     Potassium 04/16/2021 4.3     Chloride 04/16/2021 103     CO2 04/16/2021 24     Anion Gap 04/16/2021 11     Glucose 04/16/2021 125*    BUN 04/16/2021 24*    CREATININE 04/16/2021 2.1*    GFR Non- 04/16/2021 30*    GFR  04/16/2021 37*    Calcium 04/16/2021 8.9     Phosphorus 04/16/2021 3.3     Albumin 04/16/2021 3.4     POC Glucose 04/16/2021 122*    Performed on 04/16/2021 ACCU-CHEK            Assessment:       82 y.o. patient with planned surgery as above. Known risk factors for perioperative complications: Diabetes mellitus, Hypertension, Renal dysfunction  Current medications which may produce withdrawal symptoms if withheld perioperatively: none      Plan:     1. Pre-op examination  - Preoperative workup as follows: hemoglobin, hematocrit, electrolytes, creatinine  -He has ischemic heart disease listed in the problem list however I cannot find any actual documentation of this. He had a normal stress test in 2017 prior to a Nissen fundoplication and he does not see a cardiologist.   - Change in medication regimen before surgery: Take levetiracetam and oxcarbazepine on morning of surgery with sip of water, and hold all other medications until after surgery  - Prophylaxis for cardiac events with perioperative beta-blockers: Not indicated  - No contraindications to planned surgery  - EKG 12 Lead    2. Unilateral inguinal hernia without obstruction or gangrene, recurrence not specified  - will undergo surgical repair    3. Stage 3a chronic kidney disease (HCC)  -Stable  -Avoid nephrotoxins  - Comprehensive Metabolic Panel; Future  - CBC Auto Differential; Future    4. Peripheral vascular disease (HCC)  -Continue aspirin, statin. Holding aspirin preoperatively.     5. Partial symptomatic epilepsy with complex partial seizures, not intractable, without status epilepticus ([facility]  -Controlled, no recent seizures, continue levetiracetam and oxcarbazepine    6. Type 2 diabetes mellitus with stage 3 chronic kidney disease, without long-term current use of insulin, unspecified whether stage 3a or 3b CKD (HCC)  -Stable, controlled  - Comprehensive Metabolic Panel;  Future  - CBC Auto Differential; Future

## 2021-06-15 ENCOUNTER — ANESTHESIA EVENT (OUTPATIENT)
Dept: OPERATING ROOM | Age: 86
End: 2021-06-15
Payer: MEDICARE

## 2021-06-15 LAB
A/G RATIO: 1.4 (ref 1.1–2.2)
ALBUMIN SERPL-MCNC: 4.2 G/DL (ref 3.4–5)
ALP BLD-CCNC: 82 U/L (ref 40–129)
ALT SERPL-CCNC: 14 U/L (ref 10–40)
ANION GAP SERPL CALCULATED.3IONS-SCNC: 12 MMOL/L (ref 3–16)
AST SERPL-CCNC: 20 U/L (ref 15–37)
BASOPHILS ABSOLUTE: 0.1 K/UL (ref 0–0.2)
BASOPHILS RELATIVE PERCENT: 0.9 %
BILIRUB SERPL-MCNC: <0.2 MG/DL (ref 0–1)
BUN BLDV-MCNC: 35 MG/DL (ref 7–20)
CALCIUM SERPL-MCNC: 9.5 MG/DL (ref 8.3–10.6)
CHLORIDE BLD-SCNC: 105 MMOL/L (ref 99–110)
CO2: 26 MMOL/L (ref 21–32)
CREAT SERPL-MCNC: 2.6 MG/DL (ref 0.8–1.3)
EOSINOPHILS ABSOLUTE: 0.2 K/UL (ref 0–0.6)
EOSINOPHILS RELATIVE PERCENT: 3.2 %
GFR AFRICAN AMERICAN: 29
GFR NON-AFRICAN AMERICAN: 24
GLOBULIN: 3 G/DL
GLUCOSE BLD-MCNC: 86 MG/DL (ref 70–99)
HCT VFR BLD CALC: 28.9 % (ref 40.5–52.5)
HEMOGLOBIN: 9.7 G/DL (ref 13.5–17.5)
LYMPHOCYTES ABSOLUTE: 0.8 K/UL (ref 1–5.1)
LYMPHOCYTES RELATIVE PERCENT: 13.7 %
MCH RBC QN AUTO: 28.9 PG (ref 26–34)
MCHC RBC AUTO-ENTMCNC: 33.6 G/DL (ref 31–36)
MCV RBC AUTO: 85.9 FL (ref 80–100)
MONOCYTES ABSOLUTE: 0.5 K/UL (ref 0–1.3)
MONOCYTES RELATIVE PERCENT: 9.6 %
NEUTROPHILS ABSOLUTE: 4.1 K/UL (ref 1.7–7.7)
NEUTROPHILS RELATIVE PERCENT: 72.6 %
PDW BLD-RTO: 13.1 % (ref 12.4–15.4)
PLATELET # BLD: 219 K/UL (ref 135–450)
PMV BLD AUTO: 9.5 FL (ref 5–10.5)
POTASSIUM SERPL-SCNC: 4.9 MMOL/L (ref 3.5–5.1)
RBC # BLD: 3.36 M/UL (ref 4.2–5.9)
SODIUM BLD-SCNC: 143 MMOL/L (ref 136–145)
TOTAL PROTEIN: 7.2 G/DL (ref 6.4–8.2)
WBC # BLD: 5.7 K/UL (ref 4–11)

## 2021-06-15 NOTE — PROGRESS NOTES
Abnormal labs done 6-14 routed to surgeon. /MA    6-15-21 @ 1051 - Reviewed Hgb 9.7 and Hct 28.9 with Dr. Yohan Cast, approved, no repeat necessary per Dr. Yohan Cast.  Fernie Valencia

## 2021-06-16 ENCOUNTER — ANESTHESIA (OUTPATIENT)
Dept: OPERATING ROOM | Age: 86
End: 2021-06-16
Payer: MEDICARE

## 2021-06-16 ENCOUNTER — HOSPITAL ENCOUNTER (OUTPATIENT)
Age: 86
Setting detail: OUTPATIENT SURGERY
Discharge: HOME | End: 2021-06-16
Attending: SURGERY | Admitting: SURGERY
Payer: MEDICARE

## 2021-06-16 VITALS
DIASTOLIC BLOOD PRESSURE: 86 MMHG | BODY MASS INDEX: 17.53 KG/M2 | HEIGHT: 75 IN | OXYGEN SATURATION: 97 % | RESPIRATION RATE: 16 BRPM | TEMPERATURE: 97 F | HEART RATE: 74 BPM | SYSTOLIC BLOOD PRESSURE: 102 MMHG | WEIGHT: 141 LBS

## 2021-06-16 VITALS — DIASTOLIC BLOOD PRESSURE: 110 MMHG | TEMPERATURE: 96.4 F | SYSTOLIC BLOOD PRESSURE: 206 MMHG | OXYGEN SATURATION: 100 %

## 2021-06-16 DIAGNOSIS — K40.90 UNILATERAL INGUINAL HERNIA WITHOUT OBSTRUCTION OR GANGRENE, RECURRENCE NOT SPECIFIED: Primary | ICD-10-CM

## 2021-06-16 LAB
GLUCOSE BLD-MCNC: 123 MG/DL (ref 70–99)
GLUCOSE BLD-MCNC: 97 MG/DL (ref 70–99)
PERFORMED ON: ABNORMAL
PERFORMED ON: NORMAL

## 2021-06-16 PROCEDURE — 2580000003 HC RX 258: Performed by: SURGERY

## 2021-06-16 PROCEDURE — 3700000001 HC ADD 15 MINUTES (ANESTHESIA): Performed by: SURGERY

## 2021-06-16 PROCEDURE — 3700000000 HC ANESTHESIA ATTENDED CARE: Performed by: SURGERY

## 2021-06-16 PROCEDURE — 7100000010 HC PHASE II RECOVERY - FIRST 15 MIN: Performed by: SURGERY

## 2021-06-16 PROCEDURE — 3600000014 HC SURGERY LEVEL 4 ADDTL 15MIN: Performed by: SURGERY

## 2021-06-16 PROCEDURE — 6370000000 HC RX 637 (ALT 250 FOR IP): Performed by: ANESTHESIOLOGY

## 2021-06-16 PROCEDURE — 7100000001 HC PACU RECOVERY - ADDTL 15 MIN: Performed by: SURGERY

## 2021-06-16 PROCEDURE — 3600000004 HC SURGERY LEVEL 4 BASE: Performed by: SURGERY

## 2021-06-16 PROCEDURE — C1781 MESH (IMPLANTABLE): HCPCS | Performed by: SURGERY

## 2021-06-16 PROCEDURE — 6360000002 HC RX W HCPCS: Performed by: NURSE ANESTHETIST, CERTIFIED REGISTERED

## 2021-06-16 PROCEDURE — 2709999900 HC NON-CHARGEABLE SUPPLY: Performed by: SURGERY

## 2021-06-16 PROCEDURE — C1713 ANCHOR/SCREW BN/BN,TIS/BN: HCPCS | Performed by: SURGERY

## 2021-06-16 PROCEDURE — 49650 LAP ING HERNIA REPAIR INIT: CPT | Performed by: SURGERY

## 2021-06-16 PROCEDURE — 2500000003 HC RX 250 WO HCPCS: Performed by: SURGERY

## 2021-06-16 PROCEDURE — 2580000003 HC RX 258: Performed by: ANESTHESIOLOGY

## 2021-06-16 PROCEDURE — 6360000002 HC RX W HCPCS: Performed by: SURGERY

## 2021-06-16 PROCEDURE — 7100000000 HC PACU RECOVERY - FIRST 15 MIN: Performed by: SURGERY

## 2021-06-16 PROCEDURE — 7100000011 HC PHASE II RECOVERY - ADDTL 15 MIN: Performed by: SURGERY

## 2021-06-16 PROCEDURE — 2500000003 HC RX 250 WO HCPCS: Performed by: NURSE ANESTHETIST, CERTIFIED REGISTERED

## 2021-06-16 DEVICE — SYSTEM PERM FIX L37CM 15 FAST CAPSUR: Type: IMPLANTABLE DEVICE | Site: GROIN | Status: FUNCTIONAL

## 2021-06-16 DEVICE — MESH HERN L W10.8XL16CM L INGUINAL WHT POLYPR MFIL: Type: IMPLANTABLE DEVICE | Site: GROIN | Status: FUNCTIONAL

## 2021-06-16 RX ORDER — SODIUM CHLORIDE, SODIUM LACTATE, POTASSIUM CHLORIDE, CALCIUM CHLORIDE 600; 310; 30; 20 MG/100ML; MG/100ML; MG/100ML; MG/100ML
INJECTION, SOLUTION INTRAVENOUS CONTINUOUS
Status: DISCONTINUED | OUTPATIENT
Start: 2021-06-16 | End: 2021-06-16 | Stop reason: HOSPADM

## 2021-06-16 RX ORDER — LABETALOL HYDROCHLORIDE 5 MG/ML
5 INJECTION, SOLUTION INTRAVENOUS EVERY 10 MIN PRN
Status: DISCONTINUED | OUTPATIENT
Start: 2021-06-16 | End: 2021-06-16 | Stop reason: HOSPADM

## 2021-06-16 RX ORDER — ONDANSETRON 2 MG/ML
INJECTION INTRAMUSCULAR; INTRAVENOUS PRN
Status: DISCONTINUED | OUTPATIENT
Start: 2021-06-16 | End: 2021-06-16 | Stop reason: SDUPTHER

## 2021-06-16 RX ORDER — OXYCODONE HYDROCHLORIDE 5 MG/1
5 TABLET ORAL PRN
Status: DISCONTINUED | OUTPATIENT
Start: 2021-06-16 | End: 2021-06-16 | Stop reason: HOSPADM

## 2021-06-16 RX ORDER — PROMETHAZINE HYDROCHLORIDE 25 MG/ML
6.25 INJECTION, SOLUTION INTRAMUSCULAR; INTRAVENOUS
Status: DISCONTINUED | OUTPATIENT
Start: 2021-06-16 | End: 2021-06-16 | Stop reason: HOSPADM

## 2021-06-16 RX ORDER — BUPIVACAINE HYDROCHLORIDE 5 MG/ML
INJECTION, SOLUTION EPIDURAL; INTRACAUDAL PRN
Status: DISCONTINUED | OUTPATIENT
Start: 2021-06-16 | End: 2021-06-16 | Stop reason: ALTCHOICE

## 2021-06-16 RX ORDER — MORPHINE SULFATE 4 MG/ML
1 INJECTION, SOLUTION INTRAMUSCULAR; INTRAVENOUS EVERY 5 MIN PRN
Status: DISCONTINUED | OUTPATIENT
Start: 2021-06-16 | End: 2021-06-16 | Stop reason: HOSPADM

## 2021-06-16 RX ORDER — DIPHENHYDRAMINE HYDROCHLORIDE 50 MG/ML
12.5 INJECTION INTRAMUSCULAR; INTRAVENOUS
Status: DISCONTINUED | OUTPATIENT
Start: 2021-06-16 | End: 2021-06-16 | Stop reason: HOSPADM

## 2021-06-16 RX ORDER — PROPOFOL 10 MG/ML
INJECTION, EMULSION INTRAVENOUS PRN
Status: DISCONTINUED | OUTPATIENT
Start: 2021-06-16 | End: 2021-06-16 | Stop reason: SDUPTHER

## 2021-06-16 RX ORDER — METOCLOPRAMIDE HYDROCHLORIDE 5 MG/ML
10 INJECTION INTRAMUSCULAR; INTRAVENOUS
Status: DISCONTINUED | OUTPATIENT
Start: 2021-06-16 | End: 2021-06-16 | Stop reason: HOSPADM

## 2021-06-16 RX ORDER — AMLODIPINE BESYLATE 5 MG/1
5 TABLET ORAL ONCE
Status: COMPLETED | OUTPATIENT
Start: 2021-06-16 | End: 2021-06-16

## 2021-06-16 RX ORDER — CEFAZOLIN SODIUM 2 G/50ML
2000 SOLUTION INTRAVENOUS ONCE
Status: COMPLETED | OUTPATIENT
Start: 2021-06-16 | End: 2021-06-16

## 2021-06-16 RX ORDER — FENTANYL CITRATE 50 UG/ML
INJECTION, SOLUTION INTRAMUSCULAR; INTRAVENOUS PRN
Status: DISCONTINUED | OUTPATIENT
Start: 2021-06-16 | End: 2021-06-16 | Stop reason: SDUPTHER

## 2021-06-16 RX ORDER — OXYCODONE HYDROCHLORIDE 5 MG/1
5 TABLET ORAL EVERY 6 HOURS PRN
Qty: 12 TABLET | Refills: 0 | Status: SHIPPED | OUTPATIENT
Start: 2021-06-16 | End: 2021-06-19

## 2021-06-16 RX ORDER — MAGNESIUM HYDROXIDE 1200 MG/15ML
LIQUID ORAL CONTINUOUS PRN
Status: COMPLETED | OUTPATIENT
Start: 2021-06-16 | End: 2021-06-16

## 2021-06-16 RX ORDER — OXYCODONE HYDROCHLORIDE 5 MG/1
10 TABLET ORAL PRN
Status: DISCONTINUED | OUTPATIENT
Start: 2021-06-16 | End: 2021-06-16 | Stop reason: HOSPADM

## 2021-06-16 RX ORDER — HYDRALAZINE HYDROCHLORIDE 20 MG/ML
5 INJECTION INTRAMUSCULAR; INTRAVENOUS EVERY 10 MIN PRN
Status: DISCONTINUED | OUTPATIENT
Start: 2021-06-16 | End: 2021-06-16 | Stop reason: HOSPADM

## 2021-06-16 RX ORDER — ROCURONIUM BROMIDE 10 MG/ML
INJECTION, SOLUTION INTRAVENOUS PRN
Status: DISCONTINUED | OUTPATIENT
Start: 2021-06-16 | End: 2021-06-16 | Stop reason: SDUPTHER

## 2021-06-16 RX ORDER — MEPERIDINE HYDROCHLORIDE 25 MG/ML
12.5 INJECTION INTRAMUSCULAR; INTRAVENOUS; SUBCUTANEOUS EVERY 5 MIN PRN
Status: DISCONTINUED | OUTPATIENT
Start: 2021-06-16 | End: 2021-06-16 | Stop reason: HOSPADM

## 2021-06-16 RX ADMIN — CEFAZOLIN SODIUM 2000 MG: 2 SOLUTION INTRAVENOUS at 11:28

## 2021-06-16 RX ADMIN — AMLODIPINE BESYLATE 5 MG: 5 TABLET ORAL at 11:03

## 2021-06-16 RX ADMIN — FENTANYL CITRATE 25 MCG: 50 INJECTION, SOLUTION INTRAMUSCULAR; INTRAVENOUS at 11:50

## 2021-06-16 RX ADMIN — SODIUM CHLORIDE, POTASSIUM CHLORIDE, SODIUM LACTATE AND CALCIUM CHLORIDE: 600; 310; 30; 20 INJECTION, SOLUTION INTRAVENOUS at 10:49

## 2021-06-16 RX ADMIN — PHENYLEPHRINE HYDROCHLORIDE 80 MCG: 10 INJECTION, SOLUTION INTRAMUSCULAR; INTRAVENOUS; SUBCUTANEOUS at 12:24

## 2021-06-16 RX ADMIN — FENTANYL CITRATE 50 MCG: 50 INJECTION, SOLUTION INTRAMUSCULAR; INTRAVENOUS at 11:23

## 2021-06-16 RX ADMIN — PHENYLEPHRINE HYDROCHLORIDE 40 MCG: 10 INJECTION, SOLUTION INTRAMUSCULAR; INTRAVENOUS; SUBCUTANEOUS at 12:22

## 2021-06-16 RX ADMIN — PROPOFOL 40 MG: 10 INJECTION, EMULSION INTRAVENOUS at 11:28

## 2021-06-16 RX ADMIN — ONDANSETRON 4 MG: 2 INJECTION INTRAMUSCULAR; INTRAVENOUS at 12:31

## 2021-06-16 RX ADMIN — SUGAMMADEX 200 MG: 100 INJECTION, SOLUTION INTRAVENOUS at 12:37

## 2021-06-16 RX ADMIN — ROCURONIUM BROMIDE 50 MG: 10 INJECTION INTRAVENOUS at 11:26

## 2021-06-16 RX ADMIN — PROPOFOL 80 MG: 10 INJECTION, EMULSION INTRAVENOUS at 11:26

## 2021-06-16 RX ADMIN — FENTANYL CITRATE 25 MCG: 50 INJECTION, SOLUTION INTRAMUSCULAR; INTRAVENOUS at 12:04

## 2021-06-16 RX ADMIN — ROCURONIUM BROMIDE 15 MG: 10 INJECTION INTRAVENOUS at 11:50

## 2021-06-16 RX ADMIN — PROPOFOL 20 MG: 10 INJECTION, EMULSION INTRAVENOUS at 11:58

## 2021-06-16 ASSESSMENT — PULMONARY FUNCTION TESTS
PIF_VALUE: 13
PIF_VALUE: 5
PIF_VALUE: 12
PIF_VALUE: 0
PIF_VALUE: 19
PIF_VALUE: 13
PIF_VALUE: 19
PIF_VALUE: 0
PIF_VALUE: 1
PIF_VALUE: 13
PIF_VALUE: 19
PIF_VALUE: 18
PIF_VALUE: 21
PIF_VALUE: 15
PIF_VALUE: 18
PIF_VALUE: 1
PIF_VALUE: 20
PIF_VALUE: 18
PIF_VALUE: 0
PIF_VALUE: 21
PIF_VALUE: 19
PIF_VALUE: 21
PIF_VALUE: 21
PIF_VALUE: 15
PIF_VALUE: 10
PIF_VALUE: 15
PIF_VALUE: 18
PIF_VALUE: 15
PIF_VALUE: 1
PIF_VALUE: 18
PIF_VALUE: 13
PIF_VALUE: 0
PIF_VALUE: 0
PIF_VALUE: 15
PIF_VALUE: 2
PIF_VALUE: 19
PIF_VALUE: 14
PIF_VALUE: 12
PIF_VALUE: 18
PIF_VALUE: 21
PIF_VALUE: 14
PIF_VALUE: 17
PIF_VALUE: 14
PIF_VALUE: 1
PIF_VALUE: 12
PIF_VALUE: 15
PIF_VALUE: 17
PIF_VALUE: 13
PIF_VALUE: 13
PIF_VALUE: 0
PIF_VALUE: 18
PIF_VALUE: 1
PIF_VALUE: 18
PIF_VALUE: 16
PIF_VALUE: 18
PIF_VALUE: 15
PIF_VALUE: 18
PIF_VALUE: 20
PIF_VALUE: 1
PIF_VALUE: 12
PIF_VALUE: 19
PIF_VALUE: 15
PIF_VALUE: 13
PIF_VALUE: 0
PIF_VALUE: 12
PIF_VALUE: 21
PIF_VALUE: 13
PIF_VALUE: 17
PIF_VALUE: 13
PIF_VALUE: 14
PIF_VALUE: 1
PIF_VALUE: 15
PIF_VALUE: 18
PIF_VALUE: 0
PIF_VALUE: 21
PIF_VALUE: 13
PIF_VALUE: 21
PIF_VALUE: 9
PIF_VALUE: 23
PIF_VALUE: 19
PIF_VALUE: 21
PIF_VALUE: 14
PIF_VALUE: 18
PIF_VALUE: 20
PIF_VALUE: 18
PIF_VALUE: 21
PIF_VALUE: 15
PIF_VALUE: 20

## 2021-06-16 ASSESSMENT — PAIN SCALES - GENERAL
PAINLEVEL_OUTOF10: 0

## 2021-06-16 ASSESSMENT — ENCOUNTER SYMPTOMS: SHORTNESS OF BREATH: 1

## 2021-06-16 ASSESSMENT — PAIN - FUNCTIONAL ASSESSMENT: PAIN_FUNCTIONAL_ASSESSMENT: 0-10

## 2021-06-16 NOTE — PROGRESS NOTES
Notified Dr. Leti Andino in anesthesia regarding SBP being elevated. He ordered Norvasc PO preoperatively.

## 2021-06-16 NOTE — ANESTHESIA PRE PROCEDURE
Department of Anesthesiology  Preprocedure Note       Name:  Garett Lai   Age:  82 y.o.  :  1935                                          MRN:  [id number]         Date:  2021      Surgeon: Papo Sheppard):  Grace Yuan MD    Procedure: Procedure(s):  LAPAROSCOPIC LEFT INGUINAL HERNIA REPAIR    Medications prior to admission:   Prior to Admission medications    Medication Sig Start Date End Date Taking? Authorizing Provider   tamsulosin (FLOMAX) 0.4 MG capsule TAKE 1 CAPSULE BY MOUTH EVERY DAY 21  Yes Myles Garza MD   oxybutynin (DITROPAN XL) 15 MG extended release tablet TAKE 1 TABLET BY MOUTH 2 TIMES DAILY 21  Yes Myles Garza MD   memantine (NAMENDA) 5 MG tablet Take 0.5 tablets by mouth daily 5/10/21  Yes Myles Garza MD   mirtazapine (REMERON) 15 MG tablet TAKE 1 TABLET BY MOUTH EVERY DAY AT NIGHT 5/3/21  Yes Myles Garza MD   Multiple Vitamin (DAILY-TJ) TABS TAKE 1 TABLET BY MOUTH EVERY DAY 5/3/21  Yes Myles Gazra MD   polyethylene glycol (GLYCOLAX) 17 g packet Take 17 g by mouth daily as needed for Constipation 21  Yes Myles Garza MD   gabapentin (NEURONTIN) 100 MG capsule Take 100 mg by mouth 2 times daily. Yes Historical Provider, MD   aspirin 325 MG EC tablet TAKE 1 TABLET BY MOUTH EVERY DAY WITH FOOD 4/10/21  Yes Myels Garza MD   famotidine (PEPCID) 40 MG tablet Take 1 tablet by mouth every evening 21  Yes Marvin Rea MD   amLODIPine (NORVASC) 5 MG tablet Take 1 tablet by mouth daily 3/31/21  Yes Myles Garza MD   Psyllium-Calcium (METAMUCIL PLUS CALCIUM) CAPS Take 1 tablet by mouth 2 times daily Take with 8 oz water.  3/31/21  Yes Myles Garza MD   levETIRAcetam (KEPPRA) 500 MG tablet TAKE 1 TABLET BY MOUTH TWICE A DAY 2/15/21  Yes Myles Garza MD   clotrimazole (LOTRIMIN) 1 % cream APPLY TO AFFECTED AREA TWICE A DAY 21  Yes Myles Garza MD   Accu-Chek Softclix Lancets MISC 1 each by Does not apply route daily 8/19/20  Yes Tiffanie Cerna MD   blood glucose test strips (ACCU-CHEK ZEENAT PLUS) strip 1 each by In Vitro route daily as needed (symptoms of low or high blood sugar) 8/19/20  Yes Tiffanie Cerna MD   QUEtiapine (SEROQUEL) 25 MG tablet Take 1 tablet by mouth nightly 8/19/20  Yes Tiffanie Cerna MD   OXcarbazepine (TRILEPTAL) 300 MG tablet Take 1 tablet by mouth 2 times daily 8/10/20  Yes Tiffanie Cerna MD   metFORMIN (GLUCOPHAGE) 500 MG tablet TAKE 1 TABLET BY MOUTH EVERY DAY WITH FOOD 7/24/20  Yes Tiffanie Cerna MD   BREO ELLIPTA 100-25 MCG/INH AEPB inhaler TAKE 1 PUFF BY MOUTH EVERY DAY 5/4/20  Yes Nelson Rocha MD   atorvastatin (LIPITOR) 10 MG tablet TAKE 1 TABLET BY MOUTH EVERY DAY 2/6/20  Yes Tiffanie Cerna MD   latanoprost (XALATAN) 0.005 % ophthalmic solution Place 1 drop into the left eye nightly  7/27/19  Yes Historical MD Emeka   fluticasone (FLONASE) 50 MCG/ACT nasal spray USE 2 SPRAYS BY NASAL ROUTE DAILY AS NEEDED FOR RHINITIS 8/12/19  Yes Tiffanie Cerna MD   nitroGLYCERIN (NITROSTAT) 0.4 MG SL tablet Place 1 tablet under the tongue every 5 minutes as needed.  1/9/12   Dalila Shepard MD       Current medications:    Current Facility-Administered Medications   Medication Dose Route Frequency Provider Last Rate Last Admin    ceFAZolin (ANCEF) 2000 mg in dextrose 3 % 50 mL IVPB (duplex)  2,000 mg Intravenous Once Estiven Albarran MD        lactated ringers infusion   Intravenous Continuous Rich Larsen  mL/hr at 06/16/21 1049 New Bag at 06/16/21 1049    HYDROmorphone (DILAUDID) injection 0.25 mg  0.25 mg Intravenous Q5 Min PRN Cara Vargas MD        HYDROmorphone (DILAUDID) injection 0.5 mg  0.5 mg Intravenous Q5 Min PRN Cara Vargas MD        morphine injection 1 mg  1 mg Intravenous Q5 Min PRN Cara Vargas MD        HYDROmorphone (DILAUDID) injection 0.5 mg  0.5 mg Intravenous Q5 Min PRN Jason Honeycutt MD        oxyCODONE (ROXICODONE) immediate release tablet 5 mg  5 mg Oral PRN Jason Honeycutt MD        Or    oxyCODONE (ROXICODONE) immediate release tablet 10 mg  10 mg Oral PRN Jason Honeycutt MD        diphenhydrAMINE (BENADRYL) injection 12.5 mg  12.5 mg Intravenous Once PRN Jason Honeycutt MD        metoclopramide (REGLAN) injection 10 mg  10 mg Intravenous Once PRN Jason Honeycutt MD        promethazine [facility]) injection 6.25 mg  6.25 mg Intravenous Once PRN Jason Honeycutt MD        labetalol (NORMODYNE;TRANDATE) injection 5 mg  5 mg Intravenous Q10 Min PRN Jason Honeycutt MD        hydrALAZINE (APRESOLINE) injection 5 mg  5 mg Intravenous Q10 Min PRN Jason Honeycutt MD        meperidine (DEMEROL) injection 12.5 mg  12.5 mg Intravenous Q5 Min PRN Jason Honeycutt MD           Allergies:     Allergies   Allergen Reactions    Lorazepam Other (See Comments)     Pt unable to recall reaction       Problem List:    Patient Active Problem List   Diagnosis Code    Ischemic heart disease I25.9    Paranoid schizophrenia ([facility] [address] F20.0    Complex partial seizure disorder ([address] G40.209    Diverticulosis K57.90    Erectile dysfunction N52.9    Back pain M54.9    Dyspnea on exertion R06.00    Peripheral vascular disease ([address] I73.9    Hemorrhoid K64.9    Other specified anxiety disorders F41.8    Hyperlipidemia E78.5    Eczema L30.9    Overactive bladder N32.81    Fatigue R53.83    Anemia D64.9    Chronic cough R05    Elevated PSA R97.20    Prostate cancer ([address] C61    Bradycardia R00.1    Memory loss R41.3    Closed left hip fracture ([address] S72.002A    Depression F32.9    Constipation K59.00    Insomnia G47.00    Left hip pain M25.552    S/P hip replacement Z96.649    Hypertension I10    Abnormal weight loss R63.4    Former smoker Z87.891    Abnormal chest CT R93.89    Chronic kidney disease, stage 3 (HCC) N18.30    Type 2 diabetes mellitus with stage 3 chronic kidney disease (HCC) E11.22, N18.30    Gastroesophageal reflux disease without esophagitis K21.9    Type 2 diabetes mellitus without complication, without long-term current use of insulin (HCC) E11.9    Perennial allergic rhinitis J30.89    Patulous lower esophageal sphincter K22.8    Achalasia K22.0    Adenopathy R59.9    Skin lesion L98.9    Mild asthma without complication B17.010    Vascular dementia with behavior disturbance (HCC) F01.51    THERESE (acute kidney injury) ([address] N17.9    Acute kidney injury ([address] N17.9    Ulcer of urethral meatus N34.2    Inguinal hernia K40.90       Past Medical History:        Diagnosis Date    Anemia 1/9/2012    Anxiety and depression     Arthritis     bulging disc    BPH (benign prostatic hypertrophy) 5/16/2010    Constipation 6/16/2015    Diverticulosis 5/16/2010    Dyspnea on exertion 5/16/2010    Eczema 4/12/2011    Elevated PSA 10/30/2012    Erectile dysfunction 5/16/2010    Essential hypertension 8/26/2015    GERD (gastroesophageal reflux disease) 5/16/2010    Hemorrhoid 5/16/2010    Hyperlipidemia 1/10/2011    Hyperphosphatemia 7/3/2010    Hypertension 5/16/2010    Insomnia 6/16/2015    Memory loss 1/14/2015    Nausea 6/16/2015    Overactive bladder 7/12/2011    Paranoid schizophrenia ([address] 5/16/2010    Perennial allergic rhinitis 11/30/2016    Prostate cancer ([address] 1/8/2013    had radiation treatments    Seizures ([address] long ago    Type 2 diabetes mellitus without complication, without long-term current use of insulin ([address] 8/31/2016       Past Surgical History:        Procedure Laterality Date    CHEST WALL RESECTION N/A 1/21/2020    EXCISION CHEST LESION (3 x 2.8cm) AND RIGHT UPPER ABDOMEN LESION (3 x .1), COMPLEX CLOSURE 1.9CM;  ADJACENT TISSUE TRANSFER; performed by Amelia Lala MD at [facility] [address], LAPAROSCOPIC  September 13, 2012    Dr. Estrella Manriquez COLONOSCOPY  2011    Dr. Vivienne Ferreira COLONOSCOPY  2011    COLONOSCOPY N/A 10/1/2019    COLONOSCOPY DIAGNOSTIC performed by Alycia Scott MD at [address], COLON, DIAGNOSTIC      HIP SURGERY Left 2015    Dr. Carter Rowell - incision and drainage of left hip infected hematoma     OTHER SURGICAL HISTORY  2017     ESOPHAGOGASTRODUODENOSCOPY                OTHER SURGICAL HISTORY  2017     LAPAROSCOPIC HELLER MYOTOMY 1 WRAP, EGD    PROSTATE BIOPSY  2012    Dr. Kishan Wood  May 8, 2001    TURP    TOTAL HIP ARTHROPLASTY Left May 17, 2015    Dr. Garnica Prom  2011    UPPER GASTROINTESTINAL ENDOSCOPY  2016    Dr. Barb Unger ENDOSCOPY N/A 04/15/2016    Esophagogastroduodenoscopy with Botulinum toxin injection of the lower esophageal sphincter (LES)    UPPER GASTROINTESTINAL ENDOSCOPY  2017    dilatation; and botox injection 4 units    UPPER GASTROINTESTINAL ENDOSCOPY N/A 2019    EGD SUBMUCOSAL/BOTOX INJECTION performed by Alycia Scott MD at [address] N/A 2019    EGD DILATION BALLOON performed by Alycia Scott MD at [address] History:    Social History     Tobacco Use    Smoking status: Former Smoker     Packs/day: 1.00     Years: 18.00     Pack years: 18.00     Types: Cigarettes     Start date: 10/22/1972     Quit date: 1990     Years since quittin.1    Smokeless tobacco: Never Used    Tobacco comment: quit smoking in     Substance Use Topics    Alcohol use:  No                                Counseling given: Not Answered  Comment: quit smoking in        Vital Signs (Current):   Vitals:    21 1317 21 1003 21 1052   BP:  (!) 180/80 (!) 186/86   Pulse:  53 LABABO O 09/13/2012    LABRH Negative 09/13/2012       Drug/Infectious Status (If Applicable):  No results found for: HIV, HEPCAB    COVID-19 Screening (If Applicable):   Lab Results   Component Value Date    COVID19 Not Detected 04/02/2021           Anesthesia Evaluation  Patient summary reviewed and Nursing notes reviewed no history of anesthetic complications:   Airway: Mallampati: II  TM distance: >3 FB   Neck ROM: full  Mouth opening: > = 3 FB Dental:          Pulmonary:   (+) shortness of breath:  asthma:                            Cardiovascular:    (+) hypertension:, GUTIERRES:,                   Neuro/Psych:   (+) psychiatric history:            GI/Hepatic/Renal:   (+) GERD:,           Endo/Other:    (+) DiabetesType II DM, , .                 Abdominal:           Vascular:                                        Anesthesia Plan      general     ASA 1    (82-year-[facility] Plan general anesthesia with ASA standard monitors. Questions answered. Patient agreeable with anesthetic plan.  )  Induction: intravenous. Anesthetic plan and risks discussed with patient. Plan discussed with CRNA.     Attending anesthesiologist reviewed and agrees with Pre Eval content        Christian Loco MD   6/16/2021

## 2021-06-16 NOTE — H&P
Angela Parr    [id number]    [facility] Same Day Surgery Update H & P  Department of General Surgery   Surgical Service   Pre-operative History and Physical  Last H & P within the last 30 days. DIAGNOSIS:   Inguinal hernia without obstruction or gangrene, recurrence not specified, unspecified laterality [K40.90]    Procedure(s):  LAPAROSCOPIC LEFT INGUINAL HERNIA REPAIR     HISTORY OF PRESENT ILLNESS:   Patient with c/o persistent discomfort in the setting of left inguinal hernia presents today for repair. Covid 19:  Patient denies fever, chills, cough or known exposure to Covid-19.        Past Medical History:        Diagnosis Date    Anemia 1/9/2012    Anxiety and depression     Arthritis     bulging disc    BPH (benign prostatic hypertrophy) 5/16/2010    Constipation 6/16/2015    Diverticulosis 5/16/2010    Dyspnea on exertion 5/16/2010    Eczema 4/12/2011    Elevated PSA 10/30/2012    Erectile dysfunction 5/16/2010    Essential hypertension 8/26/2015    GERD (gastroesophageal reflux disease) 5/16/2010    Hemorrhoid 5/16/2010    Hyperlipidemia 1/10/2011    Hyperphosphatemia 7/3/2010    Hypertension 5/16/2010    Insomnia 6/16/2015    Memory loss 1/14/2015    Nausea 6/16/2015    Overactive bladder 7/12/2011    Paranoid schizophrenia ([address] 5/16/2010    Perennial allergic rhinitis 11/30/2016    Prostate cancer ([address] 1/8/2013    had radiation treatments    Seizures ([address] long ago    Type 2 diabetes mellitus without complication, without long-term current use of insulin ([address] 8/31/2016     Past Surgical History:        Procedure Laterality Date    CHEST WALL RESECTION N/A 1/21/2020    EXCISION CHEST LESION (3 x 2.8cm) AND RIGHT UPPER ABDOMEN LESION (3 x .1), COMPLEX CLOSURE 1.9CM;  ADJACENT TISSUE TRANSFER; performed by Raysa Smith MD at [address], LAPAROSCOPIC  September 13, 2012    Dr. Ophelia Woods  December 1, 2011 Use: Never used   Substance and Sexual Activity    Alcohol use: No    Drug use: Never    Sexual activity: Yes     Partners: Female     Comment:  - Yolanda Santiago (Adelaida Ramirez) - 1958   Other Topics Concern    None   Social History Narrative    None     Social Determinants of Health     Financial Resource Strain: Low Risk     Difficulty of Paying Living Expenses: Not hard at all   Food Insecurity: No Food Insecurity    Worried About Running Out of Food in the Last Year: Never true    [address] in the Last Year: Never true   Transportation Needs:     Lack of Transportation (Medical):  Lack of Transportation (Non-Medical):    Physical Activity:     Days of Exercise per Week:     Minutes of Exercise per Session:    Stress:     Feeling of Stress :    Social Connections:     Frequency of Communication with Friends and Family:     Frequency of Social Gatherings with Friends and Family:     Attends Buddhism Services:     Active Member of Clubs or Organizations:     Attends Club or Organization Meetings:     Marital Status:    Intimate Partner Violence:     Fear of Current or Ex-Partner:     Emotionally Abused:     Physically Abused:     Sexually Abused:          Medications Prior to Admission:      Prior to Admission medications    Medication Sig Start Date End Date Taking?  Authorizing Provider   tamsulosin (FLOMAX) 0.4 MG capsule TAKE 1 CAPSULE BY MOUTH EVERY DAY 5/24/21  Yes Rashaad Rivas MD   oxybutynin (DITROPAN XL) 15 MG extended release tablet TAKE 1 TABLET BY MOUTH 2 TIMES DAILY 5/19/21  Yes Rashaad Rivas MD   memantine (NAMENDA) 5 MG tablet Take 0.5 tablets by mouth daily 5/10/21  Yes Rashaad Rivas MD   mirtazapine (REMERON) 15 MG tablet TAKE 1 TABLET BY MOUTH EVERY DAY AT NIGHT 5/3/21  Yes Rashaad Rivas MD   Multiple Vitamin (DAILY-TJ) TABS TAKE 1 TABLET BY MOUTH EVERY DAY 5/3/21  Yes Rashaad Rivas MD   polyethylene glycol (GLYCOLAX) 17 g packet Take 17 g by mouth daily as needed for Constipation 4/24/21  Yes Tana Anaya MD   gabapentin (NEURONTIN) 100 MG capsule Take 100 mg by mouth 2 times daily. Yes Historical Provider, MD   aspirin 325 MG EC tablet TAKE 1 TABLET BY MOUTH EVERY DAY WITH FOOD 4/10/21  Yes Tana Anaya MD   famotidine (PEPCID) 40 MG tablet Take 1 tablet by mouth every evening 4/1/21  Yes Leah Russell MD   amLODIPine (NORVASC) 5 MG tablet Take 1 tablet by mouth daily 3/31/21  Yes Tana Anaya MD   Psyllium-Calcium (METAMUCIL PLUS CALCIUM) CAPS Take 1 tablet by mouth 2 times daily Take with 8 oz water.  3/31/21  Yes Tana Anaya MD   levETIRAcetam (KEPPRA) 500 MG tablet TAKE 1 TABLET BY MOUTH TWICE A DAY 2/15/21  Yes Tana Anaya MD   clotrimazole (LOTRIMIN) 1 % cream APPLY TO AFFECTED AREA TWICE A DAY 1/4/21  Yes Tana Anaya MD   Accu-Chek Softclix Lancets MISC 1 each by Does not apply route daily 8/19/20  Yes Tana Anaya MD   blood glucose test strips (ACCU-CHEK ZEENAT PLUS) strip 1 each by In Vitro route daily as needed (symptoms of low or high blood sugar) 8/19/20  Yes Tana Anaya MD   QUEtiapine (SEROQUEL) 25 MG tablet Take 1 tablet by mouth nightly 8/19/20  Yes Tana Anaya MD   OXcarbazepine (TRILEPTAL) 300 MG tablet Take 1 tablet by mouth 2 times daily 8/10/20  Yes Tana Anaya MD   metFORMIN (GLUCOPHAGE) 500 MG tablet TAKE 1 TABLET BY MOUTH EVERY DAY WITH FOOD 7/24/20  Yes Tana Anaya MD   BREO ELLIPTA 100-25 MCG/INH AEPB inhaler TAKE 1 PUFF BY MOUTH EVERY DAY 5/4/20  Yes Ilene Awad MD   atorvastatin (LIPITOR) 10 MG tablet TAKE 1 TABLET BY MOUTH EVERY DAY 2/6/20  Yes Tana Anaya MD   latanoprost (XALATAN) 0.005 % ophthalmic solution Place 1 drop into the left eye nightly  7/27/19  Yes Historical Provider, MD   fluticasone (FLONASE) 50 MCG/ACT nasal spray USE 2 SPRAYS BY NASAL ROUTE DAILY AS NEEDED FOR RHINITIS 8/12/19  Yes Edward Keith MD   nitroGLYCERIN (NITROSTAT) 0.4 MG SL tablet Place 1 tablet under the tongue every 5 minutes as needed. 1/9/12   Terrell Leon MD         Allergies:  Lorazepam    PHYSICAL EXAM:      BP (!) 180/80   Pulse 53   Temp 98.5 °F (36.9 °C) (Oral)   Resp 16   Ht 6' 3\" (1.905 m)   Wt 141 lb (64 kg)   SpO2 100%   BMI 17.62 kg/m²      Airway:  Airway patent with no audible stridor    Heart:  regular rate and rhythm, No murmur noted    Lungs:  No increased work of breathing, good air exchange, clear to auscultation bilaterally, no crackles or wheezing    Abdomen:  soft, non-distended, non-tender, no rebound tenderness or guarding, normal active bowel sounds and no masses palpated    ASSESSMENT AND PLAN     Patient is a 82 y.o. male with above specified procedure planned. 1.  The patients history and physical was obtained and signed off by the pre-admission testing department. Patient seen and focused exam done today- no new changes since last physical exam on 6/14/21    2. Access to ancillary services are available per request of the provider.     DAVID Davis - CNP     6/16/2021

## 2021-06-16 NOTE — PROGRESS NOTES
Patient admission completed. Vitals stable. Systolic blood pressure 134. Will notify anesthesia. Skin intact. Wife at bedside. Call light in reach. Will continue to monitor.

## 2021-06-16 NOTE — OP NOTE
Operative Note      Patient: Blossom Garcia  YOB: 1935  MRN: [id number]    Date of Procedure: 6/16/2021    Pre-Op Diagnosis: Left Inguinal Hernia    Post-Op Diagnosis: Same       Procedure(s):  LAPAROSCOPIC LEFT INGUINAL HERNIA REPAIR    Surgeon(s):  Rahel Yu MD    Assistant:   Resident: Eliseo Lara DO    Anesthesia: General    Estimated Blood Loss (mL): 20    Complications: None    Specimens:   * No specimens in log *    Implants:  Implant Name Type Inv. Item Serial No.  Lot No. LRB No. Used Action   SYSTEM PERM FIX L37CM 15 FAST CAPSUR  SYSTEM PERM FIX L37CM 15 FAST CAPSUR  BARD DAVOL-WD [id number] Left 1 Implanted   MESH LO L [id number] L INGUINAL WHT POLYPR MFIL  MESH LO L [id number] L INGUINAL WHT POLYPR MFIL  BARD DAVOL-WD [id number] Left 1 Implanted         Drains:   [REMOVED] Urethral Catheter Coude 14 fr (Removed)       Findings: Left indirect inguinal hernia. No cord lipoma     Indication for Procedure:  Mr. Mayco Grant is an 82year old male who presented to the outpatient office with complaints of a bulge in his left groin. He denied any pain in the area however it does become sore after prolonged activity. He denied any obstructive symptoms. On examination the patient was noted to have a left inguinal hernia and operative intervention was recommended. Risks, benefits, and alternatives were discussed with the patient and he agreed to proceed. Detailed Description of Procedure: The patient was brought to the operating room and placed in the supine position. Anesthesia was induced. Preoperative antibiotics were infused and sequential compression devices were placed. The abdomen was prepped and draped in the usual sterile fashion. 0.5% Marcaine was injected into the verenice-umbilical region. A verenice-umbilical incision to the right of the umbilicus was made with a #11 blade scalpel.  The incision was deepened through skin and subcutaneous tissues with an  S retractor and hemostat down to anterior rectus sheath. This layer was entered with a stab incision. A hemostat was introduced into the opening and blunt dissection was used to identify the posterior rectus sheath. A 10 mm trocar was introduced into this space followed by a 10 mm angled laparoscope. The scope was used to dissect loose areolar tissue in the midline until the pubic symphysis was identified. A 5mm trocar was placed in the midline. Additional dissection was performed in the preperitoneal space. An additional 5mm trocar was placed in the midline approximately 3 fingerbreaths below the previous trocar. The preperitoneal space was further developed exposing the inferior epigastric vessels and they were kept anterior to the dissection plane. The Ceferino's ligament was dissected laterally to its junction with the iliac vein. The dissection was continued inferiorly and laterally, gently  peritoneum and pushing it back. Care was taken to avoid injury to neurovascular structures. The indirect hernia sac was noted to be small and was easily mobilized from the cord structures. There was no cord lipoma. After the dissection was completed a large 3DMaxx mesh was introduced in the preperitoneal space and then secured using tacks in Coopers ligament, just above the pubis, and then along the anterior abdominal wall. This allowed for a flat lay of the mesh which covered all potential areas of weakness. The peritoneal cavity was entered through the periumbilical incision and the pneumoperitoneum was released. A 0 Vicryl was used to close the periumbilical fascial defect with a figure of eight stitch. The skin was closed with interrupted 4-0 Monocryl. Skin glue was then applied. The patient tolerated the procedure well. He was woken up and brought to the PACU in stable condition. All counts were correct x2 at the end of the procedure. Dr. Diamante Kahn was present and scrubbed for the entire procedure. Electronically signed by Javi Fields DO on 6/17/2021 at 1:06 AM

## 2021-06-16 NOTE — PROGRESS NOTES
Urinal removed since unable to void and had tobar in OR. Prepared to bladder scan. (Depends not  In place properly. Very large amount of urine out into bed. Complete linen change.

## 2021-06-16 NOTE — ANESTHESIA POSTPROCEDURE EVALUATION
Department of Anesthesiology  Postprocedure Note    Patient: Venkat Gramajo  MRN: [id number]  YOB: 1935  Date of evaluation: 6/16/2021  Time:  1:52 PM     Procedure Summary     Date: 06/16/21 Room / Location: [address] [place] [address]    Anesthesia Start: 2514 Anesthesia Stop: 2946    Procedure: LAPAROSCOPIC LEFT INGUINAL HERNIA REPAIR (Left ) Diagnosis:       Inguinal hernia without obstruction or gangrene, recurrence not specified, unspecified laterality      (Left Inguinal Hernia)    Surgeons: Ra Whitmore MD Responsible Provider: Pretty Reynolds MD    Anesthesia Type: general ASA Status: 3          Anesthesia Type: general    Eric Phase I: Eric Score: 10    Eric Phase II:      Last vitals: Reviewed and per EMR flowsheets.        Anesthesia Post Evaluation    Patient location during evaluation: PACU  Patient participation: complete - patient participated  Level of consciousness: awake and alert  Pain score: 0  Airway patency: patent  Nausea & Vomiting: no nausea and no vomiting  Complications: no  Cardiovascular status: hemodynamically stable  Respiratory status: acceptable  Hydration status: euvolemic

## 2021-06-16 NOTE — BRIEF OP NOTE
Brief Postoperative Note      Patient: Asaf Yeboah  YOB: 1935  MRN: [id number]    Date of Procedure: 6/16/2021    Pre-Op Diagnosis: Left Indirect Inguinal Hernia    Post-Op Diagnosis: Same       Procedure(s):  LAPAROSCOPIC LEFT INGUINAL HERNIA REPAIR    Surgeon(s):  Radha George MD    Assistant:  Resident: Edwige Raman DO    Anesthesia: General    Estimated Blood Loss (mL): Minimal    Complications: None    Specimens:   * No specimens in log *    Implants:  * No implants in log *      Drains:   [REMOVED] Urethral Catheter Coude 14 fr (Removed)       Findings: Left indirect inguinal hernia.  No cord lipoma     Electronically signed by Edwige Raman DO on 6/16/2021 at 12:37 PM

## 2021-06-16 NOTE — PROGRESS NOTES
1240 Admitted to PACU from [address] [facility] [address]. Connected to monitor. Report at bedside. Denies pain. .

## 2021-06-16 NOTE — PROGRESS NOTES
PACU Transfer to [facility] [place]    Vitals:    06/16/21 1341   BP: 105/88   Pulse: 75   Resp: 12   Temp: 98.8 °F (37.1 °C)   SpO2: 99%         Intake/Output Summary (Last 24 hours) at 6/16/2021 1352  Last data filed at 6/16/2021 1341  Gross per 24 hour   Intake 49 ml   Output --   Net 49 ml       Pain assessment:  none  Pain Level: 0    Patient transferred to care of [facility] [place] RN.    6/16/2021 1:52 PM

## 2021-07-01 ENCOUNTER — OFFICE VISIT (OUTPATIENT)
Dept: SURGERY | Age: 86
End: 2021-07-01

## 2021-07-01 VITALS
DIASTOLIC BLOOD PRESSURE: 83 MMHG | SYSTOLIC BLOOD PRESSURE: 167 MMHG | BODY MASS INDEX: 17.66 KG/M2 | OXYGEN SATURATION: 98 % | HEIGHT: 75 IN | HEART RATE: 90 BPM | WEIGHT: 142 LBS

## 2021-07-01 DIAGNOSIS — Z09 POSTOP CHECK: Primary | ICD-10-CM

## 2021-07-01 PROCEDURE — 99024 POSTOP FOLLOW-UP VISIT: CPT | Performed by: SURGERY

## 2021-07-01 NOTE — PROGRESS NOTES
PATIENT NAME: Jess Epley     YOB: 1935     TODAY'S DATE: 7/1/2021    Reason for Visit: Post-op check    Requesting Physician:  Dr. Haley Hood:              The patient is a 82 y.o. male who presents for follow up without complaints.      Past surgical history: EGD for an impacted meat bolus in 2011, TURP, laparoscopic cholecystectomy (2012, Dr. Kely Lugo), laparoscopic Heller myotomy with hiatal hernia repair, Nissen fundoplication and left chest tube placement (2017, Dr. Dereck Louise), excision of chest wall masses (2020, Dr. Kadi Sanchez)  Medications: Flomax, Ditropan, Namenda, Remeron, MiraLAX, gabapentin, aspirin 325 mg, Pepcid, Norvasc, Metamucil, Keppra, Seroquel, Trileptal, Metformin, inhalers, Lipitor, Flonase, nitroglycerin    REVIEW OF SYSTEMS:  CONSTITUTIONAL:  negative  HEENT:  negative  RESPIRATORY:  negative  CARDIOVASCULAR:  negative  GASTROINTESTINAL:  negative except for: see HPI  GENITOURINARY:  negative  HEMATOLOGIC/LYMPHATIC:  negative  NEUROLOGICAL:  negative    PMH  Past Medical History:   Diagnosis Date    Anemia 1/9/2012    Anxiety and depression     Arthritis     bulging disc    BPH (benign prostatic hypertrophy) 5/16/2010    Constipation 6/16/2015    Diverticulosis 5/16/2010    Dyspnea on exertion 5/16/2010    Eczema 4/12/2011    Elevated PSA 10/30/2012    Erectile dysfunction 5/16/2010    Essential hypertension 8/26/2015    GERD (gastroesophageal reflux disease) 5/16/2010    Hemorrhoid 5/16/2010    Hyperlipidemia 1/10/2011    Hyperphosphatemia 7/3/2010    Hypertension 5/16/2010    Insomnia 6/16/2015    Memory loss 1/14/2015    Nausea 6/16/2015    Overactive bladder 7/12/2011    Paranoid schizophrenia ([address] 5/16/2010    Perennial allergic rhinitis 11/30/2016    Prostate cancer ([address] 1/8/2013    had radiation treatments    Seizures ([address] [address] long ago    Type 2 diabetes mellitus without complication, without long-term current use of insulin ([facility] [address] 8/31/2016       [address]  Past Surgical History:   Procedure Laterality Date    CHEST WALL RESECTION N/A 1/21/2020    EXCISION CHEST LESION (3 x 2.8cm) AND RIGHT UPPER ABDOMEN LESION (3 x .1), COMPLEX CLOSURE 1.9CM;  ADJACENT TISSUE TRANSFER; performed by Annika Machado MD at [facility], LAPAROSCOPIC  September 13, 2012    Dr. Amy Geronimo  December 1, 2011    Dr. Nella Leslie  June 16, 2011    COLONOSCOPY N/A 10/1/2019    COLONOSCOPY DIAGNOSTIC performed by Ismael Pillai MD at [place] [address], COLON, DIAGNOSTIC     [address] [facility] Left 6/16/2021    LAPAROSCOPIC LEFT INGUINAL HERNIA REPAIR performed by Taylor Case MD at [facility] Left June 23, 2015    Dr. Demetri Almanzar - incision and drainage of left hip infected hematoma     OTHER SURGICAL HISTORY  11/06/2017     ESOPHAGOGASTRODUODENOSCOPY                OTHER SURGICAL HISTORY  11/06/2017     LAPAROSCOPIC HELLER MYOTOMY 1 WRAP, EGD    PROSTATE BIOPSY  November 2012    Dr. Tawanna Silva  May 8, 2001    TURP    TOTAL HIP ARTHROPLASTY Left May 17, 2015    Dr. Tiki Monet  November 7, 2011    UPPER GASTROINTESTINAL ENDOSCOPY  February 4, 2016    Dr. Екатерина Tamayo ENDOSCOPY N/A 04/15/2016    Esophagogastroduodenoscopy with Botulinum toxin injection of the lower esophageal sphincter (LES)    UPPER GASTROINTESTINAL ENDOSCOPY  04/25/2017    dilatation; and botox injection 4 units    UPPER GASTROINTESTINAL ENDOSCOPY N/A 6/14/2019    EGD SUBMUCOSAL/BOTOX INJECTION performed by Ismael Pillai MD at [facility] N/A 6/14/2019    EGD DILATION BALLOON performed by Ismael Pillai MD at [address] History     Socioeconomic History    Marital status: (1.905 m)   Wt 142 lb (64.4 kg)   SpO2 98%   BMI 17.75 kg/m²     CONSTITUTIONAL:  alert, no apparent distress   EYES:  sclera clear  ENT:  normocepalic, without obvious abnormality  NECK:  supple, symmetrical, trachea midline   LUNGS: Nonlabored respirations, symmetrical chest rise  CARDIOVASCULAR:  regular rate and rhythm  ABDOMEN: Soft, nondistended, nontender, left groin hernia noted  MUSCULOSKELETAL: No obvious deformities  NEUROLOGIC:  Mental Status Exam:  Level of Alertness:   awake  Orientation:   person, place, time  SKIN:  no bruising or bleeding    DATA:  Radiology Review:    CT abdomen and pelvis with contrast on 10/15/2019       HISTORY: Weight loss, poor appetite   COMPARISON: 11/20/2012   CONTRAST:  Oral and 80 mellitus Isovue-370 intravenous     TECHNIQUE: Individualized dose optimization technique was used in order to meet ALARA standards for radiation dose reduction. In addition to vendor specific dose reduction algorithms, the dose reduction techniques vary based on the specific scanner    utilized but frequently include automated exposure control, adjustment of the mA and/or kV according to patient size, and use of iterative reconstruction technique.       COMMENTS:        Degenerative changes in the spine. Left hip arthroplasty noted. Tree-in-bud centrilobular nodules are seen in the lower lobes compatible with bronchiolitis. 14 mm lipoma is seen in the anterior chest wall on image 17. There is colonic diverticulosis. No    evidence of bowel obstruction. Normal appendix. No lymphadenopathy. Gallbladder surgically absent. Calcified hepatic and splenic granulomas are incidentally noted. Mild intrahepatic ductal dilatation noted similar to previous. 2.0 cm left inferior renal    cyst incidentally noted. Solid abdominal organs are otherwise unremarkable. Bladder is unremarkable. Prostate radiation seeds are noted.      RETROPERITONEAL ULTRASOUND on 4/2/2021   History : renal failure   Comparison : none       COMMENTS :       Kidney size : Right 9.8 cm, Left 9.0 cm   Renal parenchyma : Normal   Masses/Cysts : 2.2 and 2.0 cm left renal cysts. Hydronephrosis : Mild right and mild-moderate left hydronephrosis   Bladder : Normal       Mild prostatomegaly. ECHO Complete 2D W Doppler W Color  4/7/2014     Summary    Left ventricular function is normal with ejection fraction estimated at 60    %. There is basal septal hypertrophy. Left ventricle size is normal. There    is reversal of E/A inflow velocities across the mitral valve suggesting    impaired left ventricular relaxation. There is mild tricuspid regurgitation    with RVSP estimated at 34 mmHg. IMPRESSION/RECOMMENDATIONS:    Patient is s/p laparoscopic left inguinal hernia repair. He is recovering well from surgery. His pain is minimal and he is returning to normal activities. We discussed his continued restrictions and I will see him back in the office on a prn basis.     Stephenie Montelongo MD

## 2021-07-09 ENCOUNTER — CARE COORDINATION (OUTPATIENT)
Dept: CARE COORDINATION | Age: 86
End: 2021-07-09

## 2021-07-09 ENCOUNTER — HOSPITAL ENCOUNTER (OUTPATIENT)
Dept: ULTRASOUND IMAGING | Age: 86
Discharge: HOME | End: 2021-07-09
Payer: MEDICARE

## 2021-07-09 DIAGNOSIS — N17.9 AKI (ACUTE KIDNEY INJURY) (HCC): ICD-10-CM

## 2021-07-09 PROCEDURE — 76770 US EXAM ABDO BACK WALL COMP: CPT

## 2021-07-09 NOTE — CARE COORDINATION
Unable to contact the pt. No answer and no voice mail . Person listed on [employer]IP[employer] has the same phone number.

## 2021-07-16 ENCOUNTER — CARE COORDINATION (OUTPATIENT)
Dept: CARE COORDINATION | Age: 86
End: 2021-07-16

## 2021-07-16 NOTE — CARE COORDINATION
RNLETA:  The RNLETA spoke with the pt and offered assistance through Care Coordination. The pt stated he is doing well and thanked the nurse for calling to check on him. The pt stated he is active with Home Health. The pt declined Care Coordination at this time but stated he would call if he needs assistance.

## 2021-09-12 ENCOUNTER — APPOINTMENT (OUTPATIENT)
Dept: GENERAL RADIOLOGY | Age: 86
End: 2021-09-12
Payer: MEDICARE

## 2021-09-12 ENCOUNTER — HOSPITAL ENCOUNTER (EMERGENCY)
Age: 86
Discharge: HOME | End: 2021-09-13
Attending: EMERGENCY MEDICINE
Payer: MEDICARE

## 2021-09-12 DIAGNOSIS — R11.2 NON-INTRACTABLE VOMITING WITH NAUSEA, UNSPECIFIED VOMITING TYPE: Primary | ICD-10-CM

## 2021-09-12 PROCEDURE — 93005 ELECTROCARDIOGRAM TRACING: CPT | Performed by: EMERGENCY MEDICINE

## 2021-09-12 PROCEDURE — 71046 X-RAY EXAM CHEST 2 VIEWS: CPT

## 2021-09-12 PROCEDURE — 99285 EMERGENCY DEPT VISIT HI MDM: CPT

## 2021-09-13 VITALS
SYSTOLIC BLOOD PRESSURE: 145 MMHG | HEART RATE: 88 BPM | OXYGEN SATURATION: 100 % | DIASTOLIC BLOOD PRESSURE: 71 MMHG | BODY MASS INDEX: 17.25 KG/M2 | RESPIRATION RATE: 16 BRPM | TEMPERATURE: 98.1 F | WEIGHT: 138 LBS

## 2021-09-13 LAB
ANION GAP SERPL CALCULATED.3IONS-SCNC: 16 MMOL/L (ref 3–16)
BASOPHILS ABSOLUTE: 0 K/UL (ref 0–0.2)
BASOPHILS RELATIVE PERCENT: 0.6 %
BILIRUBIN URINE: NEGATIVE
BLOOD, URINE: NEGATIVE
BUN BLDV-MCNC: 45 MG/DL (ref 7–20)
CALCIUM SERPL-MCNC: 9.3 MG/DL (ref 8.3–10.6)
CHLORIDE BLD-SCNC: 106 MMOL/L (ref 99–110)
CLARITY: CLEAR
CO2: 20 MMOL/L (ref 21–32)
COLOR: YELLOW
CREAT SERPL-MCNC: 2.6 MG/DL (ref 0.8–1.3)
EKG ATRIAL RATE: 79 BPM
EKG DIAGNOSIS: NORMAL
EKG P AXIS: 78 DEGREES
EKG P-R INTERVAL: 214 MS
EKG Q-T INTERVAL: 382 MS
EKG QRS DURATION: 88 MS
EKG QTC CALCULATION (BAZETT): 438 MS
EKG R AXIS: 17 DEGREES
EKG T AXIS: 59 DEGREES
EKG VENTRICULAR RATE: 79 BPM
EOSINOPHILS ABSOLUTE: 0.2 K/UL (ref 0–0.6)
EOSINOPHILS RELATIVE PERCENT: 3.3 %
EPITHELIAL CELLS, UA: NORMAL /HPF (ref 0–5)
GFR AFRICAN AMERICAN: 29
GFR NON-AFRICAN AMERICAN: 24
GLUCOSE BLD-MCNC: 132 MG/DL (ref 70–99)
GLUCOSE BLD-MCNC: 68 MG/DL (ref 70–99)
GLUCOSE URINE: NEGATIVE MG/DL
HCT VFR BLD CALC: 28.5 % (ref 40.5–52.5)
HEMOGLOBIN: 9.7 G/DL (ref 13.5–17.5)
KETONES, URINE: NEGATIVE MG/DL
LEUKOCYTE ESTERASE, URINE: NEGATIVE
LIPASE: 36 U/L (ref 13–60)
LYMPHOCYTES ABSOLUTE: 0.7 K/UL (ref 1–5.1)
LYMPHOCYTES RELATIVE PERCENT: 14.4 %
MCH RBC QN AUTO: 29.4 PG (ref 26–34)
MCHC RBC AUTO-ENTMCNC: 33.9 G/DL (ref 31–36)
MCV RBC AUTO: 86.8 FL (ref 80–100)
MICROSCOPIC EXAMINATION: YES
MONOCYTES ABSOLUTE: 0.6 K/UL (ref 0–1.3)
MONOCYTES RELATIVE PERCENT: 11.3 %
NEUTROPHILS ABSOLUTE: 3.4 K/UL (ref 1.7–7.7)
NEUTROPHILS RELATIVE PERCENT: 70.4 %
NITRITE, URINE: NEGATIVE
PDW BLD-RTO: 12.8 % (ref 12.4–15.4)
PERFORMED ON: ABNORMAL
PH UA: 6 (ref 5–8)
PLATELET # BLD: 231 K/UL (ref 135–450)
PMV BLD AUTO: 8.5 FL (ref 5–10.5)
POTASSIUM SERPL-SCNC: 3.8 MMOL/L (ref 3.5–5.1)
PROTEIN UA: 100 MG/DL
RBC # BLD: 3.28 M/UL (ref 4.2–5.9)
RBC UA: NORMAL /HPF (ref 0–4)
SODIUM BLD-SCNC: 142 MMOL/L (ref 136–145)
SPECIFIC GRAVITY UA: 1.02 (ref 1–1.03)
TROPONIN: <0.01 NG/ML
TROPONIN: <0.01 NG/ML
URINE REFLEX TO CULTURE: ABNORMAL
URINE TYPE: ABNORMAL
UROBILINOGEN, URINE: 0.2 E.U./DL
WBC # BLD: 4.9 K/UL (ref 4–11)
WBC UA: NORMAL /HPF (ref 0–5)

## 2021-09-13 PROCEDURE — 84484 ASSAY OF TROPONIN QUANT: CPT

## 2021-09-13 PROCEDURE — 85025 COMPLETE CBC W/AUTO DIFF WBC: CPT

## 2021-09-13 PROCEDURE — 36415 COLL VENOUS BLD VENIPUNCTURE: CPT

## 2021-09-13 PROCEDURE — 83690 ASSAY OF LIPASE: CPT

## 2021-09-13 PROCEDURE — 81001 URINALYSIS AUTO W/SCOPE: CPT

## 2021-09-13 PROCEDURE — 80048 BASIC METABOLIC PNL TOTAL CA: CPT

## 2021-09-13 ASSESSMENT — ENCOUNTER SYMPTOMS
DIARRHEA: 0
VOICE CHANGE: 0
BACK PAIN: 0
SHORTNESS OF BREATH: 0
VOMITING: 1
ABDOMINAL PAIN: 0
COUGH: 0
NAUSEA: 1

## 2021-09-13 NOTE — ED PROVIDER NOTES
[address] [facility]          ATTENDING PHYSICIAN NOTE       Date of evaluation: 9/12/2021    Chief Complaint     Nausea, Emesis (had several episodes of emesis today. ), Fatigue (daughter told EMS he has been lethargic for past couple of days. ), and Chest Pain (started earlier today, feels like pressure)      History of Present Illness     Kari Livingston is a 82 y.o. male who presents with a complaint of nausea and vomiting. The patient and his wife state that he ate about 3 eggs and started vomiting after this. He did not finish the fourth day but feels better at this point. He denies abdominal pain preceding this. His family did not notice any focal weakness, denies dysuria or hematuria. No history of frequent vomiting, and he has no abdominal pain complaints at this point. Is otherwise in his usual state of health. He denies chest pain at this point, in contrast to triage notes, and denies fatigue. Review of Systems     Review of Systems   Constitutional: Negative for chills, fatigue and fever. HENT: Positive for dental problem. Negative for drooling and voice change. Respiratory: Negative for cough and shortness of breath. Cardiovascular: Negative for palpitations. Gastrointestinal: Positive for nausea and vomiting. Negative for abdominal pain and diarrhea. Musculoskeletal: Negative for back pain. Skin: Negative for pallor and rash. Neurological: Negative for dizziness, weakness and headaches. All other systems reviewed and are negative.       Past Medical, Surgical, Family, and Social History     He has a past medical history of Anemia, Anxiety and depression, Arthritis, BPH (benign prostatic hypertrophy), Constipation, Diverticulosis, Dyspnea on exertion, Eczema, Elevated PSA, Erectile dysfunction, Essential hypertension, GERD (gastroesophageal reflux disease), Hemorrhoid, Hyperlipidemia, Hyperphosphatemia, Hypertension, Insomnia, Memory loss, Nausea, Overactive bladder, Paranoid schizophrenia ([facility] Perennial allergic rhinitis, Prostate cancer ([facility] [address] Seizures ([facility] [address] and Type 2 diabetes mellitus without complication, without long-term current use of insulin ([facility] [address] He has a past surgical history that includes Prostate surgery (May 8, 2001); Colonoscopy (December 1, 2011); Upper gastrointestinal endoscopy (November 7, 2011); Colonoscopy (June 16, 2011); Cholecystectomy, laparoscopic (September 13, 2012); Prostate biopsy (November 2012); Total hip arthroplasty (Left, May 17, 2015); hip surgery (Left, June 23, 2015); Upper gastrointestinal endoscopy (February 4, 2016); Upper gastrointestinal endoscopy (N/A, 04/15/2016); Upper gastrointestinal endoscopy (04/25/2017); other surgical history (11/06/2017); other surgical history (11/06/2017); Upper gastrointestinal endoscopy (N/A, 6/14/2019); Upper gastrointestinal endoscopy (N/A, 6/14/2019); Endoscopy, colon, diagnostic; Colonoscopy (N/A, 10/1/2019); Chest Wall Resection (N/A, 1/21/2020); and hernia repair (Left, 6/16/2021). His family history includes Cancer in his brother and mother. He reports that he quit smoking about 31 years ago. His smoking use included cigarettes. He started smoking about 48 years ago. He has a 18.00 pack-year smoking history. He has never used smokeless tobacco. He reports that he does not drink alcohol and does not use drugs.     Medications     Previous Medications    ACCU-CHEK SOFTCLIX LANCETS MISC    1 each by Does not apply route daily    AMLODIPINE (NORVASC) 5 MG TABLET    Take 1 tablet by mouth daily    ASPIRIN 325 MG EC TABLET    TAKE 1 TABLET BY MOUTH EVERY DAY WITH FOOD    ATORVASTATIN (LIPITOR) 10 MG TABLET    TAKE 1 TABLET BY MOUTH EVERY DAY    BLOOD GLUCOSE TEST STRIPS (ACCU-CHEK ZEENAT PLUS) STRIP    1 each by In Vitro route daily as needed (symptoms of low or high blood sugar)    BREO ELLIPTA 100-25 MCG/INH AEPB INHALER    TAKE 1 PUFF BY MOUTH EVERY DAY    CLOTRIMAZOLE Abd: Soft, nontender, nondistended. No rebound or guarding noted. No tenderness to deep palpation. MSK: Full ROM, no edema or tenderness to palpation. Skin: Extremities are warm and well perfused. 2+ radial pulses . Cap Refill <3 seconds. Neuro: A&Ox3. He is dysarthric and edentulous. He has a mild left facial droop that resolved with smiling. His tongue protrudes midline. He has full visual fields to confrontation and full extraocular movements. He has full strength on straight leg raise bilaterally, and no pronator drift or weakness of his upper extremities. Psych: Thought content, behavior & judgement normal.      Diagnostic Results     EKG   EKG shows sinus rhythm, with a prolonged IA interval with a rate of 79, IA of 214, QRS of 88 and QTc of 438. There are tall R waves in leads V3 and V4, but no ST segment elevations or depressions. RADIOLOGY:  XR CHEST (2 VW)   Final Result     Mildly hyperinflated lungs with coarse interstitial markings in the    lung bases likely fibrotic changes, similar to previous. No new    infiltrates are identified.               LABS:   Results for orders placed or performed during the hospital encounter of 43/66/65   Basic Metabolic Panel   Result Value Ref Range    Sodium 142 136 - 145 mmol/L    Potassium 3.8 3.5 - 5.1 mmol/L    Chloride 106 99 - 110 mmol/L    CO2 20 (L) 21 - 32 mmol/L    Anion Gap 16 3 - 16    Glucose 68 (L) 70 - 99 mg/dL    BUN 45 (H) 7 - 20 mg/dL    CREATININE 2.6 (H) 0.8 - 1.3 mg/dL    GFR Non-African American 24 (A) >60    GFR  29 (A) >60    Calcium 9.3 8.3 - 10.6 mg/dL   Troponin   Result Value Ref Range    Troponin <0.01 <0.01 ng/mL   CBC Auto Differential   Result Value Ref Range    WBC 4.9 4.0 - 11.0 K/uL    RBC 3.28 (L) 4.20 - 5.90 M/uL    Hemoglobin 9.7 (L) 13.5 - 17.5 g/dL    Hematocrit 28.5 (L) 40.5 - 52.5 %    MCV 86.8 80.0 - 100.0 fL    MCH 29.4 26.0 - 34.0 pg    MCHC 33.9 31.0 - 36.0 g/dL    RDW 12.8 12.4 - 15.4 %    Platelets 990 598 - 810 K/uL    MPV 8.5 5.0 - 10.5 fL    Neutrophils % 70.4 %    Lymphocytes % 14.4 %    Monocytes % 11.3 %    Eosinophils % 3.3 %    Basophils % 0.6 %    Neutrophils Absolute 3.4 1.7 - 7.7 K/uL    Lymphocytes Absolute 0.7 (L) 1.0 - 5.1 K/uL    Monocytes Absolute 0.6 0.0 - 1.3 K/uL    Eosinophils Absolute 0.2 0.0 - 0.6 K/uL    Basophils Absolute 0.0 0.0 - 0.2 K/uL   Urinalysis Reflex to Culture    Specimen: Urine, clean catch   Result Value Ref Range    Color, UA Yellow Straw/Yellow    Clarity, UA Clear Clear    Glucose, Ur Negative Negative mg/dL    Bilirubin Urine Negative Negative    Ketones, Urine Negative Negative mg/dL    Specific Gravity, UA 1.020 1.005 - 1.030    Blood, Urine Negative Negative    pH, UA 6.0 5.0 - 8.0    Protein,  (A) Negative mg/dL    Urobilinogen, Urine 0.2 <2.0 E.U./dL    Nitrite, Urine Negative Negative    Leukocyte Esterase, Urine Negative Negative    Microscopic Examination YES     Urine Type Voided     Urine Reflex to Culture Not Indicated    Lipase   Result Value Ref Range    Lipase 36.0 13.0 - 60.0 U/L   Microscopic Urinalysis   Result Value Ref Range    WBC, UA 0-2 0 - 5 /HPF    RBC, UA None seen 0 - 4 /HPF    Epithelial Cells, UA 0-1 0 - 5 /HPF   Troponin   Result Value Ref Range    Troponin <0.01 <0.01 ng/mL   POCT Glucose   Result Value Ref Range    POC Glucose 132 (H) 70 - 99 mg/dl    Performed on ACCU-CHEK        ED BEDSIDE ULTRASOUND:      RECENT VITALS:  BP: (!) 145/71,Temp: 98.1 °F (36.7 °C), Pulse: 88, Resp: 16, SpO2: 100 %     Procedures         ED Course     Nursing Notes, Past Medical Hx, Past Surgical Hx, Social Hx,Allergies, and Family Hx were reviewed. patient was given the following medications:  No orders of the defined types were placed in this encounter.       CONSULTS:  None    MEDICAL DECISIONMAKING / ASSESSMENT / PLAN     Felisha Webb is a 82 y.o. male presenting with a complaint of nausea and vomiting after reportedly eating some bad eggs. He was complained to his family of chest pain but currently has no chest pain here. His EKG is nonischemic. He had 2 troponins are below the lower limit of detection. No active symptoms and an unremarkable EKG, low suspicion for active STEMI NSTEMI, or unstable angina at this point. His lipase was unremarkable, urinalysis showed no evidence of infection, and his BMP was remarkable only for hypoglycemia that was relatively mild and corrected with juice. He has CKD with out electrolyte abnormalities. I discussed the patient's dysarthria wife and she reports that he is exactly like this most days. He had no aphasia was able to repeat complex phrases to me. At this point it appears chronic, with some component likely from his edentulous status and not having his dentures in. His mild droop of the left lower face was also chronic per his wife and did resolve with smiling. He had no other focal neurologic deficits concerning for an acute disabling stroke. The patient has plans to see his PCP on Tuesday, I believe this is appropriate. This time explained to the wife and the family that he appears well at this time, and therefore is appropriate for discharge. Wife is comfortable this plan. Clinical Impression     1. Non-intractable vomiting with nausea, unspecified vomiting type        Disposition     PATIENT REFERRED TO:  Sadaf Elliott MD  [address] [address] [place] [address] No. [facility]  [phone]    Schedule an appointment as soon as possible for a visit       [facility] Emergency Department  [address]  [phone]    If symptoms worsen      DISCHARGE MEDICATIONS:  New Prescriptions    No medications on file       DISPOSITION Decision To Discharge 09/13/2021 03:16:17 AM          Eddie Mccarthy MD  09/13/21 0773

## 2021-09-14 ENCOUNTER — OFFICE VISIT (OUTPATIENT)
Dept: INTERNAL MEDICINE CLINIC | Age: 86
End: 2021-09-14
Payer: MEDICARE

## 2021-09-14 VITALS
SYSTOLIC BLOOD PRESSURE: 110 MMHG | DIASTOLIC BLOOD PRESSURE: 60 MMHG | BODY MASS INDEX: 16.16 KG/M2 | HEIGHT: 75 IN | WEIGHT: 130 LBS

## 2021-09-14 DIAGNOSIS — R35.0 URINARY FREQUENCY: ICD-10-CM

## 2021-09-14 DIAGNOSIS — D64.9 ANEMIA, UNSPECIFIED TYPE: ICD-10-CM

## 2021-09-14 DIAGNOSIS — Z23 NEED FOR INFLUENZA VACCINATION: ICD-10-CM

## 2021-09-14 DIAGNOSIS — I10 ESSENTIAL HYPERTENSION: Chronic | ICD-10-CM

## 2021-09-14 DIAGNOSIS — R63.4 UNINTENTIONAL WEIGHT LOSS: ICD-10-CM

## 2021-09-14 DIAGNOSIS — F20.0 PARANOID SCHIZOPHRENIA (HCC): ICD-10-CM

## 2021-09-14 DIAGNOSIS — E11.22 TYPE 2 DIABETES MELLITUS WITH STAGE 3 CHRONIC KIDNEY DISEASE, WITHOUT LONG-TERM CURRENT USE OF INSULIN, UNSPECIFIED WHETHER STAGE 3A OR 3B CKD (HCC): Primary | ICD-10-CM

## 2021-09-14 DIAGNOSIS — N18.30 TYPE 2 DIABETES MELLITUS WITH STAGE 3 CHRONIC KIDNEY DISEASE, WITHOUT LONG-TERM CURRENT USE OF INSULIN, UNSPECIFIED WHETHER STAGE 3A OR 3B CKD (HCC): Primary | ICD-10-CM

## 2021-09-14 DIAGNOSIS — E55.9 VITAMIN D DEFICIENCY: ICD-10-CM

## 2021-09-14 PROCEDURE — 1036F TOBACCO NON-USER: CPT | Performed by: INTERNAL MEDICINE

## 2021-09-14 PROCEDURE — 4040F PNEUMOC VAC/ADMIN/RCVD: CPT | Performed by: INTERNAL MEDICINE

## 2021-09-14 PROCEDURE — 99214 OFFICE O/P EST MOD 30 MIN: CPT | Performed by: INTERNAL MEDICINE

## 2021-09-14 PROCEDURE — G8418 CALC BMI BLW LOW PARAM F/U: HCPCS | Performed by: INTERNAL MEDICINE

## 2021-09-14 PROCEDURE — 1123F ACP DISCUSS/DSCN MKR DOCD: CPT | Performed by: INTERNAL MEDICINE

## 2021-09-14 PROCEDURE — G8427 DOCREV CUR MEDS BY ELIG CLIN: HCPCS | Performed by: INTERNAL MEDICINE

## 2021-09-14 RX ORDER — DESMOPRESSIN ACETATE 0.2 MG/1
0.2 TABLET ORAL DAILY
Qty: 90 TABLET | Refills: 3 | Status: CANCELLED | OUTPATIENT
Start: 2021-09-14

## 2021-09-14 RX ORDER — AMLODIPINE BESYLATE 5 MG/1
5 TABLET ORAL DAILY
Qty: 90 TABLET | Refills: 3 | Status: SHIPPED | OUTPATIENT
Start: 2021-09-14 | End: 2022-09-15

## 2021-09-14 RX ORDER — FAMOTIDINE 40 MG/1
TABLET, FILM COATED ORAL
Qty: 90 TABLET | Refills: 1 | Status: SHIPPED | OUTPATIENT
Start: 2021-09-14 | End: 2022-03-28

## 2021-09-14 RX ORDER — TAMSULOSIN HYDROCHLORIDE 0.4 MG/1
CAPSULE ORAL
Qty: 90 CAPSULE | Refills: 1 | Status: SHIPPED | OUTPATIENT
Start: 2021-09-14 | End: 2022-04-26

## 2021-09-14 NOTE — PROGRESS NOTES
Kamini Wiseman (:  1935) is a 82 y.o. male,Established patient, here for evaluation of the following chief complaint(s):  New Patient         ASSESSMENT/PLAN:  1. Type 2 diabetes mellitus with stage 3 chronic kidney disease, without long-term current use of insulin (HCC)  -Current GFR is just under 30, will discontinue Metformin  -A1c has been very well controlled of late so at this point would monitor before adding another medication  -     Comprehensive Metabolic Panel; Future  -     Lipid Panel; Future  -     Hemoglobin A1C; Future  2. Urinary frequency  -Stable  -     tamsulosin (FLOMAX) 0.4 MG capsule; TAKE 1 CAPSULE BY MOUTH EVERY DAY, Disp-90 capsule, R-1DX Code Needed  . Normal  3. Unintentional weight loss  -Weight loss is concerning, check labs, add supplement. May need malignancy evaluation  -     TSH with Reflex; Future  4. Essential hypertension  - stable, controlled  -     amLODIPine (NORVASC) 5 MG tablet; Take 1 tablet by mouth daily, Disp-90 tablet, R-3Normal  -     Comprehensive Metabolic Panel; Future  -     Lipid Panel; Future  -     CBC Auto Differential; Future  5. Anemia, unspecified type  - stable moderate anemia, reassess, check iron studies, B12  -     Ferritin; Future  -     Vitamin B12; Future  -     Folate; Future  -     IRON AND TIBC; Future  -     CBC Auto Differential; Future  6. Vitamin D deficiency  -     Vitamin D 25 Hydroxy; Future  7. Need for influenza vaccination  -     INFLUENZA, QUADV, ADJUVANTED, 65 YRS =, IM, PF, PREFILL SYR, 0.5ML (FLUAD)  8. Paranoid schizophrenia ([facility] [address]  - mood stable on mirtazapine, quetiapine    Return in about 2 months (around 2021) for weight loss. Subjective   SUBJECTIVE/OBJECTIVE:  HPI    Diabetes -Sugar has been well controlled. Still taking Metformin for his diabetes. He denies stomach upset or diarrhea. CKD - sees Dr. Miriam Burleson. Overall kidney function has been stable. He takes tamsulosin for urinary frequency.   This has been helpful. He has had unintentional weight loss. Appetite has not been great. They are planning to add a nutritional supplement like Ensure. Denies nausea, diarrhea, night sweats. Blood pressure has been controlled on his current regimen. No chest pain or shortness of breath. Review of Systems       Objective   Physical Exam  Vitals reviewed. Constitutional:       General: He is not in acute distress. Appearance: Normal appearance. He is well-developed. HENT:      Head: Normocephalic and atraumatic. Cardiovascular:      Rate and Rhythm: Normal rate and regular rhythm. Heart sounds: Normal heart sounds. Pulmonary:      Effort: Pulmonary effort is normal. No respiratory distress. Breath sounds: Normal breath sounds. Skin:     General: Skin is warm and dry. Neurological:      Mental Status: He is alert. Psychiatric:         Behavior: Behavior normal.         Thought Content: Thought content normal.         Judgment: Judgment normal.                  An electronic signature was used to authenticate this note.     --Diann Hernandes MD

## 2021-09-21 ENCOUNTER — NURSE TRIAGE (OUTPATIENT)
Dept: OTHER | Facility: CLINIC | Age: 86
End: 2021-09-21

## 2021-09-21 ENCOUNTER — TELEPHONE (OUTPATIENT)
Dept: INTERNAL MEDICINE CLINIC | Age: 86
End: 2021-09-21

## 2021-09-21 DIAGNOSIS — R63.4 UNINTENTIONAL WEIGHT LOSS: ICD-10-CM

## 2021-09-21 RX ORDER — NUT TX, LACT-REDUCED, IRON 0.09G-2.25
8 LIQUID (ML) ORAL DAILY
Qty: 240 ML | Refills: 2 | Status: SHIPPED | OUTPATIENT
Start: 2021-09-21

## 2021-09-21 NOTE — TELEPHONE ENCOUNTER
is causing the leg swelling? \"  Unsure    8. MEDICAL HISTORY: \"Do you have a history of heart failure, kidney disease, liver failure, or cancer? \"   no    9. RECURRENT SYMPTOM: \"Have you had leg swelling before? \" If so, ask: \"When was the last time? \" \"What happened that time? \"  No    10. OTHER SYMPTOMS: \"Do you have any other symptoms? \" (e.g., chest pain, difficulty breathing)    Chest pain, seen in er last week for this,difficulty breathing    11. PREGNANCY: \"Is there any chance you are pregnant? \" \"When was your last menstrual period? \"     male    Protocols used: LEG SWELLING AND EDEMA-ADULT-OH

## 2021-09-21 NOTE — TELEPHONE ENCOUNTER
Pt's son in law called into the office to request a script for Boost Very High Calorie ( for weight gain) that he can get under insurance instead of paying 3 dollars. [id number] - [place] [phone]      Please advise.

## 2021-09-23 ENCOUNTER — OFFICE VISIT (OUTPATIENT)
Dept: INTERNAL MEDICINE CLINIC | Age: 86
End: 2021-09-23
Payer: MEDICARE

## 2021-09-23 VITALS
SYSTOLIC BLOOD PRESSURE: 138 MMHG | TEMPERATURE: 97.8 F | WEIGHT: 141.2 LBS | RESPIRATION RATE: 16 BRPM | HEIGHT: 75 IN | OXYGEN SATURATION: 99 % | DIASTOLIC BLOOD PRESSURE: 70 MMHG | HEART RATE: 81 BPM | BODY MASS INDEX: 17.56 KG/M2

## 2021-09-23 DIAGNOSIS — R60.0 BILATERAL LEG EDEMA: Primary | ICD-10-CM

## 2021-09-23 DIAGNOSIS — N18.32 STAGE 3B CHRONIC KIDNEY DISEASE (HCC): ICD-10-CM

## 2021-09-23 DIAGNOSIS — I10 ESSENTIAL HYPERTENSION: ICD-10-CM

## 2021-09-23 DIAGNOSIS — R91.1 PULMONARY NODULE: ICD-10-CM

## 2021-09-23 DIAGNOSIS — N18.32 TYPE 2 DIABETES MELLITUS WITH STAGE 3B CHRONIC KIDNEY DISEASE, WITHOUT LONG-TERM CURRENT USE OF INSULIN (HCC): ICD-10-CM

## 2021-09-23 DIAGNOSIS — D64.9 ANEMIA, UNSPECIFIED TYPE: ICD-10-CM

## 2021-09-23 DIAGNOSIS — E11.22 TYPE 2 DIABETES MELLITUS WITH STAGE 3B CHRONIC KIDNEY DISEASE, WITHOUT LONG-TERM CURRENT USE OF INSULIN (HCC): ICD-10-CM

## 2021-09-23 PROCEDURE — 4040F PNEUMOC VAC/ADMIN/RCVD: CPT | Performed by: INTERNAL MEDICINE

## 2021-09-23 PROCEDURE — 1036F TOBACCO NON-USER: CPT | Performed by: INTERNAL MEDICINE

## 2021-09-23 PROCEDURE — 99214 OFFICE O/P EST MOD 30 MIN: CPT | Performed by: INTERNAL MEDICINE

## 2021-09-23 PROCEDURE — G8427 DOCREV CUR MEDS BY ELIG CLIN: HCPCS | Performed by: INTERNAL MEDICINE

## 2021-09-23 PROCEDURE — 1123F ACP DISCUSS/DSCN MKR DOCD: CPT | Performed by: INTERNAL MEDICINE

## 2021-09-23 PROCEDURE — 90694 VACC AIIV4 NO PRSRV 0.5ML IM: CPT | Performed by: INTERNAL MEDICINE

## 2021-09-23 PROCEDURE — G0008 ADMIN INFLUENZA VIRUS VAC: HCPCS | Performed by: INTERNAL MEDICINE

## 2021-09-23 PROCEDURE — G8418 CALC BMI BLW LOW PARAM F/U: HCPCS | Performed by: INTERNAL MEDICINE

## 2021-09-23 RX ORDER — POTASSIUM CHLORIDE 1500 MG/1
TABLET, EXTENDED RELEASE ORAL
COMMUNITY
Start: 2021-08-23 | End: 2021-11-16

## 2021-09-23 ASSESSMENT — ENCOUNTER SYMPTOMS
SHORTNESS OF BREATH: 0
CHEST TIGHTNESS: 0
SINUS PRESSURE: 0
BLOOD IN STOOL: 0
TROUBLE SWALLOWING: 0
VOMITING: 0
EYE PAIN: 0
ABDOMINAL PAIN: 0
RHINORRHEA: 0
SORE THROAT: 0
NAUSEA: 0
SINUS PAIN: 0
WHEEZING: 0
EYE DISCHARGE: 0
COUGH: 0

## 2021-09-23 NOTE — PROGRESS NOTES
Value               Date                       EAG                      122.6               09/14/2021            Lab Results       Component                Value               Date                       NA                       142                 09/14/2021                 K                        4.3                 09/14/2021                 CL                       104                 09/14/2021                 CREATININE               2.5 (H)             09/14/2021                 BUN                      44 (H)              09/14/2021                 CO2                      23                  09/14/2021                 LABMICR                  YES                 09/13/2021              Has been taking meds as ordered. He has no side effects from the current diabetes medications. History of pulmonary nodule but no symptoms. Stable with last CT scan 2 years ago. Review of Systems   Constitutional: Negative for appetite change, chills, fever and unexpected weight change. HENT: Negative for congestion, ear discharge, ear pain, nosebleeds, rhinorrhea, sinus pressure, sinus pain, sore throat and trouble swallowing. Eyes: Negative for pain and discharge. Respiratory: Negative for cough, chest tightness, shortness of breath and wheezing. Cardiovascular: Negative for chest pain, palpitations and leg swelling. Gastrointestinal: Negative for abdominal pain, blood in stool, nausea and vomiting. Endocrine: Negative for polydipsia and polyphagia. Genitourinary: Negative for difficulty urinating, enuresis, flank pain and hematuria. Musculoskeletal: Negative for myalgias. Skin: Negative for rash. Neurological: Positive for weakness. Negative for facial asymmetry, light-headedness, numbness and headaches. Psychiatric/Behavioral: Negative for confusion.        Current Outpatient Medications on File Prior to Visit   Medication Sig Dispense Refill    KLOR-CON M20 20 MEQ extended release tablet TAKE 1 TABLET BY MOUTH EVERY DAY      Nutritional Supplements (BOOST VERY HIGH CALORIE) LIQD Take 8 oz by mouth daily 240 mL 2    tamsulosin (FLOMAX) 0.4 MG capsule TAKE 1 CAPSULE BY MOUTH EVERY DAY 90 capsule 1    famotidine (PEPCID) 40 MG tablet TAKE 1 TABLET BY MOUTH EVERY DAY IN THE EVENING 90 tablet 1    amLODIPine (NORVASC) 5 MG tablet Take 1 tablet by mouth daily 90 tablet 3    oxybutynin (DITROPAN XL) 15 MG extended release tablet TAKE 1 TABLET BY MOUTH 2 TIMES DAILY 180 tablet 1    memantine (NAMENDA) 5 MG tablet Take 0.5 tablets by mouth daily 45 tablet 1    mirtazapine (REMERON) 15 MG tablet TAKE 1 TABLET BY MOUTH EVERY DAY AT NIGHT 90 tablet 1    Multiple Vitamin (DAILY-TJ) TABS TAKE 1 TABLET BY MOUTH EVERY DAY 90 tablet 1    polyethylene glycol (GLYCOLAX) 17 g packet Take 17 g by mouth daily as needed for Constipation 527 g 1    gabapentin (NEURONTIN) 100 MG capsule Take 100 mg by mouth 2 times daily.  aspirin 325 MG EC tablet TAKE 1 TABLET BY MOUTH EVERY DAY WITH FOOD 90 tablet 3    Psyllium-Calcium (METAMUCIL PLUS CALCIUM) CAPS Take 1 tablet by mouth 2 times daily Take with 8 oz water.  180 capsule 2    levETIRAcetam (KEPPRA) 500 MG tablet TAKE 1 TABLET BY MOUTH TWICE A  tablet 3    clotrimazole (LOTRIMIN) 1 % cream APPLY TO AFFECTED AREA TWICE A DAY 30 g 2    Accu-Chek Softclix Lancets MISC 1 each by Does not apply route daily 100 each 1    blood glucose test strips (ACCU-CHEK ZEENAT PLUS) strip 1 each by In Vitro route daily as needed (symptoms of low or high blood sugar) 100 strip 2    QUEtiapine (SEROQUEL) 25 MG tablet Take 1 tablet by mouth nightly 30 tablet 3    OXcarbazepine (TRILEPTAL) 300 MG tablet Take 1 tablet by mouth 2 times daily 180 tablet 3    BREO ELLIPTA 100-25 MCG/INH AEPB inhaler TAKE 1 PUFF BY MOUTH EVERY DAY 1 each 11    atorvastatin (LIPITOR) 10 MG tablet TAKE 1 TABLET BY MOUTH EVERY DAY 90 tablet 3    latanoprost (XALATAN) 0.005 % ophthalmic solution Place 1 drop into the left eye nightly       fluticasone (FLONASE) 50 MCG/ACT nasal spray USE 2 SPRAYS BY NASAL ROUTE DAILY AS NEEDED FOR RHINITIS 1 Bottle 1    nitroGLYCERIN (NITROSTAT) 0.4 MG SL tablet Place 1 tablet under the tongue every 5 minutes as needed. 25 tablet 12     No current facility-administered medications on file prior to visit.       Allergies   Allergen Reactions    Lorazepam Other (See Comments)     Pt unable to recall reaction     Past Medical History:   Diagnosis Date    Anemia 1/9/2012    Anxiety and depression     Arthritis     bulging disc    BPH (benign prostatic hypertrophy) 5/16/2010    Constipation 6/16/2015    Diverticulosis 5/16/2010    Dyspnea on exertion 5/16/2010    Eczema 4/12/2011    Elevated PSA 10/30/2012    Erectile dysfunction 5/16/2010    Essential hypertension 8/26/2015    GERD (gastroesophageal reflux disease) 5/16/2010    Hemorrhoid 5/16/2010    Hyperlipidemia 1/10/2011    Hyperphosphatemia 7/3/2010    Hypertension 5/16/2010    Insomnia 6/16/2015    Memory loss 1/14/2015    Nausea 6/16/2015    Overactive bladder 7/12/2011    Paranoid schizophrenia ([address] 5/16/2010    Perennial allergic rhinitis 11/30/2016    Prostate cancer ([address] 1/8/2013    had radiation treatments    Seizures ([address] long ago    Type 2 diabetes mellitus without complication, without long-term current use of insulin ([address] 8/31/2016     Past Surgical History:   Procedure Laterality Date    CHEST WALL RESECTION N/A 1/21/2020    EXCISION CHEST LESION (3 x 2.8cm) AND RIGHT UPPER ABDOMEN LESION (3 x .1), COMPLEX CLOSURE 1.9CM;  ADJACENT TISSUE TRANSFER; performed by Jacob Mancini MD at [facility] [address], LAPAROSCOPIC  September 13, 2012    Dr. Niraj Mcconnell  December 1, 2011    Dr. Robin Iverson  June 16, 2011    COLONOSCOPY N/A 10/1/2019    COLONOSCOPY DIAGNOSTIC performed by Joannie Osler, MD at [address], COLON, DIAGNOSTIC      HERNIA REPAIR Left 2021    LAPAROSCOPIC LEFT INGUINAL HERNIA REPAIR performed by Alejandrina Morrow MD at [facility] Left 2015    Dr. Ingris Xavier - incision and drainage of left hip infected hematoma     OTHER SURGICAL HISTORY  2017     ESOPHAGOGASTRODUODENOSCOPY                OTHER SURGICAL HISTORY  2017     LAPAROSCOPIC HELLER MYOTOMY [address], EGD    PROSTATE BIOPSY  2012    Dr. Juan Alberto Mccloud  May 8, 2001    TURP    TOTAL HIP ARTHROPLASTY Left May 17, 2015    Dr. Haresh Matthews  2011    UPPER GASTROINTESTINAL ENDOSCOPY  2016    Dr. Leann Iverson ENDOSCOPY N/A 04/15/2016    Esophagogastroduodenoscopy with Botulinum toxin injection of the lower esophageal sphincter (LES)    UPPER GASTROINTESTINAL ENDOSCOPY  2017    dilatation; and botox injection 4 units    UPPER GASTROINTESTINAL ENDOSCOPY N/A 2019    EGD SUBMUCOSAL/BOTOX INJECTION performed by Crow Camacho MD at [address] N/A 2019    EGD DILATION BALLOON performed by Crow Camacho MD at [address] [facility] [address] History     Tobacco Use    Smoking status: Former Smoker     Packs/day: 1.00     Years: 18.00     Pack years: 18.00     Types: Cigarettes     Start date: 10/22/1972     Quit date: 1990     Years since quittin.4    Smokeless tobacco: Never Used    Tobacco comment: quit smoking in     Substance Use Topics    Alcohol use: No      Family History   Problem Relation Age of Onset    Cancer Mother         colon cancer    Cancer Brother         colon cancer, stomach cancer        Vitals:    21 1627   BP: 138/70   Site: Left Upper Arm   Position: Sitting   Cuff Size: Medium Adult   Pulse: 81   Resp: 16   Temp: 97.8 °F (36.6 °C)   TempSrc: Temporal   SpO2: 99%   Weight: 141 lb 3.2 oz (64 kg)   Height: 6' 3\" (1.905 m)     Estimated body mass index is 17.65 kg/m² as calculated from the following:    Height as of this encounter: 6' 3\" (1.905 m). Weight as of this encounter: 141 lb 3.2 oz (64 kg). Physical Exam  Vitals and nursing note reviewed. Constitutional:       General: He is not in acute distress. HENT:      Head: Normocephalic and atraumatic. Right Ear: External ear normal.      Left Ear: External ear normal.      Nose: Nose normal.   Eyes:      General: Lids are normal.      Conjunctiva/sclera: Conjunctivae normal.      Pupils: Pupils are equal, round, and reactive to light. Neck:      Thyroid: No thyromegaly. Vascular: No JVD. Trachea: No tracheal deviation. Cardiovascular:      Rate and Rhythm: Normal rate and regular rhythm. Heart sounds: Normal heart sounds. No gallop. Pulmonary:      Effort: Pulmonary effort is normal. No respiratory distress. Breath sounds: Normal breath sounds. No wheezing or rales. Abdominal:      General: Bowel sounds are normal.      Palpations: Abdomen is soft. There is no mass. Tenderness: There is no abdominal tenderness. Musculoskeletal:         General: No tenderness. Cervical back: Neck supple. Comments: No  calf tenderness    Leg--feet --minimal swelling   Lymphadenopathy:      Cervical: No cervical adenopathy. Skin:     General: Skin is warm and dry. Findings: No rash. Neurological:      Mental Status: He is alert and oriented to person, place, and time. Cranial Nerves: No cranial nerve deficit. Sensory: No sensory deficit. Motor: Weakness present. Gait: Gait abnormal (  in wheelchair). Psychiatric:         Mood and Affect: Mood normal.         Behavior: Behavior normal.         Thought Content: Thought content normal.         Judgment: Judgment normal.         ASSESSMENT/PLAN:  1.  Bilateral leg edema    Low sodium diet  2. Stage 3b chronic kidney disease ([address]  Keep kidney specialist f/u    3. Anemia, unspecified type    - POCT Fecal Immunochemical Test (FIT); Future    4. Essential hypertension  Low sodium diet    5. Type 2 diabetes mellitus with stage 3b chronic kidney disease, without long-term current use of insulin (HCC)  Diabetic diet    6. Pulmonary nodule    - CT CHEST WO CONTRAST; Future      Return in about 2 weeks (around 10/7/2021) for ct chest--f/u w Dr Violet Saunders. Patient Instructions   Schedule CT of the chest without contrast.    Low-sodium diet and may use Mrs. Dash as salt substitute. Check stool test for blood. Stop Metformin and check sugars and if needed more strips call us tomorrow with what kind of glucometer. Keep kidney specialist follow-up for the kidney damage. He needs to do more marching exercise and leg strengthening and pelvic muscle strengthening exercises standing next to the counter. He needs to do those exercises 3 times a day after each meal.      Extensive counseling done to keep low sodium diet and  Avoid potato chips, pretzels, sauerkraut , ham , sausage, balderas , salty crackers , salty french fries, salty nuts, salty popcorn etc.  Use only low sodium soups. No salted canned vegetables. Use fresh or frozen vegetables. No salt shaker use. May use Mrs. Mars as a salt substitute. Avoid weight gain. Regular exercise program.  Keep taking blood pressure medications regularly. If blood pressure staying above 130/85 or having side effects with blood pressure medications, then bring the blood pressure and pulse recorded twice a day to an appointment sooner than scheduled one. Limit sweets or bread or potato pasta or rice. If sugar increasing then we may need different medication than Metformin. Sugar-free boost.    Call with what medication he is taking regularly. Discussed use, benefit, and side effects of prescribed medications.   Barriers to compliance discussed. All patient questions answered. Pt voiced understanding. IF YOU NEED A PRESCRIPTION REFILL, THEN PLEASE GIVE US THREE WORKING DAYS TO REFILL A PRESCRIPTION. Office Hours to Answer Questions--Tuesday thru Friday --9.00 AM to 4.00 PM    Please get all OVER DUE VACCINATIONS done at the pharmacy or health department as soon as possible. Electronically signed by Griselda Zendejas MD on 9/23/2021 at 5:23 PM     This dictation was generated by voice recognition computer software. Although all attempts are made to edit the dictation for accuracy, there may be errors in the transcription that are not intended.

## 2021-09-23 NOTE — PATIENT INSTRUCTIONS
Schedule CT of the chest without contrast.    Low-sodium diet and may use Mrs. Dash as salt substitute. Check stool test for blood. Stop Metformin and check sugars and if needed more strips call us tomorrow with what kind of glucometer. Keep kidney specialist follow-up for the kidney damage. He needs to do more marching exercise and leg strengthening and pelvic muscle strengthening exercises standing next to the counter. He needs to do those exercises 3 times a day after each meal.      Extensive counseling done to keep low sodium diet and  Avoid potato chips, pretzels, sauerkraut , ham , sausage, balderas , salty crackers , salty french fries, salty nuts, salty popcorn etc.  Use only low sodium soups. No salted canned vegetables. Use fresh or frozen vegetables. No salt shaker use. May use Mrs. Mars as a salt substitute. Avoid weight gain. Regular exercise program.  Keep taking blood pressure medications regularly. If blood pressure staying above 130/85 or having side effects with blood pressure medications, then bring the blood pressure and pulse recorded twice a day to an appointment sooner than scheduled one. Limit sweets or bread or potato pasta or rice. If sugar increasing then we may need different medication than Metformin. Sugar-free boost.    Call with what medication he is taking regularly. Discussed use, benefit, and side effects of prescribed medications. Barriers to compliance discussed. All patient questions answered. Pt voiced understanding. IF YOU NEED A PRESCRIPTION REFILL, THEN PLEASE GIVE US THREE WORKING DAYS TO REFILL A PRESCRIPTION. Office Hours to Answer Questions--Tuesday thru Friday --9.00 AM to 4.00 PM    Please get all OVER DUE VACCINATIONS done at the pharmacy or health department as soon as possible.

## 2021-09-30 ENCOUNTER — HOSPITAL ENCOUNTER (OUTPATIENT)
Dept: CT IMAGING | Age: 86
Discharge: HOME | End: 2021-09-30
Payer: MEDICARE

## 2021-09-30 DIAGNOSIS — R91.1 PULMONARY NODULE: ICD-10-CM

## 2021-09-30 PROCEDURE — 71250 CT THORAX DX C-: CPT

## 2021-10-11 NOTE — TELEPHONE ENCOUNTER
Carlota Carty called into the office to inquire as to why metformin was stopped given that he patient has been taking it for 10 + years and there has not been a substitute for the medication. Please advise.

## 2021-10-11 NOTE — TELEPHONE ENCOUNTER
His kidney function worsened to the point that metformin is contraindicated, and his sugar is so well-controlled that we should monitor before starting an additional medication

## 2021-11-16 DIAGNOSIS — R63.0 LOSS OF APPETITE: ICD-10-CM

## 2021-11-16 DIAGNOSIS — N32.81 OVERACTIVE BLADDER: Chronic | ICD-10-CM

## 2021-11-16 DIAGNOSIS — R63.4 LOSS OF WEIGHT: ICD-10-CM

## 2021-11-16 DIAGNOSIS — E11.9 TYPE 2 DIABETES MELLITUS WITHOUT COMPLICATION, WITHOUT LONG-TERM CURRENT USE OF INSULIN (HCC): Chronic | ICD-10-CM

## 2021-11-16 RX ORDER — MIRTAZAPINE 15 MG/1
TABLET, FILM COATED ORAL
Qty: 90 TABLET | Refills: 1 | Status: SHIPPED | OUTPATIENT
Start: 2021-11-16 | End: 2022-05-06

## 2021-11-16 RX ORDER — POTASSIUM CHLORIDE 1500 MG/1
TABLET, EXTENDED RELEASE ORAL
Qty: 90 TABLET | Refills: 3 | Status: SHIPPED | OUTPATIENT
Start: 2021-11-16 | End: 2021-12-02 | Stop reason: ALTCHOICE

## 2021-11-16 RX ORDER — OXYBUTYNIN CHLORIDE 15 MG/1
15 TABLET, EXTENDED RELEASE ORAL 2 TIMES DAILY
Qty: 180 TABLET | Refills: 1 | Status: SHIPPED | OUTPATIENT
Start: 2021-11-16 | End: 2022-03-04

## 2021-11-16 RX ORDER — MEMANTINE HYDROCHLORIDE 5 MG/1
TABLET ORAL
Qty: 45 TABLET | Refills: 1 | Status: SHIPPED | OUTPATIENT
Start: 2021-11-16 | End: 2022-04-26

## 2021-11-29 DIAGNOSIS — G40.209 PARTIAL SYMPTOMATIC EPILEPSY WITH COMPLEX PARTIAL SEIZURES, NOT INTRACTABLE, WITHOUT STATUS EPILEPTICUS (HCC): ICD-10-CM

## 2021-11-29 RX ORDER — OXCARBAZEPINE 300 MG/1
TABLET, FILM COATED ORAL
Qty: 180 TABLET | Refills: 3 | Status: SHIPPED | OUTPATIENT
Start: 2021-11-29 | End: 2022-01-13

## 2021-11-30 NOTE — TELEPHONE ENCOUNTER
Pat Sanchez states that he has not taken Oxcarbazepine in over a year and the pt does not feel comfortable with taking it. Also, what is he taking  med for?

## 2021-11-30 NOTE — TELEPHONE ENCOUNTER
Medication is for seizure disorder  He had indicated to me that he was still taking it at an appointment earlier in the year, but if he has really been off of the medication he should not start it. He needs to bring all of his current medications to future appointments to avoid confusion.

## 2021-11-30 NOTE — TELEPHONE ENCOUNTER
Marcio Sellers from  called in stating that the patient was prescribed OXcarbazepine (TRILEPTAL) 300 MG tablet by Dr. Ange Barahona but is also already taking levETIRAcetam (KEPPRA) tablet 500 mg that was also prescribed by Dr. Arabella Carter. Needs clarification on which medication the patient should be taking. Marcio Sellers phone number [phone]      Call patient with clarification      Pls call and advise.

## 2022-01-06 ENCOUNTER — TELEPHONE (OUTPATIENT)
Dept: INTERNAL MEDICINE CLINIC | Age: 87
End: 2022-01-06

## 2022-01-06 NOTE — TELEPHONE ENCOUNTER
[address] [facility] would like a list of  Shosahna Belkis current medications. Steph Allred is a new patient of theirs.       Fax to:  [phone]

## 2022-01-13 ENCOUNTER — TELEPHONE (OUTPATIENT)
Dept: INTERNAL MEDICINE CLINIC | Age: 87
End: 2022-01-13

## 2022-01-13 DIAGNOSIS — F41.9 ANXIETY: ICD-10-CM

## 2022-01-13 RX ORDER — QUETIAPINE FUMARATE 25 MG/1
25 TABLET, FILM COATED ORAL NIGHTLY
Qty: 90 TABLET | Refills: 1 | Status: SHIPPED | OUTPATIENT
Start: 2022-01-13 | End: 2022-03-04

## 2022-01-13 RX ORDER — ATORVASTATIN CALCIUM 10 MG/1
TABLET, FILM COATED ORAL
Qty: 90 TABLET | Refills: 3 | Status: SHIPPED | OUTPATIENT
Start: 2022-01-13 | End: 2022-09-22 | Stop reason: SDUPTHER

## 2022-01-13 NOTE — TELEPHONE ENCOUNTER
Patients home nurse called in and said kidney doctor took him off of thes meds    metFORMIN (GLUCOPHAGE) 500 MG tablet     OXcarbazepine (TRILEPTAL) 300 MG tablet           Also patients needs refill of these 2      QUEtiapine (SEROQUEL) tablet 25 mg       atorvastatin (LIPITOR) tablet 10 mg             [facility]/pharmacy #[id number]- [place], [place] - [address] - P [phone] - F [phone]   [address] [place], [facility] [address]   Phone:  [phone]  Fax:  [phone]      Please advise and call

## 2022-01-18 ENCOUNTER — CARE COORDINATION (OUTPATIENT)
Dept: CARE COORDINATION | Age: 87
End: 2022-01-18

## 2022-01-25 ENCOUNTER — CARE COORDINATION (OUTPATIENT)
Dept: CARE COORDINATION | Age: 87
End: 2022-01-25

## 2022-01-25 NOTE — CARE COORDINATION
The RNLETA introduced herself to the pt and began explaining Care Coordination and the pt hung up. The RN, ACM will sign off.

## 2022-01-27 ENCOUNTER — TELEPHONE (OUTPATIENT)
Dept: INTERNAL MEDICINE CLINIC | Age: 87
End: 2022-01-27

## 2022-01-27 NOTE — TELEPHONE ENCOUNTER
Ivon Viera from with update on patient's BP:     1/13/22- 170/80    1/20/22 -152-92    Today 162/90    All readings are after medication has been taken. Pt has an appointment tomorrow.

## 2022-02-24 DIAGNOSIS — R56.9 SEIZURE (HCC): ICD-10-CM

## 2022-02-24 RX ORDER — MULTIVITAMIN WITH FOLIC ACID 400 MCG
TABLET ORAL
Qty: 90 TABLET | Refills: 1 | Status: SHIPPED | OUTPATIENT
Start: 2022-02-24 | End: 2022-08-01

## 2022-02-24 RX ORDER — LEVETIRACETAM 500 MG/1
TABLET ORAL
Qty: 180 TABLET | Refills: 3 | Status: SHIPPED | OUTPATIENT
Start: 2022-02-24

## 2022-03-04 ENCOUNTER — OFFICE VISIT (OUTPATIENT)
Dept: INTERNAL MEDICINE CLINIC | Age: 87
End: 2022-03-04
Payer: MEDICARE

## 2022-03-04 VITALS
BODY MASS INDEX: 17.28 KG/M2 | HEIGHT: 75 IN | SYSTOLIC BLOOD PRESSURE: 130 MMHG | DIASTOLIC BLOOD PRESSURE: 70 MMHG | WEIGHT: 139 LBS

## 2022-03-04 DIAGNOSIS — F20.0 PARANOID SCHIZOPHRENIA (HCC): ICD-10-CM

## 2022-03-04 DIAGNOSIS — R41.3 MEMORY LOSS: Chronic | ICD-10-CM

## 2022-03-04 DIAGNOSIS — E46 MALNUTRITION, UNSPECIFIED TYPE (HCC): ICD-10-CM

## 2022-03-04 DIAGNOSIS — I10 PRIMARY HYPERTENSION: Primary | ICD-10-CM

## 2022-03-04 DIAGNOSIS — F01.518 VASCULAR DEMENTIA WITH BEHAVIOR DISTURBANCE: ICD-10-CM

## 2022-03-04 DIAGNOSIS — I73.9 PERIPHERAL VASCULAR DISEASE (HCC): ICD-10-CM

## 2022-03-04 DIAGNOSIS — C61 MALIGNANT NEOPLASM OF PROSTATE (HCC): ICD-10-CM

## 2022-03-04 DIAGNOSIS — J45.909 MILD ASTHMA WITHOUT COMPLICATION, UNSPECIFIED WHETHER PERSISTENT: ICD-10-CM

## 2022-03-04 DIAGNOSIS — G40.209 PARTIAL SYMPTOMATIC EPILEPSY WITH COMPLEX PARTIAL SEIZURES, NOT INTRACTABLE, WITHOUT STATUS EPILEPTICUS (HCC): ICD-10-CM

## 2022-03-04 DIAGNOSIS — E11.22 TYPE 2 DIABETES MELLITUS WITH STAGE 3 CHRONIC KIDNEY DISEASE, WITHOUT LONG-TERM CURRENT USE OF INSULIN, UNSPECIFIED WHETHER STAGE 3A OR 3B CKD (HCC): ICD-10-CM

## 2022-03-04 DIAGNOSIS — N18.32 CHRONIC KIDNEY DISEASE, STAGE 3B (HCC): ICD-10-CM

## 2022-03-04 DIAGNOSIS — N18.30 TYPE 2 DIABETES MELLITUS WITH STAGE 3 CHRONIC KIDNEY DISEASE, WITHOUT LONG-TERM CURRENT USE OF INSULIN, UNSPECIFIED WHETHER STAGE 3A OR 3B CKD (HCC): ICD-10-CM

## 2022-03-04 PROBLEM — N17.9 ACUTE KIDNEY INJURY (HCC): Status: RESOLVED | Noted: 2021-04-16 | Resolved: 2022-03-04

## 2022-03-04 PROCEDURE — 1036F TOBACCO NON-USER: CPT | Performed by: INTERNAL MEDICINE

## 2022-03-04 PROCEDURE — G8427 DOCREV CUR MEDS BY ELIG CLIN: HCPCS | Performed by: INTERNAL MEDICINE

## 2022-03-04 PROCEDURE — 4040F PNEUMOC VAC/ADMIN/RCVD: CPT | Performed by: INTERNAL MEDICINE

## 2022-03-04 PROCEDURE — 99214 OFFICE O/P EST MOD 30 MIN: CPT | Performed by: INTERNAL MEDICINE

## 2022-03-04 PROCEDURE — G8484 FLU IMMUNIZE NO ADMIN: HCPCS | Performed by: INTERNAL MEDICINE

## 2022-03-04 PROCEDURE — G8420 CALC BMI NORM PARAMETERS: HCPCS | Performed by: INTERNAL MEDICINE

## 2022-03-04 PROCEDURE — 1123F ACP DISCUSS/DSCN MKR DOCD: CPT | Performed by: INTERNAL MEDICINE

## 2022-03-04 RX ORDER — DESMOPRESSIN ACETATE 0.2 MG/1
0.2 TABLET ORAL DAILY
COMMUNITY

## 2022-03-04 RX ORDER — FLUTICASONE FUROATE AND VILANTEROL TRIFENATATE 100; 25 UG/1; UG/1
POWDER RESPIRATORY (INHALATION)
Qty: 1 EACH | Refills: 11 | Status: SHIPPED | OUTPATIENT
Start: 2022-03-04

## 2022-03-04 ASSESSMENT — PATIENT HEALTH QUESTIONNAIRE - PHQ9
6. FEELING BAD ABOUT YOURSELF - OR THAT YOU ARE A FAILURE OR HAVE LET YOURSELF OR YOUR FAMILY DOWN: 0
7. TROUBLE CONCENTRATING ON THINGS, SUCH AS READING THE NEWSPAPER OR WATCHING TELEVISION: 0
9. THOUGHTS THAT YOU WOULD BE BETTER OFF DEAD, OR OF HURTING YOURSELF: 0
3. TROUBLE FALLING OR STAYING ASLEEP: 0
10. IF YOU CHECKED OFF ANY PROBLEMS, HOW DIFFICULT HAVE THESE PROBLEMS MADE IT FOR YOU TO DO YOUR WORK, TAKE CARE OF THINGS AT HOME, OR GET ALONG WITH OTHER PEOPLE: 0
SUM OF ALL RESPONSES TO PHQ QUESTIONS 1-9: 0
8. MOVING OR SPEAKING SO SLOWLY THAT OTHER PEOPLE COULD HAVE NOTICED. OR THE OPPOSITE, BEING SO FIGETY OR RESTLESS THAT YOU HAVE BEEN MOVING AROUND A LOT MORE THAN USUAL: 0
4. FEELING TIRED OR HAVING LITTLE ENERGY: 0
SUM OF ALL RESPONSES TO PHQ9 QUESTIONS 1 & 2: 0
SUM OF ALL RESPONSES TO PHQ QUESTIONS 1-9: 0
1. LITTLE INTEREST OR PLEASURE IN DOING THINGS: 0
5. POOR APPETITE OR OVEREATING: 0
2. FEELING DOWN, DEPRESSED OR HOPELESS: 0
SUM OF ALL RESPONSES TO PHQ QUESTIONS 1-9: 0
SUM OF ALL RESPONSES TO PHQ QUESTIONS 1-9: 0

## 2022-03-04 NOTE — PROGRESS NOTES
Kassidy Worley (:  1935) is a 82 y.o. male,Established patient, here for evaluation of the following chief complaint(s):  Hypertension         ASSESSMENT/PLAN:  1. Primary hypertension   - controlled, continue amlodipine, losartan  2. Malnutrition, unspecified type ([facility]   - improved, continue supplements  3. Paranoid schizophrenia ([facility]   - stable, continue current medications  4. Vascular dementia with behavior disturbance (HCC)   - stable, continue   5. Type 2 diabetes mellitus with stage 3 chronic kidney disease, without long-term current use of insulin, unspecified whether stage 3a or 3b CKD ([facility]  -     Comprehensive Metabolic Panel; Future  -     Lipid Panel; Future  -     CBC with Auto Differential; Future  -     Hemoglobin A1C; Future  6. Peripheral vascular disease ([facility]   - continue ASA  7. Partial symptomatic epilepsy with complex partial seizures, not intractable, without status epilepticus (HCC)   - stable, continue keppra  8. Malignant neoplasm of prostate [facility])   - sees urologist  9. Chronic kidney disease, stage 3b ([facility]   - seeing nephrologist, avoid nephrotoxins  10. Mild asthma without complication, unspecified whether persistent   - previously was on breo, resent to the pharmacy. If too expensive may just monitor  11. Memory loss   - continue namenda    Return in about 3 months (around 2022) for HTN, CKD. Subjective   SUBJECTIVE/OBJECTIVE:  HPI    Hypertension - taking amlodipine, blood pressure has been controlled. No longer taking any diabetes medication and not checking sugars, this has overall been well-controlled. Mood has been good, he is taking mirtazapine at night. Memory is overall stable, taking memantine 2.5mg daily    Coughing up phlegm daily, no shortness of breath. He used to be on an inhaler but hasn't been on one recently. No seizures, still taking keppra. Taking flomax for urination, he is not taking oxybutynin anymore.     Has been drinking the supplements which has helped his weight. He sees Dr. Sg Rey for follow up for the kidneys, he has no     Review of Systems       Objective   Physical Exam  Vitals reviewed. Constitutional:       General: He is not in acute distress. Appearance: Normal appearance. He is well-developed. HENT:      Head: Normocephalic and atraumatic. Cardiovascular:      Rate and Rhythm: Normal rate and regular rhythm. Heart sounds: Normal heart sounds. Pulmonary:      Effort: Pulmonary effort is normal. No respiratory distress. Breath sounds: Normal breath sounds. Skin:     General: Skin is warm and dry. Neurological:      Mental Status: He is alert. Mental status is at baseline. Psychiatric:         Mood and Affect: Mood normal.         Behavior: Behavior normal.         Thought Content: Thought content normal.         Cognition and Memory: Memory is impaired. Judgment: Judgment normal.                  An electronic signature was used to authenticate this note.     --Arik Osorio MD

## 2022-03-07 DIAGNOSIS — N17.9 AKI (ACUTE KIDNEY INJURY) (HCC): ICD-10-CM

## 2022-03-07 DIAGNOSIS — N18.30 TYPE 2 DIABETES MELLITUS WITH STAGE 3 CHRONIC KIDNEY DISEASE, WITHOUT LONG-TERM CURRENT USE OF INSULIN, UNSPECIFIED WHETHER STAGE 3A OR 3B CKD (HCC): ICD-10-CM

## 2022-03-07 DIAGNOSIS — E11.22 TYPE 2 DIABETES MELLITUS WITH STAGE 3 CHRONIC KIDNEY DISEASE, WITHOUT LONG-TERM CURRENT USE OF INSULIN, UNSPECIFIED WHETHER STAGE 3A OR 3B CKD (HCC): ICD-10-CM

## 2022-03-08 LAB
A/G RATIO: 1.5 (ref 1.1–2.2)
ALBUMIN SERPL-MCNC: 4.2 G/DL (ref 3.4–5)
ALP BLD-CCNC: 112 U/L (ref 40–129)
ALT SERPL-CCNC: 11 U/L (ref 10–40)
ANION GAP SERPL CALCULATED.3IONS-SCNC: 18 MMOL/L (ref 3–16)
AST SERPL-CCNC: 19 U/L (ref 15–37)
BASOPHILS ABSOLUTE: 0 K/UL (ref 0–0.2)
BASOPHILS RELATIVE PERCENT: 0.7 %
BILIRUB SERPL-MCNC: <0.2 MG/DL (ref 0–1)
BUN BLDV-MCNC: 52 MG/DL (ref 7–20)
CALCIUM SERPL-MCNC: 9.2 MG/DL (ref 8.3–10.6)
CHLORIDE BLD-SCNC: 104 MMOL/L (ref 99–110)
CHOLESTEROL, TOTAL: 179 MG/DL (ref 0–199)
CO2: 22 MMOL/L (ref 21–32)
CREAT SERPL-MCNC: 3.5 MG/DL (ref 0.8–1.3)
CREATININE URINE: 68.1 MG/DL (ref 39–259)
EOSINOPHILS ABSOLUTE: 0.1 K/UL (ref 0–0.6)
EOSINOPHILS RELATIVE PERCENT: 2.4 %
ESTIMATED AVERAGE GLUCOSE: 137 MG/DL
GFR AFRICAN AMERICAN: 20
GFR NON-AFRICAN AMERICAN: 17
GLUCOSE BLD-MCNC: 104 MG/DL (ref 70–99)
HBA1C MFR BLD: 6.4 %
HCT VFR BLD CALC: 28.2 % (ref 40.5–52.5)
HDLC SERPL-MCNC: 78 MG/DL (ref 40–60)
HEMOGLOBIN: 9.2 G/DL (ref 13.5–17.5)
LDL CHOLESTEROL CALCULATED: 91 MG/DL
LYMPHOCYTES ABSOLUTE: 0.7 K/UL (ref 1–5.1)
LYMPHOCYTES RELATIVE PERCENT: 15.4 %
MCH RBC QN AUTO: 27.6 PG (ref 26–34)
MCHC RBC AUTO-ENTMCNC: 32.6 G/DL (ref 31–36)
MCV RBC AUTO: 84.4 FL (ref 80–100)
MICROALBUMIN UR-MCNC: 103 MG/DL
MICROALBUMIN/CREAT UR-RTO: 1512.5 MG/G (ref 0–30)
MONOCYTES ABSOLUTE: 0.5 K/UL (ref 0–1.3)
MONOCYTES RELATIVE PERCENT: 10.4 %
NEUTROPHILS ABSOLUTE: 3.2 K/UL (ref 1.7–7.7)
NEUTROPHILS RELATIVE PERCENT: 71.1 %
PDW BLD-RTO: 12.6 % (ref 12.4–15.4)
PHOSPHORUS: 4.4 MG/DL (ref 2.5–4.9)
PLATELET # BLD: 207 K/UL (ref 135–450)
PMV BLD AUTO: 9 FL (ref 5–10.5)
POTASSIUM SERPL-SCNC: 3.9 MMOL/L (ref 3.5–5.1)
RBC # BLD: 3.34 M/UL (ref 4.2–5.9)
SODIUM BLD-SCNC: 144 MMOL/L (ref 136–145)
TOTAL PROTEIN: 7 G/DL (ref 6.4–8.2)
TRIGL SERPL-MCNC: 51 MG/DL (ref 0–150)
VLDLC SERPL CALC-MCNC: 10 MG/DL
WBC # BLD: 4.6 K/UL (ref 4–11)

## 2022-03-28 RX ORDER — FAMOTIDINE 40 MG/1
TABLET, FILM COATED ORAL
Qty: 90 TABLET | Refills: 1 | Status: SHIPPED | OUTPATIENT
Start: 2022-03-28 | End: 2022-09-15

## 2022-04-26 DIAGNOSIS — R35.0 URINARY FREQUENCY: ICD-10-CM

## 2022-04-26 RX ORDER — MEMANTINE HYDROCHLORIDE 5 MG/1
TABLET ORAL
Qty: 45 TABLET | Refills: 1 | Status: SHIPPED | OUTPATIENT
Start: 2022-04-26 | End: 2022-10-27

## 2022-04-26 RX ORDER — TAMSULOSIN HYDROCHLORIDE 0.4 MG/1
CAPSULE ORAL
Qty: 90 CAPSULE | Refills: 1 | Status: SHIPPED | OUTPATIENT
Start: 2022-04-26

## 2022-05-06 DIAGNOSIS — R63.4 LOSS OF WEIGHT: ICD-10-CM

## 2022-05-06 DIAGNOSIS — R63.0 LOSS OF APPETITE: ICD-10-CM

## 2022-05-06 RX ORDER — MIRTAZAPINE 15 MG/1
TABLET, FILM COATED ORAL
Qty: 90 TABLET | Refills: 1 | Status: SHIPPED | OUTPATIENT
Start: 2022-05-06

## 2022-06-07 ENCOUNTER — OFFICE VISIT (OUTPATIENT)
Dept: INTERNAL MEDICINE CLINIC | Age: 87
End: 2022-06-07
Payer: MEDICARE

## 2022-06-07 VITALS
DIASTOLIC BLOOD PRESSURE: 72 MMHG | BODY MASS INDEX: 17.28 KG/M2 | WEIGHT: 139 LBS | HEIGHT: 75 IN | SYSTOLIC BLOOD PRESSURE: 150 MMHG

## 2022-06-07 DIAGNOSIS — E78.5 HYPERLIPIDEMIA, UNSPECIFIED HYPERLIPIDEMIA TYPE: Chronic | ICD-10-CM

## 2022-06-07 DIAGNOSIS — N18.4 STAGE 4 CHRONIC KIDNEY DISEASE (HCC): ICD-10-CM

## 2022-06-07 DIAGNOSIS — Z00.00 MEDICARE ANNUAL WELLNESS VISIT, SUBSEQUENT: Primary | ICD-10-CM

## 2022-06-07 DIAGNOSIS — N18.4 TYPE 2 DIABETES MELLITUS WITH STAGE 4 CHRONIC KIDNEY DISEASE, WITHOUT LONG-TERM CURRENT USE OF INSULIN (HCC): ICD-10-CM

## 2022-06-07 DIAGNOSIS — R53.83 FATIGUE, UNSPECIFIED TYPE: ICD-10-CM

## 2022-06-07 DIAGNOSIS — K22.0 ACHALASIA: ICD-10-CM

## 2022-06-07 DIAGNOSIS — K21.9 GASTROESOPHAGEAL REFLUX DISEASE WITHOUT ESOPHAGITIS: Chronic | ICD-10-CM

## 2022-06-07 DIAGNOSIS — E11.22 TYPE 2 DIABETES MELLITUS WITH STAGE 4 CHRONIC KIDNEY DISEASE, WITHOUT LONG-TERM CURRENT USE OF INSULIN (HCC): ICD-10-CM

## 2022-06-07 DIAGNOSIS — N18.32 STAGE 3B CHRONIC KIDNEY DISEASE (HCC): ICD-10-CM

## 2022-06-07 DIAGNOSIS — I10 PRIMARY HYPERTENSION: ICD-10-CM

## 2022-06-07 DIAGNOSIS — F01.518 VASCULAR DEMENTIA WITH BEHAVIOR DISTURBANCE: ICD-10-CM

## 2022-06-07 LAB
ALBUMIN SERPL-MCNC: 4.5 G/DL (ref 3.4–5)
ANION GAP SERPL CALCULATED.3IONS-SCNC: 15 MMOL/L (ref 3–16)
BASOPHILS ABSOLUTE: 0 K/UL (ref 0–0.2)
BASOPHILS RELATIVE PERCENT: 0.7 %
BUN BLDV-MCNC: 43 MG/DL (ref 7–20)
CALCIUM SERPL-MCNC: 9.6 MG/DL (ref 8.3–10.6)
CHLORIDE BLD-SCNC: 109 MMOL/L (ref 99–110)
CO2: 23 MMOL/L (ref 21–32)
CREAT SERPL-MCNC: 3.7 MG/DL (ref 0.8–1.3)
EOSINOPHILS ABSOLUTE: 0.2 K/UL (ref 0–0.6)
EOSINOPHILS RELATIVE PERCENT: 4 %
GFR AFRICAN AMERICAN: 19
GFR NON-AFRICAN AMERICAN: 16
GLUCOSE BLD-MCNC: 103 MG/DL (ref 70–99)
HCT VFR BLD CALC: 28.3 % (ref 40.5–52.5)
HEMOGLOBIN: 9.5 G/DL (ref 13.5–17.5)
LYMPHOCYTES ABSOLUTE: 0.7 K/UL (ref 1–5.1)
LYMPHOCYTES RELATIVE PERCENT: 14.2 %
MCH RBC QN AUTO: 28.2 PG (ref 26–34)
MCHC RBC AUTO-ENTMCNC: 33.5 G/DL (ref 31–36)
MCV RBC AUTO: 84.2 FL (ref 80–100)
MONOCYTES ABSOLUTE: 0.5 K/UL (ref 0–1.3)
MONOCYTES RELATIVE PERCENT: 10.4 %
NEUTROPHILS ABSOLUTE: 3.5 K/UL (ref 1.7–7.7)
NEUTROPHILS RELATIVE PERCENT: 70.7 %
PDW BLD-RTO: 13.1 % (ref 12.4–15.4)
PHOSPHORUS: 4.5 MG/DL (ref 2.5–4.9)
PLATELET # BLD: 223 K/UL (ref 135–450)
PMV BLD AUTO: 8.9 FL (ref 5–10.5)
POTASSIUM SERPL-SCNC: 4.4 MMOL/L (ref 3.5–5.1)
RBC # BLD: 3.36 M/UL (ref 4.2–5.9)
SODIUM BLD-SCNC: 147 MMOL/L (ref 136–145)
WBC # BLD: 5 K/UL (ref 4–11)

## 2022-06-07 PROCEDURE — G0439 PPPS, SUBSEQ VISIT: HCPCS | Performed by: INTERNAL MEDICINE

## 2022-06-07 PROCEDURE — 3044F HG A1C LEVEL LT 7.0%: CPT | Performed by: INTERNAL MEDICINE

## 2022-06-07 PROCEDURE — 1123F ACP DISCUSS/DSCN MKR DOCD: CPT | Performed by: INTERNAL MEDICINE

## 2022-06-07 ASSESSMENT — PATIENT HEALTH QUESTIONNAIRE - PHQ9
3. TROUBLE FALLING OR STAYING ASLEEP: 0
9. THOUGHTS THAT YOU WOULD BE BETTER OFF DEAD, OR OF HURTING YOURSELF: 0
SUM OF ALL RESPONSES TO PHQ QUESTIONS 1-9: 0
1. LITTLE INTEREST OR PLEASURE IN DOING THINGS: 0
6. FEELING BAD ABOUT YOURSELF - OR THAT YOU ARE A FAILURE OR HAVE LET YOURSELF OR YOUR FAMILY DOWN: 0
7. TROUBLE CONCENTRATING ON THINGS, SUCH AS READING THE NEWSPAPER OR WATCHING TELEVISION: 0
SUM OF ALL RESPONSES TO PHQ QUESTIONS 1-9: 0
SUM OF ALL RESPONSES TO PHQ9 QUESTIONS 1 & 2: 0
SUM OF ALL RESPONSES TO PHQ QUESTIONS 1-9: 0
8. MOVING OR SPEAKING SO SLOWLY THAT OTHER PEOPLE COULD HAVE NOTICED. OR THE OPPOSITE, BEING SO FIGETY OR RESTLESS THAT YOU HAVE BEEN MOVING AROUND A LOT MORE THAN USUAL: 0
5. POOR APPETITE OR OVEREATING: 0
2. FEELING DOWN, DEPRESSED OR HOPELESS: 0
SUM OF ALL RESPONSES TO PHQ QUESTIONS 1-9: 0
4. FEELING TIRED OR HAVING LITTLE ENERGY: 0
10. IF YOU CHECKED OFF ANY PROBLEMS, HOW DIFFICULT HAVE THESE PROBLEMS MADE IT FOR YOU TO DO YOUR WORK, TAKE CARE OF THINGS AT HOME, OR GET ALONG WITH OTHER PEOPLE: 0

## 2022-06-07 ASSESSMENT — LIFESTYLE VARIABLES: HOW OFTEN DO YOU HAVE A DRINK CONTAINING ALCOHOL: NEVER

## 2022-06-07 NOTE — PROGRESS NOTES
Medicare Annual Wellness Visit    Lois Milan is here for Medicare AWV    Assessment & Plan   Medicare annual wellness visit, subsequent  Stage 4 chronic kidney disease ([facility] [address]  - Creatinine increased on last check, he is due to see the nephrologist later in the week, recheck renal panel  -     Renal Function Panel; Future  -     CBC with Auto Differential; Future  Fatigue, unspecified type   -Likely related to anemia from CKD  Achalasia   -Occasional symptoms which could be recurrence but this is not happening frequently, will monitor for right now  Gastroesophageal reflux disease without esophagitis   -Continue famotidine  Hyperlipidemia, unspecified hyperlipidemia type   -Continue atorvastatin 10 mg daily  Primary hypertension   -Running higher today, for right now continue amlodipine 5 mg daily, losartan 25 mg daily, if still elevated at his appointment with the nephrologist later this week may need adjustment in his medication  Type 2 diabetes mellitus with stage 4 chronic kidney disease, without long-term current use of insulin ([facility] [address]   -Sugar has been very well controlled, off medication  [facility] with [facility] disturbance [facility]   -Continue memantine     Recommendations for Preventive Services Due: see orders and patient instructions/AVS.  Recommended screening schedule for the next 5-10 years is provided to the patient in written form: see Patient Instructions/AVS.     Return in 3 months (on 9/7/2022) for HTN follow up. Subjective   The following acute and/or chronic problems were also addressed today:    He has been feeling tired recently. Otherwise feeling okay. He is having some congestion in the throat. Sometimes happens after eating. Only rarely feels like he has trouble swallowing. He is taking famotidine. Patient's complete Health Risk Assessment and screening values have been reviewed and are found in Flowsheets.  The following problems were reviewed today and where indicated follow up appointments were made and/or referrals ordered.     Positive Risk Factor Screenings with Interventions:    Fall Risk:  Do you feel unsteady or are you worried about falling? : (!) yes  2 or more falls in past year?: no  Fall with injury in past year?: no     Fall Risk Interventions:    · Home safety tips provided              General Health and ACP:  General  In general, how would you say your health is?: Fair  In the past 7 days, have you experienced any of the following: New or Increased Pain, New or Increased Fatigue, Loneliness, Social Isolation, Stress or Anger?: (!) Yes  Select all that apply: (!) New or Increased Fatigue  Do you get the social and emotional support that you need?: Yes  Do you have a Living Will?: (!) No    Advance Directives     Power of  Living Will ACP-Advance Directive ACP-Power of     Not on File Not on File Filed Not on File      General Health Risk Interventions:  · Fatigue: related to anemia, but this is likely related to renal dysfunction  · No Living Will: Advance Care Planning addressed with patient today    Health Habits/Nutrition:     Physical Activity: Insufficiently Active    Days of Exercise per Week: 2 days    Minutes of Exercise per Session: 20 min     Have you lost any weight without trying in the past 3 months?: No  Body mass index: (!) 17.37  Have you seen the dentist within the past year?: (!) No    Health Habits/Nutrition Interventions:  · Dental exam overdue:  patient encouraged to make appointment with his/her dentist    Hearing/Vision:  Do you or your family notice any trouble with your hearing that hasn't been managed with hearing aids?: No  Do you have difficulty driving, watching TV, or doing any of your daily activities because of your eyesight?: No  Have you had an eye exam within the past year?: (!) No  No exam data present    Hearing/Vision Interventions:  · Vision concerns:  patient encouraged to make appointment with his/her eye specialist    Safety:  Do you have working smoke detectors?: (!) No  Do you have any tripping hazards - loose or unsecured carpets or rugs?: No  Do you have any tripping hazards - clutter in doorways, halls, or stairs?: No  Do you have either shower bars, grab bars, non-slip mats or non-slip surfaces in your shower or bathtub?: Yes  Do all of your stairways have a railing or banister?: (!) No  Do you always fasten your seatbelt when you are in a car?: Yes    Safety Interventions:  · Home safety tips provided           Objective   Vitals:    06/07/22 0854 06/07/22 0855   BP: (!) 158/70 (!) 150/72   Site: Right Upper Arm Left Upper Arm   Weight: 139 lb (63 kg)    Height: 6' 3\" (1.905 m)       Body mass index is 17.37 kg/m². Physical Exam  Vitals reviewed. Constitutional:       General: He is not in acute distress. Appearance: Normal appearance. He is well-developed. HENT:      Head: Normocephalic and atraumatic. Cardiovascular:      Rate and Rhythm: Normal rate and regular rhythm. Heart sounds: Normal heart sounds. Pulmonary:      Effort: Pulmonary effort is normal. No respiratory distress. Breath sounds: Normal breath sounds. Skin:     General: Skin is warm and dry. Neurological:      Mental Status: He is alert. Mental status is at baseline. Psychiatric:         Mood and Affect: Mood normal.         Behavior: Behavior normal.         Thought Content: Thought content normal.         Cognition and Memory: Memory is impaired. Judgment: Judgment normal.               Allergies   Allergen Reactions    Lorazepam Other (See Comments)     Pt unable to recall reaction     Prior to Visit Medications    Medication Sig Taking?  Authorizing Provider   mirtazapine (REMERON) 15 MG tablet TAKE 1 TABLET BY MOUTH EVERY DAY AT NIGHT Yes Uzma Myers MD   memantine (NAMENDA) 5 MG tablet TAKE 1/2 TABLET BY MOUTH EVERY DAY Yes Uzma Myers MD   tamsulosin (FLOMAX) 0.4 MG capsule TAKE 1 CAPSULE BY MOUTH EVERY DAY Yes Kim Zamora MD   famotidine (PEPCID) 40 MG tablet TAKE 1 TABLET BY MOUTH EVERY DAY IN THE EVENING Yes Kim Zamora MD   desmopressin (DDAVP) 0.2 MG tablet Take 0.2 mg by mouth daily Yes Historical Provider, MD   fluticasone-vilanterol (BREO ELLIPTA) 100-25 MCG/INH AEPB inhaler TAKE 1 PUFF BY MOUTH EVERY DAY Yes Kim Zamora MD   losartan (COZAAR) 25 MG tablet Take 1 tablet by mouth daily Yes Herbert Lomas MD   Multiple Vitamin (DAILY-TJ) TABS TAKE 1 TABLET BY MOUTH EVERY DAY Yes Kim Zamora MD   levETIRAcetam (KEPPRA) 500 MG tablet TAKE 1 TABLET BY MOUTH TWICE A DAY Yes Kim Zamora MD   atorvastatin (LIPITOR) 10 MG tablet TAKE 1 TABLET BY MOUTH EVERY DAY Yes Kim Zamora MD   Nutritional Supplements (BOOST VERY HIGH CALORIE) LIQD Take 8 oz by mouth daily Yes Kim Zamora MD   amLODIPine (NORVASC) 5 MG tablet Take 1 tablet by mouth daily Yes Kim Zamora MD   polyethylene glycol (GLYCOLAX) 17 g packet Take 17 g by mouth daily as needed for Constipation Yes Anand Urbina MD   aspirin 325 MG EC tablet TAKE 1 TABLET BY MOUTH EVERY DAY WITH FOOD Yes Anand Urbina MD   Psyllium-Calcium (METAMUCIL PLUS CALCIUM) CAPS Take 1 tablet by mouth 2 times daily Take with 8 oz water.  Yes Anand Urbina MD   clotrimazole (LOTRIMIN) 1 % cream APPLY TO AFFECTED AREA TWICE A DAY Yes Anand Urbina MD   Accu-Chek Softclix Lancets MISC 1 each by Does not apply route daily Yes Anand Urbina MD   blood glucose test strips (ACCU-CHEK ZEENAT PLUS) strip 1 each by In Vitro route daily as needed (symptoms of low or high blood sugar) Yes Anand Urbina MD   latanoprost (XALATAN) 0.005 % ophthalmic solution Place 1 drop into the left eye nightly  Yes Historical Provider, MD   fluticasone (FLONASE) 50 MCG/ACT nasal spray USE 2 SPRAYS BY NASAL ROUTE DAILY AS NEEDED FOR RHINITIS Yes Anand Urbina MD   nitroGLYCERIN (NITROSTAT) 0.4 MG SL tablet Place 1 tablet under the tongue every 5 minutes as needed.  Yes Lorraine Rodrigez MD       [facility] (Including outside providers/suppliers regularly involved in providing care):   Patient Care Team:  Kim Zamora MD as PCP - General (Internal Medicine)  Kim Zamora MD as PCP - REHABILITATION [facility] Empaneled Provider  Nic Bañuelos MD (Gastroenterology)  Mello Andino MD (Urology)  Nancy Sunshine MD (Orthopedic Surgery)  Cira Newby MD (General Surgery)  [facility] [place] [place] (Podiatry)  Radha Epps MD (Orthopedic Surgery)  Miranda Faustin MD (Infectious Diseases)  DAVID Nino CNP as Nurse Practitioner (Nurse Practitioner)  Luis Mares MD as Consulting Physician (Pulmonology)     Reviewed and updated this visit:  Tobacco  Allergies  Meds  Med Hx  Surg Hx  Soc Hx  Fam Hx

## 2022-06-07 NOTE — PATIENT INSTRUCTIONS
Personalized Preventive Plan for Gary Cohen - 6/7/2022  Medicare offers a range of preventive health benefits. Some of the tests and screenings are paid in full while other may be subject to a deductible, co-insurance, and/or copay. Some of these benefits include a comprehensive review of your medical history including lifestyle, illnesses that may run in your family, and various assessments and screenings as appropriate. After reviewing your medical record and screening and assessments performed today your provider may have ordered immunizations, labs, imaging, and/or referrals for you. A list of these orders (if applicable) as well as your Preventive Care list are included within your After Visit Summary for your review. Other Preventive Recommendations:    · A preventive eye exam performed by an eye specialist is recommended every 1-2 years to screen for glaucoma; cataracts, macular degeneration, and other eye disorders. · A preventive dental visit is recommended every 6 months. · Try to get at least 150 minutes of exercise per week or 10,000 steps per day on a pedometer . · Order or download the FREE \"Exercise & Physical Activity: Your Everyday Guide\" from [employer]. Call 7-558.763.2351 or search [employer] online. · You need 2616-6296 mg of calcium and 9901-0008 IU of vitamin D per day. It is possible to meet your calcium requirement with diet alone, but a vitamin D supplement is usually necessary to meet this goal.  · When exposed to the sun, use a sunscreen that protects against both UVA and UVB radiation with an SPF of 30 or greater. Reapply every 2 to 3 hours or after sweating, drying off with a towel, or swimming. · Always wear a seat belt when traveling in a car. Always wear a helmet when riding a bicycle or motorcycle.     Phlegm - guaifenesin (Mucinex)

## 2022-07-12 DIAGNOSIS — N17.9 AKI (ACUTE KIDNEY INJURY) (HCC): ICD-10-CM

## 2022-07-12 LAB
ALBUMIN SERPL-MCNC: 3.9 G/DL (ref 3.4–5)
ANION GAP SERPL CALCULATED.3IONS-SCNC: 13 MMOL/L (ref 3–16)
BUN BLDV-MCNC: 42 MG/DL (ref 7–20)
CALCIUM SERPL-MCNC: 8.9 MG/DL (ref 8.3–10.6)
CHLORIDE BLD-SCNC: 106 MMOL/L (ref 99–110)
CO2: 23 MMOL/L (ref 21–32)
CREAT SERPL-MCNC: 4 MG/DL (ref 0.8–1.3)
GFR AFRICAN AMERICAN: 17
GFR NON-AFRICAN AMERICAN: 14
GLUCOSE BLD-MCNC: 163 MG/DL (ref 70–99)
PHOSPHORUS: 4 MG/DL (ref 2.5–4.9)
POTASSIUM SERPL-SCNC: 4.2 MMOL/L (ref 3.5–5.1)
SODIUM BLD-SCNC: 142 MMOL/L (ref 136–145)

## 2022-07-22 ENCOUNTER — HOSPITAL ENCOUNTER (OUTPATIENT)
Dept: ULTRASOUND IMAGING | Age: 87
Discharge: HOME | End: 2022-07-22
Payer: MEDICARE

## 2022-07-22 DIAGNOSIS — N17.9 AKI (ACUTE KIDNEY INJURY) (HCC): ICD-10-CM

## 2022-07-22 PROCEDURE — 76770 US EXAM ABDO BACK WALL COMP: CPT

## 2022-07-23 LAB
ALBUMIN SERPL-MCNC: 4.3 G/DL (ref 3.4–5)
ANION GAP SERPL CALCULATED.3IONS-SCNC: 12 MMOL/L (ref 3–16)
BUN BLDV-MCNC: 48 MG/DL (ref 7–20)
CALCIUM SERPL-MCNC: 9.1 MG/DL (ref 8.3–10.6)
CHLORIDE BLD-SCNC: 104 MMOL/L (ref 99–110)
CO2: 25 MMOL/L (ref 21–32)
CREAT SERPL-MCNC: 3.7 MG/DL (ref 0.8–1.3)
CREATININE URINE: 73.9 MG/DL (ref 39–259)
GFR AFRICAN AMERICAN: 19
GFR NON-AFRICAN AMERICAN: 16
GLUCOSE BLD-MCNC: 101 MG/DL (ref 70–99)
MICROALBUMIN UR-MCNC: 55.4 MG/DL
MICROALBUMIN/CREAT UR-RTO: 749.7 MG/G (ref 0–30)
PHOSPHORUS: 4.3 MG/DL (ref 2.5–4.9)
POTASSIUM SERPL-SCNC: 4.3 MMOL/L (ref 3.5–5.1)
SODIUM BLD-SCNC: 141 MMOL/L (ref 136–145)

## 2022-08-01 RX ORDER — MULTIVITAMIN WITH FOLIC ACID 400 MCG
TABLET ORAL
Qty: 90 TABLET | Refills: 1 | Status: SHIPPED | OUTPATIENT
Start: 2022-08-01

## 2022-09-13 ENCOUNTER — OFFICE VISIT (OUTPATIENT)
Dept: INTERNAL MEDICINE CLINIC | Age: 87
End: 2022-09-13
Payer: MEDICARE

## 2022-09-13 VITALS
SYSTOLIC BLOOD PRESSURE: 130 MMHG | WEIGHT: 140 LBS | BODY MASS INDEX: 17.41 KG/M2 | DIASTOLIC BLOOD PRESSURE: 70 MMHG | HEIGHT: 75 IN

## 2022-09-13 DIAGNOSIS — N18.4 TYPE 2 DIABETES MELLITUS WITH STAGE 4 CHRONIC KIDNEY DISEASE, WITHOUT LONG-TERM CURRENT USE OF INSULIN (HCC): ICD-10-CM

## 2022-09-13 DIAGNOSIS — R63.4 ABNORMAL WEIGHT LOSS: ICD-10-CM

## 2022-09-13 DIAGNOSIS — I10 PRIMARY HYPERTENSION: ICD-10-CM

## 2022-09-13 DIAGNOSIS — Z23 NEED FOR INFLUENZA VACCINATION: ICD-10-CM

## 2022-09-13 DIAGNOSIS — N18.4 STAGE 4 CHRONIC KIDNEY DISEASE (HCC): Primary | ICD-10-CM

## 2022-09-13 DIAGNOSIS — E11.22 TYPE 2 DIABETES MELLITUS WITH STAGE 4 CHRONIC KIDNEY DISEASE, WITHOUT LONG-TERM CURRENT USE OF INSULIN (HCC): ICD-10-CM

## 2022-09-13 PROBLEM — D64.9 ANEMIA, UNSPECIFIED: Status: RESOLVED | Noted: 2021-09-23 | Resolved: 2022-09-13

## 2022-09-13 PROCEDURE — 99214 OFFICE O/P EST MOD 30 MIN: CPT | Performed by: INTERNAL MEDICINE

## 2022-09-13 PROCEDURE — 1123F ACP DISCUSS/DSCN MKR DOCD: CPT | Performed by: INTERNAL MEDICINE

## 2022-09-13 PROCEDURE — 90694 VACC AIIV4 NO PRSRV 0.5ML IM: CPT | Performed by: INTERNAL MEDICINE

## 2022-09-13 PROCEDURE — G8419 CALC BMI OUT NRM PARAM NOF/U: HCPCS | Performed by: INTERNAL MEDICINE

## 2022-09-13 PROCEDURE — 1036F TOBACCO NON-USER: CPT | Performed by: INTERNAL MEDICINE

## 2022-09-13 PROCEDURE — G0008 ADMIN INFLUENZA VIRUS VAC: HCPCS | Performed by: INTERNAL MEDICINE

## 2022-09-13 PROCEDURE — G8427 DOCREV CUR MEDS BY ELIG CLIN: HCPCS | Performed by: INTERNAL MEDICINE

## 2022-09-13 PROCEDURE — 3044F HG A1C LEVEL LT 7.0%: CPT | Performed by: INTERNAL MEDICINE

## 2022-09-13 SDOH — ECONOMIC STABILITY: FOOD INSECURITY: WITHIN THE PAST 12 MONTHS, YOU WORRIED THAT YOUR FOOD WOULD RUN OUT BEFORE YOU GOT MONEY TO BUY MORE.: NEVER TRUE

## 2022-09-13 SDOH — ECONOMIC STABILITY: FOOD INSECURITY: WITHIN THE PAST 12 MONTHS, THE FOOD YOU BOUGHT JUST DIDN'T LAST AND YOU DIDN'T HAVE MONEY TO GET MORE.: NEVER TRUE

## 2022-09-13 ASSESSMENT — PATIENT HEALTH QUESTIONNAIRE - PHQ9
3. TROUBLE FALLING OR STAYING ASLEEP: 0
10. IF YOU CHECKED OFF ANY PROBLEMS, HOW DIFFICULT HAVE THESE PROBLEMS MADE IT FOR YOU TO DO YOUR WORK, TAKE CARE OF THINGS AT HOME, OR GET ALONG WITH OTHER PEOPLE: 0
SUM OF ALL RESPONSES TO PHQ QUESTIONS 1-9: 0
SUM OF ALL RESPONSES TO PHQ QUESTIONS 1-9: 0
5. POOR APPETITE OR OVEREATING: 0
4. FEELING TIRED OR HAVING LITTLE ENERGY: 0
SUM OF ALL RESPONSES TO PHQ9 QUESTIONS 1 & 2: 0
SUM OF ALL RESPONSES TO PHQ QUESTIONS 1-9: 0
8. MOVING OR SPEAKING SO SLOWLY THAT OTHER PEOPLE COULD HAVE NOTICED. OR THE OPPOSITE, BEING SO FIGETY OR RESTLESS THAT YOU HAVE BEEN MOVING AROUND A LOT MORE THAN USUAL: 0
2. FEELING DOWN, DEPRESSED OR HOPELESS: 0
1. LITTLE INTEREST OR PLEASURE IN DOING THINGS: 0
7. TROUBLE CONCENTRATING ON THINGS, SUCH AS READING THE NEWSPAPER OR WATCHING TELEVISION: 0
6. FEELING BAD ABOUT YOURSELF - OR THAT YOU ARE A FAILURE OR HAVE LET YOURSELF OR YOUR FAMILY DOWN: 0
9. THOUGHTS THAT YOU WOULD BE BETTER OFF DEAD, OR OF HURTING YOURSELF: 0
SUM OF ALL RESPONSES TO PHQ QUESTIONS 1-9: 0

## 2022-09-13 ASSESSMENT — SOCIAL DETERMINANTS OF HEALTH (SDOH): HOW HARD IS IT FOR YOU TO PAY FOR THE VERY BASICS LIKE FOOD, HOUSING, MEDICAL CARE, AND HEATING?: NOT HARD AT ALL

## 2022-09-13 NOTE — PROGRESS NOTES
Hema Ibarra (:  1935) is a 82 y.o. male,Established patient, here for evaluation of the following chief complaint(s):  Hypertension         ASSESSMENT/PLAN:  1. Stage 4 chronic kidney disease ([facility] [address]  -Overall stable, he is following up closely with a nephrologist.  Continue current medications. -     Comprehensive Metabolic Panel; Future  -     Lipid Panel; Future  -     CBC with Auto Differential; Future  -     Hemoglobin A1C; Future  2. Primary hypertension  -Improved on this check, continue amlodipine 5 mg daily, losartan 25 mg daily  -     Comprehensive Metabolic Panel; Future  -     Lipid Panel; Future  -     CBC with Auto Differential; Future  -     Hemoglobin A1C; Future  3. Abnormal weight loss   -Stabilized, continue to monitor, discussed continuing supplement  4. Type 2 diabetes mellitus with stage 4 chronic kidney disease, without long-term current use of insulin (HCC)  -Diet controlled currently, recheck A1c at next visit  -     Comprehensive Metabolic Panel; Future  -     Lipid Panel; Future  -     CBC with Auto Differential; Future  -     Hemoglobin A1C; Future  5. Need for influenza vaccination  -     Influenza, FLUAD, (age 72 y+), IM, Preservative Free, 0.5 mL    Return in about 3 months (around 2022) for HTN follow up, DM follow up. Subjective   SUBJECTIVE/OBJECTIVE:  HPI    No significant changes since last visit. His blood pressure is under better control. He is taking medications as prescribed. No chest pain or shortness of breath. He is seeing his nephrologist regularly, he forgot to get blood work before the last appointment but will be getting blood work again in November. He has been trying to gain weight, drinking 1 Ensure per day. No change in appetite. Review of Systems       Objective   Physical Exam  Vitals reviewed. Constitutional:       General: He is not in acute distress. Appearance: Normal appearance. He is well-developed.    HENT: Head: Normocephalic and atraumatic. Cardiovascular:      Rate and Rhythm: Normal rate and regular rhythm. Heart sounds: Normal heart sounds. Pulmonary:      Effort: Pulmonary effort is normal. No respiratory distress. Breath sounds: Normal breath sounds. Skin:     General: Skin is warm and dry. Neurological:      Mental Status: He is alert. Psychiatric:         Mood and Affect: Mood normal.         Behavior: Behavior normal.         Thought Content: Thought content normal.         Judgment: Judgment normal.                An electronic signature was used to authenticate this note.     --Jacobo Hutchinson MD

## 2022-09-15 DIAGNOSIS — I10 ESSENTIAL HYPERTENSION: Chronic | ICD-10-CM

## 2022-09-15 RX ORDER — AMLODIPINE BESYLATE 5 MG/1
TABLET ORAL
Qty: 90 TABLET | Refills: 1 | Status: SHIPPED | OUTPATIENT
Start: 2022-09-15

## 2022-09-15 RX ORDER — FAMOTIDINE 40 MG/1
TABLET, FILM COATED ORAL
Qty: 90 TABLET | Refills: 1 | Status: SHIPPED | OUTPATIENT
Start: 2022-09-15

## 2022-09-22 RX ORDER — ATORVASTATIN CALCIUM 10 MG/1
TABLET, FILM COATED ORAL
Qty: 90 TABLET | Refills: 3 | Status: SHIPPED | OUTPATIENT
Start: 2022-09-22

## 2022-09-22 RX ORDER — LOSARTAN POTASSIUM 25 MG/1
TABLET ORAL
Qty: 90 TABLET | Refills: 3 | Status: SHIPPED | OUTPATIENT
Start: 2022-09-22

## 2022-09-22 RX ORDER — DESMOPRESSIN ACETATE 0.2 MG/1
0.2 TABLET ORAL NIGHTLY
Qty: 30 TABLET | Refills: 3 | OUTPATIENT
Start: 2022-09-22

## 2022-09-22 NOTE — TELEPHONE ENCOUNTER
Since I don't see a recent prescription I didn't refill the desmopressin, if he still sees his urologist they can assess whether he still needs it  I did refill the other two

## 2022-09-22 NOTE — TELEPHONE ENCOUNTER
The patient's wife states he still is taking the medication, she said the bottle is empty. I asked if he was having frequent urination. Mrs. Yareli Rodriguez states he gets up once during the night and goes as soon as he wakes up. She said  if he doesn't need it he won't take it.

## 2022-09-22 NOTE — TELEPHONE ENCOUNTER
Pt needs a refill on   desmopressin (DDAVP) 0.2 MG tablet [  losartan (COZAAR) 25 MG tablet   And if he is still on the   atorvastatin (LIPITOR) 10 MG tablet         [facility]/pharmacy #[id number]- [place], [place] - [address] - P [phone] - F [phone] [address], [facility] [address]   Phone:  [phone]  Fax:  [phone]      Please advise

## 2022-09-23 NOTE — TELEPHONE ENCOUNTER
I let Mrs. Bradford Anderson know that she would have to check with Urology for this medication. She was agreeable.

## 2022-10-27 RX ORDER — MEMANTINE HYDROCHLORIDE 5 MG/1
TABLET ORAL
Qty: 45 TABLET | Refills: 1 | Status: SHIPPED | OUTPATIENT
Start: 2022-10-27

## 2022-11-08 DIAGNOSIS — R63.0 LOSS OF APPETITE: ICD-10-CM

## 2022-11-08 DIAGNOSIS — R63.4 LOSS OF WEIGHT: ICD-10-CM

## 2022-11-08 DIAGNOSIS — R35.0 URINARY FREQUENCY: ICD-10-CM

## 2022-11-08 RX ORDER — TAMSULOSIN HYDROCHLORIDE 0.4 MG/1
CAPSULE ORAL
Qty: 90 CAPSULE | Refills: 1 | Status: SHIPPED | OUTPATIENT
Start: 2022-11-08

## 2022-11-08 RX ORDER — MIRTAZAPINE 15 MG/1
TABLET, FILM COATED ORAL
Qty: 90 TABLET | Refills: 1 | Status: SHIPPED | OUTPATIENT
Start: 2022-11-08

## 2022-11-17 DIAGNOSIS — E11.69 TYPE 2 DIABETES MELLITUS WITH OTHER SPECIFIED COMPLICATION, UNSPECIFIED WHETHER LONG TERM INSULIN USE (HCC): ICD-10-CM

## 2022-11-17 DIAGNOSIS — I10 HYPERTENSION, UNSPECIFIED TYPE: ICD-10-CM

## 2022-11-17 DIAGNOSIS — N18.9 ACUTE KIDNEY INJURY SUPERIMPOSED ON CHRONIC KIDNEY DISEASE (HCC): ICD-10-CM

## 2022-11-17 DIAGNOSIS — N17.9 ACUTE KIDNEY INJURY SUPERIMPOSED ON CHRONIC KIDNEY DISEASE (HCC): ICD-10-CM

## 2022-11-17 DIAGNOSIS — F03.90 DEMENTIA, UNSPECIFIED DEMENTIA SEVERITY, UNSPECIFIED DEMENTIA TYPE, UNSPECIFIED WHETHER BEHAVIORAL, PSYCHOTIC, OR MOOD DISTURBANCE OR ANXIETY (HCC): ICD-10-CM

## 2022-11-17 DIAGNOSIS — N18.32 STAGE 3B CHRONIC KIDNEY DISEASE (HCC): ICD-10-CM

## 2022-11-17 LAB
ALBUMIN SERPL-MCNC: 4.3 G/DL (ref 3.4–5)
ANION GAP SERPL CALCULATED.3IONS-SCNC: 15 MMOL/L (ref 3–16)
BASOPHILS ABSOLUTE: 0 K/UL (ref 0–0.2)
BASOPHILS RELATIVE PERCENT: 0.6 %
BUN BLDV-MCNC: 58 MG/DL (ref 7–20)
CALCIUM SERPL-MCNC: 9.3 MG/DL (ref 8.3–10.6)
CHLORIDE BLD-SCNC: 104 MMOL/L (ref 99–110)
CO2: 22 MMOL/L (ref 21–32)
CREAT SERPL-MCNC: 3.8 MG/DL (ref 0.8–1.3)
EOSINOPHILS ABSOLUTE: 0.2 K/UL (ref 0–0.6)
EOSINOPHILS RELATIVE PERCENT: 3.7 %
GFR SERPL CREATININE-BSD FRML MDRD: 15 ML/MIN/{1.73_M2}
GLUCOSE BLD-MCNC: 112 MG/DL (ref 70–99)
HCT VFR BLD CALC: 29.4 % (ref 40.5–52.5)
HEMOGLOBIN: 9.6 G/DL (ref 13.5–17.5)
LYMPHOCYTES ABSOLUTE: 0.7 K/UL (ref 1–5.1)
LYMPHOCYTES RELATIVE PERCENT: 11.4 %
MCH RBC QN AUTO: 27.8 PG (ref 26–34)
MCHC RBC AUTO-ENTMCNC: 32.6 G/DL (ref 31–36)
MCV RBC AUTO: 85.3 FL (ref 80–100)
MONOCYTES ABSOLUTE: 0.5 K/UL (ref 0–1.3)
MONOCYTES RELATIVE PERCENT: 8.9 %
NEUTROPHILS ABSOLUTE: 4.5 K/UL (ref 1.7–7.7)
NEUTROPHILS RELATIVE PERCENT: 75.4 %
PARATHYROID HORMONE INTACT: 154.1 PG/ML (ref 14–72)
PDW BLD-RTO: 13 % (ref 12.4–15.4)
PHOSPHORUS: 4.6 MG/DL (ref 2.5–4.9)
PLATELET # BLD: 215 K/UL (ref 135–450)
PMV BLD AUTO: 10 FL (ref 5–10.5)
POTASSIUM SERPL-SCNC: 4.5 MMOL/L (ref 3.5–5.1)
RBC # BLD: 3.45 M/UL (ref 4.2–5.9)
SODIUM BLD-SCNC: 141 MMOL/L (ref 136–145)
WBC # BLD: 6 K/UL (ref 4–11)

## 2022-12-08 DIAGNOSIS — R56.9 SEIZURE (HCC): ICD-10-CM

## 2022-12-08 RX ORDER — LEVETIRACETAM 500 MG/1
TABLET ORAL
Qty: 180 TABLET | Refills: 3 | Status: SHIPPED | OUTPATIENT
Start: 2022-12-08

## 2022-12-08 RX ORDER — MULTIVITAMIN WITH FOLIC ACID 400 MCG
TABLET ORAL
Qty: 90 TABLET | Refills: 1 | Status: SHIPPED | OUTPATIENT
Start: 2022-12-08

## 2022-12-16 ENCOUNTER — OFFICE VISIT (OUTPATIENT)
Dept: INTERNAL MEDICINE CLINIC | Age: 87
End: 2022-12-16

## 2022-12-16 VITALS
DIASTOLIC BLOOD PRESSURE: 70 MMHG | BODY MASS INDEX: 17.41 KG/M2 | SYSTOLIC BLOOD PRESSURE: 130 MMHG | WEIGHT: 140 LBS | HEIGHT: 75 IN

## 2022-12-16 DIAGNOSIS — I10 HYPERTENSION, UNSPECIFIED TYPE: ICD-10-CM

## 2022-12-16 DIAGNOSIS — E11.22 TYPE 2 DIABETES MELLITUS WITH STAGE 4 CHRONIC KIDNEY DISEASE, WITHOUT LONG-TERM CURRENT USE OF INSULIN (HCC): ICD-10-CM

## 2022-12-16 DIAGNOSIS — N18.4 STAGE 4 CHRONIC KIDNEY DISEASE (HCC): ICD-10-CM

## 2022-12-16 DIAGNOSIS — N18.4 TYPE 2 DIABETES MELLITUS WITH STAGE 4 CHRONIC KIDNEY DISEASE, WITHOUT LONG-TERM CURRENT USE OF INSULIN (HCC): ICD-10-CM

## 2022-12-16 DIAGNOSIS — F20.0 PARANOID SCHIZOPHRENIA (HCC): Chronic | ICD-10-CM

## 2022-12-16 DIAGNOSIS — R41.3 MEMORY LOSS: Chronic | ICD-10-CM

## 2022-12-16 DIAGNOSIS — K21.9 GASTROESOPHAGEAL REFLUX DISEASE WITHOUT ESOPHAGITIS: Chronic | ICD-10-CM

## 2022-12-16 DIAGNOSIS — N18.4 STAGE 4 CHRONIC KIDNEY DISEASE (HCC): Primary | ICD-10-CM

## 2022-12-16 DIAGNOSIS — N17.9 AKI (ACUTE KIDNEY INJURY) (HCC): ICD-10-CM

## 2022-12-16 LAB
A/G RATIO: 1.4 (ref 1.1–2.2)
ALBUMIN SERPL-MCNC: 4.5 G/DL (ref 3.4–5)
ALP BLD-CCNC: 100 U/L (ref 40–129)
ALT SERPL-CCNC: 9 U/L (ref 10–40)
ANION GAP SERPL CALCULATED.3IONS-SCNC: 17 MMOL/L (ref 3–16)
AST SERPL-CCNC: 16 U/L (ref 15–37)
BASOPHILS ABSOLUTE: 0 K/UL (ref 0–0.2)
BASOPHILS RELATIVE PERCENT: 0.7 %
BILIRUB SERPL-MCNC: 0.3 MG/DL (ref 0–1)
BUN BLDV-MCNC: 54 MG/DL (ref 7–20)
CALCIUM SERPL-MCNC: 9.6 MG/DL (ref 8.3–10.6)
CHLORIDE BLD-SCNC: 103 MMOL/L (ref 99–110)
CHOLESTEROL, TOTAL: 127 MG/DL (ref 0–199)
CO2: 22 MMOL/L (ref 21–32)
CREAT SERPL-MCNC: 4 MG/DL (ref 0.8–1.3)
EOSINOPHILS ABSOLUTE: 0.2 K/UL (ref 0–0.6)
EOSINOPHILS RELATIVE PERCENT: 3.1 %
GFR SERPL CREATININE-BSD FRML MDRD: 14 ML/MIN/{1.73_M2}
GLUCOSE BLD-MCNC: 109 MG/DL (ref 70–99)
HCT VFR BLD CALC: 27.6 % (ref 40.5–52.5)
HDLC SERPL-MCNC: 67 MG/DL (ref 40–60)
HEMOGLOBIN: 9 G/DL (ref 13.5–17.5)
LDL CHOLESTEROL CALCULATED: 49 MG/DL
LYMPHOCYTES ABSOLUTE: 0.7 K/UL (ref 1–5.1)
LYMPHOCYTES RELATIVE PERCENT: 13.7 %
MCH RBC QN AUTO: 27.9 PG (ref 26–34)
MCHC RBC AUTO-ENTMCNC: 32.7 G/DL (ref 31–36)
MCV RBC AUTO: 85.3 FL (ref 80–100)
MONOCYTES ABSOLUTE: 0.6 K/UL (ref 0–1.3)
MONOCYTES RELATIVE PERCENT: 11.3 %
NEUTROPHILS ABSOLUTE: 3.9 K/UL (ref 1.7–7.7)
NEUTROPHILS RELATIVE PERCENT: 71.2 %
PDW BLD-RTO: 13 % (ref 12.4–15.4)
PHOSPHORUS: 3.9 MG/DL (ref 2.5–4.9)
PLATELET # BLD: 229 K/UL (ref 135–450)
PMV BLD AUTO: 10 FL (ref 5–10.5)
POTASSIUM SERPL-SCNC: 4.2 MMOL/L (ref 3.5–5.1)
RBC # BLD: 3.23 M/UL (ref 4.2–5.9)
SODIUM BLD-SCNC: 142 MMOL/L (ref 136–145)
TOTAL PROTEIN: 7.7 G/DL (ref 6.4–8.2)
TRIGL SERPL-MCNC: 57 MG/DL (ref 0–150)
VLDLC SERPL CALC-MCNC: 11 MG/DL
WBC # BLD: 5.4 K/UL (ref 4–11)

## 2022-12-16 RX ORDER — MIRTAZAPINE 30 MG/1
30 TABLET, FILM COATED ORAL NIGHTLY
Qty: 90 TABLET | Refills: 1 | Status: SHIPPED | OUTPATIENT
Start: 2022-12-16

## 2022-12-16 RX ORDER — OMEPRAZOLE 20 MG/1
20 CAPSULE, DELAYED RELEASE ORAL
Qty: 90 CAPSULE | Refills: 1 | Status: SHIPPED | OUTPATIENT
Start: 2022-12-16

## 2022-12-16 ASSESSMENT — PATIENT HEALTH QUESTIONNAIRE - PHQ9
8. MOVING OR SPEAKING SO SLOWLY THAT OTHER PEOPLE COULD HAVE NOTICED. OR THE OPPOSITE, BEING SO FIGETY OR RESTLESS THAT YOU HAVE BEEN MOVING AROUND A LOT MORE THAN USUAL: 0
6. FEELING BAD ABOUT YOURSELF - OR THAT YOU ARE A FAILURE OR HAVE LET YOURSELF OR YOUR FAMILY DOWN: 0
SUM OF ALL RESPONSES TO PHQ QUESTIONS 1-9: 0
5. POOR APPETITE OR OVEREATING: 0
7. TROUBLE CONCENTRATING ON THINGS, SUCH AS READING THE NEWSPAPER OR WATCHING TELEVISION: 0
10. IF YOU CHECKED OFF ANY PROBLEMS, HOW DIFFICULT HAVE THESE PROBLEMS MADE IT FOR YOU TO DO YOUR WORK, TAKE CARE OF THINGS AT HOME, OR GET ALONG WITH OTHER PEOPLE: 0
9. THOUGHTS THAT YOU WOULD BE BETTER OFF DEAD, OR OF HURTING YOURSELF: 0
3. TROUBLE FALLING OR STAYING ASLEEP: 0
2. FEELING DOWN, DEPRESSED OR HOPELESS: 0
1. LITTLE INTEREST OR PLEASURE IN DOING THINGS: 0
SUM OF ALL RESPONSES TO PHQ QUESTIONS 1-9: 0
SUM OF ALL RESPONSES TO PHQ9 QUESTIONS 1 & 2: 0
4. FEELING TIRED OR HAVING LITTLE ENERGY: 0
SUM OF ALL RESPONSES TO PHQ QUESTIONS 1-9: 0
SUM OF ALL RESPONSES TO PHQ QUESTIONS 1-9: 0

## 2022-12-16 NOTE — PROGRESS NOTES
Harvey Rey (:  1935) is a 82 y.o. male,Established patient, here for evaluation of the following chief complaint(s):  Hypertension         ASSESSMENT/PLAN:  1. Stage 4 chronic kidney disease ([facility]  - stable, avoid nephrotoxins  -     Comprehensive Metabolic Panel; Future  -     Lipid Panel; Future  -     CBC with Auto Differential; Future  -     Hemoglobin A1C; Future  2. Type 2 diabetes mellitus with stage 4 chronic kidney disease, without long-term current use of insulin ([facility]  - controlled with diet, reassess  -     Comprehensive Metabolic Panel; Future  -     Lipid Panel; Future  -     CBC with Auto Differential; Future  -     Hemoglobin A1C; Future  3. Memory loss   - discussed paranoia could be manifestation of dementia rather than schizophrenia  4. Paranoid schizophrenia ([facility]   - he has a diagnosis of paranoid schizophrenia but I do not have the records, and per wife's report it was diagnosed late in life which is unusual - this is unclear to me  5. Hypertension, unspecified type   - controlled, continue amlodipine 5mg daily, losartan 25mg daily  6. Gastroesophageal reflux disease without esophagitis   - appears to be having significant symptoms, on famotidine which does not appear to have been controlling symptoms. Start omeprazole 20mg daily    Return in about 3 months (around 3/16/2023) for heartburn, mood, etc.         Subjective   SUBJECTIVE/OBJECTIVE:  HPI    He has some pain in the chest/stomach after eating. Per his wife, he goes through a bottle of Tums within a couple of weeks. He is taking the famotidine for heartburn. His wife is concerned that he is having paranoia. She states that he will accuse her of giving her something to poison him, and once called 911. He has a diagnosis of paranoid schizophrenia but she states that this diagnosis was only made around the time of a hospitalization around . She states that he did not have any diagnosis before that time.     His kidney function has been stable and is monitored by the nephrologist. He does not take NSAIDs. No leg swelling. Blood pressure is currently under control. He is taking the blood pressure medication regularly. No chest pain or shortness of breath. Review of Systems       Objective   Physical Exam  Vitals reviewed. Constitutional:       General: He is not in acute distress. Appearance: Normal appearance. He is well-developed. Comments: Thin   HENT:      Head: Normocephalic and atraumatic. Cardiovascular:      Rate and Rhythm: Normal rate and regular rhythm. Heart sounds: Normal heart sounds. Pulmonary:      Effort: Pulmonary effort is normal. No respiratory distress. Breath sounds: Normal breath sounds. Skin:     General: Skin is warm and dry. Neurological:      Mental Status: He is alert. Psychiatric:         Behavior: Behavior normal.         Thought Content: Thought content normal.         Judgment: Judgment normal.                An electronic signature was used to authenticate this note.     --Juli Keys MD

## 2022-12-17 LAB
ESTIMATED AVERAGE GLUCOSE: 128.4 MG/DL
HBA1C MFR BLD: 6.1 %

## 2022-12-22 RX ORDER — FAMOTIDINE 40 MG/1
TABLET, FILM COATED ORAL
Qty: 90 TABLET | Refills: 0 | Status: SHIPPED | OUTPATIENT
Start: 2022-12-22

## 2023-03-16 DIAGNOSIS — I10 ESSENTIAL HYPERTENSION: Chronic | ICD-10-CM

## 2023-03-16 RX ORDER — AMLODIPINE BESYLATE 5 MG/1
TABLET ORAL
Qty: 90 TABLET | Refills: 1 | Status: SHIPPED | OUTPATIENT
Start: 2023-03-16

## 2023-03-17 ENCOUNTER — OFFICE VISIT (OUTPATIENT)
Dept: INTERNAL MEDICINE CLINIC | Age: 88
End: 2023-03-17
Payer: MEDICARE

## 2023-03-17 VITALS
TEMPERATURE: 98.6 F | WEIGHT: 149 LBS | SYSTOLIC BLOOD PRESSURE: 136 MMHG | HEIGHT: 75 IN | BODY MASS INDEX: 18.53 KG/M2 | HEART RATE: 92 BPM | OXYGEN SATURATION: 99 % | DIASTOLIC BLOOD PRESSURE: 80 MMHG

## 2023-03-17 DIAGNOSIS — N18.4 TYPE 2 DIABETES MELLITUS WITH STAGE 4 CHRONIC KIDNEY DISEASE, WITHOUT LONG-TERM CURRENT USE OF INSULIN (HCC): ICD-10-CM

## 2023-03-17 DIAGNOSIS — F20.0 PARANOID SCHIZOPHRENIA (HCC): ICD-10-CM

## 2023-03-17 DIAGNOSIS — G40.209 PARTIAL SYMPTOMATIC EPILEPSY WITH COMPLEX PARTIAL SEIZURES, NOT INTRACTABLE, WITHOUT STATUS EPILEPTICUS (HCC): ICD-10-CM

## 2023-03-17 DIAGNOSIS — E11.22 TYPE 2 DIABETES MELLITUS WITH STAGE 4 CHRONIC KIDNEY DISEASE, WITHOUT LONG-TERM CURRENT USE OF INSULIN (HCC): ICD-10-CM

## 2023-03-17 DIAGNOSIS — I73.9 PERIPHERAL VASCULAR DISEASE (HCC): ICD-10-CM

## 2023-03-17 DIAGNOSIS — H61.23 BILATERAL HEARING LOSS DUE TO CERUMEN IMPACTION: ICD-10-CM

## 2023-03-17 DIAGNOSIS — F01.518 VASCULAR DEMENTIA WITH BEHAVIOR DISTURBANCE (HCC): ICD-10-CM

## 2023-03-17 DIAGNOSIS — C61 MALIGNANT NEOPLASM OF PROSTATE (HCC): ICD-10-CM

## 2023-03-17 DIAGNOSIS — Z00.00 MEDICARE ANNUAL WELLNESS VISIT, SUBSEQUENT: Primary | ICD-10-CM

## 2023-03-17 DIAGNOSIS — E46 MALNUTRITION, UNSPECIFIED TYPE (HCC): ICD-10-CM

## 2023-03-17 PROCEDURE — 1123F ACP DISCUSS/DSCN MKR DOCD: CPT | Performed by: INTERNAL MEDICINE

## 2023-03-17 PROCEDURE — 3078F DIAST BP <80 MM HG: CPT | Performed by: INTERNAL MEDICINE

## 2023-03-17 PROCEDURE — G8484 FLU IMMUNIZE NO ADMIN: HCPCS | Performed by: INTERNAL MEDICINE

## 2023-03-17 PROCEDURE — 3074F SYST BP LT 130 MM HG: CPT | Performed by: INTERNAL MEDICINE

## 2023-03-17 PROCEDURE — G0439 PPPS, SUBSEQ VISIT: HCPCS | Performed by: INTERNAL MEDICINE

## 2023-03-17 SDOH — ECONOMIC STABILITY: INCOME INSECURITY: HOW HARD IS IT FOR YOU TO PAY FOR THE VERY BASICS LIKE FOOD, HOUSING, MEDICAL CARE, AND HEATING?: NOT HARD AT ALL

## 2023-03-17 SDOH — ECONOMIC STABILITY: HOUSING INSECURITY
IN THE LAST 12 MONTHS, WAS THERE A TIME WHEN YOU DID NOT HAVE A STEADY PLACE TO SLEEP OR SLEPT IN A SHELTER (INCLUDING NOW)?: NO

## 2023-03-17 SDOH — ECONOMIC STABILITY: FOOD INSECURITY: WITHIN THE PAST 12 MONTHS, THE FOOD YOU BOUGHT JUST DIDN'T LAST AND YOU DIDN'T HAVE MONEY TO GET MORE.: NEVER TRUE

## 2023-03-17 SDOH — ECONOMIC STABILITY: FOOD INSECURITY: WITHIN THE PAST 12 MONTHS, YOU WORRIED THAT YOUR FOOD WOULD RUN OUT BEFORE YOU GOT MONEY TO BUY MORE.: NEVER TRUE

## 2023-03-17 ASSESSMENT — PATIENT HEALTH QUESTIONNAIRE - PHQ9
7. TROUBLE CONCENTRATING ON THINGS, SUCH AS READING THE NEWSPAPER OR WATCHING TELEVISION: 0
SUM OF ALL RESPONSES TO PHQ QUESTIONS 1-9: 0
SUM OF ALL RESPONSES TO PHQ9 QUESTIONS 1 & 2: 0
5. POOR APPETITE OR OVEREATING: 0
10. IF YOU CHECKED OFF ANY PROBLEMS, HOW DIFFICULT HAVE THESE PROBLEMS MADE IT FOR YOU TO DO YOUR WORK, TAKE CARE OF THINGS AT HOME, OR GET ALONG WITH OTHER PEOPLE: 0
4. FEELING TIRED OR HAVING LITTLE ENERGY: 0
8. MOVING OR SPEAKING SO SLOWLY THAT OTHER PEOPLE COULD HAVE NOTICED. OR THE OPPOSITE, BEING SO FIGETY OR RESTLESS THAT YOU HAVE BEEN MOVING AROUND A LOT MORE THAN USUAL: 0
3. TROUBLE FALLING OR STAYING ASLEEP: 0
1. LITTLE INTEREST OR PLEASURE IN DOING THINGS: 0
6. FEELING BAD ABOUT YOURSELF - OR THAT YOU ARE A FAILURE OR HAVE LET YOURSELF OR YOUR FAMILY DOWN: 0
SUM OF ALL RESPONSES TO PHQ QUESTIONS 1-9: 0
9. THOUGHTS THAT YOU WOULD BE BETTER OFF DEAD, OR OF HURTING YOURSELF: 0
2. FEELING DOWN, DEPRESSED OR HOPELESS: 0
SUM OF ALL RESPONSES TO PHQ QUESTIONS 1-9: 0
SUM OF ALL RESPONSES TO PHQ QUESTIONS 1-9: 0

## 2023-03-17 ASSESSMENT — LIFESTYLE VARIABLES: HOW OFTEN DO YOU HAVE A DRINK CONTAINING ALCOHOL: NEVER

## 2023-03-17 NOTE — PATIENT INSTRUCTIONS
suggests. They can show whether your hearing has changed. Your hearing aid may need to be adjusted. Use other devices as needed. These may include:  Telephone amplifiers and hearing aids that can connect to a television, stereo, radio, or microphone. Devices that use lights or vibrations. These alert you to the doorbell, a ringing telephone, or a baby monitor. Television closed-captioning. This shows the words at the bottom of the screen. Most new TVs can do this. TTY (text telephone). This lets you type messages back and forth on the telephone instead of talking or listening. These devices are also called TDD. When messages are typed on the keyboard, they are sent over the phone line to a receiving TTY. The message is shown on a monitor. Use text messaging, social media, and email if it is hard for you to communicate by telephone. Try to learn a listening technique called speechreading. It is not lipreading. You pay attention to people's gestures, expressions, posture, and tone of voice. These clues can help you understand what a person is saying. Face the person you are talking to, and have them face you. Make sure the lighting is good. You need to see the other person's face clearly. Think about counseling if you need help to adjust to your hearing loss. When should you call for help? Watch closely for changes in your health, and be sure to contact your doctor if:    You think your hearing is getting worse.     You have new symptoms, such as dizziness or nausea. Where can you learn more? Go to http://www.[employer].com/ and enter R798 to learn more about \"Hearing Loss: Care Instructions. \"  Current as of: May 4, 2022               Content Version: 13.6  © 2753-5523 [employer]wise, [employer]. Care instructions adapted under license by [facility] ([place] [facility]  If you have questions about a medical condition or this instruction, always ask your healthcare professional. [facility]

## 2023-03-17 NOTE — PROGRESS NOTES
Interventions:    Fall Risk:  Do you feel unsteady or are you worried about falling? : (!) yes  2 or more falls in past year?: no  Fall with injury in past year?: no     Interventions:    See AVS for additional education material            General HRA Questions:  Select all that apply: (!) New or Increased Pain    Pain Interventions:  See AVS for additional education material         Hearing Screen:  Do you or your family notice any trouble with your hearing that hasn't been managed with hearing aids?: (!) Yes    Interventions:  Bilateral cerumen impaction, ears flushed    Vision Screen:  Do you have difficulty driving, watching TV, or doing any of your daily activities because of your eyesight?: No  Have you had an eye exam within the past year?: (!) No  No results found. Interventions:   Patient encouraged to make appointment with their eye specialist      Advanced Directives:  Do you have a Living Will?: (!) No    Intervention:  has NO advanced directive - information provided                       Objective   Vitals:    03/17/23 1308   BP: 136/80   Site: Left Upper Arm   Position: Sitting   Pulse: 92   Temp: 98.6 °F (37 °C)   SpO2: 99%   Weight: 149 lb (67.6 kg)   Height: 6' 3\" (1.905 m)      Body mass index is 18.62 kg/m². Physical Exam  Vitals reviewed. Constitutional:       General: He is not in acute distress. Appearance: Normal appearance. He is well-developed. HENT:      Head: Normocephalic and atraumatic. Right Ear: External ear normal. There is impacted cerumen. Left Ear: External ear normal. There is impacted cerumen. Cardiovascular:      Rate and Rhythm: Normal rate and regular rhythm. Heart sounds: Normal heart sounds. Pulmonary:      Effort: Pulmonary effort is normal. No respiratory distress. Breath sounds: Normal breath sounds. Skin:     General: Skin is warm and dry. Neurological:      Mental Status: He is alert. Mental status is at baseline.

## 2023-04-27 RX ORDER — MEMANTINE HYDROCHLORIDE 5 MG/1
TABLET ORAL
Qty: 45 TABLET | Refills: 1 | Status: SHIPPED | OUTPATIENT
Start: 2023-04-27

## 2023-05-03 RX ORDER — MIRTAZAPINE 30 MG/1
30 TABLET, FILM COATED ORAL NIGHTLY
Qty: 90 TABLET | Refills: 1 | Status: SHIPPED | OUTPATIENT
Start: 2023-05-03

## 2023-05-07 DIAGNOSIS — R35.0 URINARY FREQUENCY: ICD-10-CM

## 2023-05-09 RX ORDER — TAMSULOSIN HYDROCHLORIDE 0.4 MG/1
CAPSULE ORAL
Qty: 90 CAPSULE | Refills: 1 | Status: SHIPPED | OUTPATIENT
Start: 2023-05-09

## 2023-05-18 DIAGNOSIS — N18.4 TYPE 2 DIABETES MELLITUS WITH STAGE 4 CHRONIC KIDNEY DISEASE, WITHOUT LONG-TERM CURRENT USE OF INSULIN (HCC): ICD-10-CM

## 2023-05-18 DIAGNOSIS — E11.22 TYPE 2 DIABETES MELLITUS WITH STAGE 4 CHRONIC KIDNEY DISEASE, WITHOUT LONG-TERM CURRENT USE OF INSULIN (HCC): ICD-10-CM

## 2023-05-19 LAB
ALBUMIN SERPL-MCNC: 4.2 G/DL (ref 3.4–5)
ALBUMIN/GLOB SERPL: 1.4 {RATIO} (ref 1.1–2.2)
ALP SERPL-CCNC: 96 U/L (ref 40–129)
ALT SERPL-CCNC: 9 U/L (ref 10–40)
ANION GAP SERPL CALCULATED.3IONS-SCNC: 14 MMOL/L (ref 3–16)
AST SERPL-CCNC: 15 U/L (ref 15–37)
BASOPHILS # BLD: 0 K/UL (ref 0–0.2)
BASOPHILS NFR BLD: 0.5 %
BILIRUB SERPL-MCNC: <0.2 MG/DL (ref 0–1)
BUN SERPL-MCNC: 60 MG/DL (ref 7–20)
CALCIUM SERPL-MCNC: 8.9 MG/DL (ref 8.3–10.6)
CHLORIDE SERPL-SCNC: 106 MMOL/L (ref 99–110)
CHOLEST SERPL-MCNC: 117 MG/DL (ref 0–199)
CO2 SERPL-SCNC: 21 MMOL/L (ref 21–32)
CREAT SERPL-MCNC: 4.6 MG/DL (ref 0.8–1.3)
DEPRECATED RDW RBC AUTO: 12.6 % (ref 12.4–15.4)
EOSINOPHIL # BLD: 0.1 K/UL (ref 0–0.6)
EOSINOPHIL NFR BLD: 2.6 %
EST. AVERAGE GLUCOSE BLD GHB EST-MCNC: 134.1 MG/DL
GFR SERPLBLD CREATININE-BSD FMLA CKD-EPI: 12 ML/MIN/{1.73_M2}
GLUCOSE SERPL-MCNC: 88 MG/DL (ref 70–99)
HBA1C MFR BLD: 6.3 %
HCT VFR BLD AUTO: 25.7 % (ref 40.5–52.5)
HDLC SERPL-MCNC: 73 MG/DL (ref 40–60)
HGB BLD-MCNC: 8.5 G/DL (ref 13.5–17.5)
LDLC SERPL CALC-MCNC: 34 MG/DL
LYMPHOCYTES # BLD: 0.8 K/UL (ref 1–5.1)
LYMPHOCYTES NFR BLD: 16 %
MCH RBC QN AUTO: 28.1 PG (ref 26–34)
MCHC RBC AUTO-ENTMCNC: 33.2 G/DL (ref 31–36)
MCV RBC AUTO: 84.6 FL (ref 80–100)
MONOCYTES # BLD: 0.5 K/UL (ref 0–1.3)
MONOCYTES NFR BLD: 10.2 %
NEUTROPHILS # BLD: 3.5 K/UL (ref 1.7–7.7)
NEUTROPHILS NFR BLD: 70.7 %
PLATELET # BLD AUTO: 197 K/UL (ref 135–450)
PMV BLD AUTO: 9.4 FL (ref 5–10.5)
POTASSIUM SERPL-SCNC: 4.1 MMOL/L (ref 3.5–5.1)
PROT SERPL-MCNC: 7.1 G/DL (ref 6.4–8.2)
RBC # BLD AUTO: 3.03 M/UL (ref 4.2–5.9)
SODIUM SERPL-SCNC: 141 MMOL/L (ref 136–145)
TRIGL SERPL-MCNC: 50 MG/DL (ref 0–150)
VLDLC SERPL CALC-MCNC: 10 MG/DL
WBC # BLD AUTO: 5 K/UL (ref 4–11)

## 2023-05-22 DIAGNOSIS — N18.4 CKD (CHRONIC KIDNEY DISEASE) STAGE 4, GFR 15-29 ML/MIN (HCC): ICD-10-CM

## 2023-05-22 LAB
CREAT UR-MCNC: 55.5 MG/DL (ref 39–259)
MICROALBUMIN UR DL<=1MG/L-MCNC: 16 MG/DL
MICROALBUMIN/CREAT UR: 288.3 MG/G (ref 0–30)

## 2023-05-25 ENCOUNTER — HOSPITAL ENCOUNTER (OUTPATIENT)
Dept: ULTRASOUND IMAGING | Age: 88
Discharge: HOME | End: 2023-05-25
Attending: INTERNAL MEDICINE
Payer: MEDICARE

## 2023-05-25 DIAGNOSIS — N18.4 CKD (CHRONIC KIDNEY DISEASE) STAGE 4, GFR 15-29 ML/MIN (HCC): ICD-10-CM

## 2023-05-25 PROCEDURE — 76770 US EXAM ABDO BACK WALL COMP: CPT

## 2023-06-20 ENCOUNTER — OFFICE VISIT (OUTPATIENT)
Dept: INTERNAL MEDICINE CLINIC | Age: 88
End: 2023-06-20

## 2023-06-20 VITALS
HEIGHT: 74 IN | DIASTOLIC BLOOD PRESSURE: 68 MMHG | WEIGHT: 135 LBS | SYSTOLIC BLOOD PRESSURE: 132 MMHG | BODY MASS INDEX: 17.32 KG/M2

## 2023-06-20 DIAGNOSIS — G40.209 PARTIAL SYMPTOMATIC EPILEPSY WITH COMPLEX PARTIAL SEIZURES, NOT INTRACTABLE, WITHOUT STATUS EPILEPTICUS (HCC): Chronic | ICD-10-CM

## 2023-06-20 DIAGNOSIS — N18.4 STAGE 4 CHRONIC KIDNEY DISEASE (HCC): Primary | ICD-10-CM

## 2023-06-20 DIAGNOSIS — E46 MALNUTRITION, UNSPECIFIED TYPE (HCC): ICD-10-CM

## 2023-06-20 DIAGNOSIS — E11.22 TYPE 2 DIABETES MELLITUS WITH STAGE 4 CHRONIC KIDNEY DISEASE, WITHOUT LONG-TERM CURRENT USE OF INSULIN (HCC): ICD-10-CM

## 2023-06-20 DIAGNOSIS — N18.5 ANEMIA DUE TO STAGE 5 CHRONIC KIDNEY DISEASE (HCC): ICD-10-CM

## 2023-06-20 DIAGNOSIS — D63.1 ANEMIA DUE TO STAGE 5 CHRONIC KIDNEY DISEASE (HCC): ICD-10-CM

## 2023-06-20 DIAGNOSIS — N18.4 TYPE 2 DIABETES MELLITUS WITH STAGE 4 CHRONIC KIDNEY DISEASE, WITHOUT LONG-TERM CURRENT USE OF INSULIN (HCC): ICD-10-CM

## 2023-06-20 NOTE — PROGRESS NOTES
Yosef Son (:  1935) is a 82 y.o. male,Established patient, here for evaluation of the following chief complaint(s):  Diabetes         ASSESSMENT/PLAN:  1. Stage 4 chronic kidney disease ([facility] [address]   -Has been progressing, monitored closely by the nephrologist  2. Malnutrition, unspecified type ([facility] [facility]   -Encouraged him to drink the nutritional supplements to help with the weight, continue to monitor  3. Type 2 diabetes mellitus with stage 4 chronic kidney disease, without long-term current use of insulin ([facility])   -Controlled, monitor off medication  4. Partial symptomatic epilepsy with complex partial seizures, not intractable, without status epilepticus ([facility]   -Stable, continue Keppra 500 mg twice daily    Return in about 3 months (around 2023) for chronic kidney disease, diabetes, blood pressure. Subjective   SUBJECTIVE/OBJECTIVE:  HPI    CKD -he is following up closely with Dr. Lazara Kinney, renal function has slowly been worsening. No tremor, nausea. He does not have a very good appetite. His wife prepares healthy foods for him, he we does not eat a large amount. No nausea or other GI symptoms. Diabetes has been under good control. Currently not taking any medication. He takes the Keppra twice a day, no recent seizures. No change in mental status. Review of Systems       Objective   Physical Exam  Vitals reviewed. Constitutional:       General: He is not in acute distress. Appearance: Normal appearance. He is well-developed and underweight. HENT:      Head: Normocephalic and atraumatic. Cardiovascular:      Rate and Rhythm: Normal rate and regular rhythm. Heart sounds: Normal heart sounds. Pulmonary:      Effort: Pulmonary effort is normal. No respiratory distress. Breath sounds: Normal breath sounds. Skin:     General: Skin is warm and dry. Neurological:      Mental Status: He is alert. Mental status is at baseline.    Psychiatric:         Behavior:

## 2023-06-21 DIAGNOSIS — I10 ESSENTIAL HYPERTENSION: Chronic | ICD-10-CM

## 2023-06-21 LAB
25(OH)D3 SERPL-MCNC: 52.3 NG/ML
ALBUMIN SERPL-MCNC: 4.5 G/DL (ref 3.4–5)
ANION GAP SERPL CALCULATED.3IONS-SCNC: 16 MMOL/L (ref 3–16)
BUN SERPL-MCNC: 66 MG/DL (ref 7–20)
CALCIUM SERPL-MCNC: 8.8 MG/DL (ref 8.3–10.6)
CHLORIDE SERPL-SCNC: 106 MMOL/L (ref 99–110)
CO2 SERPL-SCNC: 21 MMOL/L (ref 21–32)
CREAT SERPL-MCNC: 5 MG/DL (ref 0.8–1.3)
GFR SERPLBLD CREATININE-BSD FMLA CKD-EPI: 11 ML/MIN/{1.73_M2}
GLUCOSE SERPL-MCNC: 93 MG/DL (ref 70–99)
PHOSPHATE SERPL-MCNC: 4.8 MG/DL (ref 2.5–4.9)
POTASSIUM SERPL-SCNC: 3.9 MMOL/L (ref 3.5–5.1)
PTH-INTACT SERPL-MCNC: 190.9 PG/ML (ref 14–72)
SODIUM SERPL-SCNC: 143 MMOL/L (ref 136–145)

## 2023-06-21 RX ORDER — AMLODIPINE BESYLATE 5 MG/1
TABLET ORAL
Qty: 90 TABLET | Refills: 1 | Status: SHIPPED | OUTPATIENT
Start: 2023-06-21

## 2023-06-21 RX ORDER — MULTIVITAMIN WITH FOLIC ACID 400 MCG
TABLET ORAL
Qty: 90 TABLET | Refills: 1 | Status: SHIPPED | OUTPATIENT
Start: 2023-06-21

## 2023-06-21 RX ORDER — FAMOTIDINE 40 MG/1
TABLET, FILM COATED ORAL
Qty: 90 TABLET | Refills: 1 | Status: SHIPPED | OUTPATIENT
Start: 2023-06-21

## 2023-09-07 ENCOUNTER — TELEPHONE (OUTPATIENT)
Dept: INTERNAL MEDICINE CLINIC | Age: 88
End: 2023-09-07

## 2023-09-07 DIAGNOSIS — M25.551 RIGHT HIP PAIN: Primary | ICD-10-CM

## 2023-09-07 DIAGNOSIS — M25.561 RIGHT KNEE PAIN, UNSPECIFIED CHRONICITY: ICD-10-CM

## 2023-09-07 NOTE — TELEPHONE ENCOUNTER
He had a fall awhile back (couple of months). He is limping on his right side (hip or knee hurts). They cancelled therapy because he could not do it with the pain in his right side. They are asking for an order for an X ray for the hip or knee (they are not sure where the pain is) so he can get it done on 09/22/23 when he comes in to see Dr. Navjot Escobedo at 1:40 pm.    Call her back on [phone] if you have questions. ..

## 2023-09-22 ENCOUNTER — OFFICE VISIT (OUTPATIENT)
Dept: INTERNAL MEDICINE CLINIC | Age: 88
End: 2023-09-22

## 2023-09-22 ENCOUNTER — HOSPITAL ENCOUNTER (OUTPATIENT)
Dept: GENERAL RADIOLOGY | Age: 88
Discharge: HOME | End: 2023-09-22
Payer: MEDICARE

## 2023-09-22 VITALS — DIASTOLIC BLOOD PRESSURE: 68 MMHG | WEIGHT: 132 LBS | BODY MASS INDEX: 16.95 KG/M2 | SYSTOLIC BLOOD PRESSURE: 122 MMHG

## 2023-09-22 DIAGNOSIS — N18.4 TYPE 2 DIABETES MELLITUS WITH STAGE 4 CHRONIC KIDNEY DISEASE, WITHOUT LONG-TERM CURRENT USE OF INSULIN (HCC): ICD-10-CM

## 2023-09-22 DIAGNOSIS — N18.4 STAGE 4 CHRONIC KIDNEY DISEASE (HCC): ICD-10-CM

## 2023-09-22 DIAGNOSIS — M25.551 RIGHT HIP PAIN: ICD-10-CM

## 2023-09-22 DIAGNOSIS — M25.561 RIGHT KNEE PAIN, UNSPECIFIED CHRONICITY: ICD-10-CM

## 2023-09-22 DIAGNOSIS — I10 HYPERTENSION, UNSPECIFIED TYPE: Primary | ICD-10-CM

## 2023-09-22 DIAGNOSIS — R26.9 GAIT DISTURBANCE: ICD-10-CM

## 2023-09-22 DIAGNOSIS — E11.22 TYPE 2 DIABETES MELLITUS WITH STAGE 4 CHRONIC KIDNEY DISEASE, WITHOUT LONG-TERM CURRENT USE OF INSULIN (HCC): ICD-10-CM

## 2023-09-22 PROCEDURE — 73562 X-RAY EXAM OF KNEE 3: CPT

## 2023-09-22 PROCEDURE — 73502 X-RAY EXAM HIP UNI 2-3 VIEWS: CPT

## 2023-09-22 PROCEDURE — 73560 X-RAY EXAM OF KNEE 1 OR 2: CPT

## 2023-09-22 NOTE — PROGRESS NOTES
Salina Victor (:  1935) is a 82 y.o. male,Established patient, here for evaluation of the following chief complaint(s): Anxiety         ASSESSMENT/PLAN:  1. Hypertension, unspecified type  - stable, continue amlodipine 5mg daily  -     Comprehensive Metabolic Panel; Future  -     Lipid Panel; Future  -     Hemoglobin A1C; Future  -     CBC with Auto Differential; Future  2. Stage 4 chronic kidney disease (HCC)  - stable, monitored closely by nephrologist  -     Comprehensive Metabolic Panel; Future  -     Lipid Panel; Future  -     Hemoglobin A1C; Future  -     CBC with Auto Differential; Future  3. Type 2 diabetes mellitus with stage 4 chronic kidney disease, without long-term current use of insulin (HCC)  - stable, diet controlled, continue supplement for weight gain  -     Comprehensive Metabolic Panel; Future  -     Lipid Panel; Future  -     Hemoglobin A1C; Future  -     CBC with Auto Differential; Future  4. Gait disturbance   - will check xrays of hip and knee, not complaining of pain and no obvious weakness on exam     Return in about 3 months (around 2023) for diabetes, kidneys. Subjective   SUBJECTIVE/OBJECTIVE:  HPI    Having weakness in the left leg - no pain, but having difficulty walking. Not clear if it is the hip or the knee. Not complaining of anything. He does have a history of hip replacement. Seeing hematology for epo injections. Renal function is stable. Not yet on dialysis. Monitored by his nephrologist.    Blood pressure has been under good control. Adherent to medications. Not currently taking any diabetes medications. Has not been able to gain weight. He is taking mirtazapine. Review of Systems       Objective   Physical Exam  Vitals reviewed. Constitutional:       General: He is not in acute distress. Appearance: Normal appearance. He is well-developed. HENT:      Head: Normocephalic and atraumatic.    Cardiovascular:      Rate and Rhythm: Normal

## 2023-09-24 PROBLEM — R60.0 BILATERAL LEG EDEMA: Status: RESOLVED | Noted: 2021-09-23 | Resolved: 2023-09-24

## 2023-09-24 PROBLEM — N17.9 AKI (ACUTE KIDNEY INJURY) (HCC): Status: RESOLVED | Noted: 2021-04-15 | Resolved: 2023-09-24

## 2023-09-26 ENCOUNTER — TELEPHONE (OUTPATIENT)
Dept: INTERNAL MEDICINE CLINIC | Age: 88
End: 2023-09-26

## 2023-09-26 NOTE — TELEPHONE ENCOUNTER
Patient's wife would like to discuss xray results for the patient. This was done on 9/22. Pls advise.

## 2023-10-24 RX ORDER — MEMANTINE HYDROCHLORIDE 5 MG/1
TABLET ORAL
Qty: 45 TABLET | Refills: 1 | Status: SHIPPED | OUTPATIENT
Start: 2023-10-24

## 2023-10-27 RX ORDER — MIRTAZAPINE 30 MG/1
30 TABLET, FILM COATED ORAL NIGHTLY
Qty: 90 TABLET | Refills: 1 | Status: SHIPPED | OUTPATIENT
Start: 2023-10-27

## 2023-11-02 DIAGNOSIS — R35.0 URINARY FREQUENCY: ICD-10-CM

## 2023-11-02 RX ORDER — TAMSULOSIN HYDROCHLORIDE 0.4 MG/1
CAPSULE ORAL
Qty: 90 CAPSULE | Refills: 1 | Status: SHIPPED | OUTPATIENT
Start: 2023-11-02

## 2023-12-11 RX ORDER — ATORVASTATIN CALCIUM 10 MG/1
TABLET, FILM COATED ORAL
Qty: 90 TABLET | Refills: 1 | Status: SHIPPED | OUTPATIENT
Start: 2023-12-11

## 2023-12-11 RX ORDER — FAMOTIDINE 40 MG/1
TABLET, FILM COATED ORAL
Qty: 90 TABLET | Refills: 1 | Status: SHIPPED | OUTPATIENT
Start: 2023-12-11

## 2023-12-15 DIAGNOSIS — R56.9 SEIZURE (HCC): ICD-10-CM

## 2023-12-15 RX ORDER — LEVETIRACETAM 500 MG/1
TABLET ORAL
Qty: 180 TABLET | Refills: 3 | Status: SHIPPED | OUTPATIENT
Start: 2023-12-15

## 2024-01-25 RX ORDER — MULTIVITAMIN WITH FOLIC ACID 400 MCG
TABLET ORAL
Qty: 90 TABLET | Refills: 1 | Status: SHIPPED | OUTPATIENT
Start: 2024-01-25

## 2024-01-26 ENCOUNTER — TELEPHONE (OUTPATIENT)
Dept: INTERNAL MEDICINE CLINIC | Age: 89
End: 2024-01-26

## 2024-01-26 ENCOUNTER — HOSPITAL ENCOUNTER (OUTPATIENT)
Dept: GENERAL RADIOLOGY | Age: 89
Discharge: HOME | End: 2024-01-26
Payer: MEDICARE

## 2024-01-26 ENCOUNTER — OFFICE VISIT (OUTPATIENT)
Dept: INTERNAL MEDICINE CLINIC | Age: 89
End: 2024-01-26
Payer: MEDICARE

## 2024-01-26 VITALS — SYSTOLIC BLOOD PRESSURE: 134 MMHG | DIASTOLIC BLOOD PRESSURE: 72 MMHG

## 2024-01-26 DIAGNOSIS — Z23 NEED FOR INFLUENZA VACCINATION: ICD-10-CM

## 2024-01-26 DIAGNOSIS — I73.9 PERIPHERAL VASCULAR DISEASE (HCC): ICD-10-CM

## 2024-01-26 DIAGNOSIS — G89.29 CHRONIC RIGHT-SIDED LOW BACK PAIN, UNSPECIFIED WHETHER SCIATICA PRESENT: Primary | ICD-10-CM

## 2024-01-26 DIAGNOSIS — Z74.09 IMPAIRED FUNCTIONAL MOBILITY, BALANCE, GAIT, AND ENDURANCE: ICD-10-CM

## 2024-01-26 DIAGNOSIS — E46 MALNUTRITION, UNSPECIFIED TYPE (HCC): ICD-10-CM

## 2024-01-26 DIAGNOSIS — I10 HYPERTENSION, UNSPECIFIED TYPE: ICD-10-CM

## 2024-01-26 DIAGNOSIS — G40.209 PARTIAL SYMPTOMATIC EPILEPSY WITH COMPLEX PARTIAL SEIZURES, NOT INTRACTABLE, WITHOUT STATUS EPILEPTICUS (HCC): ICD-10-CM

## 2024-01-26 DIAGNOSIS — F01.50 VASCULAR DEMENTIA, UNSPECIFIED DEMENTIA SEVERITY, UNSPECIFIED WHETHER BEHAVIORAL, PSYCHOTIC, OR MOOD DISTURBANCE OR ANXIETY (HCC): ICD-10-CM

## 2024-01-26 DIAGNOSIS — Z85.46 HISTORY OF PROSTATE CANCER: ICD-10-CM

## 2024-01-26 DIAGNOSIS — G89.29 CHRONIC RIGHT-SIDED LOW BACK PAIN, UNSPECIFIED WHETHER SCIATICA PRESENT: ICD-10-CM

## 2024-01-26 DIAGNOSIS — M54.50 CHRONIC RIGHT-SIDED LOW BACK PAIN, UNSPECIFIED WHETHER SCIATICA PRESENT: ICD-10-CM

## 2024-01-26 DIAGNOSIS — N18.5 ANEMIA DUE TO STAGE 5 CHRONIC KIDNEY DISEASE (HCC): ICD-10-CM

## 2024-01-26 DIAGNOSIS — E11.22 TYPE 2 DIABETES MELLITUS WITH STAGE 4 CHRONIC KIDNEY DISEASE, WITHOUT LONG-TERM CURRENT USE OF INSULIN (HCC): ICD-10-CM

## 2024-01-26 DIAGNOSIS — M54.50 CHRONIC RIGHT-SIDED LOW BACK PAIN, UNSPECIFIED WHETHER SCIATICA PRESENT: Primary | ICD-10-CM

## 2024-01-26 DIAGNOSIS — F20.0 PARANOID SCHIZOPHRENIA (HCC): ICD-10-CM

## 2024-01-26 DIAGNOSIS — Z20.828 EXPOSURE TO INFLUENZA: ICD-10-CM

## 2024-01-26 DIAGNOSIS — D63.1 ANEMIA DUE TO STAGE 5 CHRONIC KIDNEY DISEASE (HCC): ICD-10-CM

## 2024-01-26 DIAGNOSIS — N18.4 TYPE 2 DIABETES MELLITUS WITH STAGE 4 CHRONIC KIDNEY DISEASE, WITHOUT LONG-TERM CURRENT USE OF INSULIN (HCC): ICD-10-CM

## 2024-01-26 PROCEDURE — G0008 ADMIN INFLUENZA VIRUS VAC: HCPCS | Performed by: INTERNAL MEDICINE

## 2024-01-26 PROCEDURE — 99214 OFFICE O/P EST MOD 30 MIN: CPT | Performed by: INTERNAL MEDICINE

## 2024-01-26 PROCEDURE — G8427 DOCREV CUR MEDS BY ELIG CLIN: HCPCS | Performed by: INTERNAL MEDICINE

## 2024-01-26 PROCEDURE — 72100 X-RAY EXAM L-S SPINE 2/3 VWS: CPT

## 2024-01-26 PROCEDURE — G8419 CALC BMI OUT NRM PARAM NOF/U: HCPCS | Performed by: INTERNAL MEDICINE

## 2024-01-26 PROCEDURE — G8484 FLU IMMUNIZE NO ADMIN: HCPCS | Performed by: INTERNAL MEDICINE

## 2024-01-26 PROCEDURE — 1123F ACP DISCUSS/DSCN MKR DOCD: CPT | Performed by: INTERNAL MEDICINE

## 2024-01-26 PROCEDURE — 90694 VACC AIIV4 NO PRSRV 0.5ML IM: CPT | Performed by: INTERNAL MEDICINE

## 2024-01-26 PROCEDURE — 1036F TOBACCO NON-USER: CPT | Performed by: INTERNAL MEDICINE

## 2024-01-26 RX ORDER — OSELTAMIVIR PHOSPHATE 30 MG/1
30 CAPSULE ORAL ONCE
Qty: 1 CAPSULE | Refills: 0 | Status: SHIPPED | OUTPATIENT
Start: 2024-01-26 | End: 2024-01-26

## 2024-01-26 ASSESSMENT — PATIENT HEALTH QUESTIONNAIRE - PHQ9
SUM OF ALL RESPONSES TO PHQ9 QUESTIONS 1 & 2: 0
SUM OF ALL RESPONSES TO PHQ QUESTIONS 1-9: 0
1. LITTLE INTEREST OR PLEASURE IN DOING THINGS: 0
6. FEELING BAD ABOUT YOURSELF - OR THAT YOU ARE A FAILURE OR HAVE LET YOURSELF OR YOUR FAMILY DOWN: 0
8. MOVING OR SPEAKING SO SLOWLY THAT OTHER PEOPLE COULD HAVE NOTICED. OR THE OPPOSITE, BEING SO FIGETY OR RESTLESS THAT YOU HAVE BEEN MOVING AROUND A LOT MORE THAN USUAL: 0
SUM OF ALL RESPONSES TO PHQ QUESTIONS 1-9: 0
4. FEELING TIRED OR HAVING LITTLE ENERGY: 0
SUM OF ALL RESPONSES TO PHQ QUESTIONS 1-9: 0
2. FEELING DOWN, DEPRESSED OR HOPELESS: 0
7. TROUBLE CONCENTRATING ON THINGS, SUCH AS READING THE NEWSPAPER OR WATCHING TELEVISION: 0
10. IF YOU CHECKED OFF ANY PROBLEMS, HOW DIFFICULT HAVE THESE PROBLEMS MADE IT FOR YOU TO DO YOUR WORK, TAKE CARE OF THINGS AT HOME, OR GET ALONG WITH OTHER PEOPLE: 0
3. TROUBLE FALLING OR STAYING ASLEEP: 0
5. POOR APPETITE OR OVEREATING: 0
9. THOUGHTS THAT YOU WOULD BE BETTER OFF DEAD, OR OF HURTING YOURSELF: 0
SUM OF ALL RESPONSES TO PHQ QUESTIONS 1-9: 0

## 2024-01-26 NOTE — PROGRESS NOTES
Fawad Glynn (:  1935) is a 88 y.o. male,Established patient, here for evaluation of the following chief complaint(s):  Diabetes         ASSESSMENT/PLAN:  1. Chronic right-sided low back pain, unspecified whether sciatica present  - will check xray due to tenderness. May refer to spine clinic  -     XR LUMBAR SPINE (2-3 VIEWS); Future  2. Impaired functional mobility, balance, gait, and endurance   - recommend home PT, wife does not want someone in the home. She eventually agreed to clear out the hallway so he can use his walker. Emphasized that she needs to bring him the walker if it is not nearby.  3. Exposure to influenza   - treat with tamiflu, significant dose adjustment per lexicomp for stage 5 CKD  4. Type 2 diabetes mellitus with stage 4 chronic kidney disease, without long-term current use of insulin (HCC)  - A1c has been controlled, will reassess with next blood work, off of all meds  -     Hemoglobin A1C; Future  5. Vascular dementia, unspecified dementia severity, unspecified whether behavioral, psychotic, or mood disturbance or anxiety (HCC)   - continue memantine  6. Anemia due to stage 5 chronic kidney disease (HCC)   - managed by nephrology  7. Paranoid schizophrenia (HCC)   - stable, continue mirtazapine 30mg nightly  8. Malnutrition, unspecified type (HCC)   - continue Boost very high calorie  9. Peripheral vascular disease (HCC)   - continue aspirin 325 mg daily  10. Partial symptomatic epilepsy with complex partial seizures, not intractable, without status epilepticus (HCC)   - controlled, continue levetiracetam 500mg twice daily  11. Hypertension, unspecified type   - controlled, continue amlodipine 5mg daily  12. Need for influenza vaccination  -     Influenza, FLUAD, (age 65 y+), IM, Preservative Free, 0.5 mL  13. History of prostate cancer   - previously treated    Return in about 3 months (around 2024) for AWV.         Subjective   SUBJECTIVE/OBJECTIVE:  HPI    Falling a

## 2024-01-26 NOTE — PATIENT INSTRUCTIONS
Physical therapy and occupational therapy to help with figuring out what would be helpful to move/change

## 2024-01-26 NOTE — TELEPHONE ENCOUNTER
Pharmcy calling stating that the dose is the same as if they had the virus it is 2 tablets daily because if they don't take two they will not get the correct treatment. He stated if there is other studies that say different to call him back and let him know.     If changing script to send new script

## 2024-01-29 ENCOUNTER — TELEPHONE (OUTPATIENT)
Dept: INTERNAL MEDICINE CLINIC | Age: 89
End: 2024-01-29

## 2024-01-29 DIAGNOSIS — G89.29 CHRONIC RIGHT-SIDED LOW BACK PAIN, UNSPECIFIED WHETHER SCIATICA PRESENT: Primary | ICD-10-CM

## 2024-01-29 DIAGNOSIS — M54.50 CHRONIC RIGHT-SIDED LOW BACK PAIN, UNSPECIFIED WHETHER SCIATICA PRESENT: Primary | ICD-10-CM

## 2024-01-29 PROBLEM — F01.518 VASCULAR DEMENTIA WITH BEHAVIOR DISTURBANCE (HCC): Status: RESOLVED | Noted: 2020-12-15 | Resolved: 2024-01-29

## 2024-01-29 PROBLEM — D63.1 ANEMIA DUE TO STAGE 5 CHRONIC KIDNEY DISEASE (HCC): Status: ACTIVE | Noted: 2024-01-29

## 2024-01-29 PROBLEM — F01.50 VASCULAR DEMENTIA (HCC): Status: ACTIVE | Noted: 2020-12-15

## 2024-01-29 PROBLEM — N18.5 ANEMIA DUE TO STAGE 5 CHRONIC KIDNEY DISEASE (HCC): Status: ACTIVE | Noted: 2024-01-29

## 2024-01-29 PROBLEM — N18.5 STAGE 5 CHRONIC KIDNEY DISEASE NOT ON CHRONIC DIALYSIS (HCC): Status: ACTIVE | Noted: 2022-09-13

## 2024-02-08 ENCOUNTER — APPOINTMENT (OUTPATIENT)
Dept: CT IMAGING | Age: 89
End: 2024-02-08
Payer: MEDICARE

## 2024-02-08 ENCOUNTER — HOSPITAL ENCOUNTER (INPATIENT)
Age: 89
LOS: 8 days | Discharge: HOME | End: 2024-02-16
Attending: EMERGENCY MEDICINE | Admitting: HOSPITALIST
Payer: MEDICARE

## 2024-02-08 DIAGNOSIS — D62 ACUTE BLOOD LOSS ANEMIA: ICD-10-CM

## 2024-02-08 DIAGNOSIS — N18.5 STAGE 5 CHRONIC KIDNEY DISEASE NOT ON CHRONIC DIALYSIS (HCC): ICD-10-CM

## 2024-02-08 DIAGNOSIS — K62.5 RECTAL BLEEDING: Primary | ICD-10-CM

## 2024-02-08 DIAGNOSIS — R46.89 SPELL OF ABNORMAL BEHAVIOR: ICD-10-CM

## 2024-02-08 PROBLEM — I95.89 HYPOTENSION DUE TO HYPOVOLEMIA: Status: ACTIVE | Noted: 2024-02-08

## 2024-02-08 PROBLEM — K92.2 GI BLEEDING: Status: ACTIVE | Noted: 2024-02-08

## 2024-02-08 PROBLEM — E86.1 HYPOTENSION DUE TO HYPOVOLEMIA: Status: ACTIVE | Noted: 2024-02-08

## 2024-02-08 LAB
ABO + RH BLD: NORMAL
ALBUMIN SERPL-MCNC: 3.4 G/DL (ref 3.4–5)
ALBUMIN SERPL-MCNC: 3.8 G/DL (ref 3.4–5)
ALP SERPL-CCNC: 157 U/L (ref 40–129)
ALT SERPL-CCNC: 8 U/L (ref 10–40)
ANION GAP SERPL CALCULATED.3IONS-SCNC: 13 MMOL/L (ref 3–16)
ANION GAP SERPL CALCULATED.3IONS-SCNC: 16 MMOL/L (ref 3–16)
AST SERPL-CCNC: 15 U/L (ref 15–37)
BASOPHILS # BLD: 0.1 K/UL (ref 0–0.2)
BASOPHILS NFR BLD: 0.7 %
BILIRUB DIRECT SERPL-MCNC: <0.2 MG/DL (ref 0–0.3)
BILIRUB INDIRECT SERPL-MCNC: ABNORMAL MG/DL (ref 0–1)
BILIRUB SERPL-MCNC: <0.2 MG/DL (ref 0–1)
BLD GP AB SCN SERPL QL: NORMAL
BUN SERPL-MCNC: 71 MG/DL (ref 7–20)
BUN SERPL-MCNC: 81 MG/DL (ref 7–20)
CALCIUM SERPL-MCNC: 8 MG/DL (ref 8.3–10.6)
CALCIUM SERPL-MCNC: 8.3 MG/DL (ref 8.3–10.6)
CHLORIDE SERPL-SCNC: 104 MMOL/L (ref 99–110)
CHLORIDE SERPL-SCNC: 108 MMOL/L (ref 99–110)
CO2 SERPL-SCNC: 19 MMOL/L (ref 21–32)
CO2 SERPL-SCNC: 20 MMOL/L (ref 21–32)
CREAT SERPL-MCNC: 6.7 MG/DL (ref 0.8–1.3)
CREAT SERPL-MCNC: 7 MG/DL (ref 0.8–1.3)
DEPRECATED RDW RBC AUTO: 14.9 % (ref 12.4–15.4)
DEPRECATED RDW RBC AUTO: 15.5 % (ref 12.4–15.4)
EKG ATRIAL RATE: 86 BPM
EKG DIAGNOSIS: NORMAL
EKG P AXIS: 79 DEGREES
EKG P-R INTERVAL: 196 MS
EKG Q-T INTERVAL: 394 MS
EKG QRS DURATION: 82 MS
EKG QTC CALCULATION (BAZETT): 471 MS
EKG R AXIS: 82 DEGREES
EKG T AXIS: 85 DEGREES
EKG VENTRICULAR RATE: 86 BPM
EOSINOPHIL # BLD: 0.2 K/UL (ref 0–0.6)
EOSINOPHIL NFR BLD: 1.5 %
GFR SERPLBLD CREATININE-BSD FMLA CKD-EPI: 7 ML/MIN/{1.73_M2}
GFR SERPLBLD CREATININE-BSD FMLA CKD-EPI: 7 ML/MIN/{1.73_M2}
GLUCOSE BLD-MCNC: 199 MG/DL (ref 70–99)
GLUCOSE BLD-MCNC: 199 MG/DL (ref 70–99)
GLUCOSE BLD-MCNC: 95 MG/DL (ref 70–99)
GLUCOSE BLD-MCNC: 99 MG/DL (ref 70–99)
GLUCOSE SERPL-MCNC: 122 MG/DL (ref 70–99)
GLUCOSE SERPL-MCNC: 154 MG/DL (ref 70–99)
HCT VFR BLD AUTO: 23.4 % (ref 40.5–52.5)
HCT VFR BLD AUTO: 24.2 % (ref 40.5–52.5)
HCT VFR BLD AUTO: 24.7 % (ref 40.5–52.5)
HCT VFR BLD AUTO: 28 % (ref 40.5–52.5)
HGB BLD-MCNC: 7.8 G/DL (ref 13.5–17.5)
HGB BLD-MCNC: 8 G/DL (ref 13.5–17.5)
HGB BLD-MCNC: 8.1 G/DL (ref 13.5–17.5)
HGB BLD-MCNC: 8.9 G/DL (ref 13.5–17.5)
LIPASE SERPL-CCNC: 233 U/L (ref 13–60)
LYMPHOCYTES # BLD: 0.6 K/UL (ref 1–5.1)
LYMPHOCYTES NFR BLD: 5.2 %
MCH RBC QN AUTO: 27.2 PG (ref 26–34)
MCH RBC QN AUTO: 27.7 PG (ref 26–34)
MCHC RBC AUTO-ENTMCNC: 31.9 G/DL (ref 31–36)
MCHC RBC AUTO-ENTMCNC: 32.5 G/DL (ref 31–36)
MCV RBC AUTO: 85 FL (ref 80–100)
MCV RBC AUTO: 85.3 FL (ref 80–100)
MONOCYTES # BLD: 0.4 K/UL (ref 0–1.3)
MONOCYTES NFR BLD: 3.3 %
NEUTROPHILS # BLD: 10.1 K/UL (ref 1.7–7.7)
NEUTROPHILS NFR BLD: 89.3 %
PERFORMED ON: ABNORMAL
PERFORMED ON: ABNORMAL
PERFORMED ON: NORMAL
PERFORMED ON: NORMAL
PHOSPHATE SERPL-MCNC: 5.1 MG/DL (ref 2.5–4.9)
PHOSPHATE SERPL-MCNC: 5.3 MG/DL (ref 2.5–4.9)
PLATELET # BLD AUTO: 240 K/UL (ref 135–450)
PLATELET # BLD AUTO: 241 K/UL (ref 135–450)
PMV BLD AUTO: 8.9 FL (ref 5–10.5)
PMV BLD AUTO: 9 FL (ref 5–10.5)
POTASSIUM SERPL-SCNC: 3.9 MMOL/L (ref 3.5–5.1)
POTASSIUM SERPL-SCNC: 3.9 MMOL/L (ref 3.5–5.1)
PROT SERPL-MCNC: 6.9 G/DL (ref 6.4–8.2)
RBC # BLD AUTO: 2.9 M/UL (ref 4.2–5.9)
RBC # BLD AUTO: 3.29 M/UL (ref 4.2–5.9)
SODIUM SERPL-SCNC: 139 MMOL/L (ref 136–145)
SODIUM SERPL-SCNC: 141 MMOL/L (ref 136–145)
WBC # BLD AUTO: 11.3 K/UL (ref 4–11)
WBC # BLD AUTO: 12 K/UL (ref 4–11)

## 2024-02-08 PROCEDURE — 70450 CT HEAD/BRAIN W/O DYE: CPT

## 2024-02-08 PROCEDURE — 85027 COMPLETE CBC AUTOMATED: CPT

## 2024-02-08 PROCEDURE — 84100 ASSAY OF PHOSPHORUS: CPT

## 2024-02-08 PROCEDURE — 83690 ASSAY OF LIPASE: CPT

## 2024-02-08 PROCEDURE — 86850 RBC ANTIBODY SCREEN: CPT

## 2024-02-08 PROCEDURE — 80069 RENAL FUNCTION PANEL: CPT

## 2024-02-08 PROCEDURE — 80177 DRUG SCRN QUAN LEVETIRACETAM: CPT

## 2024-02-08 PROCEDURE — 99223 1ST HOSP IP/OBS HIGH 75: CPT | Performed by: INTERNAL MEDICINE

## 2024-02-08 PROCEDURE — 6360000002 HC RX W HCPCS: Performed by: STUDENT IN AN ORGANIZED HEALTH CARE EDUCATION/TRAINING PROGRAM

## 2024-02-08 PROCEDURE — 74176 CT ABD & PELVIS W/O CONTRAST: CPT

## 2024-02-08 PROCEDURE — 2580000003 HC RX 258: Performed by: EMERGENCY MEDICINE

## 2024-02-08 PROCEDURE — 94640 AIRWAY INHALATION TREATMENT: CPT

## 2024-02-08 PROCEDURE — 36415 COLL VENOUS BLD VENIPUNCTURE: CPT

## 2024-02-08 PROCEDURE — 6370000000 HC RX 637 (ALT 250 FOR IP): Performed by: STUDENT IN AN ORGANIZED HEALTH CARE EDUCATION/TRAINING PROGRAM

## 2024-02-08 PROCEDURE — 99222 1ST HOSP IP/OBS MODERATE 55: CPT | Performed by: HOSPITALIST

## 2024-02-08 PROCEDURE — 86923 COMPATIBILITY TEST ELECTRIC: CPT

## 2024-02-08 PROCEDURE — 1200000000 HC SEMI PRIVATE

## 2024-02-08 PROCEDURE — 86900 BLOOD TYPING SEROLOGIC ABO: CPT

## 2024-02-08 PROCEDURE — 30233N1 TRANSFUSION OF NONAUTOLOGOUS RED BLOOD CELLS INTO PERIPHERAL VEIN, PERCUTANEOUS APPROACH: ICD-10-PCS | Performed by: EMERGENCY MEDICINE

## 2024-02-08 PROCEDURE — 85025 COMPLETE CBC W/AUTO DIFF WBC: CPT

## 2024-02-08 PROCEDURE — 2580000003 HC RX 258: Performed by: STUDENT IN AN ORGANIZED HEALTH CARE EDUCATION/TRAINING PROGRAM

## 2024-02-08 PROCEDURE — 99285 EMERGENCY DEPT VISIT HI MDM: CPT

## 2024-02-08 PROCEDURE — 6360000002 HC RX W HCPCS: Performed by: NURSE PRACTITIONER

## 2024-02-08 PROCEDURE — C9113 INJ PANTOPRAZOLE SODIUM, VIA: HCPCS | Performed by: STUDENT IN AN ORGANIZED HEALTH CARE EDUCATION/TRAINING PROGRAM

## 2024-02-08 PROCEDURE — 2580000003 HC RX 258

## 2024-02-08 PROCEDURE — 86901 BLOOD TYPING SEROLOGIC RH(D): CPT

## 2024-02-08 PROCEDURE — 85014 HEMATOCRIT: CPT

## 2024-02-08 PROCEDURE — 51798 US URINE CAPACITY MEASURE: CPT

## 2024-02-08 PROCEDURE — 85018 HEMOGLOBIN: CPT

## 2024-02-08 PROCEDURE — 80076 HEPATIC FUNCTION PANEL: CPT

## 2024-02-08 PROCEDURE — 93005 ELECTROCARDIOGRAM TRACING: CPT | Performed by: EMERGENCY MEDICINE

## 2024-02-08 PROCEDURE — P9016 RBC LEUKOCYTES REDUCED: HCPCS

## 2024-02-08 RX ORDER — TAMSULOSIN HYDROCHLORIDE 0.4 MG/1
0.4 CAPSULE ORAL DAILY
Status: DISCONTINUED | OUTPATIENT
Start: 2024-02-08 | End: 2024-02-16 | Stop reason: HOSPADM

## 2024-02-08 RX ORDER — ONDANSETRON 4 MG/1
4 TABLET, ORALLY DISINTEGRATING ORAL EVERY 8 HOURS PRN
Status: DISCONTINUED | OUTPATIENT
Start: 2024-02-08 | End: 2024-02-16 | Stop reason: HOSPADM

## 2024-02-08 RX ORDER — M-VIT,TX,IRON,MINS/CALC/FOLIC 27MG-0.4MG
1 TABLET ORAL DAILY
Status: DISCONTINUED | OUTPATIENT
Start: 2024-02-08 | End: 2024-02-16 | Stop reason: HOSPADM

## 2024-02-08 RX ORDER — SODIUM CHLORIDE 9 MG/ML
INJECTION, SOLUTION INTRAVENOUS PRN
Status: DISCONTINUED | OUTPATIENT
Start: 2024-02-08 | End: 2024-02-10

## 2024-02-08 RX ORDER — MIRTAZAPINE 30 MG/1
30 TABLET, FILM COATED ORAL NIGHTLY
Status: DISCONTINUED | OUTPATIENT
Start: 2024-02-08 | End: 2024-02-13

## 2024-02-08 RX ORDER — AMLODIPINE BESYLATE 5 MG/1
5 TABLET ORAL DAILY
Status: DISCONTINUED | OUTPATIENT
Start: 2024-02-08 | End: 2024-02-14

## 2024-02-08 RX ORDER — FLUTICASONE PROPIONATE 50 MCG
2 SPRAY, SUSPENSION (ML) NASAL DAILY PRN
Status: DISCONTINUED | OUTPATIENT
Start: 2024-02-08 | End: 2024-02-08 | Stop reason: CLARIF

## 2024-02-08 RX ORDER — SODIUM CHLORIDE 0.9 % (FLUSH) 0.9 %
5-40 SYRINGE (ML) INJECTION PRN
Status: DISCONTINUED | OUTPATIENT
Start: 2024-02-08 | End: 2024-02-16 | Stop reason: HOSPADM

## 2024-02-08 RX ORDER — GLUCAGON 1 MG/ML
1 KIT INJECTION PRN
Status: DISCONTINUED | OUTPATIENT
Start: 2024-02-08 | End: 2024-02-16 | Stop reason: HOSPADM

## 2024-02-08 RX ORDER — ACETAMINOPHEN 325 MG/1
650 TABLET ORAL EVERY 6 HOURS PRN
Status: DISCONTINUED | OUTPATIENT
Start: 2024-02-08 | End: 2024-02-16 | Stop reason: HOSPADM

## 2024-02-08 RX ORDER — SODIUM CHLORIDE 9 MG/ML
INJECTION, SOLUTION INTRAVENOUS PRN
Status: DISCONTINUED | OUTPATIENT
Start: 2024-02-08 | End: 2024-02-16 | Stop reason: HOSPADM

## 2024-02-08 RX ORDER — LEVETIRACETAM 500 MG/5ML
500 INJECTION, SOLUTION, CONCENTRATE INTRAVENOUS EVERY 12 HOURS
Status: DISCONTINUED | OUTPATIENT
Start: 2024-02-08 | End: 2024-02-16 | Stop reason: HOSPADM

## 2024-02-08 RX ORDER — 0.9 % SODIUM CHLORIDE 0.9 %
500 INTRAVENOUS SOLUTION INTRAVENOUS ONCE
Status: COMPLETED | OUTPATIENT
Start: 2024-02-08 | End: 2024-02-08

## 2024-02-08 RX ORDER — ONDANSETRON 2 MG/ML
4 INJECTION INTRAMUSCULAR; INTRAVENOUS EVERY 6 HOURS PRN
Status: DISCONTINUED | OUTPATIENT
Start: 2024-02-08 | End: 2024-02-16 | Stop reason: HOSPADM

## 2024-02-08 RX ORDER — LATANOPROST 50 UG/ML
1 SOLUTION/ DROPS OPHTHALMIC NIGHTLY
COMMUNITY
End: 2024-02-22 | Stop reason: SDUPTHER

## 2024-02-08 RX ORDER — BUDESONIDE 0.5 MG/2ML
0.5 INHALANT ORAL
Status: DISCONTINUED | OUTPATIENT
Start: 2024-02-08 | End: 2024-02-10

## 2024-02-08 RX ORDER — POLYETHYLENE GLYCOL 3350 17 G/17G
17 POWDER, FOR SOLUTION ORAL DAILY PRN
Status: DISCONTINUED | OUTPATIENT
Start: 2024-02-08 | End: 2024-02-11 | Stop reason: SDUPTHER

## 2024-02-08 RX ORDER — ACETAMINOPHEN 650 MG/1
650 SUPPOSITORY RECTAL EVERY 6 HOURS PRN
Status: DISCONTINUED | OUTPATIENT
Start: 2024-02-08 | End: 2024-02-16 | Stop reason: HOSPADM

## 2024-02-08 RX ORDER — SODIUM CHLORIDE 0.9 % (FLUSH) 0.9 %
5-40 SYRINGE (ML) INJECTION EVERY 12 HOURS SCHEDULED
Status: DISCONTINUED | OUTPATIENT
Start: 2024-02-08 | End: 2024-02-16 | Stop reason: HOSPADM

## 2024-02-08 RX ORDER — ARFORMOTEROL TARTRATE 15 UG/2ML
15 SOLUTION RESPIRATORY (INHALATION)
Status: DISCONTINUED | OUTPATIENT
Start: 2024-02-08 | End: 2024-02-10

## 2024-02-08 RX ORDER — MEMANTINE HYDROCHLORIDE 5 MG/1
2.5 TABLET ORAL DAILY
Status: DISCONTINUED | OUTPATIENT
Start: 2024-02-08 | End: 2024-02-13

## 2024-02-08 RX ORDER — ASPIRIN 81 MG/1
81 TABLET ORAL DAILY
COMMUNITY
End: 2024-02-22 | Stop reason: SDUPTHER

## 2024-02-08 RX ORDER — LATANOPROST 50 UG/ML
1 SOLUTION/ DROPS OPHTHALMIC NIGHTLY
Status: DISCONTINUED | OUTPATIENT
Start: 2024-02-08 | End: 2024-02-16 | Stop reason: HOSPADM

## 2024-02-08 RX ORDER — LEVETIRACETAM 500 MG/1
500 TABLET ORAL 2 TIMES DAILY
Status: DISCONTINUED | OUTPATIENT
Start: 2024-02-08 | End: 2024-02-08

## 2024-02-08 RX ORDER — INSULIN LISPRO 100 [IU]/ML
0-4 INJECTION, SOLUTION INTRAVENOUS; SUBCUTANEOUS
Status: DISCONTINUED | OUTPATIENT
Start: 2024-02-08 | End: 2024-02-16 | Stop reason: HOSPADM

## 2024-02-08 RX ORDER — INSULIN LISPRO 100 [IU]/ML
0-4 INJECTION, SOLUTION INTRAVENOUS; SUBCUTANEOUS NIGHTLY
Status: DISCONTINUED | OUTPATIENT
Start: 2024-02-08 | End: 2024-02-16 | Stop reason: HOSPADM

## 2024-02-08 RX ORDER — LATANOPROST 50 UG/ML
1 SOLUTION/ DROPS OPHTHALMIC NIGHTLY
Status: DISCONTINUED | OUTPATIENT
Start: 2024-02-08 | End: 2024-02-08

## 2024-02-08 RX ORDER — ATORVASTATIN CALCIUM 10 MG/1
10 TABLET, FILM COATED ORAL DAILY
Status: DISCONTINUED | OUTPATIENT
Start: 2024-02-08 | End: 2024-02-13

## 2024-02-08 RX ORDER — DEXTROSE MONOHYDRATE 100 MG/ML
INJECTION, SOLUTION INTRAVENOUS CONTINUOUS PRN
Status: DISCONTINUED | OUTPATIENT
Start: 2024-02-08 | End: 2024-02-16 | Stop reason: HOSPADM

## 2024-02-08 RX ADMIN — SODIUM CHLORIDE, PRESERVATIVE FREE 10 ML: 5 INJECTION INTRAVENOUS at 11:20

## 2024-02-08 RX ADMIN — LEVETIRACETAM 500 MG: 500 INJECTION, SOLUTION, CONCENTRATE INTRAVENOUS at 23:27

## 2024-02-08 RX ADMIN — ARFORMOTEROL TARTRATE 15 MCG: 15 SOLUTION RESPIRATORY (INHALATION) at 21:16

## 2024-02-08 RX ADMIN — BUDESONIDE INHALATION 500 MCG: 0.5 SUSPENSION RESPIRATORY (INHALATION) at 21:16

## 2024-02-08 RX ADMIN — LEVETIRACETAM 500 MG: 500 INJECTION, SOLUTION, CONCENTRATE INTRAVENOUS at 11:16

## 2024-02-08 RX ADMIN — LATANOPROST 1 DROP: 50 SOLUTION OPHTHALMIC at 21:54

## 2024-02-08 RX ADMIN — SODIUM CHLORIDE, PRESERVATIVE FREE 10 ML: 5 INJECTION INTRAVENOUS at 22:23

## 2024-02-08 RX ADMIN — SODIUM CHLORIDE, PRESERVATIVE FREE 40 MG: 5 INJECTION INTRAVENOUS at 11:16

## 2024-02-08 RX ADMIN — POLYETHYLENE GLYCOL-3350 AND ELECTROLYTES 4000 ML: 236; 6.74; 5.86; 2.97; 22.74 POWDER, FOR SOLUTION ORAL at 16:13

## 2024-02-08 RX ADMIN — SODIUM CHLORIDE 500 ML: 9 INJECTION, SOLUTION INTRAVENOUS at 04:32

## 2024-02-08 ASSESSMENT — LIFESTYLE VARIABLES
HOW OFTEN DO YOU HAVE A DRINK CONTAINING ALCOHOL: NEVER
HOW MANY STANDARD DRINKS CONTAINING ALCOHOL DO YOU HAVE ON A TYPICAL DAY: PATIENT DOES NOT DRINK

## 2024-02-08 ASSESSMENT — PAIN SCALES - GENERAL
PAINLEVEL_OUTOF10: 7
PAINLEVEL_OUTOF10: 0
PAINLEVEL_OUTOF10: 0

## 2024-02-08 ASSESSMENT — PAIN - FUNCTIONAL ASSESSMENT: PAIN_FUNCTIONAL_ASSESSMENT: NONE - DENIES PAIN

## 2024-02-08 ASSESSMENT — PAIN DESCRIPTION - LOCATION: LOCATION: ABDOMEN

## 2024-02-08 NOTE — PROGRESS NOTES
4 Eyes Skin Assessment     NAME:  Fawad Glynn  YOB: 1935  MEDICAL RECORD NUMBER:  [id number]    The patient is being assessed for  Admission    I agree that at least one RN has performed a thorough Head to Toe Skin Assessment on the patient. ALL assessment sites listed below have been assessed.      Areas assessed by both nurses:    Head, Face, Ears, Shoulders, Back, Chest, Arms, Elbows, Hands, Sacrum. Buttock, Coccyx, Ischium, Legs. Feet and Heels, and Under Medical Devices         Does the Patient have a Wound? No noted wound(s)       Jayden Prevention initiated by RN: Yes  Wound Care Orders initiated by RN: No    Pressure Injury (Stage 3,4, Unstageable, DTI, NWPT, and Complex wounds) if present, place Wound referral order by RN under : No    New Ostomies, if present place, Ostomy referral order under : No     Nurse 1 eSignature: Electronically signed by Dann Frost RN on 2/8/24 at 7:30 AM EST    **SHARE this note so that the co-signing nurse can place an eSignature**    Nurse 2 eSignature: Electronically signed by Callie White RN on 2/8/24 at 7:35 AM EST

## 2024-02-08 NOTE — ED PROVIDER NOTES
ED Attending Attestation Note     Date of evaluation: 2/8/2024    This patient was seen by the resident.  I have seen and examined the patient, agree with the workup, evaluation, management and diagnosis. The care plan has been discussed.      I have reviewed the ECG and concur with the resident's interpretation.      My assessment reveals an 88-year-old male who has a history of paranoid schizophrenia CKD not on any anticoagulation who presented with bright red blood per rectum starting earlier this afternoon.  Patient was having some mild lower abdominal discomfort associated with it.  On physical examination had an abdomen which was soft nontender nondistended.  He was awake alert and appropriate oriented with his environment.  The patient did have active ongoing bleeding at the time of his examination here in the emergency department.  He had labs which are notable for a hemoglobin of 8.9 initially which was generally within his prior values a repeat value done several hours later in the emergency department showed a decline down to 8.0.  The patient has been typed and crossed for 1 unit of packed red blood cells but I am holding transfusion at this time and give the patient a small amount of crystalloid given his hemoglobin has not declined less than 7.  While the patient was on the CT scanner attempting to get a CT scan of the abdomen pelvis he had a brief period of unresponsiveness and during that time had a pulse but was unresponsive to verbal and painful stimuli.  A code was called but no CPR was started as the patient was noted to have a pulse.  He was taken back from the CT scanner to one of the trauma resuscitation bay's and by the time he had returned there he had a return to consciousness and was responsive at that time continued to have a pulse.  Family was brought into the room code discussions were had they did iterate that the patient is a full code.  The patient apparently has had several previous

## 2024-02-08 NOTE — PROGRESS NOTES
VERY large BM that spilled across bed and required entire bed change.  Large red/black blood clots with brown bits of fecal matter   Also voided bladder during this, unmeasured    3/4 of the way through with bowel prep

## 2024-02-08 NOTE — CONSULTS
Clinical Pharmacy Progress Note  Medication History     Admit Date: 2/8/2024    Pharmacy consulted to verify home medication list by Dr Franklin.    List of of current medications patient is taking is complete. Home Medication list in EPIC updated to reflect changes noted below.    Source of information: spoke with patient's wife in family waiting room, pharmacy fills    Patient's home pharmacy: [facility]     Changes made to medication list:   Medications removed:   Clotrimazole 1% cr - from 2021  Flonase prn - from 2019  Breo Ellipta - from 2022  NTG SL prn - from 2021  Omeprazole 20mg daily - from 12/2022  Miralax prn - from 2021  Metamucil caps BID - from 2021  Medication doses adjusted:   ASA - changed from 325mg EC to 81mg EC daily.  Family purchases this OTC.  Latanoprost opth - changed from Left eye to BOTH eyes   Other notes:   Will address the above corrections with ICU team.     Current Outpatient Medications   Medication Instructions    amLODIPine (NORVASC) 5 MG tablet TAKE 1 TABLET BY MOUTH EVERY DAY    aspirin 81 mg, Oral, DAILY    atorvastatin (LIPITOR) 10 MG tablet TAKE 1 TABLET BY MOUTH EVERY DAY    famotidine (PEPCID) 40 MG tablet TAKE 1 TABLET BY MOUTH EVERY DAY IN THE EVENING    latanoprost (XALATAN) 0.005 % ophthalmic solution 1 drop, Both Eyes, NIGHTLY    levETIRAcetam (KEPPRA) 500 MG tablet TAKE 1 TABLET BY MOUTH TWICE A DAY    memantine (NAMENDA) 5 MG tablet TAKE 1/2 TABLET BY MOUTH EVERY DAY    mirtazapine (REMERON) 30 mg, Oral, NIGHTLY    Multiple Vitamin (DAILY-TJ MULTIVITAMIN) TABS TAKE 1 TABLET BY MOUTH EVERY DAY    tamsulosin (FLOMAX) 0.4 MG capsule TAKE 1 CAPSULE BY MOUTH EVERY DAY       Please call with any questions.  Caro Alcantara PharmD., BCPS   2/8/2024 10:17 AM  Wireless: [phone]

## 2024-02-08 NOTE — CONSULTS
tablet 1    levETIRAcetam (KEPPRA) 500 MG tablet TAKE 1 TABLET BY MOUTH TWICE A  tablet 3    famotidine (PEPCID) 40 MG tablet TAKE 1 TABLET BY MOUTH EVERY DAY IN THE EVENING 90 tablet 1    atorvastatin (LIPITOR) 10 MG tablet TAKE 1 TABLET BY MOUTH EVERY DAY 90 tablet 1    tamsulosin (FLOMAX) 0.4 MG capsule TAKE 1 CAPSULE BY MOUTH EVERY DAY 90 capsule 1    mirtazapine (REMERON) 30 MG tablet TAKE 1 TABLET BY MOUTH NIGHTLY 90 tablet 1    memantine (NAMENDA) 5 MG tablet TAKE 1/2 TABLET BY MOUTH EVERY DAY 45 tablet 1    amLODIPine (NORVASC) 5 MG tablet TAKE 1 TABLET BY MOUTH EVERY DAY 90 tablet 1       Patient denies ASA, NSAID use.    Allergies  Allergies   Allergen Reactions    Lorazepam Other (See Comments)     Pt unable to recall reaction      Social   Social History     Tobacco Use    Smoking status: Former     Current packs/day: 0.00     Average packs/day: 1 pack/day for 18.0 years (18.0 ttl pk-yrs)     Types: Cigarettes     Start date: 10/22/1972     Quit date: 1990     Years since quittin.8    Smokeless tobacco: Never    Tobacco comments:     quit smoking in     Substance Use Topics    Alcohol use: No        Family History   Problem Relation Age of Onset    Cancer Mother         colon cancer    Cancer Brother         colon cancer, stomach cancer      No family history of colon cancer, Crohn's disease, or ulcerative colitis.    Review of Systems  Pertinent items are noted in HPI.       Physical Exam    /67   Pulse 88   Temp 97.8 °F (36.6 °C) (Oral)   Resp 16   Ht 1.905 m (6' 3\")   Wt 67.3 kg (148 lb 4.8 oz)   SpO2 99%   BMI 18.54 kg/m²   General appearance: alert, cooperative, no distress, appears stated age  Anicteric, No Jaundice  Head: Normocephalic, without obvious abnormality  Lungs: clear to auscultation bilaterally, Normal Effort  Heart: regular rate and rhythm, normal S1 and S2, no murmurs or rubs  Abdomen: soft, non-tender; bowel sounds normal; no masses,  no

## 2024-02-08 NOTE — ED NOTES
Patient had a moment of brief unresponsiveness in CT. CT tech push the code blue button. Patient didn't respond to verbal or painful Stimuli but maintain a pulse and had agonal breathing. After about 10 min patient  begin respond again. Patient was alert and orient prior to going to CT. Patient is now more groggy and still having moments of unresponsiveness.      Yusra Luke, RN  02/08/24 0457

## 2024-02-08 NOTE — PROGRESS NOTES
-[facility] Medicine-  -ICU to [place] Transfer Note-  HPI from H&P  Mr Glynn is an 87 yo M with a PMHx of diverticulosis, paranoid schizophrenia, CKD stage V, seizures who presented following an episode of a BM with bright red blood. He arriver via EMS from his nursing facility. Pt states he was using the bathroom and had a large amount of blood in the toilet. He states his wife was present during this as well who also endorses a large volume of blood loss. Pt denies any abdominal pain prior to or during the BM. Denies any dizziness, fatigue, SOB. States his appetite is low, but not lower than normal. Denies any recent N/V, fever, chills, weight loss. Says this has never happened before. Denies known history of hemorrhoids.     In the ED, VS were WNL and stable. CBC was remarkable for WBC 11.3, Hgb 8.9. Baseline Hgb 9.0 in the setting of chronic anemia  2/2 CKD. The patient had a brief episode of unresponsiveness while getting CT done, however maintained a pulse so CPR was not started. Returned to baseline after receiving a 500mL bolus. Repeat Hgb was 8.0 so patient received 1u pRBCs.    I  ICU Admission Reason & Brief ICU Course:   Pt remained hemodynamically stable. No additional episodes of GI bleed. Remained alter and responsive.         C  Code Status/DPOA Info/Goals of Care/ACP Note:   -Code Status Full  -POA/ACP documentation?: yes - Wife, Taylor Glynn  -Any changes to goals of care? no  -is palliative care consulted? no      U Unprescribing & Pertinent High-Risk Medications  -Anticoagulation:  None - reason: GI bleed, on SCDs     -Antibiotics:  None  -[] GI PPX : yes started     P Pending Tests at the Time of Transfer  None  Procedures   None      A Active consultants, including Rehab:  Subspecialty Consultants: yes  GI      U Uncertainty Measure/Diagnostic Pause:   Working diagnosis at the time of transfer BRBPR  Important differential diagnoses include diverticular bleed vs AVM vs

## 2024-02-08 NOTE — ED PROVIDER NOTES
[facility]  EMERGENCY DEPARTMENT ENCOUNTER          EM RESIDENT NOTE       Date of evaluation: 2/8/2024    Chief Complaint     Rectal Bleeding (Patient presents to the ED via EMS from [facility]. Per EMS, nursing staff at facility reports patient passed a significant amount of dark, tarry stool this evening. He is not on blood thinners. Patient states that his stomach began hurting earlier this evening. )      History of Present Illness     Fawad Glynn is a 88 y.o. male on aspirin but no other blood thinners who presents with an episode of bright red blood per rectum earlier today while going to the bathroom.  Patient says that today he had a little bit of an upset stomach and so he got some Pepto-Bismol and shortly thereafter went to the bathroom.  He says that when he went to use the commode he saw a large volume of bright red blood in the toilet.  At this time he was sent to [facility] emergency department for further evaluation.  On arrival he says that he has not had any fever, cough, sore throat.  He said that he did vomit a couple of times this morning.    MEDICAL DECISION MAKING / ASSESSMENT / PLAN     INITIAL VITALS: BP: 133/87, Temp: 98.2 °F (36.8 °C), Pulse: 86, Respirations: 18, SpO2: 100 %    Fawad Glynn is a 88 y.o. male with history as detailed above. Upon presentation, the patient was well-appearing, afebrile and hemodynamically stable.  Given visible bright red blood per rectum and soiling his diaper, I have significant concern for a lower GI bleed that is likely active.  Will assess patient's hemoglobin at this time to determine the need for transfusion as well as obtain CTA imaging of his abdomen and pelvis to identify whether this is a radiologically significant bleed that would require higher level of care.  Patient will need admission regardless for likely scope by GI.    Is this patient to be included in the SEP-1 core measure? No Exclusion criteria - the patient is NOT to be included

## 2024-02-08 NOTE — ED NOTES
Patient checked and changed. A large amount of blood clot was in pt brief. MD made aware.      Yusra Luke, RN  02/08/24 0554

## 2024-02-08 NOTE — CONSULTS
Nephrology Consult Note                                                                                                                                                                                                                                                                                                                                                               Office : [phone]     Fax :[phone]    Patient's Name: Fawad Glynn  9:00 AM  2/8/2024    Reason for Consult:  Inpatient PD for ESRD  Requesting Physician:  Callie Hansen MD  Chief Complaint:    Chief Complaint   Patient presents with    Rectal Bleeding     Patient presents to the ED via EMS from [facility]. Per EMS, nursing staff at facility reports patient passed a significant amount of dark, tarry stool this evening. He is not on blood thinners. Patient states that his stomach began hurting earlier this evening.        Assessment/Plan       # THERESE on CKD 5  - no signs of uremia   - if renal function not better will need RRT   - monitor UO and renal function   - Mild [place] on CT noted     # HTN  - monitor   - will not be very agressive with BP control   - Goal SBP < 150 systolic for now     # Anemia sec to GIB   Pt has Anemia of CKD 5 also   - monitor Hb   - GI consulted   -Follows w/ [facility] for EPO.  - will give EPO here     # Acid- base/ Electrolyte imbalance   -Monitor lytes and replete prn      History of Present Ilness:    Fawad Glynn is a 88 y.o. male with prior history CKD stage 5, anemia of chronic disease, HTN, HLD, T2DM, seizure disorder, and schizophrenia who was admitted to the ICU for ongoing rectal bleeding and altered mental status.       Interval hx   Patient had endorsed abdominal pain for which he was given Pepto Bismol. Shortly after, patient had several episodes of hematochezia. Was initially alert and awake when he went to ED. Hgb decreased from 8.9->8.0, he was transfused 1 unit PRBC. Sent for CT Head, Abd/Pelvis where he  Dr. Avtar Jasso - incision and drainage of left hip infected hematoma     OTHER SURGICAL HISTORY  11/06/2017     ESOPHAGOGASTRODUODENOSCOPY                OTHER SURGICAL HISTORY  11/06/2017     LAPAROSCOPIC HELLER MYOTOMY 270 WRAP, EGD    PROSTATE BIOPSY  November 2012    Dr. Hill Martinez    PROSTATE SURGERY  May 8, 2001    TURP    T[facility]    UPPER GASTROINTESTINAL ENDOSCOPY  November 7, 2011    UPPER GASTROINTESTINAL ENDOSCOPY  February 4, 2016    Dr. Cornell Headley [facility]    UPPER GASTROINTESTINAL ENDOSCOPY N/A 04/15/2016    Esophagogastroduodenoscopy with Botulinum toxin injection of the lower esophageal sphincter (LES)    UPPER GASTROINTESTINAL ENDOSCOPY  04/25/2017    dilatation; and botox injection 4 units    UPPER GASTROINTESTINAL ENDOSCOPY N/A 6/14/2019    EGD SUBMUCOSAL/BOTOX INJECTION performed by Zaid Headley MD at [facility] ENDOSCOPY    UPPER GASTROINTESTINAL ENDOSCOPY N/A 6/14/2019    EGD DILATION BALLOON performed by Zaid Headley MD at [facility] ENDOSCOPY       Family History   Problem Relation Age of Onset    Cancer Mother         colon cancer    Cancer Brother         colon cancer, stomach cancer        reports that he quit smoking about 33 years ago. His smoking use included cigarettes. He started smoking about 51 years ago. He has a 18.0 pack-year smoking history. He has never used smokeless tobacco. He reports that he does not drink alcohol and does not use drugs.    Allergies:  Lorazepam    Current Medications:    0.9 % sodium chloride infusion, PRN  0.9 % sodium chloride infusion, PRN  sodium chloride flush 0.9 % injection 5-40 mL, 2 times per day  sodium chloride flush 0.9 % injection 5-40 mL, PRN  0.9 % sodium chloride infusion, PRN  ondansetron (ZOFRAN-ODT) disintegrating tablet 4 mg, Q8H PRN   Or  ondansetron (ZOFRAN) injection 4 mg, Q6H PRN  polyethylene glycol (GLYCOLAX) packet 17 g, Daily

## 2024-02-08 NOTE — CONSULTS
ICU CONSULT       Hospital Day: 1  ICU Day:   1                                                       Code:Full Code  Admit Date: 2/8/2024  PCP: Callie Hansen MD                                  CC: [id number]    HISTORY OF PRESENT ILLNESS:   Mr Glynn is an 89 yo M with a PMHx of diverticulosis, paranoid schizophrenia, CKD stage V, seizures who presented following an episode of a BM with bright red blood. He arriver via EMS from his nursing facility. Pt states he was using the bathroom and had a large amount of blood in the toilet. He states his wife was present during this as well who also endorses a large volume of blood loss. Pt denies any abdominal pain prior to or during the BM. Denies any dizziness, fatigue, SOB. States his appetite is low, but not lower than normal. Denies any recent N/V, fever, chills, weight loss. Says this has never happened before. Denies known history of hemorrhoids.    In the ED, VS were WNL and stable. CBC was remarkable for WBC 11.3, Hgb 8.9. Baseline Hgb 9.0 in the setting of chronic anemia  2/2 CKD. The patient had a brief episode of unresponsiveness while getting CT done, however maintained a pulse so CPR was not started. Returned to baseline after receiving a 500mL bolus. Repeat Hgb was 8.0 so patient received 1u pRBCs.    PAST HISTORY:     Past Medical History:   Diagnosis Date    Anemia 01/09/2012    Anxiety and depression     Arthritis     bulging disc    BPH (benign prostatic hypertrophy) 05/16/2010    Diverticulosis 05/16/2010    Eczema 04/12/2011    Erectile dysfunction 05/16/2010    Essential hypertension 08/26/2015    GERD (gastroesophageal reflux disease) 05/16/2010    Hemorrhoid 05/16/2010    Hyperlipidemia 01/10/2011    Hyperphosphatemia 07/03/2010    Hypertension 05/16/2010    Overactive bladder 07/12/2011    Paranoid schizophrenia (HCC) 05/16/2010    Prostate cancer (HCC) 01/08/2013    had radiation treatments    Seizures (HCC) long ago    Type 2 diabetes mellitus

## 2024-02-08 NOTE — PLAN OF CARE
Problem: Discharge Planning  Goal: Discharge to home or other facility with appropriate resources  Outcome: Progressing     Problem: Chronic Conditions and Co-morbidities  Goal: Patient's chronic conditions and co-morbidity symptoms are monitored and maintained or improved  Outcome: Progressing     Problem: Safety - Adult  Goal: Free from fall injury  Outcome: Progressing     Problem: Pain  Goal: Verbalizes/displays adequate comfort level or baseline comfort level  Outcome: Progressing

## 2024-02-08 NOTE — PROGRESS NOTES
Pt was being treated in ED for GI bleed.     Pt is A&O to person, place, and event with pleasant demeanor. Pt has no increased work of breathing and lungs sounds are clear in the upper lung fields and diminished in the lower. Pt skin warm, dry, and of appropriate color for ethnicity.  Pt pupils perrla, 3mm brisk.     Pt has CSM present in all extremities with general weakness in lower extremities. Pt has PIV in L FA with PRBC infusion.     Pt abdomen soft and pliable, bowel sounds hypoactive in all quadrants.     Pt denies any pain.     Pt vital stable and WNL.    Pt bathed, gown and linen changed, sacral heart placed.     Pt in bed with rails x 3, wheel lock engaged, bed exit alarm armed, with call light within reach.     ICU residents notified of pt arrival.

## 2024-02-08 NOTE — CONSENT
Informed Consent for Blood Component Transfusion Note    I have discussed with the patient the rationale for blood component transfusion; its benefits in treating or preventing fatigue, organ damage, or death; and its risk which includes mild transfusion reactions, rare risk of blood borne infection, or more serious but rare reactions. I have discussed the alternatives to transfusion, including the risk and consequences of not receiving transfusion. The patient had an opportunity to ask questions and had agreed to proceed with transfusion of blood components.    Electronically signed by Rey Gonzalez MD on 2/8/24 at 4:11 AM EST

## 2024-02-08 NOTE — H&P
ICU H&P      Hospital Day: 1  ICU Day: 1                                                        Code:Prior  Admit Date: 2/8/2024  PCP: Callie Hansen MD                                  CC: Bloody Bowel Movement    HISTORY OF PRESENT ILLNESS:   Mr. Glynn is a 88 y.o. 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    Pt is not on anticoagulation and per family at bedside has not had prior episodes of hematochezia.  Pt was non-responsive on examination, per wife and daughter at bedside he is normally interactive A/Ox4.  He had been complaining to the facility staff of abdominal cramping for which he was given pepto bismol.  Pt subsequently had a bowel movement with bright red blood followed by several episodes of grossly bloody bowel movements.  Pt was awake and alert on presentation to ED with active bleeding per rectum.  Hgb down trended 8.9->8.0, transfused 1 unit PRBC.  Pt was sent for CT Head, abd, and pelvis during which he became unresponsive and a code blue was called.  CPR was not initiated as patient had a pulse and was protecting his airway.  He became alert once more following CT scan, however, quickly became unresponsive once more.  On exam pt was hemodynamically stable and saturating >90% on RA.  He was not responsive to verbal, tactile, or painful stimuli.  Per wife at bedside he has had intermittent episodes of unresponsiveness over the past year.  Per chart review pt has hx of complex partial seizures.  He was hypotensive with sbp 80's and MAP 50's, given 500 NS bolus after which he became alert once more, however would not speak.  Urgent consult to GI placed per ED physician.  Pt admitted to ICU for ongoing rectal bleeding and alteration of mental status.    PAST HISTORY:     Past Medical History:   Diagnosis Date

## 2024-02-08 NOTE — PROGRESS NOTES
No new bleeding since arriving to ICU. No stools.  No further AMS/unresponsiveness.  Hemodynamically stable.     NPO awaiting GI consult.  Nephro notified of noon labs.  Has voided 250mL per urinal.      Q6H H&H

## 2024-02-09 ENCOUNTER — APPOINTMENT (OUTPATIENT)
Dept: CT IMAGING | Age: 89
End: 2024-02-09
Payer: MEDICARE

## 2024-02-09 ENCOUNTER — ANESTHESIA EVENT (OUTPATIENT)
Dept: ENDOSCOPY | Age: 89
End: 2024-02-09
Payer: MEDICARE

## 2024-02-09 ENCOUNTER — ANESTHESIA (OUTPATIENT)
Dept: ENDOSCOPY | Age: 89
End: 2024-02-09
Payer: MEDICARE

## 2024-02-09 LAB
ALBUMIN SERPL-MCNC: 2.9 G/DL (ref 3.4–5)
ANION GAP SERPL CALCULATED.3IONS-SCNC: 21 MMOL/L (ref 3–16)
BASE EXCESS BLDA CALC-SCNC: -10 MMOL/L (ref -3–3)
BASE EXCESS BLDA CALC-SCNC: -8 MMOL/L (ref -3–3)
BLOOD BANK DISPENSE STATUS: NORMAL
BLOOD BANK PRODUCT CODE: NORMAL
BPU ID: NORMAL
BUN SERPL-MCNC: 84 MG/DL (ref 7–20)
CALCIUM SERPL-MCNC: 7.5 MG/DL (ref 8.3–10.6)
CHLORIDE SERPL-SCNC: 107 MMOL/L (ref 99–110)
CO2 BLDA-SCNC: 16 MMOL/L
CO2 BLDA-SCNC: 18 MMOL/L
CO2 SERPL-SCNC: 15 MMOL/L (ref 21–32)
CREAT SERPL-MCNC: 6.7 MG/DL (ref 0.8–1.3)
DESCRIPTION BLOOD BANK: NORMAL
GFR SERPLBLD CREATININE-BSD FMLA CKD-EPI: 7 ML/MIN/{1.73_M2}
GLUCOSE BLD-MCNC: 144 MG/DL (ref 70–99)
GLUCOSE BLD-MCNC: 152 MG/DL (ref 70–99)
GLUCOSE BLD-MCNC: 158 MG/DL (ref 70–99)
GLUCOSE BLD-MCNC: 170 MG/DL (ref 70–99)
GLUCOSE BLD-MCNC: 173 MG/DL (ref 70–99)
GLUCOSE BLD-MCNC: 177 MG/DL (ref 70–99)
GLUCOSE BLD-MCNC: 219 MG/DL (ref 70–99)
GLUCOSE SERPL-MCNC: 196 MG/DL (ref 70–99)
HCO3 BLDA-SCNC: 15.5 MMOL/L (ref 21–29)
HCO3 BLDA-SCNC: 17.4 MMOL/L (ref 21–29)
HCT VFR BLD AUTO: 18 % (ref 40.5–52.5)
HCT VFR BLD AUTO: 18.1 % (ref 40.5–52.5)
HCT VFR BLD AUTO: 21.6 % (ref 40.5–52.5)
HCT VFR BLD AUTO: 22.7 % (ref 40.5–52.5)
HCT VFR BLD AUTO: 25.4 % (ref 40.5–52.5)
HGB BLD-MCNC: 5.8 G/DL (ref 13.5–17.5)
HGB BLD-MCNC: 6.3 G/DL (ref 13.5–17.5)
HGB BLD-MCNC: 7 G/DL (ref 13.5–17.5)
HGB BLD-MCNC: 7.8 G/DL (ref 13.5–17.5)
HGB BLD-MCNC: 8.5 G/DL (ref 13.5–17.5)
LACTATE BLD-SCNC: 1.36 MMOL/L (ref 0.4–2)
LACTATE BLDV-SCNC: 2.8 MMOL/L (ref 0.4–2)
LEVETIRACETAM SERPL-MCNC: 35.7 UG/ML (ref 6–46)
MAGNESIUM SERPL-MCNC: 2.1 MG/DL (ref 1.8–2.4)
MEDICATION DOSE-MCNC: NORMAL
PCO2 BLDA: 26.1 MM HG (ref 35–45)
PCO2 BLDA: 28.4 MM HG (ref 35–45)
PERFORMED ON: ABNORMAL
PH BLDA: 7.38 [PH] (ref 7.35–7.45)
PH BLDA: 7.39 [PH] (ref 7.35–7.45)
PHOSPHATE SERPL-MCNC: 6.2 MG/DL (ref 2.5–4.9)
PO2 BLDA: 94.4 MM HG (ref 75–108)
PO2 BLDA: 95.2 MM HG (ref 75–108)
POC SAMPLE TYPE: ABNORMAL
POC SAMPLE TYPE: ABNORMAL
POTASSIUM SERPL-SCNC: 3.8 MMOL/L (ref 3.5–5.1)
SAO2 % BLDA: 98 % (ref 93–100)
SAO2 % BLDA: 98 % (ref 93–100)
SODIUM SERPL-SCNC: 143 MMOL/L (ref 136–145)

## 2024-02-09 PROCEDURE — A4216 STERILE WATER/SALINE, 10 ML: HCPCS | Performed by: STUDENT IN AN ORGANIZED HEALTH CARE EDUCATION/TRAINING PROGRAM

## 2024-02-09 PROCEDURE — 74174 CTA ABD&PLVS W/CONTRAST: CPT

## 2024-02-09 PROCEDURE — 80069 RENAL FUNCTION PANEL: CPT

## 2024-02-09 PROCEDURE — 36430 TRANSFUSION BLD/BLD COMPNT: CPT

## 2024-02-09 PROCEDURE — 2500000003 HC RX 250 WO HCPCS: Performed by: NURSE ANESTHETIST, CERTIFIED REGISTERED

## 2024-02-09 PROCEDURE — 2580000003 HC RX 258: Performed by: STUDENT IN AN ORGANIZED HEALTH CARE EDUCATION/TRAINING PROGRAM

## 2024-02-09 PROCEDURE — 82947 ASSAY GLUCOSE BLOOD QUANT: CPT

## 2024-02-09 PROCEDURE — 3700000001 HC ADD 15 MINUTES (ANESTHESIA): Performed by: INTERNAL MEDICINE

## 2024-02-09 PROCEDURE — 3609027000 HC COLONOSCOPY: Performed by: INTERNAL MEDICINE

## 2024-02-09 PROCEDURE — 0DJ08ZZ INSPECTION OF UPPER INTESTINAL TRACT, VIA NATURAL OR ARTIFICIAL OPENING ENDOSCOPIC: ICD-10-PCS | Performed by: INTERNAL MEDICINE

## 2024-02-09 PROCEDURE — 99233 SBSQ HOSP IP/OBS HIGH 50: CPT | Performed by: INTERNAL MEDICINE

## 2024-02-09 PROCEDURE — 85018 HEMOGLOBIN: CPT

## 2024-02-09 PROCEDURE — 3700000000 HC ANESTHESIA ATTENDED CARE: Performed by: INTERNAL MEDICINE

## 2024-02-09 PROCEDURE — 1200000000 HC SEMI PRIVATE

## 2024-02-09 PROCEDURE — 2580000003 HC RX 258: Performed by: INTERNAL MEDICINE

## 2024-02-09 PROCEDURE — 3609017100 HC EGD: Performed by: INTERNAL MEDICINE

## 2024-02-09 PROCEDURE — 83735 ASSAY OF MAGNESIUM: CPT

## 2024-02-09 PROCEDURE — 99233 SBSQ HOSP IP/OBS HIGH 50: CPT | Performed by: HOSPITALIST

## 2024-02-09 PROCEDURE — 6360000004 HC RX CONTRAST MEDICATION

## 2024-02-09 PROCEDURE — 83605 ASSAY OF LACTIC ACID: CPT

## 2024-02-09 PROCEDURE — 85014 HEMATOCRIT: CPT

## 2024-02-09 PROCEDURE — 0DJD8ZZ INSPECTION OF LOWER INTESTINAL TRACT, VIA NATURAL OR ARTIFICIAL OPENING ENDOSCOPIC: ICD-10-PCS | Performed by: INTERNAL MEDICINE

## 2024-02-09 PROCEDURE — 6360000002 HC RX W HCPCS: Performed by: NURSE ANESTHETIST, CERTIFIED REGISTERED

## 2024-02-09 PROCEDURE — 36415 COLL VENOUS BLD VENIPUNCTURE: CPT

## 2024-02-09 PROCEDURE — 2580000003 HC RX 258

## 2024-02-09 PROCEDURE — C9113 INJ PANTOPRAZOLE SODIUM, VIA: HCPCS | Performed by: STUDENT IN AN ORGANIZED HEALTH CARE EDUCATION/TRAINING PROGRAM

## 2024-02-09 PROCEDURE — 2500000003 HC RX 250 WO HCPCS: Performed by: INTERNAL MEDICINE

## 2024-02-09 PROCEDURE — 2580000003 HC RX 258: Performed by: NURSE ANESTHETIST, CERTIFIED REGISTERED

## 2024-02-09 PROCEDURE — 6360000002 HC RX W HCPCS: Performed by: STUDENT IN AN ORGANIZED HEALTH CARE EDUCATION/TRAINING PROGRAM

## 2024-02-09 PROCEDURE — 82803 BLOOD GASES ANY COMBINATION: CPT

## 2024-02-09 RX ORDER — SODIUM CHLORIDE 9 MG/ML
INJECTION, SOLUTION INTRAVENOUS PRN
Status: DISCONTINUED | OUTPATIENT
Start: 2024-02-09 | End: 2024-02-10

## 2024-02-09 RX ORDER — SODIUM CHLORIDE, SODIUM LACTATE, POTASSIUM CHLORIDE, AND CALCIUM CHLORIDE .6; .31; .03; .02 G/100ML; G/100ML; G/100ML; G/100ML
1000 INJECTION, SOLUTION INTRAVENOUS ONCE
Status: COMPLETED | OUTPATIENT
Start: 2024-02-09 | End: 2024-02-09

## 2024-02-09 RX ORDER — SODIUM CHLORIDE, SODIUM LACTATE, POTASSIUM CHLORIDE, CALCIUM CHLORIDE 600; 310; 30; 20 MG/100ML; MG/100ML; MG/100ML; MG/100ML
INJECTION, SOLUTION INTRAVENOUS CONTINUOUS PRN
Status: DISCONTINUED | OUTPATIENT
Start: 2024-02-09 | End: 2024-02-09 | Stop reason: SDUPTHER

## 2024-02-09 RX ORDER — SODIUM CHLORIDE, SODIUM LACTATE, POTASSIUM CHLORIDE, CALCIUM CHLORIDE 600; 310; 30; 20 MG/100ML; MG/100ML; MG/100ML; MG/100ML
INJECTION, SOLUTION INTRAVENOUS CONTINUOUS
Status: DISCONTINUED | OUTPATIENT
Start: 2024-02-09 | End: 2024-02-09

## 2024-02-09 RX ORDER — LIDOCAINE HYDROCHLORIDE 20 MG/ML
INJECTION, SOLUTION INFILTRATION; PERINEURAL PRN
Status: DISCONTINUED | OUTPATIENT
Start: 2024-02-09 | End: 2024-02-09 | Stop reason: SDUPTHER

## 2024-02-09 RX ORDER — PROPOFOL 10 MG/ML
INJECTION, EMULSION INTRAVENOUS CONTINUOUS PRN
Status: DISCONTINUED | OUTPATIENT
Start: 2024-02-09 | End: 2024-02-09 | Stop reason: SDUPTHER

## 2024-02-09 RX ORDER — SODIUM CHLORIDE, SODIUM LACTATE, POTASSIUM CHLORIDE, AND CALCIUM CHLORIDE .6; .31; .03; .02 G/100ML; G/100ML; G/100ML; G/100ML
250 INJECTION, SOLUTION INTRAVENOUS ONCE
Status: COMPLETED | OUTPATIENT
Start: 2024-02-09 | End: 2024-02-09

## 2024-02-09 RX ADMIN — IOPAMIDOL 75 ML: 755 INJECTION, SOLUTION INTRAVENOUS at 02:59

## 2024-02-09 RX ADMIN — LEVETIRACETAM 500 MG: 500 INJECTION, SOLUTION, CONCENTRATE INTRAVENOUS at 23:01

## 2024-02-09 RX ADMIN — SODIUM CHLORIDE, POTASSIUM CHLORIDE, SODIUM LACTATE AND CALCIUM CHLORIDE 250 ML: 600; 310; 30; 20 INJECTION, SOLUTION INTRAVENOUS at 13:24

## 2024-02-09 RX ADMIN — SODIUM BICARBONATE: 84 INJECTION, SOLUTION INTRAVENOUS at 12:49

## 2024-02-09 RX ADMIN — SODIUM CHLORIDE, POTASSIUM CHLORIDE, SODIUM LACTATE AND CALCIUM CHLORIDE 1000 ML: 600; 310; 30; 20 INJECTION, SOLUTION INTRAVENOUS at 16:11

## 2024-02-09 RX ADMIN — PROPOFOL 50 MCG/KG/MIN: 10 INJECTION, EMULSION INTRAVENOUS at 15:06

## 2024-02-09 RX ADMIN — LEVETIRACETAM 500 MG: 500 INJECTION, SOLUTION, CONCENTRATE INTRAVENOUS at 11:12

## 2024-02-09 RX ADMIN — SODIUM CHLORIDE, PRESERVATIVE FREE 40 MG: 5 INJECTION INTRAVENOUS at 07:41

## 2024-02-09 RX ADMIN — SODIUM CHLORIDE, SODIUM LACTATE, POTASSIUM CHLORIDE, AND CALCIUM CHLORIDE: .6; .31; .03; .02 INJECTION, SOLUTION INTRAVENOUS at 14:55

## 2024-02-09 RX ADMIN — SODIUM CHLORIDE, POTASSIUM CHLORIDE, SODIUM LACTATE AND CALCIUM CHLORIDE: 600; 310; 30; 20 INJECTION, SOLUTION INTRAVENOUS at 04:51

## 2024-02-09 RX ADMIN — LIDOCAINE HYDROCHLORIDE 50 MG: 20 INJECTION, SOLUTION INFILTRATION; PERINEURAL at 15:06

## 2024-02-09 RX ADMIN — LATANOPROST 1 DROP: 50 SOLUTION OPHTHALMIC at 20:49

## 2024-02-09 ASSESSMENT — PAIN SCALES - GENERAL
PAINLEVEL_OUTOF10: 0

## 2024-02-09 ASSESSMENT — ENCOUNTER SYMPTOMS: SHORTNESS OF BREATH: 1

## 2024-02-09 NOTE — ANESTHESIA PRE PROCEDURE
Department of Anesthesiology  Preprocedure Note       Name:  Fawad Glynn   Age:  88 y.o.  :  1935                                          MRN:  [id number]         Date:  2024      Surgeon: Surgeon(s):  Qamar Maddox MD    Procedure: Procedure(s):  ESOPHAGOGASTRODUODENOSCOPY  COLONOSCOPY DIAGNOSTIC    Medications prior to admission:   Prior to Admission medications    Medication Sig Start Date End Date Taking? Authorizing Provider   latanoprost (XALATAN) 0.005 % ophthalmic solution Place 1 drop into both eyes nightly   Yes ProviderGladys MD   aspirin 81 MG EC tablet Take 1 tablet by mouth daily   Yes ProviderGladys MD   Multiple Vitamin (DAILY-TJ MULTIVITAMIN) TABS TAKE 1 TABLET BY MOUTH EVERY DAY 24   Callie Hansne MD   levETIRAcetam (KEPPRA) 500 MG tablet TAKE 1 TABLET BY MOUTH TWICE A DAY 12/15/23   Callie Hansen MD   famotidine (PEPCID) 40 MG tablet TAKE 1 TABLET BY MOUTH EVERY DAY IN THE EVENING 23   Callie Hansen MD   atorvastatin (LIPITOR) 10 MG tablet TAKE 1 TABLET BY MOUTH EVERY DAY 23   Callie Hansen MD   tamsulosin (FLOMAX) 0.4 MG capsule TAKE 1 CAPSULE BY MOUTH EVERY DAY 23   Callie Hansen MD   mirtazapine (REMERON) 30 MG tablet TAKE 1 TABLET BY MOUTH NIGHTLY 10/27/23   Callie Hansen MD   memantine (NAMENDA) 5 MG tablet TAKE 1/2 TABLET BY MOUTH EVERY DAY 10/24/23   Callie Hansen MD   amLODIPine (NORVASC) 5 MG tablet TAKE 1 TABLET BY MOUTH EVERY DAY 23   Callie Hansen MD       Current medications:    Current Facility-Administered Medications   Medication Dose Route Frequency Provider Last Rate Last Admin    0.9 % sodium chloride infusion   IntraVENous PRN Osorio Franklin DO        sodium bicarbonate 150 mEq in dextrose 5 % 1,000 mL infusion   IntraVENous Continuous Aron Mcclain  mL/hr at 24 1249 New Bag at 24 1249    lactated ringers bolus bolus 1,000 mL  1,000 mL IntraVENous Once Storm Funes DO        0.9 % sodium

## 2024-02-09 NOTE — PROCEDURES
[facility]  Colonoscopy Note    Patient: Fawad Glynn  : 1935  Acct#:     Procedure: Colonoscopy     Date:  2024    Surgeon:  QAMAR MADDOX MD    Referring Physician:  Guillermo Partida MD    Anesthesia: IV propofol, per anesthesia    EBL: <50 mL    Indications: screening     Procedure:     An informed consent was obtained from the patient after explanation of indications, benefits, possible risks and complications of the procedure.  The patient was then taken to the endoscopy suite, placed in the left lateral decubitus position, and the above IV anesthesia was administered.    A digital rectal examination was performed and revealed negative without mass, lesions or tenderness.      The Olympus PCFQ-H190 video colonoscope was placed in the patient's rectum under digital direction and advanced to the cecum. The cecum was identified by characteristic anatomy and ballottment.  The prep was adequate.      Findings:  Normal colon.       The scope was then withdrawn into the rectum and retroflexed.  The retroflexed view of the anal verge and rectum demonstrates small internal hemorrhoids.     The scope was straightened, the colon was decompressed and the scope was withdrawn from the patient.      The patient tolerated the procedure well and was taken to the PACU in good condition.    Biopsies:  no       Impression:   Normal colon.     Recommendations:  Colonoscopy in 10 years.     Qamar Maddox MD  [facility]

## 2024-02-09 NOTE — PROCEDURES
[facility]  Colonoscopy Note    Patient: Fawad Glynn  : 1935  Acct#:     Procedure: Colonoscopy     Date:  2024    Surgeon:  RACHEAL ROCA MD    Referring Physician:  Guillermo Partida MD    Anesthesia: IV propofol, per anesthesia    EBL: <50 mL    Indications: hematochezia     Procedure:     An informed consent was obtained from the patient after explanation of indications, benefits, possible risks and complications of the procedure.  The patient was then taken to the endoscopy suite, placed in the left lateral decubitus position, and the above IV anesthesia was administered.    A digital rectal examination was performed and revealed negative without mass, lesions or tenderness.      The Olympus PCFQ-H190 video colonoscope was placed in the patient's rectum under digital direction and advanced to the cecum and terminal ileum. The cecum was identified by characteristic anatomy and ballottment.  The prep was poor.      Findings:  Very poor prep with a large amount of stool and blood which obscured the majority of the colon.  Large lesions could easily be missed given the quality the prep  Normal terminal ileum.  Scant blood in the cecum, ascending and proximal transverse colon.  Red blood and clots in the distal transverse and entire left colon mixed with large amounts of stool.       The scope was straightened, the colon was decompressed and the scope was withdrawn from the patient.      The patient tolerated the procedure well and was taken to the PACU in good condition.    Biopsies:  no       Impression:   Very poor prep with a large amount of stool and blood which obscured the majority of the colon.  Large lesions could easily be missed given the quality the prep  Normal terminal ileum.  Scant blood in the cecum, ascending and proximal transverse colon.  Red blood and clots in the distal transverse and entire left colon mixed with large amounts of stool.

## 2024-02-09 NOTE — CARE COORDINATION
Case Management Assessment  Initial Evaluation    Date/Time of Evaluation: 2/9/2024 1:15 PM  Assessment Completed by: Jocelynn Argueta RN    If patient is discharged prior to next notation, then this note serves as note for discharge by case management.    Patient Name: Fawad Glynn                   YOB: 1935  Diagnosis: GI bleeding [K92.2]  Rectal bleeding [K62.5]  Spell of abnormal behavior [R46.89]                   Date / Time: 2/8/2024  1:53 AM    Patient Admission Status: Inpatient   Readmission Risk (Low < 19, Mod (19-27), High > 27): Readmission Risk Score: 18.6    Current PCP: Callie Hansen MD  PCP verified by CM? Yes    Chart Reviewed: Yes      History Provided by: Child/Family  Patient Orientation: Person, Situation, Self    Patient Cognition: Alert    Hospitalization in the last 30 days (Readmission):  No    If yes, Readmission Assessment in  Navigator will be completed.    Advance Directives:      Code Status: Full Code   Patient's Primary Decision Maker is: Legal Next of Kin    Primary Decision Maker: karon glynn - Spouse - [phone]    Secondary Decision Maker: Boris Glynn - Child - [phone]    Secondary Decision Maker: Bailey Glynn - Child - [phone]    Discharge Planning:    Patient lives with: Spouse/Significant Other Type of Home: House (5 steps to enter)  Primary Care Giver: Spouse  Patient Support Systems include: Family Members, Children, Spouse/Significant Other   Current Financial resources: Medicare  Current community resources: None  Current services prior to admission: Durable Medical Equipment            Current DME: Walker, Other (Comment) (safety grab bars)            Type of Home Care services:  PT, OT, Nursing Services    ADLS  Prior functional level: Assistance with the following:, Bathing, Dressing, Toileting, Cooking, Housework, Mobility  Current functional level: Assistance with the following:, Bathing, Dressing, Toileting, Housework,

## 2024-02-09 NOTE — PROGRESS NOTES
Pt taken to ENDO with this RN at bedside. Bps soft, NSR on monitor. Responds to pain. Dr. Maddox notified of critical lab results    Latest Reference Range & Units 02/09/24 14:01   Hemoglobin Quant 13.5 - 17.5 g/dL 6.3 (LL)   Hematocrit 40.5 - 52.5 % 18.1 (LL)

## 2024-02-09 NOTE — PROCEDURES
[facility]  Endoscopy Note    Patient: Fawad Glynn  : 1935  Acct#:     Procedure: Esophagogastroduodenoscopy     Date:  2024     Surgeon:  QAMAR MADDOX MD      Anesthesia:    IV propofol, per anesthesia       EBL: <50 mL    Indications:  hematochezia, anemia due to acute GI blood loss    Description of Procedure:    Informed consent was obtained from the patient after explanation of indications, benefits and possible risks and complications of the procedure.      The patient was then taken to the endoscopy suite, placed in the left lateral decubitus position and the above IV sedation was administrered.    The Olympus videoendoscope (GIF-H190) was placed in the patient's mouth and under direct visualization passed into the esophagus.  The scope was then advanced into the stomach and to the second portion of the duodenum.  A retroflexed exam of the gastric cardia and fundus was performed. The scope was then withdrawn back into the stomach, it was decompressed, and the scope was completely withdrawn.    Findings:  Normal first and second portion of the duodenum.  Medium hiatal hernia.  Few erosions and patchy hematin in the gastric body.  Grade A reflux esophagitis       The patient tolerated the procedure well and was taken to the post anesthesia care unit in good condition.    Biopsies: no     Impression:    Normal first and second portion of the duodenum.  Medium hiatal hernia.  Few erosions and patchy hematin in the gastric body.  Grade A reflux esophagitis    Recommendations:   PPI daily  Colonoscopy today      Qamar Maddox MD  [employer]

## 2024-02-09 NOTE — ANESTHESIA POSTPROCEDURE EVALUATION
Department of Anesthesiology  Postprocedure Note    Patient: Fawad Glynn  MRN: [id number]  YOB: 1935  Date of evaluation: 2/9/2024    Procedure Summary       Date: 02/09/24 Room / Location: [place] [address] / [facility]    Anesthesia Start: 1455 Anesthesia Stop: 1554    Procedures:       ESOPHAGOGASTRODUODENOSCOPY      COLONOSCOPY DIAGNOSTIC Diagnosis: Hematochezia    Surgeons: Qamar Maddox MD Responsible Provider: Jean-Claude Tello MD    Anesthesia Type: MAC, general ASA Status: 4            Anesthesia Type: No value filed.    Eric Phase I:      Eric Phase II:      Anesthesia Post Evaluation    Patient location during evaluation: ICU  Patient participation: complete - patient participated  Level of consciousness: awake and alert  Airway patency: patent  Nausea & Vomiting: no nausea and no vomiting  Cardiovascular status: blood pressure returned to baseline  Respiratory status: acceptable  Hydration status: euvolemic  Pain management: adequate    No notable events documented.

## 2024-02-09 NOTE — PLAN OF CARE
Problem: Discharge Planning  Goal: Discharge to home or other facility with appropriate resources  2/9/2024 0817 by Thao Coronado RN  Outcome: Progressing  2/8/2024 2222 by Ezio Truong RN  Outcome: Progressing  Flowsheets (Taken 2/8/2024 2000)  Discharge to home or other facility with appropriate resources:   Identify barriers to discharge with patient and caregiver   Identify discharge learning needs (meds, wound care, etc)   Arrange for needed discharge resources and transportation as appropriate     Problem: Chronic Conditions and Co-morbidities  Goal: Patient's chronic conditions and co-morbidity symptoms are monitored and maintained or improved  2/9/2024 0817 by Thao Coronado RN  Outcome: Progressing  2/8/2024 2222 by Ezio Truong RN  Outcome: Progressing  Flowsheets (Taken 2/8/2024 2000)  Care Plan - Patient's Chronic Conditions and Co-Morbidity Symptoms are Monitored and Maintained or Improved:   Monitor and assess patient's chronic conditions and comorbid symptoms for stability, deterioration, or improvement   Collaborate with multidisciplinary team to address chronic and comorbid conditions and prevent exacerbation or deterioration   Update acute care plan with appropriate goals if chronic or comorbid symptoms are exacerbated and prevent overall improvement and discharge     Problem: Safety - Adult  Goal: Free from fall injury  2/9/2024 0817 by Thao Coronado RN  Outcome: Progressing  2/8/2024 2222 by Ezio Truong RN  Outcome: Progressing  Flowsheets (Taken 2/8/2024 2207)  Free From Fall Injury: Instruct family/caregiver on patient safety     Problem: Pain  Goal: Verbalizes/displays adequate comfort level or baseline comfort level  2/9/2024 0817 by Thao Coronado RN  Outcome: Progressing  2/8/2024 2222 by Ezio Truong RN  Outcome: Progressing  Flowsheets (Taken 2/8/2024 2000)  Verbalizes/displays adequate comfort level or baseline comfort level:   Encourage patient to monitor pain

## 2024-02-09 NOTE — PROGRESS NOTES
Oral Daily    [Held by provider] amLODIPine  5 mg Oral Daily    [Held by provider] tamsulosin  0.4 mg Oral Daily    budesonide  0.5 mg Nebulization BID RT    And    arformoterol tartrate  15 mcg Nebulization BID RT    pantoprazole (PROTONIX) 40 mg in sodium chloride (PF) 0.9 % 10 mL injection  40 mg IntraVENous Daily    latanoprost  1 drop Both Eyes Nightly    levETIRAcetam  500 mg IntraVENous Q12H     Continuous Infusions:   sodium chloride      lactated ringers IV soln 75 mL/hr at 02/09/24 0451    sodium chloride      sodium chloride      sodium chloride      dextrose         Labs:  CBC:   Recent Labs     02/08/24  0234 02/08/24  0358 02/08/24  0906 02/08/24  2326 02/09/24  0051 02/09/24  0615   WBC 11.3* 12.0*  --   --   --   --    HGB 8.9* 8.0*   < > 7.0* 5.8* 8.5*    240  --   --   --   --     < > = values in this interval not displayed.     BMP:    Recent Labs     02/08/24  0234 02/08/24  1134 02/09/24  0615    141 143   K 3.9 3.9 3.8    108 107   CO2 19* 20* 15*   BUN 71* 81* 84*   CREATININE 6.7* 7.0* 6.7*   GLUCOSE 154* 122* 196*     Ca/Mg/Phos:   Recent Labs     02/08/24  0229 02/08/24  0234 02/08/24  1134 02/09/24  0615   CALCIUM  --  8.3 8.0* 7.5*   MG  --   --   --  2.10   PHOS 5.3*  --  5.1* 6.2*     Hepatic:   Recent Labs     02/08/24 0234   AST 15   ALT 8*   BILITOT <0.2   ALKPHOS 157*     Troponin: No results for input(s): \"TROPONINI\" in the last 72 hours.  BNP: No results for input(s): \"BNP\" in the last 72 hours.  Lipids: No results for input(s): \"CHOL\", \"TRIG\", \"HDL\", \"LDLCALC\" in the last 72 hours.    Invalid input(s): \"LABVLDL\"  ABGs:   Recent Labs     02/09/24  0037   PHART 7.382   PO2ART 95.2   JHG8DCV 26.1*     INR: No results for input(s): \"INR\" in the last 72 hours.  UA:No results for input(s): \"COLORU\", \"CLARITYU\", \"GLUCOSEU\", \"BILIRUBINUR\", \"KETUA\", \"SPECGRAV\", \"BLOODU\", \"PHUR\", \"PROTEINU\", \"UROBILINOGEN\", \"NITRU\", \"LEUKOCYTESUR\", \"URINETYPE\" in the last 72  reviewed     D/w Primary team             Brad Bee   [facility]  Office -[phone]  Tsy-[phone]      I have seen the patient independently from the resident .I discussed the care with the resident. I personally reviewed the HPI, PH, FH, SH, ROS and medications. I repeated pertinent portions of the examination and reviewed the relevant imaging and laboratory data. I agree with the findings, assessment and plan as documented, with the following addendum:      Aron Mcclain MD

## 2024-02-09 NOTE — PROGRESS NOTES
Endo notified of patient family not wanting to go through with colonoscopy. Per endo team, Dr. Maddox is en route to bedside.

## 2024-02-09 NOTE — PROGRESS NOTES
Contacted by RN for pt becoming unresponsive.  He had been alert a few minutes prior taking his go-lytely.  On examination he was unresponsive to verbal, tactile, and painful stimuli.  ABG drawn within normal limits, blood glucose 219.    Constitutional: Unresponsive to verbal, tactile, and painful stimuli.  HEENT: normocephalic, atraumatic, PERRL  Neck: Carotid pulses present without bruit  Cardiovascular: NSR, no murmurs, rubs.  No acute changes on telemetry review.  Pulmonary: Breath sounds equal and symmetric in bilateral lung fields.  RR 16 with saturations >95% on RA.  No signs of acute respiratory distress or increased work of breathing.  Abdomen: non-distended, non-tender, no palpable masses.  Pt having dark red bloody bowel movements with go-lytely  Musculoskeletal: spontaneous movement present, unable to assess strength or coordination 2/2 mental status  Skin: warm, dry, no new wounds or rashes  Neurological: A/Ox0 unresponsive to sternal rub.  PERRL    Hgb: 5.8, Hct 18.0, transfused 2 unit PRBC  Spoke with Dr. Partida and Dr. Rodriguez.  Both agreed to repeat CTA Abd/Pelvis followed by post-contrast IVF LR 75 mL/hr.

## 2024-02-09 NOTE — PROGRESS NOTES
Pt. Alert and oriented x3/4, appropriate behavior, calm, cooperative. Bloody bowel movement noted after receiving report. Completed incontinence care. Hypertension noted, both systolic and diastolic > 100. Family members at bedside.

## 2024-02-09 NOTE — PROGRESS NOTES
During midnight assessment, pt. Became lethargic/unresponsive to stimuli including sternal rub/pain. ICU residents called to bedside, notified attending. ABG unremarkable. Hemoglobin resulted 5.8, Hematocrit 18.

## 2024-02-09 NOTE — PROGRESS NOTES
Pt returned from ENDO. Pt alert to voice. Bps soft. LR bolus started and blood orders released. Family updated at bedside by Dr. Maddox.

## 2024-02-09 NOTE — PROGRESS NOTES
Internal Medicine Progress Note    Date: 2/9/2024   Patient: Fawad Glynn   [facility] Day: 1      CC: Rectal Bleeding (Patient presents to the ED via EMS from SNF. Per EMS, nursing staff at facility reports patient passed a significant amount of dark, tarry stool this evening. He is not on blood thinners. Patient states that his stomach began hurting earlier this evening. )       Interval Hx   Patient was seen and examined at bedside this morning. Patient was transferred from ICU 2/8/24. Overnight events include significant event of unresponsiveness. Patient did not respond to pain or touch. Hgb was down to 5.8, he received 2 units of pRBC. Post H+H 8.5. CTA  (approved by Nephrology, Dr. Mcclain) revealed no evidence of acute GI hemorrhage. LR was given post CTA. Patient has EGD and colonoscopy planned as per GI.       HPI:   \"Mr Glynn is an 87 yo M with a PMHx of diverticulosis, paranoid schizophrenia, CKD stage V, seizures who presented following an episode of a BM with bright red blood. He arriver via EMS from his nursing facility. Pt states he was using the bathroom and had a large amount of blood in the toilet. He states his wife was present during this as well who also endorses a large volume of blood loss. Pt denies any abdominal pain prior to or during the BM. Denies any dizziness, fatigue, SOB. States his appetite is low, but not lower than normal. Denies any recent N/V, fever, chills, weight loss. Says this has never happened before. Denies known history of hemorrhoids.     In the ED, VS were WNL and stable. CBC was remarkable for WBC 11.3, Hgb 8.9. Baseline Hgb 9.0 in the setting of chronic anemia  2/2 CKD. The patient had a brief episode of unresponsiveness while getting CT done, however maintained a pulse so CPR was not started. Returned to baseline after receiving a 500mL bolus. Repeat Hgb was 8.0 so patient received 1u pRBCs.\"      Objective     Vital Signs:  Patient Vitals for the past 8

## 2024-02-09 NOTE — CARE COORDINATION
Case Management Assessment            Discharge Note                    Date / Time of Note: 2/9/2024 1:03 PM                  Discharge Note Completed by: Jocelynn Argueta RN    Patient Name: Fawad Glynn   YOB: 1935  Diagnosis: GI bleeding [K92.2]  Rectal bleeding [K62.5]  Spell of abnormal behavior [R46.89]   Date / Time: 2/8/2024  1:53 AM    Current PCP: Callie Hansen MD    Advance Directives:  Code Status: Full Code    Financial:  Payor: MEDICARE / Plan: MEDICARE PART A AND B / Product Type: *No Product type* /      Pharmacy:    [facility]/pharmacy #[id number] - [place], [place] - [address] - P [phone] - F [phone]  [address]  [facility] [address]  Phone: [phone] Fax: [phone]    DISCHARGE Disposition: Home with Home Health Care: [facility]     Transportation:  Transportation PLAN for discharge: EMS transportation   Mode of Transport: Ambulance stretcher - BLS  Name of Transport Company:     Home Care:  Home Care ordered at discharge: Yes  Home Care Agency: [facility]  Phone: [phone]  Fax: [phone]  Orders faxed: Yes    Additional CM Notes:     COVID Result:    Lab Results   Component Value Date/Time    COVID19 Not Detected 04/02/2021 12:00 PM       The Plan for Transition of Care is related to the following treatment goals of GI bleeding [K92.2]  Rectal bleeding [K62.5]  Spell of abnormal behavior [R46.89]    The Patient and/or patient representative Fawad and his family were provided with a choice of provider and agrees with the discharge plan Yes    Freedom of choice list was provided with basic dialogue that supports the patient's individualized plan of care/goals and shares the quality data associated with the providers. Yes      Jocelynn Argueta RN  [facility]  Case Management Department  [phone]

## 2024-02-09 NOTE — PROGRESS NOTES
Latest Reference Range & Units 02/09/24 14:01   Hemoglobin Quant 13.5 - 17.5 g/dL 6.3 (LL)   Hematocrit 40.5 - 52.5 % 18.1 (LL)   (LL): Data is critically low        Pt receiving first unit of PRBC. Family educated at bedside.

## 2024-02-09 NOTE — PLAN OF CARE
Problem: Discharge Planning  Goal: Discharge to home or other facility with appropriate resources  2/8/2024 2222 by Ezio Truong, RN  Outcome: Progressing  Flowsheets (Taken 2/8/2024 2000)  Discharge to home or other facility with appropriate resources:   Identify barriers to discharge with patient and caregiver   Identify discharge learning needs (meds, wound care, etc)   Arrange for needed discharge resources and transportation as appropriate  2/8/2024 1142 by Anusha Shea  Outcome: Progressing     Problem: Chronic Conditions and Co-morbidities  Goal: Patient's chronic conditions and co-morbidity symptoms are monitored and maintained or improved  2/8/2024 2222 by Ezio Truong, RN  Outcome: Progressing  Flowsheets (Taken 2/8/2024 2000)  Care Plan - Patient's Chronic Conditions and Co-Morbidity Symptoms are Monitored and Maintained or Improved:   Monitor and assess patient's chronic conditions and comorbid symptoms for stability, deterioration, or improvement   Collaborate with multidisciplinary team to address chronic and comorbid conditions and prevent exacerbation or deterioration   Update acute care plan with appropriate goals if chronic or comorbid symptoms are exacerbated and prevent overall improvement and discharge  2/8/2024 1142 by Anusha Shea  Outcome: Progressing     Problem: Safety - Adult  Goal: Free from fall injury  2/8/2024 2222 by Ezio Truong, RN  Outcome: Progressing  Flowsheets (Taken 2/8/2024 2207)  Free From Fall Injury: Instruct family/caregiver on patient safety  2/8/2024 1142 by Anusha Shea  Outcome: Progressing     Problem: Pain  Goal: Verbalizes/displays adequate comfort level or baseline comfort level  2/8/2024 2222 by Ezio Truong, RN  Outcome: Progressing  Flowsheets (Taken 2/8/2024 2000)  Verbalizes/displays adequate comfort level or baseline comfort level:   Encourage patient to monitor pain and request assistance   Assess pain using appropriate  pain scale   Administer analgesics based on type and severity of pain and evaluate response   Implement non-pharmacological measures as appropriate and evaluate response  2/8/2024 1142 by Anusha Shea  Outcome: Progressing     Problem: Skin/Tissue Integrity  Goal: Absence of new skin breakdown  Description: 1.  Monitor for areas of redness and/or skin breakdown  2.  Assess vascular access sites hourly  3.  Every 4-6 hours minimum:  Change oxygen saturation probe site  4.  Every 4-6 hours:  If on nasal continuous positive airway pressure, respiratory therapy assess nares and determine need for appliance change or resting period.  Outcome: Progressing     Problem: ABCDS Injury Assessment  Goal: Absence of physical injury  Outcome: Progressing  Flowsheets (Taken 2/8/2024 2207)  Absence of Physical Injury: Implement safety measures based on patient assessment

## 2024-02-10 ENCOUNTER — APPOINTMENT (OUTPATIENT)
Dept: GENERAL RADIOLOGY | Age: 89
End: 2024-02-10
Payer: MEDICARE

## 2024-02-10 LAB
ALBUMIN SERPL-MCNC: 2.4 G/DL (ref 3.4–5)
ANION GAP SERPL CALCULATED.3IONS-SCNC: 19 MMOL/L (ref 3–16)
BASOPHILS # BLD: 0 K/UL (ref 0–0.2)
BASOPHILS NFR BLD: 0.5 %
BUN SERPL-MCNC: 96 MG/DL (ref 7–20)
CALCIUM SERPL-MCNC: 6.8 MG/DL (ref 8.3–10.6)
CHLORIDE SERPL-SCNC: 108 MMOL/L (ref 99–110)
CO2 SERPL-SCNC: 17 MMOL/L (ref 21–32)
CREAT SERPL-MCNC: 7.8 MG/DL (ref 0.8–1.3)
DEPRECATED RDW RBC AUTO: 15.4 % (ref 12.4–15.4)
EOSINOPHIL # BLD: 0 K/UL (ref 0–0.6)
EOSINOPHIL NFR BLD: 0 %
GFR SERPLBLD CREATININE-BSD FMLA CKD-EPI: 6 ML/MIN/{1.73_M2}
GLUCOSE BLD-MCNC: 150 MG/DL (ref 70–99)
GLUCOSE BLD-MCNC: 152 MG/DL (ref 70–99)
GLUCOSE BLD-MCNC: 183 MG/DL (ref 70–99)
GLUCOSE BLD-MCNC: 199 MG/DL (ref 70–99)
GLUCOSE SERPL-MCNC: 152 MG/DL (ref 70–99)
HCT VFR BLD AUTO: 19.6 % (ref 40.5–52.5)
HCT VFR BLD AUTO: 22.3 % (ref 40.5–52.5)
HGB BLD-MCNC: 6.6 G/DL (ref 13.5–17.5)
HGB BLD-MCNC: 7.6 G/DL (ref 13.5–17.5)
INR PPP: 1.4 (ref 0.84–1.16)
LYMPHOCYTES # BLD: 0.7 K/UL (ref 1–5.1)
LYMPHOCYTES NFR BLD: 10.1 %
MAGNESIUM SERPL-MCNC: 2.1 MG/DL (ref 1.8–2.4)
MCH RBC QN AUTO: 30.1 PG (ref 26–34)
MCHC RBC AUTO-ENTMCNC: 34.2 G/DL (ref 31–36)
MCV RBC AUTO: 88 FL (ref 80–100)
MONOCYTES # BLD: 0.8 K/UL (ref 0–1.3)
MONOCYTES NFR BLD: 11.4 %
NEUTROPHILS # BLD: 5.7 K/UL (ref 1.7–7.7)
NEUTROPHILS NFR BLD: 78 %
PERFORMED ON: ABNORMAL
PHOSPHATE SERPL-MCNC: 7.4 MG/DL (ref 2.5–4.9)
PLATELET # BLD AUTO: 94 K/UL (ref 135–450)
PLATELET BLD QL SMEAR: ABNORMAL
PMV BLD AUTO: 9.6 FL (ref 5–10.5)
POTASSIUM SERPL-SCNC: 4.1 MMOL/L (ref 3.5–5.1)
PROTHROMBIN TIME: 17.1 SEC (ref 11.5–14.8)
RBC # BLD AUTO: 2.54 M/UL (ref 4.2–5.9)
SLIDE REVIEW: ABNORMAL
SODIUM SERPL-SCNC: 144 MMOL/L (ref 136–145)
WBC # BLD AUTO: 7.3 K/UL (ref 4–11)

## 2024-02-10 PROCEDURE — 85014 HEMATOCRIT: CPT

## 2024-02-10 PROCEDURE — 80069 RENAL FUNCTION PANEL: CPT

## 2024-02-10 PROCEDURE — 86706 HEP B SURFACE ANTIBODY: CPT

## 2024-02-10 PROCEDURE — 2580000003 HC RX 258: Performed by: HOSPITALIST

## 2024-02-10 PROCEDURE — 99233 SBSQ HOSP IP/OBS HIGH 50: CPT | Performed by: HOSPITALIST

## 2024-02-10 PROCEDURE — 87340 HEPATITIS B SURFACE AG IA: CPT

## 2024-02-10 PROCEDURE — 36556 INSERT NON-TUNNEL CV CATH: CPT

## 2024-02-10 PROCEDURE — 1200000000 HC SEMI PRIVATE

## 2024-02-10 PROCEDURE — 90935 HEMODIALYSIS ONE EVALUATION: CPT

## 2024-02-10 PROCEDURE — 36415 COLL VENOUS BLD VENIPUNCTURE: CPT

## 2024-02-10 PROCEDURE — 6360000002 HC RX W HCPCS

## 2024-02-10 PROCEDURE — 06HY33Z INSERTION OF INFUSION DEVICE INTO LOWER VEIN, PERCUTANEOUS APPROACH: ICD-10-PCS | Performed by: HOSPITALIST

## 2024-02-10 PROCEDURE — 85025 COMPLETE CBC W/AUTO DIFF WBC: CPT

## 2024-02-10 PROCEDURE — 6370000000 HC RX 637 (ALT 250 FOR IP): Performed by: STUDENT IN AN ORGANIZED HEALTH CARE EDUCATION/TRAINING PROGRAM

## 2024-02-10 PROCEDURE — 85018 HEMOGLOBIN: CPT

## 2024-02-10 PROCEDURE — 86704 HEP B CORE ANTIBODY TOTAL: CPT

## 2024-02-10 PROCEDURE — 2580000003 HC RX 258

## 2024-02-10 PROCEDURE — 5A1D70Z PERFORMANCE OF URINARY FILTRATION, INTERMITTENT, LESS THAN 6 HOURS PER DAY: ICD-10-PCS | Performed by: INTERNAL MEDICINE

## 2024-02-10 PROCEDURE — 71045 X-RAY EXAM CHEST 1 VIEW: CPT

## 2024-02-10 PROCEDURE — 36430 TRANSFUSION BLD/BLD COMPNT: CPT

## 2024-02-10 PROCEDURE — 85610 PROTHROMBIN TIME: CPT

## 2024-02-10 PROCEDURE — 6360000002 HC RX W HCPCS: Performed by: STUDENT IN AN ORGANIZED HEALTH CARE EDUCATION/TRAINING PROGRAM

## 2024-02-10 PROCEDURE — 83735 ASSAY OF MAGNESIUM: CPT

## 2024-02-10 PROCEDURE — 99223 1ST HOSP IP/OBS HIGH 75: CPT | Performed by: SURGERY

## 2024-02-10 RX ORDER — 0.9 % SODIUM CHLORIDE 0.9 %
1000 INTRAVENOUS SOLUTION INTRAVENOUS ONCE
Status: COMPLETED | OUTPATIENT
Start: 2024-02-10 | End: 2024-02-10

## 2024-02-10 RX ORDER — SODIUM CHLORIDE 9 MG/ML
INJECTION, SOLUTION INTRAVENOUS PRN
Status: DISCONTINUED | OUTPATIENT
Start: 2024-02-10 | End: 2024-02-12

## 2024-02-10 RX ORDER — SODIUM CHLORIDE 9 MG/ML
INJECTION, SOLUTION INTRAVENOUS ONCE
Status: DISCONTINUED | OUTPATIENT
Start: 2024-02-10 | End: 2024-02-10 | Stop reason: SDUPTHER

## 2024-02-10 RX ORDER — SODIUM CHLORIDE 9 MG/ML
INJECTION, SOLUTION INTRAVENOUS PRN
Status: DISCONTINUED | OUTPATIENT
Start: 2024-02-10 | End: 2024-02-10

## 2024-02-10 RX ORDER — HEPARIN SODIUM 1000 [USP'U]/ML
1000 INJECTION, SOLUTION INTRAVENOUS; SUBCUTANEOUS ONCE
Status: COMPLETED | OUTPATIENT
Start: 2024-02-10 | End: 2024-02-10

## 2024-02-10 RX ADMIN — SODIUM CHLORIDE 1000 ML: 9 INJECTION, SOLUTION INTRAVENOUS at 11:45

## 2024-02-10 RX ADMIN — LATANOPROST 1 DROP: 50 SOLUTION OPHTHALMIC at 23:07

## 2024-02-10 RX ADMIN — LEVETIRACETAM 500 MG: 500 INJECTION, SOLUTION, CONCENTRATE INTRAVENOUS at 23:09

## 2024-02-10 RX ADMIN — SODIUM CHLORIDE, PRESERVATIVE FREE 10 ML: 5 INJECTION INTRAVENOUS at 10:53

## 2024-02-10 RX ADMIN — HEPARIN SODIUM 1000 UNITS: 1000 INJECTION INTRAVENOUS; SUBCUTANEOUS at 14:01

## 2024-02-10 RX ADMIN — LEVETIRACETAM 500 MG: 500 INJECTION, SOLUTION, CONCENTRATE INTRAVENOUS at 14:11

## 2024-02-10 RX ADMIN — SODIUM CHLORIDE, PRESERVATIVE FREE 10 ML: 5 INJECTION INTRAVENOUS at 23:08

## 2024-02-10 ASSESSMENT — PAIN SCALES - GENERAL
PAINLEVEL_OUTOF10: 0
PAINLEVEL_OUTOF10: 0

## 2024-02-10 NOTE — PLAN OF CARE
Problem: Pain  Goal: Verbalizes/displays adequate comfort level or baseline comfort level  2/10/2024 1605 by Pat Rinaldi, RN  Outcome: Progressing   Pt denies any pain or discomfort at this time  Problem: Safety - Adult  Goal: Free from fall injury  2/10/2024 1605 by Pat Rinaldi, RN  Outcome: Progressing   All standard safety precautions in place.

## 2024-02-10 NOTE — PROCEDURES
Fawad Glynn is a 88 y.o. male patient.  1. Rectal bleeding    2. Spell of abnormal behavior      Past Medical History:   Diagnosis Date    Anemia 01/09/2012    Anxiety and depression     Arthritis     bulging disc    BPH (benign prostatic hypertrophy) 05/16/2010    Diverticulosis 05/16/2010    Eczema 04/12/2011    Erectile dysfunction 05/16/2010    Essential hypertension 08/26/2015    GERD (gastroesophageal reflux disease) 05/16/2010    Hemorrhoid 05/16/2010    Hyperlipidemia 01/10/2011    Hyperphosphatemia 07/03/2010    Hypertension 05/16/2010    Overactive bladder 07/12/2011    Paranoid schizophrenia (HCC) 05/16/2010    Prostate cancer (HCC) 01/08/2013    had radiation treatments    Seizures (HCC) long ago    Type 2 diabetes mellitus without complication, without long-term current use of insulin (HCC) 08/31/2016     Blood pressure 130/60, pulse 86, temperature 98 °F (36.7 °C), temperature source Axillary, resp. rate 14, height 1.905 m (6' 3\"), weight 67.3 kg (148 lb 4.8 oz), SpO2 95 %.    Central Line    Date/Time: 2/10/2024 2:40 PM    Performed by: Mark Connor DO  Authorized by: Mark Connor DO  Consent: Verbal consent obtained. Written consent obtained.  Risks and benefits: risks, benefits and alternatives were discussed  Consent given by: patient and spouse  Patient understanding: patient states understanding of the procedure being performed  Patient consent: the patient's understanding of the procedure matches consent given  Procedure consent: procedure consent matches procedure scheduled  Relevant documents: relevant documents present and verified  Test results: test results available and properly labeled  Patient identity confirmed: verbally with patient  Time out: Immediately prior to procedure a \"time out\" was called to verify the correct patient, procedure, equipment, support staff and site/side marked as required.  Indications: vascular access    Anesthesia:  Local Anesthetic: lidocaine 1% with  epinephrine  Anesthetic total: 10 mL    Sedation:  Patient sedated: no    Preparation: skin prepped with 2% chlorhexidine  Location details: right femoral  Site selection rationale: Attempted RIJ 4 times in trendelenburg position. Unable to get access secondary to high compressability. Then attempted right femoral access in reverse Trendelenburg position  Patient position: reverse Trendelenburg  Catheter type: triple lumen  Catheter size: 13.  Pre-procedure: landmarks identified  Ultrasound guidance: yes  Number of attempts: 5 or more (1 femoral, 4 IJ)  Successful placement: yes  Post-procedure: line sutured and dressing applied  Assessment: blood return through all ports and no pneumothorax on x-ray  Patient tolerance: patient tolerated the procedure well with no immediate complications  Comments: EBL 5 ml          Mark Connor DO  2/10/2024

## 2024-02-10 NOTE — PLAN OF CARE
Problem: Discharge Planning  Goal: Discharge to home or other facility with appropriate resources  Outcome: Progressing  Flowsheets (Taken 2/9/2024 2000)  Discharge to home or other facility with appropriate resources:   Identify barriers to discharge with patient and caregiver   Arrange for needed discharge resources and transportation as appropriate   Identify discharge learning needs (meds, wound care, etc)   Arrange for interpreters to assist at discharge as needed   Refer to discharge planning if patient needs post-hospital services based on physician order or complex needs related to functional status, cognitive ability or social support system     Problem: Chronic Conditions and Co-morbidities  Goal: Patient's chronic conditions and co-morbidity symptoms are monitored and maintained or improved  Outcome: Progressing  Flowsheets (Taken 2/9/2024 2000)  Care Plan - Patient's Chronic Conditions and Co-Morbidity Symptoms are Monitored and Maintained or Improved:   Update acute care plan with appropriate goals if chronic or comorbid symptoms are exacerbated and prevent overall improvement and discharge   Collaborate with multidisciplinary team to address chronic and comorbid conditions and prevent exacerbation or deterioration   Monitor and assess patient's chronic conditions and comorbid symptoms for stability, deterioration, or improvement     Problem: Safety - Adult  Goal: Free from fall injury  Outcome: Progressing  Flowsheets  Taken 2/9/2024 2326 by Trinh Perez, RN  Free From Fall Injury:   Instruct family/caregiver on patient safety   Based on caregiver fall risk screen, instruct family/caregiver to ask for assistance with transferring infant if caregiver noted to have fall risk factors  Taken 2/9/2024 1820 by Najma Perez, RN  Free From Fall Injury:   Instruct family/caregiver on patient safety   Based on caregiver fall risk screen, instruct family/caregiver to ask for assistance with  transferring infant if caregiver noted to have fall risk factors     Problem: Pain  Goal: Verbalizes/displays adequate comfort level or baseline comfort level  Outcome: Progressing  Flowsheets (Taken 2/9/2024 2000)  Verbalizes/displays adequate comfort level or baseline comfort level:   Encourage patient to monitor pain and request assistance   Assess pain using appropriate pain scale   Administer analgesics based on type and severity of pain and evaluate response   Implement non-pharmacological measures as appropriate and evaluate response   Consider cultural and social influences on pain and pain management   Notify Licensed Independent Practitioner if interventions unsuccessful or patient reports new pain     Problem: Skin/Tissue Integrity  Goal: Absence of new skin breakdown  Description: 1.  Monitor for areas of redness and/or skin breakdown  2.  Assess vascular access sites hourly  3.  Every 4-6 hours minimum:  Change oxygen saturation probe site  4.  Every 4-6 hours:  If on nasal continuous positive airway pressure, respiratory therapy assess nares and determine need for appliance change or resting period.  Outcome: Progressing     Problem: ABCDS Injury Assessment  Goal: Absence of physical injury  Outcome: Progressing  Flowsheets  Taken 2/9/2024 2326 by Trinh Perez RN  Absence of Physical Injury: Implement safety measures based on patient assessment  Taken 2/9/2024 1820 by Najma Perez RN  Absence of Physical Injury: Implement safety measures based on patient assessment

## 2024-02-10 NOTE — PROGRESS NOTES
[facility]  Gastroenterology Progress Note    Fawad Glynn is a 88 y.o. male patient.  Principal Problem:    GI bleeding  Active Problems:    Acute renal failure superimposed on stage 5 chronic kidney disease, not on chronic dialysis (HCC)    Rectal bleeding    Hypotension due to hypovolemia    Acute blood loss anemia  Resolved Problems:    * No resolved hospital problems. *      SUBJECTIVE:    No abdominal pain.  Patient denies any further bleeding.  Hemoglobin stabilized    Physical    VITALS:  /60   Pulse 86   Temp 98 °F (36.7 °C) (Axillary)   Resp 14   Ht 1.905 m (6' 3\")   Wt 67.3 kg (148 lb 4.8 oz)   SpO2 95%   BMI 18.54 kg/m²   TEMPERATURE:  Current - Temp: 98 °F (36.7 °C); Max - Temp  Av.1 °F (36.7 °C)  Min: 97.8 °F (36.6 °C)  Max: 99.2 °F (37.3 °C)    NAD, age appropriate  Integ: no rash, jaundice, ecchymoses  Abdomen soft, ND, NT, no HSM, Bowel sounds normal.    Data    Data Review:    Recent Labs     24  0234 24  0358 24  0906 24  1401 24  2318 02/10/24  0941   WBC 11.3* 12.0*  --   --   --  7.3   HGB 8.9* 8.0*   < > 6.3* 7.8* 7.6*   HCT 28.0* 24.7*   < > 18.1* 22.7* 22.3*   MCV 85.0 85.3  --   --   --  88.0    240  --   --   --  94*    < > = values in this interval not displayed.     Recent Labs     24  1134 24  0615 02/10/24  0559    143 144   K 3.9 3.8 4.1    107 108   CO2 20* 15* 17*   PHOS 5.1* 6.2* 7.4*   BUN 81* 84* 96*   CREATININE 7.0* 6.7* 7.8*     Recent Labs     24  0234   AST 15   ALT 8*   BILIDIR <0.2   BILITOT <0.2   ALKPHOS 157*     Recent Labs     24  0234   LIPASE 233.0*     Recent Labs     02/10/24  0941   PROTIME 17.1*   INR 1.40*     No results for input(s): \"PTT\" in the last 72 hours.          ASSESSMENT   1.  Lower GI bleed-EGD  with reflux esophagitis and erosive gastritis.  Colonoscopy was notable for extremely poor prep with limited visualization.  He had blood clots of red blood in the

## 2024-02-10 NOTE — PROGRESS NOTES
4 Eyes Skin Assessment     NAME:  Fawad Glynn  YOB: 1935  MEDICAL RECORD NUMBER:  [id number]    The patient is being assessed for  Admission    I agree that at least one RN has performed a thorough Head to Toe Skin Assessment on the patient. ALL assessment sites listed below have been assessed.      Areas assessed by both nurses:    Head, Face, Ears, Shoulders, Back, Chest, Arms, Elbows, Hands, Sacrum. Buttock, Coccyx, Ischium, Legs. Feet and Heels, and Under Medical Devices         Does the Patient have a Wound? No noted wound(s)       Jayden Prevention initiated by RN: No  Wound Care Orders initiated by RN: No    Pressure Injury (Stage 3,4, Unstageable, DTI, NWPT, and Complex wounds) if present, place Wound referral order by RN under : No    New Ostomies, if present place, Ostomy referral order under : No     Nurse 1 eSignature: Electronically signed by Chrystal Hsu RN on 2/10/24 at 6:45 AM EST    **SHARE this note so that the co-signing nurse can place an eSignature**    Nurse 2 eSignature: Electronically signed by Denise Damon RN on 2/10/24 at 7:49 AM EST

## 2024-02-10 NOTE — PROGRESS NOTES
Patient transferred from ICU. Admitted with large amount of stool and bleeding.. Transfusing RBC. A//O with intermittent confusion. Respiration easy and even.   Daughter at bedside. Oriented to staff and room . Fall precautions in place.

## 2024-02-10 NOTE — PROGRESS NOTES
Renal Progress Note  Subjective:   Admit Date: 2/8/2024       Assessment/Plan   # THERESE on CKD 5  - no signs of uremia -monitor   - if renal function not better will need RRT   - monitor UO and renal function   - Mild Hydro on CT noted       2/10  serum Cr trending up , will get  temporary HD line and will do HD x 2 hrs for clearance  Surgery team to get HD line      # HTN  - monitor   - BP low   - IVF   - stop BP meds      # Anemia sec to GIB   Pt has Anemia of CKD 5 also   - monitor Hb   - GI consulted; EGD and colonoscopy planned  -Follows w/ [facility] for EPO.  - will give EPO here      # Acid- base/ Electrolyte imbalance   -Monitor lytes and replete prn      HPI   Fawad Glynn is a 88 y.o. male with prior history CKD stage 5, anemia of chronic disease, HTN, HLD, T2DM, seizure disorder, and schizophrenia who was admitted to the ICU for ongoing rectal bleeding and altered mental status.      Interval History:   Patient had endorsed abdominal pain for which he was given Pepto Bismol. Shortly after, patient had several episodes of hematochezia. Was initially alert and awake when he went to ED. Hgb decreased from 8.9->8.0, he was transfused 1 unit PRBC. Sent for CT Head, Abd/Pelvis where he became unresponsive and code blue was called. Had pulse and was able to protect airway. Became alert after CT scan. CT head showed no acute intracranial bleed and was similar to previous remote study. CT Abd/Pelvis limited due to no IV contrast but showed no gross sequelae of acute GI bleed.    This morning, patient does not report any symptoms. He denied headache, chest pain, dyspnea, abd pain, N/V, pruritis, appetite change.       DIET ADULT DIET; Clear Liquid  Medications:   Scheduled Meds:   heparin (porcine)  1,000 Units IntraCATHeter Once    sodium chloride flush  5-40 mL IntraVENous 2 times per day    insulin lispro  0-4 Units SubCUTAneous TID WC    insulin lispro  0-4 Units SubCUTAneous Nightly    [Held by provider]

## 2024-02-10 NOTE — CONSULTS
General Surgery   Resident Consult Note    Reason for Consult: HD Access, GIB     History of Present Illness:   Fawad Glynn is a 88 y.o. male with Hx of ESRD, HTN, seizure disorder, schizophrenia, type 2 diabetes, hyperlipidemia, who presented to the hospital with GI bleed.  Initial workup included colonoscopy and CTA which did not reveal source of bleeding.  He was resuscitated with product and has not acutely bled since admission.  His renal function has declined due to bleeding and now requires hemodialysis. General surgery has been engaged to place hemodialysis catheter.     Previous central line attempts this admission:  Central line attempts prior to this admission:   Current Access: PIV  Anticoagulation:   Antiplatelet: ASA  INR:   Lab Results   Component Value Date    INR 1.40 (H) 02/10/2024     Platelet Count :   Lab Results   Component Value Date    PLT 94 (L) 02/10/2024       Past Medical History:        Diagnosis Date    Anemia 01/09/2012    Anxiety and depression     Arthritis     bulging disc    BPH (benign prostatic hypertrophy) 05/16/2010    Diverticulosis 05/16/2010    Eczema 04/12/2011    Erectile dysfunction 05/16/2010    Essential hypertension 08/26/2015    GERD (gastroesophageal reflux disease) 05/16/2010    Hemorrhoid 05/16/2010    Hyperlipidemia 01/10/2011    Hyperphosphatemia 07/03/2010    Hypertension 05/16/2010    Overactive bladder 07/12/2011    Paranoid schizophrenia (HCC) 05/16/2010    Prostate cancer (HCC) 01/08/2013    had radiation treatments    Seizures (HCC) long ago    Type 2 diabetes mellitus without complication, without long-term current use of insulin (HCC) 08/31/2016       Past Surgical History:        Procedure Laterality Date    CHEST WALL RESECTION N/A 1/21/2020    EXCISION CHEST LESION (3 x 2.8cm) AND RIGHT UPPER ABDOMEN LESION (3 x .1), COMPLEX CLOSURE 1.9CM;  ADJACENT TISSUE TRANSFER; performed by Milady Andre MD at [facility] OR    CHOLECYSTECTOMY, LAPAROSCOPIC

## 2024-02-10 NOTE — PROGRESS NOTES
Internal Medicine Progress Note    Date: 2/10/2024   Patient: Fawad Glynn   [facility] Day: 2      CC: Rectal Bleeding (Patient presents to the ED via EMS from SNF. Per EMS, nursing staff at facility reports patient passed a significant amount of dark, tarry stool this evening. He is not on blood thinners. Patient states that his stomach began hurting earlier this evening. )       Interval Hx   Patient was seen and examined at bedside this morning. Overnight events include Hgb 7.8. 1 unit pRBC was given. Patient had a large BM with clots and bright red blood. Cr is a 7.8 and BUN is 96. Patient has never had dialysis. EGD and colonoscopy was done 2/9/24. EGD revealed few erosions and patchy hematin in gastric body. Medium hiatal hernia and Grade A esophagitis was also seen. Colonoscopy revealed poor prep and masses and lesions are unable to be ruled out. Could be diverticular bleed. Bladder scan done and showed 250 mL.      HPI:   \"Mr Glynn is an 87 yo M with a PMHx of diverticulosis, paranoid schizophrenia, CKD stage V, seizures who presented following an episode of a BM with bright red blood. He arriver via EMS from his nursing facility. Pt states he was using the bathroom and had a large amount of blood in the toilet. He states his wife was present during this as well who also endorses a large volume of blood loss. Pt denies any abdominal pain prior to or during the BM. Denies any dizziness, fatigue, SOB. States his appetite is low, but not lower than normal. Denies any recent N/V, fever, chills, weight loss. Says this has never happened before. Denies known history of hemorrhoids.     In the ED, VS were WNL and stable. CBC was remarkable for WBC 11.3, Hgb 8.9. Baseline Hgb 9.0 in the setting of chronic anemia  2/2 CKD. The patient had a brief episode of unresponsiveness while getting CT done, however maintained a pulse so CPR was not started. Returned to baseline after receiving a 500mL bolus. Repeat  moderate L colon diverticulosis   - Baseline hgb 9-10  - EGD and colonoscopy planned for 2/9/24   - EGD revealed few erosions and patchy hematin in gastric body, medium hiatal hernia and Grade A esophagitis    - Colonoscopy revealed poor prep, unable to rule out masses or lesions; could be diverticular bleed  - GI recommendations include transfusing to keep Hgb over 8. Repeat CTA if bleeds continue to patient becomes unable.   - Trend H+H q6, s/p total 4 u pRBC   - IV Protonix 40 mg  - EPO given; follows with [facility] for EPO    Altered Mental status, improved  Patient had an episode of unresponsiveness in the ED. Patient had another episode 2/9 after transfer from the ICU to the floors. Unclear etiology of whether this is seizure vs hypotension. Wife states this has been happening for about a year.   - Patient mental status has improved- currently at baseline  - Continue Keprra 500 mg BID.    CKD, Stage 5  Baseline GFR 11, GFR 9 on admission. On PD  - 2/9 BUN 84, Cr 6.7  - 2/10 Cr 7.8  - Bladder scan today- 250 ml  - Nephrology consulted, will appreciate recs  - Monitor RFP  -  RRT planned as Cr are trending up  - Surgery c/s for vasc cath placement 2/10  - PT/INR ordered    Chronic Conditions  Vascular Dementia: Continue memantine and remeron  Anemia of chronic disease: transfusion if Hgb <7 (baseline 9.0)  Complex partial seizure disorder: Continue home Keppra 500 mg BID  HTN: Continue home Norvasc 5 mg qd  HLD: Continue home atorvastatin 10 mg qd  T2DM: LDSSI, q4h POCT, hypoglycemia protocol    DVT PPx: bleeding risk  Diet: ADULT DIET; Clear Liquid   Code status:  Full Code     ELOS: >3 days  Barriers to discharge:  Disposition  - Preadmission: home   - Current: 4519  - Upon discharge: TBD    Will discuss with attending physician Guillermo Swanson MD     _____________________  Dorinda Perdomo DO   Internal Medicine Resident, PGY-1    Addendum to Resident H& P/Progress note:  I have personally seen,examined and

## 2024-02-11 LAB
ALBUMIN SERPL-MCNC: 2.5 G/DL (ref 3.4–5)
ANION GAP SERPL CALCULATED.3IONS-SCNC: 12 MMOL/L (ref 3–16)
BLOOD BANK DISPENSE STATUS: NORMAL
BLOOD BANK PRODUCT CODE: NORMAL
BPU ID: NORMAL
BUN SERPL-MCNC: 61 MG/DL (ref 7–20)
CALCIUM SERPL-MCNC: 6.5 MG/DL (ref 8.3–10.6)
CHLORIDE SERPL-SCNC: 105 MMOL/L (ref 99–110)
CO2 SERPL-SCNC: 21 MMOL/L (ref 21–32)
CREAT SERPL-MCNC: 5.9 MG/DL (ref 0.8–1.3)
DEPRECATED RDW RBC AUTO: 15.2 % (ref 12.4–15.4)
DESCRIPTION BLOOD BANK: NORMAL
FOLATE SERPL-MCNC: >20 NG/ML (ref 4.78–24.2)
GFR SERPLBLD CREATININE-BSD FMLA CKD-EPI: 9 ML/MIN/{1.73_M2}
GLUCOSE BLD-MCNC: 108 MG/DL (ref 70–99)
GLUCOSE BLD-MCNC: 122 MG/DL (ref 70–99)
GLUCOSE BLD-MCNC: 147 MG/DL (ref 70–99)
GLUCOSE BLD-MCNC: 176 MG/DL (ref 70–99)
GLUCOSE SERPL-MCNC: 94 MG/DL (ref 70–99)
HBV CORE AB SERPL QL IA: NEGATIVE
HBV SURFACE AB SERPL IA-ACNC: <3.5 MIU/ML
HBV SURFACE AG SERPL QL IA: NORMAL
HCT VFR BLD AUTO: 21.5 % (ref 40.5–52.5)
HCT VFR BLD AUTO: 23 % (ref 40.5–52.5)
HCT VFR BLD AUTO: 23.9 % (ref 40.5–52.5)
HCT VFR BLD AUTO: 23.9 % (ref 40.5–52.5)
HGB BLD-MCNC: 7.6 G/DL (ref 13.5–17.5)
HGB BLD-MCNC: 7.8 G/DL (ref 13.5–17.5)
HGB BLD-MCNC: 8.5 G/DL (ref 13.5–17.5)
HGB BLD-MCNC: 8.5 G/DL (ref 13.5–17.5)
LACTATE BLDV-SCNC: 0.8 MMOL/L (ref 0.4–2)
MAGNESIUM SERPL-MCNC: 1.9 MG/DL (ref 1.8–2.4)
MCH RBC QN AUTO: 30 PG (ref 26–34)
MCHC RBC AUTO-ENTMCNC: 35.5 G/DL (ref 31–36)
MCV RBC AUTO: 84.6 FL (ref 80–100)
PERFORMED ON: ABNORMAL
PHOSPHATE SERPL-MCNC: 3.9 MG/DL (ref 2.5–4.9)
PLATELET # BLD AUTO: 78 K/UL (ref 135–450)
PMV BLD AUTO: 9.1 FL (ref 5–10.5)
POTASSIUM SERPL-SCNC: 3.4 MMOL/L (ref 3.5–5.1)
RBC # BLD AUTO: 2.83 M/UL (ref 4.2–5.9)
SODIUM SERPL-SCNC: 138 MMOL/L (ref 136–145)
VIT B12 SERPL-MCNC: 611 PG/ML (ref 211–911)
WBC # BLD AUTO: 7.7 K/UL (ref 4–11)

## 2024-02-11 PROCEDURE — 6360000002 HC RX W HCPCS

## 2024-02-11 PROCEDURE — A4216 STERILE WATER/SALINE, 10 ML: HCPCS | Performed by: STUDENT IN AN ORGANIZED HEALTH CARE EDUCATION/TRAINING PROGRAM

## 2024-02-11 PROCEDURE — 6370000000 HC RX 637 (ALT 250 FOR IP): Performed by: STUDENT IN AN ORGANIZED HEALTH CARE EDUCATION/TRAINING PROGRAM

## 2024-02-11 PROCEDURE — 82746 ASSAY OF FOLIC ACID SERUM: CPT

## 2024-02-11 PROCEDURE — 6370000000 HC RX 637 (ALT 250 FOR IP)

## 2024-02-11 PROCEDURE — 85014 HEMATOCRIT: CPT

## 2024-02-11 PROCEDURE — 99233 SBSQ HOSP IP/OBS HIGH 50: CPT | Performed by: HOSPITALIST

## 2024-02-11 PROCEDURE — 2580000003 HC RX 258

## 2024-02-11 PROCEDURE — 36415 COLL VENOUS BLD VENIPUNCTURE: CPT

## 2024-02-11 PROCEDURE — 82607 VITAMIN B-12: CPT

## 2024-02-11 PROCEDURE — 2580000003 HC RX 258: Performed by: STUDENT IN AN ORGANIZED HEALTH CARE EDUCATION/TRAINING PROGRAM

## 2024-02-11 PROCEDURE — 85018 HEMOGLOBIN: CPT

## 2024-02-11 PROCEDURE — C9113 INJ PANTOPRAZOLE SODIUM, VIA: HCPCS | Performed by: STUDENT IN AN ORGANIZED HEALTH CARE EDUCATION/TRAINING PROGRAM

## 2024-02-11 PROCEDURE — 6360000002 HC RX W HCPCS: Performed by: STUDENT IN AN ORGANIZED HEALTH CARE EDUCATION/TRAINING PROGRAM

## 2024-02-11 PROCEDURE — 99232 SBSQ HOSP IP/OBS MODERATE 35: CPT | Performed by: SURGERY

## 2024-02-11 PROCEDURE — 36430 TRANSFUSION BLD/BLD COMPNT: CPT

## 2024-02-11 PROCEDURE — 83735 ASSAY OF MAGNESIUM: CPT

## 2024-02-11 PROCEDURE — 80069 RENAL FUNCTION PANEL: CPT

## 2024-02-11 PROCEDURE — 85027 COMPLETE CBC AUTOMATED: CPT

## 2024-02-11 PROCEDURE — 99232 SBSQ HOSP IP/OBS MODERATE 35: CPT | Performed by: HOSPITALIST

## 2024-02-11 PROCEDURE — 1200000000 HC SEMI PRIVATE

## 2024-02-11 PROCEDURE — 83605 ASSAY OF LACTIC ACID: CPT

## 2024-02-11 PROCEDURE — 6370000000 HC RX 637 (ALT 250 FOR IP): Performed by: INTERNAL MEDICINE

## 2024-02-11 RX ORDER — SODIUM CHLORIDE 9 MG/ML
INJECTION, SOLUTION INTRAVENOUS PRN
Status: DISCONTINUED | OUTPATIENT
Start: 2024-02-11 | End: 2024-02-12

## 2024-02-11 RX ORDER — MECOBALAMIN 5000 MCG
5 TABLET,DISINTEGRATING ORAL NIGHTLY
Status: DISCONTINUED | OUTPATIENT
Start: 2024-02-11 | End: 2024-02-16 | Stop reason: HOSPADM

## 2024-02-11 RX ORDER — POLYETHYLENE GLYCOL 3350 17 G/17G
17 POWDER, FOR SOLUTION ORAL 2 TIMES DAILY
Status: DISCONTINUED | OUTPATIENT
Start: 2024-02-11 | End: 2024-02-11

## 2024-02-11 RX ORDER — POTASSIUM CHLORIDE 7.45 MG/ML
10 INJECTION INTRAVENOUS
Status: COMPLETED | OUTPATIENT
Start: 2024-02-11 | End: 2024-02-11

## 2024-02-11 RX ADMIN — LEVETIRACETAM 500 MG: 500 INJECTION, SOLUTION, CONCENTRATE INTRAVENOUS at 23:32

## 2024-02-11 RX ADMIN — SODIUM CHLORIDE, PRESERVATIVE FREE 40 MG: 5 INJECTION INTRAVENOUS at 08:25

## 2024-02-11 RX ADMIN — LATANOPROST 1 DROP: 50 SOLUTION OPHTHALMIC at 20:40

## 2024-02-11 RX ADMIN — SODIUM CHLORIDE: 9 INJECTION, SOLUTION INTRAVENOUS at 03:46

## 2024-02-11 RX ADMIN — SODIUM CHLORIDE: 9 INJECTION, SOLUTION INTRAVENOUS at 11:24

## 2024-02-11 RX ADMIN — SODIUM CHLORIDE, PRESERVATIVE FREE 10 ML: 5 INJECTION INTRAVENOUS at 19:38

## 2024-02-11 RX ADMIN — POTASSIUM CHLORIDE 10 MEQ: 10 INJECTION, SOLUTION INTRAVENOUS at 14:23

## 2024-02-11 RX ADMIN — POTASSIUM CHLORIDE 10 MEQ: 10 INJECTION, SOLUTION INTRAVENOUS at 13:16

## 2024-02-11 RX ADMIN — POLYETHYLENE GLYCOL-3350 AND ELECTROLYTES 2000 ML: 236; 6.74; 5.86; 2.97; 22.74 POWDER, FOR SOLUTION ORAL at 16:00

## 2024-02-11 RX ADMIN — LEVETIRACETAM 500 MG: 500 INJECTION, SOLUTION, CONCENTRATE INTRAVENOUS at 11:25

## 2024-02-11 RX ADMIN — SODIUM CHLORIDE, PRESERVATIVE FREE 10 ML: 5 INJECTION INTRAVENOUS at 08:26

## 2024-02-11 RX ADMIN — POTASSIUM CHLORIDE 10 MEQ: 10 INJECTION, SOLUTION INTRAVENOUS at 13:00

## 2024-02-11 RX ADMIN — Medication 5 MG: at 04:15

## 2024-02-11 RX ADMIN — POTASSIUM CHLORIDE 10 MEQ: 10 INJECTION, SOLUTION INTRAVENOUS at 11:25

## 2024-02-11 RX ADMIN — SODIUM CHLORIDE: 9 INJECTION, SOLUTION INTRAVENOUS at 19:39

## 2024-02-11 RX ADMIN — SODIUM CHLORIDE: 9 INJECTION, SOLUTION INTRAVENOUS at 16:09

## 2024-02-11 NOTE — PLAN OF CARE
Problem: Chronic Conditions and Co-morbidities  Goal: Patient's chronic conditions and co-morbidity symptoms are monitored and maintained or improved  Outcome: Progressing  Flowsheets (Taken 2/11/2024 0035)  Care Plan - Patient's Chronic Conditions and Co-Morbidity Symptoms are Monitored and Maintained or Improved:   Monitor and assess patient's chronic conditions and comorbid symptoms for stability, deterioration, or improvement   Collaborate with multidisciplinary team to address chronic and comorbid conditions and prevent exacerbation or deterioration   Update acute care plan with appropriate goals if chronic or comorbid symptoms are exacerbated and prevent overall improvement and discharge     Problem: Safety - Adult  Goal: Free from fall injury  2/11/2024 0035 by Anu Sen RN  Outcome: Progressing  Flowsheets (Taken 2/11/2024 0035)  Free From Fall Injury: Based on caregiver fall risk screen, instruct family/caregiver to ask for assistance with transferring infant if caregiver noted to have fall risk factors  Note: Discuss with patient and daughter safety measures and ensure all are in place, bed lock in lowest position, bed alarm on, non skid sock worn by patient, bedside table with patient belongings and call light within patient reach.     Problem: Skin/Tissue Integrity  Goal: Absence of new skin breakdown  Description: 1.  Monitor for areas of redness and/or skin breakdown  2.  Assess vascular access sites hourly  3.  Every 4-6 hours minimum:  Change oxygen saturation probe site  4.  Every 4-6 hours:  If on nasal continuous positive airway pressure, respiratory therapy assess nares and determine need for appliance change or resting period.  Outcome: Progressing  Note: Patient with limited movement in bed, assist in position change every 2 hours while awake and ensure patient back is dry and clen all the time.     Problem: Hematologic - Adult  Goal: Maintains hematologic  stability  Flowsheets (Taken 2/11/2024 0035)  Maintains hematologic stability:   Assess for signs and symptoms of bleeding or hemorrhage   Monitor labs for bleeding or clotting disorders   Administer blood products/factors as ordered  Note: H & h monitored every 6 hours and monitor for any further signs of bleeding. Transfused as per order     Problem: Discharge Planning  Goal: Discharge to home or other facility with appropriate resources  Outcome: Progressing

## 2024-02-11 NOTE — PROGRESS NOTES
Patient ost transfusion hemoglobin was 7.6 Hct 21.5 another 1 unit ordered to be transfused. Inserted new IV cannula and transfusion started as ordered.

## 2024-02-11 NOTE — PROGRESS NOTES
General Surgery   Daily Progress Note  Patient: Fawad Glynn      CC: GI bleed    SUBJECTIVE:   Patient rested well overnight. Underwent dialysis without issue. Hemoglobin continues to drift, transfused several units over the last 24 hours.  No pain. No bloody bm in last 24.     ROS:   A 14 point review of systems was conducted, significant findings as noted above. All other systems negative.    OBJECTIVE:  Date 02/10/24 0701 - 02/11/24 0700 02/11/24 0701 - 02/12/24 0700   Shift 3186-4600 4642-9128 24 Hour Total 9479-9283 5525-0323 24 Hour Total   INTAKE   P.O. 200 150 350      I.V.  10 10      Blood 358.7 769.7 1128.3        Volume (Transfuse RBC) 358.7  358.7        Volume (Transfuse RBC)  441.7 441.7        Volume (Transfuse RBC)  328 328      Shift Total 558.7 929.7 1488.3      OUTPUT   Urine  450 450      Shift Total  450 450      .7 479.7 1038.3         PHYSICAL EXAM:    Vitals:    02/11/24 0410 02/11/24 0415 02/11/24 0509 02/11/24 0643   BP: (!) 159/78 (!) 157/79 110/61 126/60   Pulse: 82 85 87 78   Resp: 16 16  16   Temp: 98.1 °F (36.7 °C) 98.2 °F (36.8 °C) 98.1 °F (36.7 °C) 97.7 °F (36.5 °C)   TempSrc: Oral  Oral Oral   SpO2: 99% 99%  98%   Weight:       Height:           General appearance: alert, no acute distress, grooming appropriate  HEENT: No scleral icterus, EOM grossly intact. Mucous membranes are dry   Neck: trachea midline, no JVD, no lymphadenopathy, neck supple  Chest/Lungs: CTAB, no crackles/rales, wheezes/rhonchi, normal effort with no accessory muscle use on RA  Cardiovascular: RRR, no murmurs/gallops/rubs, S1 and S2 noted, well perfused  Abdomen: Soft, non-tender, non-distended, no rebound, guarding, or rigidity.  Skin: warm and dry, no rashes  Extremities: no edema, no cyanosis  Neuro: A&Ox3, no focal deficits, sensation intact    LABS:   Recent Labs     02/10/24  0941 02/10/24  1908 02/11/24  0234   WBC 7.3  --   --    HGB 7.6* 6.6* 7.6*   HCT 22.3* 19.6* 21.5*   MCV 88.0  --

## 2024-02-11 NOTE — PLAN OF CARE
Problem: Safety - Adult  Goal: Free from fall injury  2/11/2024 1151 by Pat Rinaldi, RN  Outcome: Progressing   Bed in lowest position, wheels locked, bed alarm on call light within reach  Problem: Pain  Goal: Verbalizes/displays adequate comfort level or baseline comfort level  Outcome: Progressing   Resident denies pain at this time.

## 2024-02-11 NOTE — DIALYSIS
Treatment time: 2 hours  Net UF: 0 ml     Pre weight: 67.3 kg  Post weight:67.3 kg  EDW: TBD kg    Access used: R Vascath   Access function: well with  ml/min     Medications or blood products given: 1 Unit PRBC's      Regular outpatient schedule: New Start      Summary of response to treatment: Patient tolerated treatment well and without any complications. Patient remained stable throughout entire treatment and upon the exiting the dialysis suite via transport.     Report given to Anu Sen RN and copy of dialysis treatment record placed in chart, to be scanned into EMR.

## 2024-02-11 NOTE — PROGRESS NOTES
Internal Medicine Progress Note    Date: 2/11/2024   Patient: Fawad Glynn   [facility] Day: 3      CC: Rectal Bleeding (Patient presents to the ED via EMS from SNF. Per EMS, nursing staff at facility reports patient passed a significant amount of dark, tarry stool this evening. He is not on blood thinners. Patient states that his stomach began hurting earlier this evening. )       Interval Hx   Patient was seen and examined at bedside this morning. Overnight events include receiving 2 units of pRBC and 5 mg melatonin. K was 3.3, repleted this morning. Nephrology was contacted yesterday for worsening kidney function. Vasc cath placed yesterday for HD. Dialysis went well. Next session will be tomorrow.  PT 17.1 and INR 1.4. Patient has not had a BM since yesterday. Hb 7.8 this morning and stable. Will give Miralax BID for stool impaction.     EGD and colonoscopy was done 2/9/24. EGD revealed few erosions and patchy hematin in gastric body. Medium hiatal hernia and Grade A esophagitis was also seen. Colonoscopy revealed poor prep and masses and lesions are unable to be ruled out. Could be diverticular bleed. Bladder scan done and showed 250 mL.      HPI:   \"Mr Glynn is an 87 yo M with a PMHx of diverticulosis, paranoid schizophrenia, CKD stage V, seizures who presented following an episode of a BM with bright red blood. He arriver via EMS from his nursing facility. Pt states he was using the bathroom and had a large amount of blood in the toilet. He states his wife was present during this as well who also endorses a large volume of blood loss. Pt denies any abdominal pain prior to or during the BM. Denies any dizziness, fatigue, SOB. States his appetite is low, but not lower than normal. Denies any recent N/V, fever, chills, weight loss. Says this has never happened before. Denies known history of hemorrhoids.     In the ED, VS were WNL and stable. CBC was remarkable for WBC 11.3, Hgb 8.9. Baseline Hgb 9.0  diverticulosis   - Baseline hgb 9-10  - Hgb 7.6   - EGD and colonoscopy planned for 2/9/24   - EGD revealed few erosions and patchy hematin in gastric body, medium hiatal hernia and Grade A esophagitis    - Colonoscopy revealed poor prep, unable to rule out masses or lesions; could be diverticular bleed  - GI recommendations include transfusing to keep Hgb over 8. Repeat CTA if bleeds continue to patient becomes unable.   - Trend H+H q6, s/p total 6 u pRBC - will continue to watch. Will hold off on additional pRBC units as long as Hgb is stable.   - IV Protonix 40 mg  - EPO given; follows with [facility] for EPO  - follow up on B12 and folate    Altered Mental status, improved  Patient had an episode of unresponsiveness in the ED. Patient had another episode 2/9 after transfer from the ICU to the floors. Unclear etiology of whether this is seizure vs hypotension. Wife states this has been happening for about a year.   - Patient mental status has improved- currently at baseline  - Continue Keprra 500 mg BID.    CKD, Stage 5  Baseline GFR 11, GFR 9 on admission. On PD  - 2/9 BUN 84, Cr 6.7  - 2/10 Cr 7.8  - Cr improved after HD Cr 5.9 and BUN 61  - Bladder scan today- 250 ml  - Nephrology consulted, will appreciate recs  - Monitor RFP  -  HD as per nephrology- next time dialysis will be 2/12  - Surgery c/s for vasc cath placement 2/10  - PT/INR: PT 17.1, INR 1.4      Constipation  - Miralax BID    Chronic Conditions  Vascular Dementia: Continue memantine and remeron  Anemia of chronic disease: transfusion if Hgb <7 (baseline 9.0)  Complex partial seizure disorder: Continue home Keppra 500 mg BID  HTN: Continue home Norvasc 5 mg qd  HLD: Continue home atorvastatin 10 mg qd  T2DM: LDSSI, q4h POCT, hypoglycemia protocol    DVT PPx: bleeding risk  Diet: ADULT DIET; Clear Liquid   Code status:  Full Code     ELOS: >3 days  Barriers to discharge:  Disposition  - Preadmission: home   - Current: 5613  - Upon discharge: TBD    Will

## 2024-02-11 NOTE — PROGRESS NOTES
latanoprost  1 drop Both Eyes Nightly    levETIRAcetam  500 mg IntraVENous Q12H     Continuous Infusions:   sodium chloride      sodium chloride      sodium chloride 25 mL/hr at 02/11/24 1124    dextrose         Labs:  CBC:   Recent Labs     02/10/24  0941 02/10/24  1908 02/11/24  0234 02/11/24  0822   WBC 7.3  --   --  7.7   HGB 7.6* 6.6* 7.6* 8.5*  8.5*   PLT 94*  --   --  78*       BMP:    Recent Labs     02/09/24  0615 02/10/24  0559 02/11/24  0822    144 138   K 3.8 4.1 3.4*    108 105   CO2 15* 17* 21   BUN 84* 96* 61*   CREATININE 6.7* 7.8* 5.9*   GLUCOSE 196* 152* 94       Ca/Mg/Phos:   Recent Labs     02/09/24  0615 02/10/24  0559 02/11/24  0822   CALCIUM 7.5* 6.8* 6.5*   MG 2.10 2.10 1.90   PHOS 6.2* 7.4* 3.9       Hepatic:   No results for input(s): \"AST\", \"ALT\", \"ALB\", \"BILITOT\", \"ALKPHOS\" in the last 72 hours.    Troponin: No results for input(s): \"TROPONINI\" in the last 72 hours.  BNP: No results for input(s): \"BNP\" in the last 72 hours.  Lipids: No results for input(s): \"CHOL\", \"TRIG\", \"HDL\", \"LDLCALC\" in the last 72 hours.    Invalid input(s): \"LABVLDL\"  ABGs:   Recent Labs     02/09/24  1326   PHART 7.395   PO2ART 94.4   ZID7UED 28.4*       INR:   Recent Labs     02/10/24  0941   INR 1.40*       UA:No results for input(s): \"COLORU\", \"CLARITYU\", \"GLUCOSEU\", \"BILIRUBINUR\", \"KETUA\", \"SPECGRAV\", \"BLOODU\", \"PHUR\", \"PROTEINU\", \"UROBILINOGEN\", \"NITRU\", \"LEUKOCYTESUR\", \"URINETYPE\" in the last 72 hours.    Invalid input(s): \"LABMICR\"   Urine Microscopic: No results for input(s): \"LABCAST\", \"BACTERIA\", \"COMU\", \"HYALCAST\", \"WBCUA\", \"RBCUA\", \"EPIU\" in the last 72 hours.  Urine Culture: No results for input(s): \"LABURIN\" in the last 72 hours.  Urine Chemistry: No results for input(s): \"CLUR\", \"LABCREA\", \"PROTEINUR\", \"NAUR\" in the last 72 hours.    Objective:   Vitals: BP (!) 150/72   Pulse 75   Temp 97.6 °F (36.4 °C) (Oral)   Resp 17   Ht 1.905 m (6' 3\")   Wt 67.1 kg (147 lb 14.9 oz)   SpO2  99%   BMI 18.49 kg/m²    Wt Readings from Last 3 Encounters:   02/11/24 67.1 kg (147 lb 14.9 oz)   09/22/23 59.9 kg (132 lb)   06/20/23 60.8 kg (134 lb)      24HR INTAKE/OUTPUT:    Intake/Output Summary (Last 24 hours) at 2/11/2024 1141  Last data filed at 2/11/2024 1045  Gross per 24 hour   Intake 1259.67 ml   Output 450 ml   Net 809.67 ml           NECK: supple, no JVD  Cardiovascular:  S1, S2 without m/r/g  Respiratory:  CTA B without w/r/r  Abdomen: +bs, soft, nt  Ext: no LE edema  Skin Warm and dry   Assessment and Plan:       IMAGING:  XR CHEST PORTABLE   Final Result      No pneumothorax.      Electronically signed by Jose Manuel Ernst MD      CTA ABDOMEN PELVIS W CONTRAST   Final Result         1. No CT evidence of acute gastrointestinal hemorrhage.      Electronically signed by Tyrone Carpenter MD      CT HEAD WO CONTRAST   Final Result      No acute intracranial abnormality.      Sinus disease, similar to remote prior study.      CT ABDOMEN PELVIS WO CONTRAST Additional Contrast? None   Final Result   Severely limited study due to lack of intravenous contrast. No gross   sequelae of acute gastrointestinal hemorrhage. If clinical concern persists,   recommend CT angiography or endoscopy.      Mild left hydronephrosis. No obstructing calculi.          Medical Decision Making:  The following items were considered in medical decision making:  Discussion of patient care with other providers  Reviewed clinical lab tests  Reviewed radiology tests  Reviewed other diagnostic tests/interventions  Independent review of radiologic images - reviewed               Franck Muhammad MD   [facility]  Office -[phone]  [phone]      :      Franck Muhammad MD

## 2024-02-11 NOTE — PROGRESS NOTES
[facility]  Gastroenterology Progress Note    Fawad Glynn is a 88 y.o. male patient.  Principal Problem:    GI bleeding  Active Problems:    Acute renal failure superimposed on stage 5 chronic kidney disease, not on chronic dialysis (HCC)    Rectal bleeding    Hypotension due to hypovolemia    Acute blood loss anemia  Resolved Problems:    * No resolved hospital problems. *      SUBJECTIVE:    No abdominal pain.  Patient denies any further bleeding.    Physical    VITALS:  BP (!) 150/72   Pulse 75   Temp 97.6 °F (36.4 °C) (Oral)   Resp 17   Ht 1.905 m (6' 3\")   Wt 67.1 kg (147 lb 14.9 oz)   SpO2 99%   BMI 18.49 kg/m²   TEMPERATURE:  Current - Temp: 97.6 °F (36.4 °C); Max - Temp  Av °F (36.7 °C)  Min: 97.5 °F (36.4 °C)  Max: 98.4 °F (36.9 °C)    NAD, age appropriate  Integ: no rash, jaundice, ecchymoses  Abdomen soft, ND, NT, no HSM, Bowel sounds normal.    Data    Data Review:    Recent Labs     02/10/24  0941 02/10/24  1908 24  0234 24  0822   WBC 7.3  --   --  7.7   HGB 7.6* 6.6* 7.6* 8.5*  8.5*   HCT 22.3* 19.6* 21.5* 23.9*  23.9*   MCV 88.0  --   --  84.6   PLT 94*  --   --  78*     Recent Labs     24  0615 02/10/24  0559 24  0822    144 138   K 3.8 4.1 3.4*    108 105   CO2 15* 17* 21   PHOS 6.2* 7.4* 3.9   BUN 84* 96* 61*   CREATININE 6.7* 7.8* 5.9*     No results for input(s): \"AST\", \"ALT\", \"ALB\", \"BILIDIR\", \"BILITOT\", \"ALKPHOS\" in the last 72 hours.    No results for input(s): \"LIPASE\", \"AMYLASE\" in the last 72 hours.    Recent Labs     02/10/24  0941   PROTIME 17.1*   INR 1.40*     No results for input(s): \"PTT\" in the last 72 hours.          ASSESSMENT   1.  Lower GI bleed-EGD  with reflux esophagitis and erosive gastritis.  Colonoscopy was notable for extremely poor prep with limited visualization.  He had blood clots of red blood in the distal transverse colon and the entire left colon.  No underlying lesion could be visualized due to the quality of

## 2024-02-11 NOTE — PROGRESS NOTES
Attempted to collect blood sample from pigtail on pts fem vas cath, unable to get blood returned enough for sample. Surgical resident made aware, pt changed to lab draw.

## 2024-02-12 ENCOUNTER — ANESTHESIA EVENT (OUTPATIENT)
Dept: ENDOSCOPY | Age: 89
End: 2024-02-12
Payer: MEDICARE

## 2024-02-12 ENCOUNTER — ANESTHESIA (OUTPATIENT)
Dept: ENDOSCOPY | Age: 89
End: 2024-02-12
Payer: MEDICARE

## 2024-02-12 PROBLEM — Z51.5 ENCOUNTER FOR PALLIATIVE CARE: Status: ACTIVE | Noted: 2024-02-12

## 2024-02-12 PROBLEM — Z71.89 ADVANCE CARE PLANNING: Status: ACTIVE | Noted: 2024-02-12

## 2024-02-12 LAB
ALBUMIN SERPL-MCNC: 2.6 G/DL (ref 3.4–5)
ANION GAP SERPL CALCULATED.3IONS-SCNC: 14 MMOL/L (ref 3–16)
BASOPHILS # BLD: 0 K/UL (ref 0–0.2)
BASOPHILS NFR BLD: 0.1 %
BUN SERPL-MCNC: 62 MG/DL (ref 7–20)
CALCIUM SERPL-MCNC: 6.9 MG/DL (ref 8.3–10.6)
CHLORIDE SERPL-SCNC: 109 MMOL/L (ref 99–110)
CO2 SERPL-SCNC: 23 MMOL/L (ref 21–32)
CREAT SERPL-MCNC: 6.1 MG/DL (ref 0.8–1.3)
DEPRECATED RDW RBC AUTO: 16.8 % (ref 12.4–15.4)
DEPRECATED RDW RBC AUTO: 16.8 % (ref 12.4–15.4)
EOSINOPHIL # BLD: 0.2 K/UL (ref 0–0.6)
EOSINOPHIL NFR BLD: 1.7 %
GFR SERPLBLD CREATININE-BSD FMLA CKD-EPI: 8 ML/MIN/{1.73_M2}
GLUCOSE BLD-MCNC: 104 MG/DL (ref 70–99)
GLUCOSE BLD-MCNC: 109 MG/DL (ref 70–99)
GLUCOSE BLD-MCNC: 151 MG/DL (ref 70–99)
GLUCOSE SERPL-MCNC: 102 MG/DL (ref 70–99)
HCT VFR BLD AUTO: 24.4 % (ref 40.5–52.5)
HCT VFR BLD AUTO: 24.6 % (ref 40.5–52.5)
HCT VFR BLD AUTO: 25.5 % (ref 40.5–52.5)
HCT VFR BLD AUTO: 26.6 % (ref 40.5–52.5)
HGB BLD-MCNC: 8.3 G/DL (ref 13.5–17.5)
HGB BLD-MCNC: 8.4 G/DL (ref 13.5–17.5)
HGB BLD-MCNC: 8.5 G/DL (ref 13.5–17.5)
HGB BLD-MCNC: 8.9 G/DL (ref 13.5–17.5)
LYMPHOCYTES # BLD: 0.7 K/UL (ref 1–5.1)
LYMPHOCYTES NFR BLD: 7.1 %
MAGNESIUM SERPL-MCNC: 1.9 MG/DL (ref 1.8–2.4)
MCH RBC QN AUTO: 29 PG (ref 26–34)
MCH RBC QN AUTO: 29.5 PG (ref 26–34)
MCHC RBC AUTO-ENTMCNC: 33.4 G/DL (ref 31–36)
MCHC RBC AUTO-ENTMCNC: 34.2 G/DL (ref 31–36)
MCV RBC AUTO: 86.3 FL (ref 80–100)
MCV RBC AUTO: 86.8 FL (ref 80–100)
MONOCYTES # BLD: 0.8 K/UL (ref 0–1.3)
MONOCYTES NFR BLD: 8.6 %
NEUTROPHILS # BLD: 7.8 K/UL (ref 1.7–7.7)
NEUTROPHILS NFR BLD: 82.5 %
PERFORMED ON: ABNORMAL
PHOSPHATE SERPL-MCNC: 4.1 MG/DL (ref 2.5–4.9)
PLATELET # BLD AUTO: 75 K/UL (ref 135–450)
PLATELET # BLD AUTO: 86 K/UL (ref 135–450)
PMV BLD AUTO: 9.3 FL (ref 5–10.5)
PMV BLD AUTO: 9.4 FL (ref 5–10.5)
POTASSIUM SERPL-SCNC: 3.6 MMOL/L (ref 3.5–5.1)
RBC # BLD AUTO: 2.83 M/UL (ref 4.2–5.9)
RBC # BLD AUTO: 3.07 M/UL (ref 4.2–5.9)
SODIUM SERPL-SCNC: 146 MMOL/L (ref 136–145)
WBC # BLD AUTO: 9 K/UL (ref 4–11)
WBC # BLD AUTO: 9.4 K/UL (ref 4–11)

## 2024-02-12 PROCEDURE — 36415 COLL VENOUS BLD VENIPUNCTURE: CPT

## 2024-02-12 PROCEDURE — 6360000002 HC RX W HCPCS: Performed by: INTERNAL MEDICINE

## 2024-02-12 PROCEDURE — 6370000000 HC RX 637 (ALT 250 FOR IP)

## 2024-02-12 PROCEDURE — 90935 HEMODIALYSIS ONE EVALUATION: CPT

## 2024-02-12 PROCEDURE — 2580000003 HC RX 258

## 2024-02-12 PROCEDURE — C9113 INJ PANTOPRAZOLE SODIUM, VIA: HCPCS | Performed by: STUDENT IN AN ORGANIZED HEALTH CARE EDUCATION/TRAINING PROGRAM

## 2024-02-12 PROCEDURE — 83735 ASSAY OF MAGNESIUM: CPT

## 2024-02-12 PROCEDURE — 85025 COMPLETE CBC W/AUTO DIFF WBC: CPT

## 2024-02-12 PROCEDURE — 99232 SBSQ HOSP IP/OBS MODERATE 35: CPT | Performed by: SURGERY

## 2024-02-12 PROCEDURE — 97535 SELF CARE MNGMENT TRAINING: CPT

## 2024-02-12 PROCEDURE — 80069 RENAL FUNCTION PANEL: CPT

## 2024-02-12 PROCEDURE — 3700000000 HC ANESTHESIA ATTENDED CARE: Performed by: INTERNAL MEDICINE

## 2024-02-12 PROCEDURE — 99232 SBSQ HOSP IP/OBS MODERATE 35: CPT | Performed by: HOSPITALIST

## 2024-02-12 PROCEDURE — 3700000001 HC ADD 15 MINUTES (ANESTHESIA): Performed by: INTERNAL MEDICINE

## 2024-02-12 PROCEDURE — 97162 PT EVAL MOD COMPLEX 30 MIN: CPT

## 2024-02-12 PROCEDURE — 2580000003 HC RX 258: Performed by: STUDENT IN AN ORGANIZED HEALTH CARE EDUCATION/TRAINING PROGRAM

## 2024-02-12 PROCEDURE — 2580000003 HC RX 258: Performed by: NURSE ANESTHETIST, CERTIFIED REGISTERED

## 2024-02-12 PROCEDURE — 97530 THERAPEUTIC ACTIVITIES: CPT

## 2024-02-12 PROCEDURE — 99233 SBSQ HOSP IP/OBS HIGH 50: CPT | Performed by: INTERNAL MEDICINE

## 2024-02-12 PROCEDURE — 2500000003 HC RX 250 WO HCPCS: Performed by: NURSE ANESTHETIST, CERTIFIED REGISTERED

## 2024-02-12 PROCEDURE — 85018 HEMOGLOBIN: CPT

## 2024-02-12 PROCEDURE — 2709999900 HC NON-CHARGEABLE SUPPLY: Performed by: INTERNAL MEDICINE

## 2024-02-12 PROCEDURE — 99223 1ST HOSP IP/OBS HIGH 75: CPT | Performed by: NURSE PRACTITIONER

## 2024-02-12 PROCEDURE — 7100000011 HC PHASE II RECOVERY - ADDTL 15 MIN: Performed by: INTERNAL MEDICINE

## 2024-02-12 PROCEDURE — 6360000002 HC RX W HCPCS: Performed by: STUDENT IN AN ORGANIZED HEALTH CARE EDUCATION/TRAINING PROGRAM

## 2024-02-12 PROCEDURE — 6360000002 HC RX W HCPCS

## 2024-02-12 PROCEDURE — 85014 HEMATOCRIT: CPT

## 2024-02-12 PROCEDURE — 1200000000 HC SEMI PRIVATE

## 2024-02-12 PROCEDURE — 7100000010 HC PHASE II RECOVERY - FIRST 15 MIN: Performed by: INTERNAL MEDICINE

## 2024-02-12 PROCEDURE — 6360000002 HC RX W HCPCS: Performed by: NURSE ANESTHETIST, CERTIFIED REGISTERED

## 2024-02-12 PROCEDURE — 0DJD8ZZ INSPECTION OF LOWER INTESTINAL TRACT, VIA NATURAL OR ARTIFICIAL OPENING ENDOSCOPIC: ICD-10-PCS | Performed by: INTERNAL MEDICINE

## 2024-02-12 PROCEDURE — 3609027000 HC COLONOSCOPY: Performed by: INTERNAL MEDICINE

## 2024-02-12 PROCEDURE — 97166 OT EVAL MOD COMPLEX 45 MIN: CPT

## 2024-02-12 PROCEDURE — 85027 COMPLETE CBC AUTOMATED: CPT

## 2024-02-12 RX ORDER — PROPOFOL 10 MG/ML
INJECTION, EMULSION INTRAVENOUS PRN
Status: DISCONTINUED | OUTPATIENT
Start: 2024-02-12 | End: 2024-02-12 | Stop reason: SDUPTHER

## 2024-02-12 RX ORDER — SODIUM CHLORIDE, SODIUM LACTATE, POTASSIUM CHLORIDE, CALCIUM CHLORIDE 600; 310; 30; 20 MG/100ML; MG/100ML; MG/100ML; MG/100ML
INJECTION, SOLUTION INTRAVENOUS CONTINUOUS
Status: DISCONTINUED | OUTPATIENT
Start: 2024-02-12 | End: 2024-02-13

## 2024-02-12 RX ORDER — LIDOCAINE HYDROCHLORIDE 20 MG/ML
INJECTION, SOLUTION INFILTRATION; PERINEURAL PRN
Status: DISCONTINUED | OUTPATIENT
Start: 2024-02-12 | End: 2024-02-12 | Stop reason: SDUPTHER

## 2024-02-12 RX ORDER — SODIUM CHLORIDE, SODIUM LACTATE, POTASSIUM CHLORIDE, CALCIUM CHLORIDE 600; 310; 30; 20 MG/100ML; MG/100ML; MG/100ML; MG/100ML
INJECTION, SOLUTION INTRAVENOUS CONTINUOUS PRN
Status: DISCONTINUED | OUTPATIENT
Start: 2024-02-12 | End: 2024-02-12 | Stop reason: SDUPTHER

## 2024-02-12 RX ORDER — HYDRALAZINE HYDROCHLORIDE 20 MG/ML
5 INJECTION INTRAMUSCULAR; INTRAVENOUS EVERY 6 HOURS PRN
Status: DISCONTINUED | OUTPATIENT
Start: 2024-02-12 | End: 2024-02-16 | Stop reason: HOSPADM

## 2024-02-12 RX ADMIN — SODIUM CHLORIDE, PRESERVATIVE FREE 10 ML: 5 INJECTION INTRAVENOUS at 08:40

## 2024-02-12 RX ADMIN — SODIUM CHLORIDE, POTASSIUM CHLORIDE, SODIUM LACTATE AND CALCIUM CHLORIDE: 600; 310; 30; 20 INJECTION, SOLUTION INTRAVENOUS at 17:57

## 2024-02-12 RX ADMIN — PHENYLEPHRINE HYDROCHLORIDE 100 MCG: 10 INJECTION, SOLUTION INTRAMUSCULAR; INTRAVENOUS; SUBCUTANEOUS at 13:47

## 2024-02-12 RX ADMIN — SODIUM CHLORIDE, PRESERVATIVE FREE 40 MG: 5 INJECTION INTRAVENOUS at 08:40

## 2024-02-12 RX ADMIN — SODIUM CHLORIDE, POTASSIUM CHLORIDE, SODIUM LACTATE AND CALCIUM CHLORIDE: 600; 310; 30; 20 INJECTION, SOLUTION INTRAVENOUS at 05:27

## 2024-02-12 RX ADMIN — LIDOCAINE HYDROCHLORIDE 100 MG: 20 INJECTION, SOLUTION INFILTRATION; PERINEURAL at 13:36

## 2024-02-12 RX ADMIN — EPOETIN ALFA-EPBX 2000 UNITS: 2000 INJECTION, SOLUTION INTRAVENOUS; SUBCUTANEOUS at 17:25

## 2024-02-12 RX ADMIN — LEVETIRACETAM 500 MG: 500 INJECTION, SOLUTION, CONCENTRATE INTRAVENOUS at 23:33

## 2024-02-12 RX ADMIN — PHENYLEPHRINE HYDROCHLORIDE 100 MCG: 10 INJECTION, SOLUTION INTRAMUSCULAR; INTRAVENOUS; SUBCUTANEOUS at 13:54

## 2024-02-12 RX ADMIN — SODIUM CHLORIDE, PRESERVATIVE FREE 5 ML: 5 INJECTION INTRAVENOUS at 23:37

## 2024-02-12 RX ADMIN — PROPOFOL 100 MCG/KG/MIN: 10 INJECTION, EMULSION INTRAVENOUS at 13:37

## 2024-02-12 RX ADMIN — PROPOFOL 40 MG: 10 INJECTION, EMULSION INTRAVENOUS at 13:36

## 2024-02-12 RX ADMIN — HYDRALAZINE HYDROCHLORIDE 5 MG: 20 INJECTION, SOLUTION INTRAMUSCULAR; INTRAVENOUS at 17:54

## 2024-02-12 RX ADMIN — Medication 5 MG: at 21:15

## 2024-02-12 RX ADMIN — SODIUM CHLORIDE, SODIUM LACTATE, POTASSIUM CHLORIDE, AND CALCIUM CHLORIDE: .6; .31; .03; .02 INJECTION, SOLUTION INTRAVENOUS at 13:31

## 2024-02-12 RX ADMIN — LEVETIRACETAM 500 MG: 500 INJECTION, SOLUTION, CONCENTRATE INTRAVENOUS at 11:48

## 2024-02-12 RX ADMIN — LATANOPROST 1 DROP: 50 SOLUTION OPHTHALMIC at 21:16

## 2024-02-12 ASSESSMENT — PAIN SCALES - WONG BAKER
WONGBAKER_NUMERICALRESPONSE: 0
WONGBAKER_NUMERICALRESPONSE: 0

## 2024-02-12 NOTE — PLAN OF CARE
Problem: Chronic Conditions and Co-morbidities  Goal: Patient's chronic conditions and co-morbidity symptoms are monitored and maintained or improved  2/12/2024 1139 by Chelita Harrington, RN  Outcome: Progressing  Flowsheets (Taken 2/12/2024 1139)  Care Plan - Patient's Chronic Conditions and Co-Morbidity Symptoms are Monitored and Maintained or Improved:   Monitor and assess patient's chronic conditions and comorbid symptoms for stability, deterioration, or improvement   Collaborate with multidisciplinary team to address chronic and comorbid conditions and prevent exacerbation or deterioration     Problem: Discharge Planning  Goal: Discharge to home or other facility with appropriate resources  2/12/2024 1139 by Chelita Harrington, RN  Outcome: Progressing  Flowsheets (Taken 2/12/2024 1139)  Discharge to home or other facility with appropriate resources:   Identify barriers to discharge with patient and caregiver   Identify discharge learning needs (meds, wound care, etc)   Refer to discharge planning if patient needs post-hospital services based on physician order or complex needs related to functional status, cognitive ability or social support system   Arrange for needed discharge resources and transportation as appropriate

## 2024-02-12 NOTE — PROGRESS NOTES
Occupational Therapy  Facility/Department: [address]  Occupational Therapy Initial Assessment/Tx Note    Name: Fawad Glynn  : 1935  MRN: [id number]  Date of Service: 2024    Assessment: Pt admitted from SNF/LTC but typically lives at home with his wife who assists him with ADLs and occasionally with mobility. On eval, pt on day 2 of bowel prep for investigation of active GI bleed. He is weak and requires two person assist for sit to/from stands, unable to safely transfer today due to severe tremors with mobility and lightheadedness. Pt able to participate in seated/supine ADLs. Recommend pt return to SNF for rehab at d/c.    Discharge Recommendations:  Subacute/Skilled Nursing Facility    OT Equipment Recommendations  Equipment Needed: No     Treatment Diagnosis: Decreased activity tolerance, impaired ADLs and mobility      Assessment   Performance deficits / Impairments: Decreased functional mobility ;Decreased ADL status;Decreased cognition;Decreased endurance;Decreased balance  Treatment Diagnosis: Decreased activity tolerance, impaired ADLs and mobility  Decision Making: Medium Complexity  REQUIRES OT FOLLOW-UP: Yes  Activity Tolerance  Activity Tolerance: Patient Tolerated treatment well  Activity Tolerance Comments: Pt indicated lightheadedness and need to sit quickly upon standing. Difficulty obtaining accurate BP but eventually 177/86 (similar to recent reading in chart).        Plan   Occupational Therapy Plan  Times Per Week: 2-5x  Times Per Day: Once a day  Current Treatment Recommendations: Strengthening, Balance training, Functional mobility training, Endurance training, Gait training, Cognitive reorientation, Safety education & training, Patient/Caregiver education & training, Equipment evaluation, education, & procurement, Self-Care / ADL     Restrictions  Position Activity Restriction  Other position/activity restrictions: up as tolerated    Subjective   General  Chart

## 2024-02-12 NOTE — PROGRESS NOTES
Physical Therapy  Facility/Department: [address]  Physical Therapy Initial Assessment and Treatment    Name: Fawad Glynn  : 1935  MRN: [id number]  Date of Service: 2024    Discharge Recommendations:  Subacute/Skilled Nursing Facility   PT Equipment Recommendations  Equipment Needed: No      Patient Diagnosis(es): The primary encounter diagnosis was Rectal bleeding. Diagnoses of Spell of abnormal behavior, Acute blood loss anemia, and Stage 5 chronic kidney disease not on chronic dialysis (HCC) were also pertinent to this visit.  Past Medical History:  has a past medical history of Anemia, Anxiety and depression, Arthritis, BPH (benign prostatic hypertrophy), Diverticulosis, Eczema, Erectile dysfunction, Essential hypertension, GERD (gastroesophageal reflux disease), Hemorrhoid, Hyperlipidemia, Hyperphosphatemia, Hypertension, Overactive bladder, Paranoid schizophrenia (HCC), Prostate cancer (HCC), Seizures (HCC), and Type 2 diabetes mellitus without complication, without long-term current use of insulin (HCC).  Past Surgical History:  has a past surgical history that includes Prostate surgery (May 8, 2001); Colonoscopy (2011); Upper gastrointestinal endoscopy (2011); Colonoscopy (2011); Cholecystectomy, laparoscopic (2012); Prostate biopsy (2012); Total hip arthroplasty (Left, May 17, 2015); hip surgery (Left, 2015); Upper gastrointestinal endoscopy (2016); Upper gastrointestinal endoscopy (N/A, 04/15/2016); Upper gastrointestinal endoscopy (2017); other surgical history (2017); other surgical history (2017); Upper gastrointestinal endoscopy (N/A, 2019); Upper gastrointestinal endoscopy (N/A, 2019); Endoscopy, colon, diagnostic; Colonoscopy (N/A, 10/1/2019); Chest Wall Resection (N/A, 2020); hernia repair (Left, 2021); Upper gastrointestinal endoscopy (N/A, 2024); and

## 2024-02-12 NOTE — PROGRESS NOTES
Treatment time: 2 hrs    Net UF: 0 ml    Pre weight: 67.5 kg  Post weight: 67.5 kg    Access used: Rt Femoral   Access function: Well tolerated, 250 BFR    Medications or blood products given: Retacrit 4,000 units, Heparin dwells    Regular outpatient schedule: MWF    Summary of response to treatment: Pt tolerated poorly. Pt came straight from endo after procedure. After 2 hrs pt demanded to come off machine and started to climb out of bed. MD notified. Pt remained stable throughout entire treatment and upon exiting hemodialysis suite.     Copy of dialysis treatment record placed in chart, to be scanned into EMR.

## 2024-02-12 NOTE — PROGRESS NOTES
Spoke with BEVERLY Art in dialysis, they would like pt as soon as possible. This RN stated to get a set of VS and ensure the pt is stable to transport. VS stable, report called to Kaelyn PAUL.

## 2024-02-12 NOTE — ANESTHESIA PRE PROCEDURE
Department of Anesthesiology  Preprocedure Note       Name:  Fawad Glynn   Age:  88 y.o.  :  1935                                          MRN:  [id number]         Date:  2024      Surgeon: Surgeon(s):  Pablo Stanford MD    Procedure: Procedure(s):  COLONOSCOPY DIAGNOSTIC    Medications prior to admission:   Prior to Admission medications    Medication Sig Start Date End Date Taking? Authorizing Provider   latanoprost (XALATAN) 0.005 % ophthalmic solution Place 1 drop into both eyes nightly   Yes Gladys Hendrickson MD   aspirin 81 MG EC tablet Take 1 tablet by mouth daily   Yes Gladys Hendrickson MD   Multiple Vitamin (DAILY-TJ MULTIVITAMIN) TABS TAKE 1 TABLET BY MOUTH EVERY DAY 24   Callie Hansen MD   levETIRAcetam (KEPPRA) 500 MG tablet TAKE 1 TABLET BY MOUTH TWICE A DAY 12/15/23   Callie Hanesn MD   famotidine (PEPCID) 40 MG tablet TAKE 1 TABLET BY MOUTH EVERY DAY IN THE EVENING 23   Callie Hansen MD   atorvastatin (LIPITOR) 10 MG tablet TAKE 1 TABLET BY MOUTH EVERY DAY 23   Callie Hansen MD   tamsulosin (FLOMAX) 0.4 MG capsule TAKE 1 CAPSULE BY MOUTH EVERY DAY 23   Callie Hansen MD   mirtazapine (REMERON) 30 MG tablet TAKE 1 TABLET BY MOUTH NIGHTLY 10/27/23   Callie Hansen MD   memantine (NAMENDA) 5 MG tablet TAKE 1/2 TABLET BY MOUTH EVERY DAY 10/24/23   Callie Hansen MD   amLODIPine (NORVASC) 5 MG tablet TAKE 1 TABLET BY MOUTH EVERY DAY 23   Callie Hansen MD       Current medications:    Current Facility-Administered Medications   Medication Dose Route Frequency Provider Last Rate Last Admin    lactated ringers IV soln infusion   IntraVENous Continuous Emre Jean DO 75 mL/hr at 24 05 New Bag at 24 05    melatonin disintegrating tablet 5 mg  5 mg Oral Nightly Felicita Valera MD   5 mg at 24 0415    sodium chloride flush 0.9 % injection 5-40 mL  5-40 mL IntraVENous 2 times per day Osorio Franklin DO   10 mL at 24 0840

## 2024-02-12 NOTE — PROGRESS NOTES
Latest Hb 8.4 and Hct 24.6 Felicita Valera MD informed to hold the last blood transfusion order for now. Continue to observed patient for further bleeding. Vital signs continue to be stable.

## 2024-02-12 NOTE — PROGRESS NOTES
At 2218 Noted patient pass fresh blood large amount dripping thru the floor, Felicita Valera MD informed, came and seen the amount and character of the bloody stool which is fresh blood with order for H&H stat.

## 2024-02-12 NOTE — PROGRESS NOTES
Patient received 1 unit PRBC overnight. Bowel prep was completed and he pass large amount of  fresh bloody bowel with some small clots. No complaint of any abdominal pain. Vital are all with in acceptable range. Keep patient NPO from 12 midnight and IV fluid started as ordered.

## 2024-02-12 NOTE — ANESTHESIA POSTPROCEDURE EVALUATION
Department of Anesthesiology  Postprocedure Note    Patient: Fawad Glynn  MRN: [id number]  YOB: 1935  Date of evaluation: 2/12/2024    Procedure Summary       Date: 02/12/24 Room / Location: [place] [address] / [facility]    Anesthesia Start: 1331 Anesthesia Stop: 1412    Procedure: COLONOSCOPY DIAGNOSTIC Diagnosis:       Hematemesis, unspecified whether nausea present      (Hematemesis, unspecified whether nausea present [K92.0])    Surgeons: Pablo Stanford MD Responsible Provider: Eleni Medina MD    Anesthesia Type: MAC ASA Status: 4            Anesthesia Type: No value filed.    Eric Phase I: Eric Score: 10    Eric Phase II: Eric Score: 9    Anesthesia Post Evaluation    Patient location during evaluation: PACU  Patient participation: complete - patient participated  Level of consciousness: awake and alert  Pain score: 0  Airway patency: patent  Nausea & Vomiting: no nausea and no vomiting  Cardiovascular status: hemodynamically stable  Respiratory status: acceptable  Hydration status: euvolemic  Pain management: adequate    No notable events documented.

## 2024-02-12 NOTE — OP NOTE
[facility]  Colonoscopy Note            Patient: Fawad Glynn  : 1935     Procedure: Colonoscopy    Date:  2024    Surgeon:  Pablo Stanford MD, MD    Anesthesia:  MAC    Indications: Persistent rectal bleeding requiring multiple blood transfusions.    Procedure:   An informed consent was obtained from the patient after explanation of indications, benefits, possible risks and complications of the procedure.  The patient was then taken to the endoscopy suite, placed in the left lateral decubitus position, and the above IV anesthesia was administered.    A digital rectal examination was performed and revealed negative without mass, lesions or tenderness.      The Olympus CFQ-180-AL video colonoscope was placed in the patient's rectum under digital direction and advanced to the cecum. The cecum was identified by characteristic anatomy and ballottment.  The prep was poor.      The scope was then withdrawn back through the cecum, ascending, transverse, descending and sigmoid colons.  The scope was then withdrawn into the rectum and retroflexed.    The scope was straightened, the colon was decompressed and the scope was withdrawn from the patient.  The patient tolerated the procedure well and was taken to the PACU in good condition.    Findings:    Cecum: No obvious bleeding source, fresh blood noted  Ascending Colon: Fresh blood noted  Transverse Colon: Fresh blood noted  Descending Colon: Moderate to severe diverticulosis, fresh blood noted, no active bleeding  Sigmoid Colon: Moderate to severe diverticulosis, fresh blood noted, no active bleeding  Rectum: No abnormalities    Mucosa appeared normal throughout with no active bleeding on careful washing throughout.    Estimated Blood Loss: None      Impression:    Presumed diverticular bleed, red blood noted throughout the colon but no active bleeding    Recommendations:    Watch for drop in hemoglobin and hemodynamic compromise.  If

## 2024-02-12 NOTE — PROGRESS NOTES
General Surgery   Daily Progress Note  Patient: Fawad Glynn      CC: GI bleed    SUBJECTIVE:   Patient received one unit of blood overnight. Two large episodes of fresh bloody bowel movement with prep overnight. Patient denies abdominal pain.     ROS:   A 14 point review of systems was conducted, significant findings as noted above. All other systems negative.    OBJECTIVE:  Date 02/11/24 0701 - 02/12/24 0700 02/12/24 0701 - 02/13/24 0700   Shift 7915-7148 7286-7552 24 Hour Total 8640-9979 5850-8434 24 Hour Total   INTAKE   P.O. 120 870 990      I.V. 10 10 20      Blood  322 322        Volume (Transfuse RBC)  322 322      Shift Total 130 1202 1332      OUTPUT   Urine  1800 1800      Shift Total  1800 1800       -603 -761           PHYSICAL EXAM:    Vitals:    02/11/24 2230 02/11/24 2327 02/12/24 0313 02/12/24 0528   BP: (!) 152/81 (!) 158/81 (!) 173/86 (!) 154/74   Pulse: 71 67 70 77   Resp: 18 18 18    Temp: 98.2 °F (36.8 °C) 98 °F (36.7 °C) 97.9 °F (36.6 °C)    TempSrc: Oral Oral Oral    SpO2: 97% 96% 95%    Weight:       Height:           General appearance: alert, no acute distress  HEENT: No scleral icterus, EOM grossly intact. Mucous membranes are dry   Neck: trachea midline, no JVD, neck supple  Chest/Lungs: , normal effort with no accessory muscle use on RA  Cardiovascular: RRR  Abdomen: Soft, non-tender, non-distended, no rebound, guarding, or rigidity, scaphoid abdomen  Skin: warm and dry, no rashes  Extremities: no edema, no cyanosis, R fem Vascath in place  Neuro: A&Ox3, no focal deficits, sensation intact    LABS:   Recent Labs     02/10/24  0941 02/10/24  1908 02/11/24  0822 02/11/24  1733 02/12/24  0008 02/12/24  0304   WBC 7.3  --  7.7  --   --   --    HGB 7.6*   < > 8.5*  8.5*   < > 8.4* 8.5*   HCT 22.3*   < > 23.9*  23.9*   < > 24.6* 25.5*   MCV 88.0  --  84.6  --   --   --    PLT 94*  --  78*  --   --   --     < > = values in this interval not displayed.          Recent Labs      Message left on machine for patient to call back

## 2024-02-12 NOTE — PROGRESS NOTES
Patient had bowel preparation started by day shift. 1 episode of bowel movement and noted with fresh blood in the stool but no clots in moderate amount

## 2024-02-12 NOTE — PROGRESS NOTES
Patient hemoglobin still dropping latest was 7.8 another 1 unit PRBC ordered and transfusion just started.

## 2024-02-12 NOTE — PLAN OF CARE
Problem: Chronic Conditions and Co-morbidities  Goal: Patient's chronic conditions and co-morbidity symptoms are monitored and maintained or improved  Outcome: Progressing  Flowsheets (Taken 2/12/2024 0051)  Care Plan - Patient's Chronic Conditions and Co-Morbidity Symptoms are Monitored and Maintained or Improved:   Monitor and assess patient's chronic conditions and comorbid symptoms for stability, deterioration, or improvement   Collaborate with multidisciplinary team to address chronic and comorbid conditions and prevent exacerbation or deterioration   Update acute care plan with appropriate goals if chronic or comorbid symptoms are exacerbated and prevent overall improvement and discharge     Problem: Safety - Adult  Goal: Free from fall injury  2/12/2024 0051 by Anu Sen RN  Outcome: Progressing  Flowsheets (Taken 2/12/2024 0051)  Free From Fall Injury: Based on caregiver fall risk screen, instruct family/caregiver to ask for assistance with transferring infant if caregiver noted to have fall risk factors     Problem: Skin/Tissue Integrity  Goal: Absence of new skin breakdown  Description: 1.  Monitor for areas of redness and/or skin breakdown  2.  Assess vascular access sites hourly  3.  Every 4-6 hours minimum:  Change oxygen saturation probe site  4.  Every 4-6 hours:  If on nasal continuous positive airway pressure, respiratory therapy assess nares and determine need for appliance change or resting period.  Outcome: Progressing  Note: Patient with limited movement in bed. Assist him to change position every 2 hours and ensure he is dry and clean on the back at all time     Problem: Hematologic - Adult  Goal: Maintains hematologic stability  Outcome: Progressing  Flowsheets (Taken 2/12/2024 0051)  Maintains hematologic stability:   Assess for signs and symptoms of bleeding or hemorrhage   Administer blood products/factors as ordered   Monitor labs for bleeding or clotting  disorders  Note: Patient hemoglobin is still dropping another unit of PRBC was given and during the bowel prep patient pass large amount of bloody stool output. Monitor H&H and transfusion as necessary.      Problem: Discharge Planning  Goal: Discharge to home or other facility with appropriate resources  Outcome: Progressing

## 2024-02-12 NOTE — CONSULTS
[facility]  Palliative Medicine Consultation Note      Date Of Admission:2/8/2024  Date of consult: 02/12/24  Seen by PC in the past:  No    Recommendations:        Pt is alert and oriented to person, place, disoriented to time. He has limited insight into his medical problems. He was able to say that he's been started on dialysis, but could not recall that he's been bleeding. Once I reminded him, he seemed to remember that. I attempted to speak with him about advance care planning. He stated he'd want his wife to make medical decisions on his behalf if he became very ill. He was not able to have a discussion about code status, stating simply to do everything we can to help him continue living, but was unable to display understanding of the potential burdens/risk of CPR.   I called his wife and she did not answer. I called daughter Bailey and introduced palliative care. Bailey asked about what options there would be if the bleeding continues. She thought the pt would probably not want to pursue surgery, and we briefly discussed home hospice, which she was receptive to. We briefly discussed code status and she would like me to talk with her mother about it and provide information so that they can discuss it further this evening.  Met with wife at the bedside while pt went down for colonoscopy. Introduced palliative care and had a voluntary discussion about advance care planning. I spoke with her about code status including potential burdens/risks of CPR. I also provided the Respecting Choices CPR Decision Aide handout. Pt's wife would like to speak with her children about it. She hopes his bleed will be fixed and that he can come home. I was unable to gauge her thoughts on whether the pt should undergo surgery if needed. Will continue to follow.  4 pm- Spoke with pt's wife, daughter Bailey, a granddaughter, and a cousin at the bedside. Again discussed goals of care and how aggressive the

## 2024-02-12 NOTE — PROGRESS NOTES
Internal Medicine Progress Note    Date: 2/12/2024   Patient: Fawad Glynn   [facility] Day: 4      CC: Rectal Bleeding (Patient presents to the ED via EMS from SNF. Per EMS, nursing staff at facility reports patient passed a significant amount of dark, tarry stool this evening. He is not on blood thinners. Patient states that his stomach began hurting earlier this evening. )       Interval Hx   Had several large bloody BM overnight. Hgb has remained stable since then with last check this am at 8.5 Hgb. Pt scheduled for repeat colonoscopy today. Pt seen and examined at bedside. Denies any further bloody BM since then and reports feeling overall well. Denies any lightheadedness or dizziness, chest pain, or SOB.     HPI:   \"Mr Glynn is an 89 yo M with a PMHx of diverticulosis, paranoid schizophrenia, CKD stage V, seizures who presented following an episode of a BM with bright red blood. He arriver via EMS from his nursing facility. Pt states he was using the bathroom and had a large amount of blood in the toilet. He states his wife was present during this as well who also endorses a large volume of blood loss. Pt denies any abdominal pain prior to or during the BM. Denies any dizziness, fatigue, SOB. States his appetite is low, but not lower than normal. Denies any recent N/V, fever, chills, weight loss. Says this has never happened before. Denies known history of hemorrhoids.     In the ED, VS were WNL and stable. CBC was remarkable for WBC 11.3, Hgb 8.9. Baseline Hgb 9.0 in the setting of chronic anemia  2/2 CKD. The patient had a brief episode of unresponsiveness while getting CT done, however maintained a pulse so CPR was not started. Returned to baseline after receiving a 500mL bolus. Repeat Hgb was 8.0 so patient received 1u pRBCs.\"      Objective     Vital Signs:  Patient Vitals for the past 8 hrs:   BP Temp Temp src Pulse Resp SpO2   02/12/24 0847 (!) 162/81 97.5 °F (36.4 °C) Oral 68 18 97 %  follows with [facility] for EPO  - follow up on B12 and folate    Altered Mental status, improved  Patient had an episode of unresponsiveness in the ED. Patient had another episode 2/9 after transfer from the ICU to the floors. Unclear etiology of whether this is seizure vs hypotension. Wife states this has been happening for about a year.   - Patient mental status has improved- currently at baseline  - Continue Keppra 500 mg BID.    CKD, Stage 5  Baseline GFR 11, GFR 9 on admission. On PD  - Nephrology consulted, appreciate recs  -  HD as per nephrology- next time dialysis will be 2/12  - Received HD on 2/10 with good tolerance. Creatinine improved post dialysis  - Monitor RFP    Constipation  - Miralax BID    Chronic Conditions  Vascular Dementia: Continue memantine and remeron  Anemia of chronic disease: transfusion if Hgb <7 (baseline 9.0)  Complex partial seizure disorder: Continue home Keppra 500 mg BID  HTN: Continue home Norvasc 5 mg qd  HLD: Continue home atorvastatin 10 mg qd  T2DM: LDSSI, q4h POCT, hypoglycemia protocol    DVT PPx: bleeding risk  Diet: Diet NPO   Code status:  Full Code     ELOS: >3 days  Barriers to discharge:  Disposition  - Preadmission: home   - Current: 3359  - Upon discharge: TBD    Will discuss with attending physician Guillermo Swanson MD     _____________________  Chris Tello MD   Internal Medicine Resident, PGY-1    Addendum to Resident H& P/Progress note:  I have personally seen,examined and evaluated the patient. I have reviewed the current history, physical findings, labs and assessment and plan and agree with note as documented by resident MD ( )  The patient underwent repeat colonoscopy ( by Dr Shah) earlier today:  Findings:     Cecum: No obvious bleeding source, fresh blood noted  Ascending Colon: Fresh blood noted  Transverse Colon: Fresh blood noted  Descending Colon: Moderate to severe diverticulosis, fresh blood noted, no active bleeding  Sigmoid Colon: Moderate to

## 2024-02-12 NOTE — PROGRESS NOTES
Renal Progress Note  Subjective:   Admit Date: 2/8/2024       Assessment/Plan     # THERESE on CKD 5  - Renal function worse in setting of hypotension/GIB & contrast study (2/9)  - Started on HD this admission. S/p temp HD line on 2/10  - Monitor UOP and renal function   - CTA 2/9 - no further hydro noted   - Renally dose meds for current GFR  - Avoid nephrotoxins   - Labs in AM    # HTN  - BP's previously low and home meds were held  - Resume Amlodipine with BP now on higher side  - Close monitoring of  BP     # Anemia sec to GIB and chronic disease   - Transfuse PRN per primary   - JI with HD  - Follows [facility] as an outpatient for EPO  - Close monitoring of CBC      # Acid- base/ Electrolyte imbalance   - Na slightly elevated. Currently NPO. On IVF    # Rectal bleeding  - GI consulted  - Plan for repeat colonoscopy     # Dementia  - On home meds   - Palliative care consulted to discuss goals of care    # DM2  - Management per primary       HPI   Fawad Glynn is a 88 y.o. male with prior history CKD stage 5, anemia of chronic disease, HTN, HLD, T2DM, seizure disorder, and schizophrenia who was admitted to the ICU for ongoing rectal bleeding and altered mental status.      Interval History:     Sitting up in bed  Reports to be doing OK  No SOB    Good UOP  Currently on LR at 75 mL/hr  Na 146  Cr 6.1    BP's elevated  Currently NPO with plans for poss repeat colonoscopy today       DIET Diet NPO  Medications:   Scheduled Meds:   melatonin  5 mg Oral Nightly    sodium chloride flush  5-40 mL IntraVENous 2 times per day    insulin lispro  0-4 Units SubCUTAneous TID WC    insulin lispro  0-4 Units SubCUTAneous Nightly    [Held by provider] memantine  2.5 mg Oral Daily    [Held by provider] mirtazapine  30 mg Oral Nightly    [Held by provider] therapeutic multivitamin-minerals  1 tablet Oral Daily    [Held by provider] atorvastatin  10 mg Oral Daily    [Held by provider] amLODIPine  5 mg Oral Daily    [Held by provider]  No show for today's appointment with CECI Mitchell @  1:30 PM     Previous No Shows   Total # No Shows including today's     Patient was placed on wait list to be called to reschedule the appointment   or  Patient has a future appointment scheduled       -[phone]  [phone]      I have seen the patient independently from the PA/NP .I discussed the care with the PA/NP . I personally reviewed the HPI, PH, FH, SH, ROS and medications. I repeated pertinent portions of the examination and reviewed the relevant imaging and laboratory data. I agree with the findings, assessment and plan as documented, with the following addendum:        Aron Mcclain MD

## 2024-02-12 NOTE — CARE COORDINATION
Case Management Assessment           Daily Note                 Date/ Time of Note: 2/12/2024 12:15 PM         Note completed by: Eleanor Ambrosio RN    Patient Name: Fawad Glynn  YOB: 1935    Diagnosis:GI bleeding [K92.2]  Rectal bleeding [K62.5]  Spell of abnormal behavior [R46.89]  Patient Admission Status: Inpatient  Date of Admission:2/8/2024  1:53 AM    Length of Stay: 4 GLOS: GMLOS: 3 Readmission Risk Score: Readmission Risk Score: 18    Current Plan of Care: admitted  with  rectal bleed:  and  AMS    GI and  Nephro following .  S/P  transfusion  last  evening  NPO this  AM  for  repeat colonoscopy   ________________________________________________________________________________________  PT AM-PAC: 11 / 24 per last evaluation on: 2/12/2024    OT AM-PAC: 13 / 24 per last evaluation on: 2/12/2024    DME Needs for discharge: defer  /  tbd   DME Ordered: No DME Delivered: No  DME Company: [employer]   ________________________________________________________________________________________  Discharge Plan: To Be Determined DUE TO: Plans to return home  vs  SNF  and  poss  New  HD    Pre-cert required for SNF: NO  COVID Result:    Lab Results   Component Value Date/Time    COVID19 Not Detected 04/02/2021 12:00 PM       Transportation PLAN for discharge: EMS transportation    Tentative discharge date: 1-2 days     Potential assistance Purchasing Medications: Potential Assistance Purchasing Medications: No  Does Patient want to participate in local refill/ meds to beds program?: Yes    Current barriers to discharge: Gi  following  repeat  Colonoscopy today  Nephrology  following  :  TDC placed  ,  THERESE on CKD 5  ,  new  HD      Referrals completed: Not Applicable    Resources/ information provided: Not indicated at this time  ________________________________________________________________________________________  Case Management Notes:     CM  following  for  d/c planning needs  / disposition.    Plans to return home at  d/c  w/ family  vs  SNF  recommended  by  PTOT     May need  New  out pt  HD  at d/c as well.        Fawad and his family were provided with choice of provider; he and his family are in agreement with the discharge plan.    Emergency Contacts:  Extended Emergency Contact Information  Primary Emergency Contact: karon glynn  Home Phone: [phone]  Mobile Phone: [phone]  Relation: Spouse  Secondary Emergency Contact: Boris Glynn  [place] Phone: [phone]  Mobile Phone: [phone]  Relation: Child   needed? No    Care Transition Patient: No    IMM Status:      Eleanor Ambrosio RN  [facility] [place]  Case Management Department  Ph: [phone]

## 2024-02-13 ENCOUNTER — APPOINTMENT (OUTPATIENT)
Dept: CT IMAGING | Age: 89
End: 2024-02-13
Payer: MEDICARE

## 2024-02-13 PROBLEM — E87.8 ELECTROLYTE IMBALANCE: Status: ACTIVE | Noted: 2024-02-13

## 2024-02-13 LAB
ABO + RH BLD: NORMAL
ALBUMIN SERPL-MCNC: 2.6 G/DL (ref 3.4–5)
ANION GAP SERPL CALCULATED.3IONS-SCNC: 11 MMOL/L (ref 3–16)
BASOPHILS # BLD: 0 K/UL (ref 0–0.2)
BASOPHILS # BLD: 0 K/UL (ref 0–0.2)
BASOPHILS NFR BLD: 0.2 %
BASOPHILS NFR BLD: 0.3 %
BLD GP AB SCN SERPL QL: NORMAL
BUN SERPL-MCNC: 46 MG/DL (ref 7–20)
CALCIUM SERPL-MCNC: 6.9 MG/DL (ref 8.3–10.6)
CHLORIDE SERPL-SCNC: 103 MMOL/L (ref 99–110)
CO2 SERPL-SCNC: 25 MMOL/L (ref 21–32)
CREAT SERPL-MCNC: 4.8 MG/DL (ref 0.8–1.3)
DEPRECATED RDW RBC AUTO: 17 % (ref 12.4–15.4)
DEPRECATED RDW RBC AUTO: 17.3 % (ref 12.4–15.4)
EOSINOPHIL # BLD: 0.2 K/UL (ref 0–0.6)
EOSINOPHIL # BLD: 0.2 K/UL (ref 0–0.6)
EOSINOPHIL NFR BLD: 2.7 %
EOSINOPHIL NFR BLD: 3 %
GFR SERPLBLD CREATININE-BSD FMLA CKD-EPI: 11 ML/MIN/{1.73_M2}
GLUCOSE BLD-MCNC: 118 MG/DL (ref 70–99)
GLUCOSE BLD-MCNC: 130 MG/DL (ref 70–99)
GLUCOSE BLD-MCNC: 152 MG/DL (ref 70–99)
GLUCOSE BLD-MCNC: 203 MG/DL (ref 70–99)
GLUCOSE SERPL-MCNC: 125 MG/DL (ref 70–99)
HCT VFR BLD AUTO: 22.4 % (ref 40.5–52.5)
HCT VFR BLD AUTO: 24.1 % (ref 40.5–52.5)
HCT VFR BLD AUTO: 26.5 % (ref 40.5–52.5)
HGB BLD-MCNC: 7.5 G/DL (ref 13.5–17.5)
HGB BLD-MCNC: 8.2 G/DL (ref 13.5–17.5)
HGB BLD-MCNC: 8.6 G/DL (ref 13.5–17.5)
INR PPP: 1.12 (ref 0.84–1.16)
LYMPHOCYTES # BLD: 0.4 K/UL (ref 1–5.1)
LYMPHOCYTES # BLD: 0.5 K/UL (ref 1–5.1)
LYMPHOCYTES NFR BLD: 6.7 %
LYMPHOCYTES NFR BLD: 6.9 %
MAGNESIUM SERPL-MCNC: 1.8 MG/DL (ref 1.8–2.4)
MCH RBC QN AUTO: 29.2 PG (ref 26–34)
MCH RBC QN AUTO: 29.4 PG (ref 26–34)
MCHC RBC AUTO-ENTMCNC: 32.5 G/DL (ref 31–36)
MCHC RBC AUTO-ENTMCNC: 33.7 G/DL (ref 31–36)
MCV RBC AUTO: 87.4 FL (ref 80–100)
MCV RBC AUTO: 89.9 FL (ref 80–100)
MONOCYTES # BLD: 0.7 K/UL (ref 0–1.3)
MONOCYTES # BLD: 0.8 K/UL (ref 0–1.3)
MONOCYTES NFR BLD: 10.4 %
MONOCYTES NFR BLD: 11.9 %
NEUTROPHILS # BLD: 4.8 K/UL (ref 1.7–7.7)
NEUTROPHILS # BLD: 5.8 K/UL (ref 1.7–7.7)
NEUTROPHILS NFR BLD: 78 %
NEUTROPHILS NFR BLD: 79.9 %
PERFORMED ON: ABNORMAL
PHOSPHATE SERPL-MCNC: 4 MG/DL (ref 2.5–4.9)
PLATELET # BLD AUTO: 73 K/UL (ref 135–450)
PLATELET # BLD AUTO: 96 K/UL (ref 135–450)
PMV BLD AUTO: 9.4 FL (ref 5–10.5)
PMV BLD AUTO: 9.4 FL (ref 5–10.5)
POTASSIUM SERPL-SCNC: 3.8 MMOL/L (ref 3.5–5.1)
PROTHROMBIN TIME: 14.5 SEC (ref 11.5–14.8)
RBC # BLD AUTO: 2.56 M/UL (ref 4.2–5.9)
RBC # BLD AUTO: 2.95 M/UL (ref 4.2–5.9)
SODIUM SERPL-SCNC: 139 MMOL/L (ref 136–145)
WBC # BLD AUTO: 6.2 K/UL (ref 4–11)
WBC # BLD AUTO: 7.3 K/UL (ref 4–11)

## 2024-02-13 PROCEDURE — 36415 COLL VENOUS BLD VENIPUNCTURE: CPT

## 2024-02-13 PROCEDURE — 83735 ASSAY OF MAGNESIUM: CPT

## 2024-02-13 PROCEDURE — 85610 PROTHROMBIN TIME: CPT

## 2024-02-13 PROCEDURE — 86850 RBC ANTIBODY SCREEN: CPT

## 2024-02-13 PROCEDURE — A4216 STERILE WATER/SALINE, 10 ML: HCPCS | Performed by: STUDENT IN AN ORGANIZED HEALTH CARE EDUCATION/TRAINING PROGRAM

## 2024-02-13 PROCEDURE — 2580000003 HC RX 258

## 2024-02-13 PROCEDURE — 99232 SBSQ HOSP IP/OBS MODERATE 35: CPT | Performed by: HOSPITALIST

## 2024-02-13 PROCEDURE — 86923 COMPATIBILITY TEST ELECTRIC: CPT

## 2024-02-13 PROCEDURE — 99233 SBSQ HOSP IP/OBS HIGH 50: CPT | Performed by: INTERNAL MEDICINE

## 2024-02-13 PROCEDURE — 6370000000 HC RX 637 (ALT 250 FOR IP)

## 2024-02-13 PROCEDURE — 6360000002 HC RX W HCPCS: Performed by: STUDENT IN AN ORGANIZED HEALTH CARE EDUCATION/TRAINING PROGRAM

## 2024-02-13 PROCEDURE — 2580000003 HC RX 258: Performed by: STUDENT IN AN ORGANIZED HEALTH CARE EDUCATION/TRAINING PROGRAM

## 2024-02-13 PROCEDURE — 86901 BLOOD TYPING SEROLOGIC RH(D): CPT

## 2024-02-13 PROCEDURE — 80069 RENAL FUNCTION PANEL: CPT

## 2024-02-13 PROCEDURE — 85025 COMPLETE CBC W/AUTO DIFF WBC: CPT

## 2024-02-13 PROCEDURE — 85014 HEMATOCRIT: CPT

## 2024-02-13 PROCEDURE — C9113 INJ PANTOPRAZOLE SODIUM, VIA: HCPCS | Performed by: STUDENT IN AN ORGANIZED HEALTH CARE EDUCATION/TRAINING PROGRAM

## 2024-02-13 PROCEDURE — 85018 HEMOGLOBIN: CPT

## 2024-02-13 PROCEDURE — 1200000000 HC SEMI PRIVATE

## 2024-02-13 PROCEDURE — 99232 SBSQ HOSP IP/OBS MODERATE 35: CPT | Performed by: SURGERY

## 2024-02-13 PROCEDURE — P9016 RBC LEUKOCYTES REDUCED: HCPCS

## 2024-02-13 PROCEDURE — 36430 TRANSFUSION BLD/BLD COMPNT: CPT

## 2024-02-13 PROCEDURE — 6370000000 HC RX 637 (ALT 250 FOR IP): Performed by: STUDENT IN AN ORGANIZED HEALTH CARE EDUCATION/TRAINING PROGRAM

## 2024-02-13 PROCEDURE — 86900 BLOOD TYPING SEROLOGIC ABO: CPT

## 2024-02-13 RX ORDER — SODIUM CHLORIDE 9 MG/ML
INJECTION, SOLUTION INTRAVENOUS PRN
Status: DISCONTINUED | OUTPATIENT
Start: 2024-02-13 | End: 2024-02-16 | Stop reason: HOSPADM

## 2024-02-13 RX ORDER — MIRTAZAPINE 30 MG/1
30 TABLET, FILM COATED ORAL NIGHTLY
Status: DISCONTINUED | OUTPATIENT
Start: 2024-02-13 | End: 2024-02-16 | Stop reason: HOSPADM

## 2024-02-13 RX ORDER — MEMANTINE HYDROCHLORIDE 5 MG/1
2.5 TABLET ORAL DAILY
Status: DISCONTINUED | OUTPATIENT
Start: 2024-02-13 | End: 2024-02-16 | Stop reason: HOSPADM

## 2024-02-13 RX ORDER — ATORVASTATIN CALCIUM 10 MG/1
10 TABLET, FILM COATED ORAL DAILY
Status: DISCONTINUED | OUTPATIENT
Start: 2024-02-13 | End: 2024-02-16 | Stop reason: HOSPADM

## 2024-02-13 RX ADMIN — ATORVASTATIN CALCIUM 10 MG: 10 TABLET, FILM COATED ORAL at 21:22

## 2024-02-13 RX ADMIN — TAMSULOSIN HYDROCHLORIDE 0.4 MG: 0.4 CAPSULE ORAL at 09:05

## 2024-02-13 RX ADMIN — INSULIN LISPRO 1 UNITS: 100 INJECTION, SOLUTION INTRAVENOUS; SUBCUTANEOUS at 17:47

## 2024-02-13 RX ADMIN — LEVETIRACETAM 500 MG: 500 INJECTION, SOLUTION, CONCENTRATE INTRAVENOUS at 11:17

## 2024-02-13 RX ADMIN — SODIUM CHLORIDE, PRESERVATIVE FREE 10 ML: 5 INJECTION INTRAVENOUS at 09:06

## 2024-02-13 RX ADMIN — LATANOPROST 1 DROP: 50 SOLUTION OPHTHALMIC at 21:23

## 2024-02-13 RX ADMIN — AMLODIPINE BESYLATE 5 MG: 5 TABLET ORAL at 09:05

## 2024-02-13 RX ADMIN — MIRTAZAPINE 30 MG: 30 TABLET, FILM COATED ORAL at 21:23

## 2024-02-13 RX ADMIN — SODIUM CHLORIDE, PRESERVATIVE FREE 40 MG: 5 INJECTION INTRAVENOUS at 09:35

## 2024-02-13 RX ADMIN — SODIUM CHLORIDE, PRESERVATIVE FREE 10 ML: 5 INJECTION INTRAVENOUS at 21:23

## 2024-02-13 RX ADMIN — LEVETIRACETAM 500 MG: 500 INJECTION, SOLUTION, CONCENTRATE INTRAVENOUS at 23:55

## 2024-02-13 RX ADMIN — MEMANTINE HYDROCHLORIDE 2.5 MG: 5 TABLET, FILM COATED ORAL at 21:22

## 2024-02-13 RX ADMIN — Medication 5 MG: at 21:23

## 2024-02-13 RX ADMIN — SODIUM CHLORIDE, POTASSIUM CHLORIDE, SODIUM LACTATE AND CALCIUM CHLORIDE: 600; 310; 30; 20 INJECTION, SOLUTION INTRAVENOUS at 01:15

## 2024-02-13 NOTE — PROGRESS NOTES
Internal Medicine Progress Note    Date: 2/13/2024   Patient: Fawad Glynn   [facility] Day: 5      CC: Rectal Bleeding (Patient presents to the ED via EMS from SNF. Per EMS, nursing staff at facility reports patient passed a significant amount of dark, tarry stool this evening. He is not on blood thinners. Patient states that his stomach began hurting earlier this evening. )       Interval Hx   H&H significant for drop from 8.9 to 7.5 overnight. Ongoing 1 u pRBC, has received 7 units thus far. CTA ordered but discontinued as pt is hemodynamically stable. HD performed yesterday with good tolerance. No noted bloody BM or other signs of bleeding noted. Seen and examined at bedside. Overall doing well with no lightheadedness. Reports some mild diffuse abdominal pain but otherwise stable in examination.     HPI:   \"Mr Glynn is an 87 yo M with a PMHx of diverticulosis, paranoid schizophrenia, CKD stage V, seizures who presented following an episode of a BM with bright red blood. He arriver via EMS from his nursing facility. Pt states he was using the bathroom and had a large amount of blood in the toilet. He states his wife was present during this as well who also endorses a large volume of blood loss. Pt denies any abdominal pain prior to or during the BM. Denies any dizziness, fatigue, SOB. States his appetite is low, but not lower than normal. Denies any recent N/V, fever, chills, weight loss. Says this has never happened before. Denies known history of hemorrhoids.     In the ED, VS were WNL and stable. CBC was remarkable for WBC 11.3, Hgb 8.9. Baseline Hgb 9.0 in the setting of chronic anemia  2/2 CKD. The patient had a brief episode of unresponsiveness while getting CT done, however maintained a pulse so CPR was not started. Returned to baseline after receiving a 500mL bolus. Repeat Hgb was 8.0 so patient received 1u pRBCs.\"      Objective     Vital Signs:  Patient Vitals for the past 8 hrs:   BP Temp

## 2024-02-13 NOTE — PROGRESS NOTES
Pt had extravasation during CTA, procedure not completed. IV cannulation tried but failed. Msg sent to Resident on-call asking whether he agrees to proceed without contrast as the CT technician want to know from the MD, but not replied yet. Pt brought back to the unit with the extravasation protocol, To apply heat compress to the site 20 minutes 4x a day and keep the arm above the heart.

## 2024-02-13 NOTE — PROGRESS NOTES
Pt latest Hgb dropped to 7.5, informed to on-call, he ordered to do type and screen and prepare 2 units PRBC, then he contacted IR for CTA abdomen and pelvis. No visible bleeding  noted. Pt and daughter made aware for the planned procedure.

## 2024-02-13 NOTE — PROGRESS NOTES
Pt has ongoing PRBC transfusion started at 0643, Vital signs monitored according to hospital policy , observed and stayed with the pt during the first 15 minutes, no untoward reaction noted. Endorsed to the next shift the ongoing transfusion and the post transfusion H&H rechecked.

## 2024-02-13 NOTE — PROGRESS NOTES
Renal Progress Note  Subjective:   Admit Date: 2/8/2024       Assessment/Plan     # THERESE on CKD 5  - Renal function worse in setting of hypotension/GIB & contrast study (2/9)  - Started on HD this admission. S/p temp HD line on 2/10  - Consult IR for tunneled HD catheter   - Monitor UOP and renal function   - CTA 2/9 - no further hydro noted   - Renally dose meds for current GFR  - Avoid nephrotoxins   - Labs in AM    # HTN  - BP's previously low and home meds were held  - Resumed Amlodipine with BP now on higher side. Stopped IVF  - Close monitoring of  BP     # Anemia sec to GIB and chronic disease   - Transfuse PRN per primary   - JI with HD  - Follows [facility] as an outpatient for EPO  - Close monitoring of CBC      # Acid- base/ Electrolyte imbalance   - Lytes stable  - Continue to monitor BMP    # Rectal bleeding  - GI consulted  - S/p colonoscopy 2/12 - presumed diverticular bleed   - CTA ordered d/t drop in Hgb but IV infiltrated     # Dementia  - On home meds   - Palliative care consulted to discuss goals of care    # DM2  - Management per primary       HPI   Fawad Glynn is a 88 y.o. male with prior history CKD stage 5, anemia of chronic disease, HTN, HLD, T2DM, seizure disorder, and schizophrenia who was admitted to the ICU for ongoing rectal bleeding and altered mental status.      Interval History:     Resting in bed  Receiving blood  Doing OK    HD yesterday without UF - pt unable to tolerate entire session  Demanded to come off after 2 hrs  C-scope yesterday with fresh blood but no active bleeding   Hgb dropped overnight. CTA was ordered but IV infiltrated     BP's up. Stopping IVF  Good UOP  Lytes stable       DIET ADULT DIET; Regular; 4 carb choices (60 gm/meal); Low Phosphorus (Less than 1000 mg)  Medications:   Scheduled Meds:   melatonin  5 mg Oral Nightly    sodium chloride flush  5-40 mL IntraVENous 2 times per day    insulin lispro  0-4 Units SubCUTAneous TID WC    insulin lispro  0-4 Units  SubCUTAneous Nightly    [Held by provider] memantine  2.5 mg Oral Daily    [Held by provider] mirtazapine  30 mg Oral Nightly    [Held by provider] therapeutic multivitamin-minerals  1 tablet Oral Daily    [Held by provider] atorvastatin  10 mg Oral Daily    amLODIPine  5 mg Oral Daily    tamsulosin  0.4 mg Oral Daily    pantoprazole (PROTONIX) 40 mg in sodium chloride (PF) 0.9 % 10 mL injection  40 mg IntraVENous Daily    latanoprost  1 drop Both Eyes Nightly    levETIRAcetam  500 mg IntraVENous Q12H     Continuous Infusions:   sodium chloride      lactated ringers IV soln 75 mL/hr at 02/13/24 0115    sodium chloride 20 mL/hr at 02/11/24 1939    dextrose         Labs:  CBC:   Recent Labs     02/12/24  0849 02/12/24  1703 02/12/24  2358   WBC 9.0 9.4 7.3   HGB 8.3* 8.9* 7.5*   PLT 86* 75* 73*       BMP:    Recent Labs     02/11/24  0822 02/12/24  0849 02/13/24  0230    146* 139   K 3.4* 3.6 3.8    109 103   CO2 21 23 25   BUN 61* 62* 46*   CREATININE 5.9* 6.1* 4.8*   GLUCOSE 94 102* 125*       Ca/Mg/Phos:   Recent Labs     02/11/24  0822 02/12/24  0849 02/13/24  0230   CALCIUM 6.5* 6.9* 6.9*   MG 1.90 1.90 1.80   PHOS 3.9 4.1 4.0       Hepatic:   No results for input(s): \"AST\", \"ALT\", \"ALB\", \"BILITOT\", \"ALKPHOS\" in the last 72 hours.    Troponin: No results for input(s): \"TROPONINI\" in the last 72 hours.  BNP: No results for input(s): \"BNP\" in the last 72 hours.  Lipids: No results for input(s): \"CHOL\", \"TRIG\", \"HDL\", \"LDLCALC\" in the last 72 hours.    Invalid input(s): \"LABVLDL\"  ABGs:   No results for input(s): \"PHART\", \"PO2ART\", \"VSX0GOL\" in the last 72 hours.    INR:   Recent Labs     02/10/24  0941   INR 1.40*       UA:No results for input(s): \"COLORU\", \"CLARITYU\", \"GLUCOSEU\", \"BILIRUBINUR\", \"KETUA\", \"SPECGRAV\", \"BLOODU\", \"PHUR\", \"PROTEINU\", \"UROBILINOGEN\", \"NITRU\", \"LEUKOCYTESUR\", \"URINETYPE\" in the last 72 hours.    Invalid input(s): \"LABMICR\"   Urine Microscopic: No results for input(s):

## 2024-02-13 NOTE — PROGRESS NOTES
General Surgery   Daily Progress Note  Patient: Fawad Glynn      CC: GI bleed    SUBJECTIVE:   Hgb decreased to 7.5 last night. CTA abdomen and pelvis was ordered, however the patient's IV infiltrated. Afebrile and hemodynamically stable. No abdominal pain at this time.     ROS:   A 14 point review of systems was conducted, significant findings as noted above. All other systems negative.    OBJECTIVE:  Date 02/12/24 0701 - 02/13/24 0700 02/13/24 0701 - 02/14/24 0700   Shift 2620-8663 9437-4332 24 Hour Total 3874-0171 3155-6219 24 Hour Total   INTAKE   P.O. 150  150      I.V. 300  300      Shift Total 450  450      OUTPUT   Urine  750 750      Shift Total  750 750       -750 -300           PHYSICAL EXAM:    Vitals:    02/12/24 2019 02/12/24 2350 02/13/24 0404 02/13/24 0608   BP: (!) 176/82 (!) 160/70 137/74 (!) 150/82   Pulse: 84 86 93 80   Resp: 18 18 18 18   Temp: 97.7 °F (36.5 °C) 98.4 °F (36.9 °C) 98.1 °F (36.7 °C) 97.5 °F (36.4 °C)   TempSrc: Oral Oral Oral Axillary   SpO2: 97% 96% 96% 100%   Weight:       Height:           General appearance: alert, no acute distress  HEENT: No scleral icterus, EOM grossly intact. Mucous membranes are dry   Neck: trachea midline, no JVD, neck supple  Chest/Lungs: , normal effort with no accessory muscle use on RA  Cardiovascular: RRR  Abdomen: Soft, non-tender, non-distended, no rebound, guarding, or rigidity, scaphoid abdomen  Skin: warm and dry, no rashes  Extremities: no edema, no cyanosis, R fem Vascath in place, L forearm with mild swelling around previous IV site, no skin necrosis  Neuro: A&Ox3, no focal deficits, sensation intact    LABS:   Recent Labs     02/12/24  1703 02/12/24  2358   WBC 9.4 7.3   HGB 8.9* 7.5*   HCT 26.6* 22.4*   MCV 86.8 87.4   PLT 75* 73*          Recent Labs     02/12/24  0849 02/13/24  0230   * 139   K 3.6 3.8    103   CO2 23 25   PHOS 4.1 4.0   BUN 62* 46*   CREATININE 6.1* 4.8*        No results for input(s): \"AST\",

## 2024-02-13 NOTE — PLAN OF CARE
Problem: Discharge Planning  Goal: Discharge to home or other facility with appropriate resources  2/12/2024 2036 by Ruth Ann Wetzel, RN  Outcome: Progressing     Problem: Chronic Conditions and Co-morbidities  Goal: Patient's chronic conditions and co-morbidity symptoms are monitored and maintained or improved  2/12/2024 2036 by Ruth Ann Wetzel, RN  Outcome: Progressing  Flowsheets (Taken 2/12/2024 2036)  Care Plan - Patient's Chronic Conditions and Co-Morbidity Symptoms are Monitored and Maintained or Improved:   Monitor and assess patient's chronic conditions and comorbid symptoms for stability, deterioration, or improvement   Collaborate with multidisciplinary team to address chronic and comorbid conditions and prevent exacerbation or deterioration   Update acute care plan with appropriate goals if chronic or comorbid symptoms are exacerbated and prevent overall improvement and discharge     Problem: Safety - Adult  Goal: Free from fall injury  Outcome: Progressing  Note: High risk for fall.  Bed lowered to it's lowest height, locked and alarm- on.  Call light within reach, instructed also the daughter to call whenever the need assistance.     Problem: Skin/Tissue Integrity  Goal: Absence of new skin breakdown  Description: 1.  Monitor for areas of redness and/or skin breakdown  2.  Assess vascular access sites hourly  3.  Every 4-6 hours minimum:  Change oxygen saturation probe site  4.  Every 4-6 hours:  If on nasal continuous positive airway pressure, respiratory therapy assess nares and determine need for appliance change or resting period.  Outcome: Progressing  Note: At risk to develop new skin changes.  Keep skin clean and dry all the time, maintain with purewick.  Check diaper and change.     Problem: ABCDS Injury Assessment  Goal: Absence of physical injury  Outcome: Progressing  Flowsheets (Taken 2/12/2024 2036)  Absence of Physical Injury: Implement safety measures based on patient assessment

## 2024-02-13 NOTE — CARE COORDINATION
CM following  and recv'd  SW consult for the following :   Pt will require temporary SNF based on PT/OT scores. Pt resistant to idea of going to a facility, but wife agrees that placement to a SNF would be best for him.   CM following up on  referrals  requested  yest.    SW/CM to follow up with  family .     1.) [facility]  2.) [facility]  3.) [facility]     If  Pt  declines  SNFand  Wants  Home bound  D/C  , PTOT will need  update  rec  for  DME  needs.      IR consulted  for  TDC  .     Electronically signed by Eleanor Ambrosio RN on 2/13/2024 at 2:09 PM       +++++++++++++++++++++++++++++++++++++++++++++++++++++++++        CM  following for  d/c planning:    Patient  from home w/  family :    CM  d/w  wife and family  , dgtr and grand dgtr  disposition planning:   Discussed  PTOT evals and rec:  however  pt  was  off  unit :  . They are  aware  SNF was  rec:  and  SNF list was  provided  ,  CM kiki nellie to confirm if  HD  is  needed  OP as  well. ( If placement  needed  No pre cert  needed  )    PC met with pt and family  :  Dispo:  TBD  .    GI following    -  patient getting  PRBC transfusion .  Monitoring HH  ,   - Pt in need of  CTA: difficult  IV access.     Electronically signed by Eleanor Ambrosio RN on 2/13/2024 at 8:58 AM       Eleanor Ambrosio RN Case Manager  [facility] [address]  [facility] [address] [phone]  Fax [phone]

## 2024-02-13 NOTE — PROGRESS NOTES
[facility]  GI Progress Note          Fawad Glynn is a 88 y.o. male patient.  1. Rectal bleeding    2. Spell of abnormal behavior    3. Acute blood loss anemia    4. Stage 5 chronic kidney disease not on chronic dialysis (HCC)        Admit Date: 2/8/2024    Subjective:       No further bleeding although there was some hemoglobin drop last night.  Current hemoglobin is 8.2.  Hemodynamically stable.      ROS:  Cardiovascular ROS: no chest pain or dyspnea on exertion  Gastrointestinal ROS: no abdominal pain, change in bowel habits, or black or bloody stools  Respiratory ROS: no cough, shortness of breath, or wheezing    Scheduled Meds:   melatonin  5 mg Oral Nightly    sodium chloride flush  5-40 mL IntraVENous 2 times per day    insulin lispro  0-4 Units SubCUTAneous TID WC    insulin lispro  0-4 Units SubCUTAneous Nightly    [Held by provider] memantine  2.5 mg Oral Daily    [Held by provider] mirtazapine  30 mg Oral Nightly    [Held by provider] therapeutic multivitamin-minerals  1 tablet Oral Daily    [Held by provider] atorvastatin  10 mg Oral Daily    amLODIPine  5 mg Oral Daily    tamsulosin  0.4 mg Oral Daily    pantoprazole (PROTONIX) 40 mg in sodium chloride (PF) 0.9 % 10 mL injection  40 mg IntraVENous Daily    latanoprost  1 drop Both Eyes Nightly    levETIRAcetam  500 mg IntraVENous Q12H       Continuous Infusions:   sodium chloride      sodium chloride 20 mL/hr at 02/11/24 1939    dextrose         PRN Meds:  sodium chloride, iopamidol, hydrALAZINE, sodium chloride flush, sodium chloride, ondansetron **OR** ondansetron, acetaminophen **OR** acetaminophen, glucose, dextrose bolus **OR** dextrose bolus, glucagon (rDNA), dextrose      Objective:       Patient Vitals for the past 24 hrs:   BP Temp Temp src Pulse Resp SpO2 Weight   02/13/24 1134 (!) 168/78 98.3 °F (36.8 °C) Axillary 75 16 98 % --   02/13/24 0936 (!) 170/83 98.4 °F (36.9 °C) Axillary 81 16 -- --   02/13/24 0909 (!)

## 2024-02-14 ENCOUNTER — HOSPITAL ENCOUNTER (INPATIENT)
Dept: INTERVENTIONAL RADIOLOGY/VASCULAR | Age: 89
Discharge: HOME | End: 2024-02-14
Payer: MEDICARE

## 2024-02-14 LAB
ALBUMIN SERPL-MCNC: 2.8 G/DL (ref 3.4–5)
ANION GAP SERPL CALCULATED.3IONS-SCNC: 12 MMOL/L (ref 3–16)
BASOPHILS # BLD: 0 K/UL (ref 0–0.2)
BASOPHILS # BLD: 0.1 K/UL (ref 0–0.2)
BASOPHILS NFR BLD: 0 %
BASOPHILS NFR BLD: 1 %
BUN SERPL-MCNC: 43 MG/DL (ref 7–20)
CALCIUM SERPL-MCNC: 7.6 MG/DL (ref 8.3–10.6)
CHLORIDE SERPL-SCNC: 106 MMOL/L (ref 99–110)
CO2 SERPL-SCNC: 23 MMOL/L (ref 21–32)
CREAT SERPL-MCNC: 5 MG/DL (ref 0.8–1.3)
DEPRECATED RDW RBC AUTO: 16.2 % (ref 12.4–15.4)
DEPRECATED RDW RBC AUTO: 16.5 % (ref 12.4–15.4)
EOSINOPHIL # BLD: 0 K/UL (ref 0–0.6)
EOSINOPHIL # BLD: 0.3 K/UL (ref 0–0.6)
EOSINOPHIL NFR BLD: 0.3 %
EOSINOPHIL NFR BLD: 4 %
GFR SERPLBLD CREATININE-BSD FMLA CKD-EPI: 11 ML/MIN/{1.73_M2}
GLUCOSE BLD-MCNC: 131 MG/DL (ref 70–99)
GLUCOSE BLD-MCNC: 144 MG/DL (ref 70–99)
GLUCOSE BLD-MCNC: 185 MG/DL (ref 70–99)
GLUCOSE BLD-MCNC: 98 MG/DL (ref 70–99)
GLUCOSE SERPL-MCNC: 89 MG/DL (ref 70–99)
HCT VFR BLD AUTO: 27 % (ref 40.5–52.5)
HCT VFR BLD AUTO: 27.6 % (ref 40.5–52.5)
HGB BLD-MCNC: 9 G/DL (ref 13.5–17.5)
HGB BLD-MCNC: 9.1 G/DL (ref 13.5–17.5)
LYMPHOCYTES # BLD: 0.1 K/UL (ref 1–5.1)
LYMPHOCYTES # BLD: 0.6 K/UL (ref 1–5.1)
LYMPHOCYTES NFR BLD: 1.5 %
LYMPHOCYTES NFR BLD: 7.4 %
MAGNESIUM SERPL-MCNC: 1.7 MG/DL (ref 1.8–2.4)
MCH RBC QN AUTO: 28.8 PG (ref 26–34)
MCH RBC QN AUTO: 28.9 PG (ref 26–34)
MCHC RBC AUTO-ENTMCNC: 32.8 G/DL (ref 31–36)
MCHC RBC AUTO-ENTMCNC: 33.5 G/DL (ref 31–36)
MCV RBC AUTO: 86.1 FL (ref 80–100)
MCV RBC AUTO: 87.9 FL (ref 80–100)
MONOCYTES # BLD: 0.7 K/UL (ref 0–1.3)
MONOCYTES # BLD: 0.8 K/UL (ref 0–1.3)
MONOCYTES NFR BLD: 10.8 %
MONOCYTES NFR BLD: 8.2 %
NEUTROPHILS # BLD: 5.7 K/UL (ref 1.7–7.7)
NEUTROPHILS # BLD: 8.1 K/UL (ref 1.7–7.7)
NEUTROPHILS NFR BLD: 76.8 %
NEUTROPHILS NFR BLD: 90 %
PERFORMED ON: ABNORMAL
PERFORMED ON: NORMAL
PHOSPHATE SERPL-MCNC: 4.1 MG/DL (ref 2.5–4.9)
PLATELET # BLD AUTO: 107 K/UL (ref 135–450)
PLATELET # BLD AUTO: 86 K/UL (ref 135–450)
PMV BLD AUTO: 10.2 FL (ref 5–10.5)
PMV BLD AUTO: 9.3 FL (ref 5–10.5)
POTASSIUM SERPL-SCNC: 3.7 MMOL/L (ref 3.5–5.1)
RBC # BLD AUTO: 3.13 M/UL (ref 4.2–5.9)
RBC # BLD AUTO: 3.14 M/UL (ref 4.2–5.9)
SODIUM SERPL-SCNC: 141 MMOL/L (ref 136–145)
WBC # BLD AUTO: 7.5 K/UL (ref 4–11)
WBC # BLD AUTO: 9 K/UL (ref 4–11)

## 2024-02-14 PROCEDURE — 2580000003 HC RX 258: Performed by: STUDENT IN AN ORGANIZED HEALTH CARE EDUCATION/TRAINING PROGRAM

## 2024-02-14 PROCEDURE — 97530 THERAPEUTIC ACTIVITIES: CPT

## 2024-02-14 PROCEDURE — C9113 INJ PANTOPRAZOLE SODIUM, VIA: HCPCS | Performed by: STUDENT IN AN ORGANIZED HEALTH CARE EDUCATION/TRAINING PROGRAM

## 2024-02-14 PROCEDURE — 6360000002 HC RX W HCPCS

## 2024-02-14 PROCEDURE — 99239 HOSP IP/OBS DSCHRG MGMT >30: CPT | Performed by: HOSPITALIST

## 2024-02-14 PROCEDURE — 6370000000 HC RX 637 (ALT 250 FOR IP)

## 2024-02-14 PROCEDURE — C1894 INTRO/SHEATH, NON-LASER: HCPCS

## 2024-02-14 PROCEDURE — 2580000003 HC RX 258

## 2024-02-14 PROCEDURE — C1750 CATH, HEMODIALYSIS,LONG-TERM: HCPCS

## 2024-02-14 PROCEDURE — 36558 INSERT TUNNELED CV CATH: CPT

## 2024-02-14 PROCEDURE — 36415 COLL VENOUS BLD VENIPUNCTURE: CPT

## 2024-02-14 PROCEDURE — 85025 COMPLETE CBC W/AUTO DIFF WBC: CPT

## 2024-02-14 PROCEDURE — 83735 ASSAY OF MAGNESIUM: CPT

## 2024-02-14 PROCEDURE — 77001 FLUOROGUIDE FOR VEIN DEVICE: CPT

## 2024-02-14 PROCEDURE — 1200000000 HC SEMI PRIVATE

## 2024-02-14 PROCEDURE — 6360000002 HC RX W HCPCS: Performed by: STUDENT IN AN ORGANIZED HEALTH CARE EDUCATION/TRAINING PROGRAM

## 2024-02-14 PROCEDURE — 99231 SBSQ HOSP IP/OBS SF/LOW 25: CPT | Performed by: SURGERY

## 2024-02-14 PROCEDURE — 80069 RENAL FUNCTION PANEL: CPT

## 2024-02-14 PROCEDURE — 6370000000 HC RX 637 (ALT 250 FOR IP): Performed by: STUDENT IN AN ORGANIZED HEALTH CARE EDUCATION/TRAINING PROGRAM

## 2024-02-14 PROCEDURE — 76937 US GUIDE VASCULAR ACCESS: CPT

## 2024-02-14 PROCEDURE — 99233 SBSQ HOSP IP/OBS HIGH 50: CPT | Performed by: INTERNAL MEDICINE

## 2024-02-14 PROCEDURE — A4216 STERILE WATER/SALINE, 10 ML: HCPCS | Performed by: STUDENT IN AN ORGANIZED HEALTH CARE EDUCATION/TRAINING PROGRAM

## 2024-02-14 PROCEDURE — 2500000003 HC RX 250 WO HCPCS

## 2024-02-14 PROCEDURE — 36556 INSERT NON-TUNNEL CV CATH: CPT

## 2024-02-14 RX ORDER — AMLODIPINE BESYLATE 10 MG/1
10 TABLET ORAL DAILY
Status: DISCONTINUED | OUTPATIENT
Start: 2024-02-15 | End: 2024-02-16 | Stop reason: HOSPADM

## 2024-02-14 RX ORDER — AMLODIPINE BESYLATE 10 MG/1
10 TABLET ORAL DAILY
Qty: 30 TABLET | Refills: 3 | Status: SHIPPED | OUTPATIENT
Start: 2024-02-15 | End: 2024-02-22 | Stop reason: SDUPTHER

## 2024-02-14 RX ORDER — MAGNESIUM SULFATE IN WATER 40 MG/ML
2000 INJECTION, SOLUTION INTRAVENOUS ONCE
Status: COMPLETED | OUTPATIENT
Start: 2024-02-14 | End: 2024-02-14

## 2024-02-14 RX ADMIN — Medication 5 MG: at 21:23

## 2024-02-14 RX ADMIN — MIRTAZAPINE 30 MG: 30 TABLET, FILM COATED ORAL at 21:23

## 2024-02-14 RX ADMIN — TAMSULOSIN HYDROCHLORIDE 0.4 MG: 0.4 CAPSULE ORAL at 09:07

## 2024-02-14 RX ADMIN — ATORVASTATIN CALCIUM 10 MG: 10 TABLET, FILM COATED ORAL at 21:27

## 2024-02-14 RX ADMIN — SODIUM CHLORIDE, PRESERVATIVE FREE 5 ML: 5 INJECTION INTRAVENOUS at 10:42

## 2024-02-14 RX ADMIN — LEVETIRACETAM 500 MG: 500 INJECTION, SOLUTION, CONCENTRATE INTRAVENOUS at 11:25

## 2024-02-14 RX ADMIN — MEMANTINE HYDROCHLORIDE 2.5 MG: 5 TABLET, FILM COATED ORAL at 21:27

## 2024-02-14 RX ADMIN — SODIUM CHLORIDE, PRESERVATIVE FREE 10 ML: 5 INJECTION INTRAVENOUS at 21:23

## 2024-02-14 RX ADMIN — SODIUM CHLORIDE: 9 INJECTION, SOLUTION INTRAVENOUS at 11:36

## 2024-02-14 RX ADMIN — LEVETIRACETAM 500 MG: 500 INJECTION, SOLUTION, CONCENTRATE INTRAVENOUS at 23:36

## 2024-02-14 RX ADMIN — MAGNESIUM SULFATE HEPTAHYDRATE 2000 MG: 40 INJECTION, SOLUTION INTRAVENOUS at 11:41

## 2024-02-14 RX ADMIN — AMLODIPINE BESYLATE 5 MG: 5 TABLET ORAL at 09:08

## 2024-02-14 RX ADMIN — SODIUM CHLORIDE, PRESERVATIVE FREE 40 MG: 5 INJECTION INTRAVENOUS at 09:39

## 2024-02-14 RX ADMIN — LATANOPROST 1 DROP: 50 SOLUTION OPHTHALMIC at 21:22

## 2024-02-14 NOTE — PROGRESS NOTES
Renal Progress Note  Subjective:   Admit Date: 2/8/2024       Assessment/Plan     # THERESE on CKD 5, now progressed to ESRD  - Renal function worse in setting of hypotension/GIB & contrast study (2/9)  - Started on HD this admission. S/p temp HD line on 2/10  - Consulted IR for tunneled HD catheter   - Plan for HD tomorrow 2/15  - Monitor UOP and renal function   - CTA 2/9 - no further hydro noted   - Renally dose meds for current GFR  - Avoid nephrotoxins   - Labs in AM    # HTN  - BP's previously low and home meds were held  - Resumed Amlodipine with BP now on higher side. Increase dose  - Close monitoring of  BP     # Anemia sec to GIB and chronic disease   - Transfuse PRN per primary   - JI with HD  - Close monitoring of CBC      # Acid- base/ Electrolyte imbalance   - Lytes stable  - Continue to monitor BMP    # Rectal bleeding  - GI consulted  - S/p colonoscopy 2/12 - presumed diverticular bleed   - CTA ordered d/t drop in Hgb but IV infiltrated     # Dementia  - On home meds   - Palliative care consulted to discuss goals of care    # DM2  - Management per primary       HPI   Fawad Glynn is a 88 y.o. male with prior history CKD stage 5, anemia of chronic disease, HTN, HLD, T2DM, seizure disorder, and schizophrenia who was admitted to the ICU for ongoing rectal bleeding and altered mental status.      Interval History:     Resting in bed  Family at bedside  Pt has been feeling much better with HD    Labs pending today   Good UOP  Plan for HD tomorrow  BP's elevated       DIET Diet NPO Exceptions are: Sips of Water with Meds  Medications:   Scheduled Meds:   mirtazapine  30 mg Oral Nightly    memantine  2.5 mg Oral Daily    atorvastatin  10 mg Oral Daily    melatonin  5 mg Oral Nightly    sodium chloride flush  5-40 mL IntraVENous 2 times per day    insulin lispro  0-4 Units SubCUTAneous TID WC    insulin lispro  0-4 Units SubCUTAneous Nightly    [Held by provider] therapeutic multivitamin-minerals  1  (Oral)   Resp 16   Ht 1.905 m (6' 3\")   Wt 67.5 kg (148 lb 13 oz)   SpO2 100%   BMI 18.60 kg/m²    Wt Readings from Last 3 Encounters:   02/12/24 67.5 kg (148 lb 13 oz)   09/22/23 59.9 kg (132 lb)   06/20/23 60.8 kg (134 lb)      24HR INTAKE/OUTPUT:    Intake/Output Summary (Last 24 hours) at 2/14/2024 1026  Last data filed at 2/14/2024 0620  Gross per 24 hour   Intake 10 ml   Output 1400 ml   Net -1390 ml         General: Alert, oriented. NAD   NECK: supple, no JVD  Cardiovascular:  S1, S2 without m/r/g  Respiratory:  CTA B. On RA  Abdomen: soft, nt  Ext: no LE edema  Skin Warm and dry   Assessment and Plan:       IMAGING:  XR CHEST PORTABLE   Final Result      No pneumothorax.      Electronically signed by Jose Manuel Ernst MD      CTA ABDOMEN PELVIS W CONTRAST   Final Result         1. No CT evidence of acute gastrointestinal hemorrhage.      Electronically signed by Tyrone Carpenter MD      CT HEAD WO CONTRAST   Final Result      No acute intracranial abnormality.      Sinus disease, similar to remote prior study.      CT ABDOMEN PELVIS WO CONTRAST Additional Contrast? None   Final Result   Severely limited study due to lack of intravenous contrast. No gross   sequelae of acute gastrointestinal hemorrhage. If clinical concern persists,   recommend CT angiography or endoscopy.      Mild left hydronephrosis. No obstructing calculi.          Medical Decision Making:  The following items were considered in medical decision making:  Discussion of patient care with other providers  Reviewed clinical lab tests  Reviewed radiology tests  Reviewed other diagnostic tests/interventions  Independent review of radiologic images - reviewed     Mellissa Tracy PA-C   [facility]  Office -[phone]  Dmt-[phone]      I have seen the patient independently from the PA/NP .I discussed the care with the PA/NP . I personally reviewed the HPI, PH, FH, SH, ROS and medications. I repeated pertinent

## 2024-02-14 NOTE — DISCHARGE SUMMARY
INTERNAL MEDICINE DEPARTMENT AT THE [facility]  DISCHARGE SUMMARY    Patient ID: Fawad Glynn                                             Discharge Date: 2/16/2024   Patient's PCP: Callie Hansen MD                                          Discharge Physician: Chris Tello MD  Admit Date: 2/8/2024   Admitting Physician: Guillermo Partida MD    PROBLEMS DURING HOSPITALIZATION:  Present on Admission:   GI bleeding   Acute renal failure superimposed on stage 5 chronic kidney disease, not on chronic dialysis (HCC)   Rectal bleeding   Hypotension due to hypovolemia   Acute blood loss anemia   Advance care planning   Encounter for palliative care   THERESE (acute kidney injury) (HCC)   Stage 5 chronic kidney disease not on chronic dialysis (HCC)   Electrolyte imbalance   Essential hypertension      DISCHARGE DIAGNOSES:  Acute on chronic normocytic anemia 2/2 GI bleed  Moderate to severe diverticulosis  AMS  CKD stage 5, now ESRD    HPI:  \"Mr Glynn is an 87 yo M with a PMHx of diverticulosis, paranoid schizophrenia, CKD stage V, seizures who presented following an episode of a BM with bright red blood. He arriver via EMS from his nursing facility. Pt states he was using the bathroom and had a large amount of blood in the toilet. He states his wife was present during this as well who also endorses a large volume of blood loss. Pt denies any abdominal pain prior to or during the BM. Denies any dizziness, fatigue, SOB. States his appetite is low, but not lower than normal. Denies any recent N/V, fever, chills, weight loss. Says this has never happened before. Denies known history of hemorrhoids.     In the ED, VS were WNL and stable. CBC was remarkable for WBC 11.3, Hgb 8.9. Baseline Hgb 9.0 in the setting of chronic anemia  2/2 CKD. The patient had a brief episode of unresponsiveness while getting CT done, however maintained a pulse so CPR was not started. Returned to baseline after receiving a 500mL bolus. Repeat Hgb  outpatient. IR tunneled dialysis catheter inserted on 2/14. Home amlodipine increased from 5 mg to 10 mg qd. To follow-up with Nephrology outpatient.    Physical Exam:  HENT:      Head: Normocephalic.   Cardiovascular:      Rate and Rhythm: Normal rate and regular rhythm.      Pulses: Normal pulses.      Heart sounds: Normal heart sounds. No murmur heard.     No friction rub. No gallop.   Pulmonary:      Effort: Pulmonary effort is normal. No respiratory distress.      Breath sounds: Normal breath sounds.   Abdominal:      General: Abdomen is flat. Bowel sounds are normal. There is no distension.      Tenderness: There is no abdominal tenderness. There is no guarding.   Skin:     General: Skin is warm.      Capillary Refill: Capillary refill takes less than 2 seconds.      Consults: Gastroenterology, Nephrology, General Surgery, IR  Significant Diagnostic Studies:  CT abdomen/pelvis w/o contrast, CTA of abdomen, 2x colonoscopies, CT Head w/o contrast   Treatments: pRBC transfusions (x7), fluids, several sessions of  HD with tunneled dialysis catheter placed on 2/14, laboratory monitoring  Disposition: home per patient request  Discharged Condition: Stable  Follow Up: Primary Care Physician in one week  Nephrology in one week  Gastroenterology in one week    Attend regular HD sessions outpatient    DISCHARGE MEDICATION:       Medication List        STOP taking these medications      Accu-Chek Alem Plus strip  Generic drug: blood glucose test strips     Accu-Chek Softclix Lancets Misc     Boost Very High Calorie Liqd     Breo Ellipta 100-25 MCG/ACT inhaler  Generic drug: fluticasone furoate-vilanterol     clotrimazole 1 % cream  Commonly known as: LOTRIMIN     fluticasone 50 MCG/ACT nasal spray  Commonly known as: FLONASE     Metamucil Plus Calcium Caps     nitroGLYCERIN 0.4 MG SL tablet  Commonly known as: NITROSTAT     omeprazole 20 MG delayed release capsule  Commonly known as: PRILOSEC     polyethylene glycol 17

## 2024-02-14 NOTE — DISCHARGE INSTRUCTIONS
[facility]  Cardiovascular Special Procedures  CENTRAL LINE PLACEMENT     Patient Information:   Your line may be placed in the chest or peripherally in the arm. The following instructions are for both.    Maintain dressing after the procedure. Leave dressing on for 7 days. If the dressing becomes moist, it should be changed. If there is any leakage at the incision site, notify your doctor.    Keep site clean and dry for 7 days and observe for any signs of infection. Dressing should be changed with a sterile process.      Bruising and mild discomfort are expected.   You may apply an ice bag to the incision area to minimize bruising, discomfort, and swelling.    Contact your physician who placed the Line for any bleeding or extreme pain. Other symptoms to report are as follows:     Fever.     Skin warm to touch.     Numbness or weakness.     Swelling of neck or arm.     Redness or Tenderness along the portal site and/or the catheter tract.     Drainage from the portal site, or if dressing is damp or saturated.         Strenuous activities and exercise should be approached gradually.      You may shower as long as you keep the incision dry while line is in place.               The incision should not be immersed in water until it is completely healed.          You may contact [facility]. for any questions or problems that may occur at [phone] during the hours of 9am-4pm Monday-Friday, or the hospital  after hours at [phone], to have the interventional radiologist on call paged.      [facility]  Cardiovascular Special Procedures  General Discharge Instructions      __x__ You may be drowsy or lightheaded after receiving sedation. DO NOT operate a vehicle (automobile, bicycle, motorcycle, machinery, or power tools), make any important decisions or sign any important/legal documents, or drink alcoholic beverages for the next 24 hours  __x__ We strongly suggest that

## 2024-02-14 NOTE — CARE COORDINATION
Dr. Tello (resident) said that he spoke to patient's family (granddaughter and wife/daughter and they disagree on placement of home v. SNF. Dr. Tello specifically tolfd the family members that they have to meet together and decide where patient is going. Granddaughter, Denise, states that patient should go to SNF because the wife/ daughter do not have the equipment/ability at home to take care of patient. Wife (Taylor) and daughter (Nereyda) want to take home. Dr. Tello will keep CM apprised of the situation.  Electronically signed by Andreea Stroud RN on 2/14/2024 at 4:33 PM

## 2024-02-14 NOTE — PROGRESS NOTES
Pt. Returned  to floor from cath lab with new hemodialysis catheter to right chest and hemodialysis catheter removed from right femoral vein. Both dressings are clean dry and intact. Pt has no complaints at this time and states that he is comfortable. Call light within reach.

## 2024-02-14 NOTE — PLAN OF CARE
Pt is becoming medically ready for discharge but pt/family not yet able to come to a collective decision on whether to take patient home or to a facility. At this time, our medical recommendation remains discharge to a SNF for needed rehabilitation services. Discussed this am with granddaughter and daughter (Nereyda) at bedside; they were adamant about not going to a facility and that they would be able to take care of the patient at home given the support mechanisms they had. On discussion with the patient, he wished to pursue this plan and declined discharge to a facility. Discussed with wife with regards to this update, and she affirms that she does not have the capability to take care of the patient at her home, and that if her daughter was willing to care for the pt at their home, that all involved parties would be agreeable to this plan.     At around 3 pm, I provided an update to another daughter (Bailey) who was concerned that her sister (Nereyda) would not be able to adequately care for the pt. Discussed with her that pt does hold decision-making capacity, and that changes in where he would like to discharge to would have to ultimately be up to him. Recommended to Bailey (reported POA) to discuss discharge planning with the patient and the rest of the family to come to a collective decision. She stated that she would let us know tomorrow morning after further discussion..

## 2024-02-14 NOTE — DISCHARGE INSTRUCTIONS
Please follow-up with the following:  - Your primary care provider, Dr. Hansen, in one week  - Nephrology, Dr. Mcclain, in one week  - Gastroenterology, Dr. Stanford, in one week    The following medications were changed:  - Amlodipine was increased from 5 mg to 10 mg once daily for management of hypertension

## 2024-02-14 NOTE — PROGRESS NOTES
General Surgery   Daily Progress Note  Patient: Fawad Glynn      CC: GI bleed    SUBJECTIVE:   No acute events overnight. Afebrile and hypertensive. Denies abdominal pain. No bloody bowel movements.     ROS:   A 14 point review of systems was conducted, significant findings as noted above. All other systems negative.    OBJECTIVE:  Date 02/13/24 0701 - 02/14/24 0700 02/14/24 0701 - 02/15/24 0700   Shift 4534-9619 7800-8540 24 Hour Total 8519-5498 9854-3976 24 Hour Total   INTAKE   I.V.  10 10      Blood  0 0        Volume (Transfuse RBC)  0 0      Shift Total  10 10      OUTPUT   Urine 1150  1150      Shift Total 1150  1150      NET -1150 10 -1140           PHYSICAL EXAM:    Vitals:    02/13/24 1636 02/13/24 1943 02/14/24 0001 02/14/24 0357   BP: (!) 165/76 (!) 155/82 (!) 166/88 (!) 166/77   Pulse: 73 79 83 78   Resp: 16 16 16 16   Temp: 98.7 °F (37.1 °C) 97.8 °F (36.6 °C) 97.6 °F (36.4 °C) 98.6 °F (37 °C)   TempSrc: Oral Oral Axillary Axillary   SpO2: 96% 100% 99% 99%   Weight:       Height:           General appearance: alert, no acute distress  HEENT: No scleral icterus, EOM grossly intact. Mucous membranes are dry   Neck: trachea midline, no JVD, neck supple  Chest/Lungs: , normal effort with no accessory muscle use on RA  Cardiovascular: RRR  Abdomen: Soft, non-tender, non-distended, no rebound, guarding, or rigidity, scaphoid abdomen  Skin: warm and dry, no rashes  Extremities: no edema, no cyanosis, R fem Vascath in place, L forearm with mild swelling around previous IV site, no skin necrosis  Neuro: A&Ox3, no focal deficits, sensation intact    LABS:   Recent Labs     02/12/24  2358 02/13/24  1048 02/13/24  1734   WBC 7.3  --  6.2   HGB 7.5* 8.2* 8.6*   HCT 22.4* 24.1* 26.5*   MCV 87.4  --  89.9   PLT 73*  --  96*          Recent Labs     02/12/24  0849 02/13/24  0230   * 139   K 3.6 3.8    103   CO2 23 25   PHOS 4.1 4.0   BUN 62* 46*   CREATININE 6.1* 4.8*        No results for input(s):

## 2024-02-14 NOTE — PROGRESS NOTES
Pt. Running tachy on tele monitor between 110-120. Resident notified via secured message. Will continue to monitor HR on tele at this time.

## 2024-02-14 NOTE — PROGRESS NOTES
Internal Medicine Progress Note    Date: 2/14/2024   Patient: Fawad Glynn   [facility] Day: 6      CC: Rectal Bleeding (Patient presents to the ED via EMS from SNF. Per EMS, nursing staff at facility reports patient passed a significant amount of dark, tarry stool this evening. He is not on blood thinners. Patient states that his stomach began hurting earlier this evening. )       Interval Hx   NAEO. Remaining hypertensive but vital signs otherwise stable. Last Hgb of 8.6 yesterday evening. No noted bleeding by overnight team. Seen and examined at bedside. Pt overall feeling well with no chest pain, SOB, lightheadedness/dizziness, or abdominal pain. Pt would not like to go to a facility. Demonstrates understanding that he requires increased support. Daughter/granddaughter also at bedside who report they would like to take him home when medically stable and that they would be able to care for him despite the increased needs. Pt is agreeable to this plan. Updated case management who will work on setting up home health care at the daughter's home.    HPI:   \"Mr Glynn is an 89 yo M with a PMHx of diverticulosis, paranoid schizophrenia, CKD stage V, seizures who presented following an episode of a BM with bright red blood. He arriver via EMS from his nursing facility. Pt states he was using the bathroom and had a large amount of blood in the toilet. He states his wife was present during this as well who also endorses a large volume of blood loss. Pt denies any abdominal pain prior to or during the BM. Denies any dizziness, fatigue, SOB. States his appetite is low, but not lower than normal. Denies any recent N/V, fever, chills, weight loss. Says this has never happened before. Denies known history of hemorrhoids.     In the ED, VS were WNL and stable. CBC was remarkable for WBC 11.3, Hgb 8.9. Baseline Hgb 9.0 in the setting of chronic anemia  2/2 CKD. The patient had a brief episode of unresponsiveness while

## 2024-02-14 NOTE — PROGRESS NOTES
0001 (!) 166/88 97.6 °F (36.4 °C) Axillary 83 16 99 %   24 1943 (!) 155/82 97.8 °F (36.6 °C) Oral 79 16 100 %   24 1636 (!) 165/76 98.7 °F (37.1 °C) Oral 73 16 96 %       Exam:  VITALS:  BP (!) 142/83   Pulse 85   Temp 98.5 °F (36.9 °C) (Oral)   Resp 18   Ht 1.905 m (6' 3\")   Wt 67.5 kg (148 lb 13 oz)   SpO2 99%   BMI 18.60 kg/m²   TEMPERATURE:  Current - Temp: 98.5 °F (36.9 °C); Max - Temp  Av.2 °F (36.8 °C)  Min: 97.6 °F (36.4 °C)  Max: 98.7 °F (37.1 °C)    NAD  General appearance: alert, appears stated age, cooperative and no distress  Head: Normocephalic, without obvious abnormality, atraumatic  Neck: supple, symmetrical, trachea midline and thyroid not enlarged, symmetric, no tenderness/mass/nodules  CVS:  RRR, Nl s1s2  Lungs CTA Bilaterally, normal effort  Abdomen: soft, non-tender; bowel sounds normal; no masses,  no organomegaly  AAOx3, No asterixis or encephalopathy  Extremities: No edema.      Recent Labs     24  2358 24  1048 24  1734 24  0842   WBC 7.3  --  6.2 7.5   HGB 7.5* 8.2* 8.6* 9.0*   HCT 22.4* 24.1* 26.5* 27.0*   MCV 87.4  --  89.9 86.1   PLT 73*  --  96* 86*     Recent Labs     24  0849 24  0230 24  0842   * 139 141   K 3.6 3.8 3.7    103 106   CO2 23 25 23   PHOS 4.1 4.0 4.1   BUN 62* 46* 43*   CREATININE 6.1* 4.8* 5.0*     No results for input(s): \"AST\", \"ALT\", \"ALB\", \"BILIDIR\", \"BILITOT\", \"ALKPHOS\" in the last 72 hours.  No results for input(s): \"LIPASE\", \"AMYLASE\" in the last 72 hours.  Recent Labs     24  1500   INR 1.12     No results for input(s): \"PTT\" in the last 72 hours.  No results for input(s): \"OCCULTBLD\" in the last 72 hours.    Radiology review: n/a    Assessment:       Principal Problem:    GI bleeding  Active Problems:    Essential hypertension    Stage 5 chronic kidney disease not on chronic dialysis (HCC)    Acute renal failure superimposed on stage 5 chronic kidney disease, not on chronic  dialysis (HCC)    THERESE (acute kidney injury) (HCC)    Rectal bleeding    Hypotension due to hypovolemia    Acute blood loss anemia    Advance care planning    Encounter for palliative care    Electrolyte imbalance  Resolved Problems:    * No resolved hospital problems. *      Diverticular bleeding, resolved.      Recommendations:       OK to discharge home.    Pablo Stanford MD  2/14/2024  12:10 PM

## 2024-02-14 NOTE — PROGRESS NOTES
Physical Therapy  [address]  Daily [address] Note  NAME: Fawad Glynn  : 1935  MRN: [id number]    Date of Service: 2024    Discharge Recommendations:  Subacute/Skilled Nursing Facility   PT Equipment Recommendations  Equipment Needed: No    Patient Diagnosis(es): The primary encounter diagnosis was Rectal bleeding. Diagnoses of Spell of abnormal behavior, Acute blood loss anemia, and Stage 5 chronic kidney disease not on chronic dialysis (HCC) were also pertinent to this visit.    Assessment   Assessment: Pt tolerated session well; pt limited by severe weakness and poor standing balance. Bed mobility was effortful requiring sequencing cues and assistance to sit EOB. Transfers and gait training were very weak and unsteady; presenting with a posterior lean and increased knee flexion. Tremors increase with activity. Performed a pivot transfer to the bedside chair.     Equipment Needed: No     Plan    Physical Therapy Plan  General Plan:  (2-5)     Restrictions  Position Activity Restriction  Other position/activity restrictions: up as tolerated     Subjective    Subjective  Subjective: Pt tolerated session well  Pain: c/o chest tightness during transfers; RN was made aware.     Objective   Vitals  Pulse: 90  BP: (!) 167/75  MAP (Calculated): 106  SpO2: 100 %  O2 Device: None (Room air)  Bed Mobility Training  Supine to Sit: Minimum assistance  Balance  Sitting: Intact  Standing: Impaired  Standing - Static: Poor  Standing - Dynamic: Poor  Transfer Training  Transfer Training: Yes  Sit to Stand: Maximum assistance  Stand to Sit: Maximum assistance  Stand Pivot Transfers: Maximum assistance     PT Exercises  Exercise Treatment:   APs: x10 B   HS: x10 B   LAQ: x10 B  Static Sitting Balance Exercises: sitting EOB for approx 5 mins  Static Standing Balance Exercises: standing EOB with a RW for approx 2x15\"     Safety Devices  Type of Devices: Call light within reach;Chair

## 2024-02-15 LAB
ALBUMIN SERPL-MCNC: 2.9 G/DL (ref 3.4–5)
ANION GAP SERPL CALCULATED.3IONS-SCNC: 12 MMOL/L (ref 3–16)
BASOPHILS # BLD: 0 K/UL (ref 0–0.2)
BASOPHILS NFR BLD: 0.4 %
BUN SERPL-MCNC: 47 MG/DL (ref 7–20)
CALCIUM SERPL-MCNC: 7.5 MG/DL (ref 8.3–10.6)
CHLORIDE SERPL-SCNC: 104 MMOL/L (ref 99–110)
CO2 SERPL-SCNC: 25 MMOL/L (ref 21–32)
CREAT SERPL-MCNC: 5.6 MG/DL (ref 0.8–1.3)
DEPRECATED RDW RBC AUTO: 16.3 % (ref 12.4–15.4)
EOSINOPHIL # BLD: 0.1 K/UL (ref 0–0.6)
EOSINOPHIL NFR BLD: 1.7 %
GFR SERPLBLD CREATININE-BSD FMLA CKD-EPI: 9 ML/MIN/{1.73_M2}
GLUCOSE BLD-MCNC: 112 MG/DL (ref 70–99)
GLUCOSE BLD-MCNC: 182 MG/DL (ref 70–99)
GLUCOSE SERPL-MCNC: 151 MG/DL (ref 70–99)
HCT VFR BLD AUTO: 27.2 % (ref 40.5–52.5)
HGB BLD-MCNC: 9 G/DL (ref 13.5–17.5)
LACTATE BLDV-SCNC: 1.6 MMOL/L (ref 0.4–2)
LYMPHOCYTES # BLD: 0.2 K/UL (ref 1–5.1)
LYMPHOCYTES NFR BLD: 3.3 %
MAGNESIUM SERPL-MCNC: 1.9 MG/DL (ref 1.8–2.4)
MCH RBC QN AUTO: 29 PG (ref 26–34)
MCHC RBC AUTO-ENTMCNC: 33.2 G/DL (ref 31–36)
MCV RBC AUTO: 87.1 FL (ref 80–100)
MONOCYTES # BLD: 0.8 K/UL (ref 0–1.3)
MONOCYTES NFR BLD: 11.1 %
NEUTROPHILS # BLD: 6.2 K/UL (ref 1.7–7.7)
NEUTROPHILS NFR BLD: 83.5 %
PERFORMED ON: ABNORMAL
PERFORMED ON: ABNORMAL
PHOSPHATE SERPL-MCNC: 4.1 MG/DL (ref 2.5–4.9)
PLATELET # BLD AUTO: 124 K/UL (ref 135–450)
PMV BLD AUTO: 9.1 FL (ref 5–10.5)
POTASSIUM SERPL-SCNC: 3.9 MMOL/L (ref 3.5–5.1)
RBC # BLD AUTO: 3.12 M/UL (ref 4.2–5.9)
SODIUM SERPL-SCNC: 141 MMOL/L (ref 136–145)
WBC # BLD AUTO: 7.4 K/UL (ref 4–11)

## 2024-02-15 PROCEDURE — 99231 SBSQ HOSP IP/OBS SF/LOW 25: CPT | Performed by: SURGERY

## 2024-02-15 PROCEDURE — 99233 SBSQ HOSP IP/OBS HIGH 50: CPT | Performed by: INTERNAL MEDICINE

## 2024-02-15 PROCEDURE — 97530 THERAPEUTIC ACTIVITIES: CPT

## 2024-02-15 PROCEDURE — C9113 INJ PANTOPRAZOLE SODIUM, VIA: HCPCS | Performed by: STUDENT IN AN ORGANIZED HEALTH CARE EDUCATION/TRAINING PROGRAM

## 2024-02-15 PROCEDURE — 6360000002 HC RX W HCPCS: Performed by: STUDENT IN AN ORGANIZED HEALTH CARE EDUCATION/TRAINING PROGRAM

## 2024-02-15 PROCEDURE — 90935 HEMODIALYSIS ONE EVALUATION: CPT

## 2024-02-15 PROCEDURE — 1200000000 HC SEMI PRIVATE

## 2024-02-15 PROCEDURE — 6370000000 HC RX 637 (ALT 250 FOR IP)

## 2024-02-15 PROCEDURE — 83735 ASSAY OF MAGNESIUM: CPT

## 2024-02-15 PROCEDURE — 6370000000 HC RX 637 (ALT 250 FOR IP): Performed by: STUDENT IN AN ORGANIZED HEALTH CARE EDUCATION/TRAINING PROGRAM

## 2024-02-15 PROCEDURE — 6370000000 HC RX 637 (ALT 250 FOR IP): Performed by: PHYSICIAN ASSISTANT

## 2024-02-15 PROCEDURE — 2580000003 HC RX 258: Performed by: STUDENT IN AN ORGANIZED HEALTH CARE EDUCATION/TRAINING PROGRAM

## 2024-02-15 PROCEDURE — 6360000002 HC RX W HCPCS

## 2024-02-15 PROCEDURE — 85025 COMPLETE CBC W/AUTO DIFF WBC: CPT

## 2024-02-15 PROCEDURE — 80069 RENAL FUNCTION PANEL: CPT

## 2024-02-15 PROCEDURE — 83605 ASSAY OF LACTIC ACID: CPT

## 2024-02-15 PROCEDURE — 02H633Z INSERTION OF INFUSION DEVICE INTO RIGHT ATRIUM, PERCUTANEOUS APPROACH: ICD-10-PCS | Performed by: RADIOLOGY

## 2024-02-15 PROCEDURE — 99239 HOSP IP/OBS DSCHRG MGMT >30: CPT | Performed by: HOSPITALIST

## 2024-02-15 PROCEDURE — A4216 STERILE WATER/SALINE, 10 ML: HCPCS | Performed by: STUDENT IN AN ORGANIZED HEALTH CARE EDUCATION/TRAINING PROGRAM

## 2024-02-15 PROCEDURE — 36415 COLL VENOUS BLD VENIPUNCTURE: CPT

## 2024-02-15 PROCEDURE — 0JH63XZ INSERTION OF TUNNELED VASCULAR ACCESS DEVICE INTO CHEST SUBCUTANEOUS TISSUE AND FASCIA, PERCUTANEOUS APPROACH: ICD-10-PCS | Performed by: RADIOLOGY

## 2024-02-15 RX ADMIN — LEVETIRACETAM 500 MG: 500 INJECTION, SOLUTION, CONCENTRATE INTRAVENOUS at 21:45

## 2024-02-15 RX ADMIN — MEMANTINE HYDROCHLORIDE 2.5 MG: 5 TABLET, FILM COATED ORAL at 10:00

## 2024-02-15 RX ADMIN — Medication 5 MG: at 20:23

## 2024-02-15 RX ADMIN — SODIUM CHLORIDE, PRESERVATIVE FREE 10 ML: 5 INJECTION INTRAVENOUS at 10:01

## 2024-02-15 RX ADMIN — ONDANSETRON 4 MG: 2 INJECTION INTRAMUSCULAR; INTRAVENOUS at 04:55

## 2024-02-15 RX ADMIN — MIRTAZAPINE 30 MG: 30 TABLET, FILM COATED ORAL at 20:23

## 2024-02-15 RX ADMIN — HYDRALAZINE HYDROCHLORIDE 5 MG: 20 INJECTION, SOLUTION INTRAMUSCULAR; INTRAVENOUS at 00:03

## 2024-02-15 RX ADMIN — ACETAMINOPHEN 650 MG: 325 TABLET ORAL at 04:55

## 2024-02-15 RX ADMIN — ATORVASTATIN CALCIUM 10 MG: 10 TABLET, FILM COATED ORAL at 10:00

## 2024-02-15 RX ADMIN — LATANOPROST 1 DROP: 50 SOLUTION OPHTHALMIC at 20:24

## 2024-02-15 RX ADMIN — SODIUM CHLORIDE, PRESERVATIVE FREE 40 MG: 5 INJECTION INTRAVENOUS at 10:00

## 2024-02-15 RX ADMIN — LEVETIRACETAM 500 MG: 500 INJECTION, SOLUTION, CONCENTRATE INTRAVENOUS at 09:59

## 2024-02-15 RX ADMIN — TAMSULOSIN HYDROCHLORIDE 0.4 MG: 0.4 CAPSULE ORAL at 10:00

## 2024-02-15 RX ADMIN — AMLODIPINE BESYLATE 10 MG: 10 TABLET ORAL at 10:00

## 2024-02-15 ASSESSMENT — PAIN DESCRIPTION - DESCRIPTORS: DESCRIPTORS: ACHING;DISCOMFORT;CRAMPING

## 2024-02-15 ASSESSMENT — PAIN DESCRIPTION - ORIENTATION: ORIENTATION: LOWER;MID

## 2024-02-15 ASSESSMENT — PAIN DESCRIPTION - LOCATION: LOCATION: ABDOMEN

## 2024-02-15 ASSESSMENT — PAIN SCALES - GENERAL: PAINLEVEL_OUTOF10: 5

## 2024-02-15 NOTE — PROGRESS NOTES
Physical Therapy  Facility/Department: [address]  Physical Therapy Treatment  Name: Fawad Glynn  : 1935  MRN: [id number]  Date of Service: 2/15/2024    Discharge Recommendations:  Subacute/Skilled Nursing Facility   PT Equipment Recommendations  Equipment Needed:  (daughter has w/c.  Will need hospital bed, lift, bsc for home.  Issued gt belt)      Patient Diagnosis(es): The primary encounter diagnosis was Rectal bleeding. Diagnoses of Spell of abnormal behavior, Acute blood loss anemia, and Stage 5 chronic kidney disease not on chronic dialysis (HCC) were also pertinent to this visit.  Past Medical History:  has a past medical history of Anemia, Anxiety and depression, Arthritis, BPH (benign prostatic hypertrophy), Diverticulosis, Eczema, Erectile dysfunction, Essential hypertension, GERD (gastroesophageal reflux disease), Hemorrhoid, Hyperlipidemia, Hyperphosphatemia, Hypertension, Overactive bladder, Paranoid schizophrenia (HCC), Prostate cancer (HCC), Seizures (HCC), and Type 2 diabetes mellitus without complication, without long-term current use of insulin (HCC).  Past Surgical History:  has a past surgical history that includes Prostate surgery (May 8, 2001); Colonoscopy (2011); Upper gastrointestinal endoscopy (2011); Colonoscopy (2011); Cholecystectomy, laparoscopic (2012); Prostate biopsy (2012); Total hip arthroplasty (Left, May 17, 2015); hip surgery (Left, 2015); Upper gastrointestinal endoscopy (2016); Upper gastrointestinal endoscopy (N/A, 04/15/2016); Upper gastrointestinal endoscopy (2017); other surgical history (2017); other surgical history (2017); Upper gastrointestinal endoscopy (N/A, 2019); Upper gastrointestinal endoscopy (N/A, 2019); Endoscopy, colon, diagnostic; Colonoscopy (N/A, 10/1/2019); Chest Wall Resection (N/A, 2020); hernia repair (Left, 2021);

## 2024-02-15 NOTE — PLAN OF CARE

## 2024-02-15 NOTE — DISCHARGE INSTR - COC
Continuity of Care Form    Patient Name: Fawad Glynn   :  1935  MRN:  [id number]    Admit date:  2024  Discharge date:  ***    Code Status Order: Full Code   Advance Directives:     Admitting Physician:  Guillermo Partida MD  PCP: Callie Hansen MD    Discharging Nurse: ***  Discharging Hospital Unit/Room#: 6312/6312-01  Discharging Unit Phone Number: ***    Emergency Contact:   Extended Emergency Contact Information  Primary Emergency Contact: karon glynn  Home Phone: [phone]  Mobile Phone: [phone]  Relation: Spouse  Secondary Emergency Contact: Boris Glynn  Home Phone: [phone]  Mobile Phone: [phone]  Relation: Child   needed? No    Past Surgical History:  Past Surgical History:   Procedure Laterality Date    CHEST WALL RESECTION N/A 2020    EXCISION CHEST LESION (3 x 2.8cm) AND RIGHT UPPER ABDOMEN LESION (3 x .1), COMPLEX CLOSURE 1.9CM;  ADJACENT TISSUE TRANSFER; performed by Milady Andre MD at [facility] OR    CHOLECYSTECTOMY, LAPAROSCOPIC  2012    Dr. Le [facility]    COLONOSCOPY  2011    Dr. Cornell Headley    COLONOSCOPY  2011    COLONOSCOPY N/A 10/1/2019    COLONOSCOPY DIAGNOSTIC performed by Zaid Headley MD at [facility] ENDOSCOPY    COLONOSCOPY N/A 2024    COLONOSCOPY DIAGNOSTIC performed by Qamar Maddox MD at [facility] ENDOSCOPY    COLONOSCOPY N/A 2024    COLONOSCOPY DIAGNOSTIC performed by Pablo Stanford MD at [facility] ENDOSCOPY    ENDOSCOPY, COLON, DIAGNOSTIC      HERNIA REPAIR Left 2021    LAPAROSCOPIC LEFT INGUINAL HERNIA REPAIR performed by Riky Cifuentes MD at [facility] OR    HIP SURGERY Left 2015    Dr. Avtar Powerso - incision and drainage of left hip infected hematoma     IR TUNNELED CATHETER PLACEMENT GREATER THAN 5 YEARS  2024    IR TUNNELED CATHETER PLACEMENT GREATER THAN 5 YEARS 2024 [facility] SPECIAL PROCEDURES    OTHER SURGICAL HISTORY  2017      {GREATER/LESS:355362457} 30 days.     Update Admission H&P: {CHP DME Changes in HandP:[id number]}    PHYSICIAN SIGNATURE:  {Esignature:[id number]}

## 2024-02-15 NOTE — PROGRESS NOTES
General Surgery   Daily Progress Note  Patient: Fawad Glynn      CC: lower GI bleed of unknown origin    SUBJECTIVE:   Remains afebrile and hypertensive. No acute events overnight. Tolerating diet without nausea or vomiting. Daughter and patient report some lower abdominal discomfort. No bowel movement in three days.     ROS:   A 14 point review of systems was conducted, significant findings as noted above. All other systems negative.    OBJECTIVE:  Date 02/14/24 0701 - 02/15/24 0700 02/15/24 0701 - 02/16/24 0700   Shift 4855-0331 7018-7045 24 Hour Total 5329-1238 0258-9426 24 Hour Total   INTAKE   P.O.  240 240      I.V.  5 5      Shift Total  245 245      OUTPUT   Urine 150  150      Shift Total 150  150      NET -150 245 95           PHYSICAL EXAM:    Vitals:    02/15/24 0037 02/15/24 0056 02/15/24 0415 02/15/24 0535   BP: (!) 172/80 (!) 162/72 (!) 169/87    Pulse: 96  97    Resp:       Temp:   98.7 °F (37.1 °C)    TempSrc:   Oral    SpO2:   97%    Weight:    65.5 kg (144 lb 6.4 oz)   Height:           General appearance: alert, no acute distress  HEENT: No scleral icterus, EOM grossly intact. Mucous membranes are dry   Neck: trachea midline, no JVD, neck supple  Chest/Lungs: , normal effort with no accessory muscle use on RA  Cardiovascular: RRR  Abdomen: Soft, mild tenderness to lower abdomen, non-distended, no rebound, guarding, or rigidity, scaphoid abdomen  Skin: warm and dry, no rashes  Extremities: no edema, no cyanosis  Neuro: A&Ox3, no focal deficits, sensation intact    LABS:   Recent Labs     02/14/24  1800 02/15/24  0240   WBC 9.0 7.4   HGB 9.1* 9.0*   HCT 27.6* 27.2*   MCV 87.9 87.1   * 124*          Recent Labs     02/14/24  0842 02/15/24  0240    141   K 3.7 3.9    104   CO2 23 25   PHOS 4.1 4.1   BUN 43* 47*   CREATININE 5.0* 5.6*        No results for input(s): \"AST\", \"ALT\", \"ALB\", \"BILIDIR\", \"BILITOT\", \"ALKPHOS\" in the last 72 hours.   No results for input(s): \"LIPASE\",

## 2024-02-15 NOTE — CARE COORDINATION
Fawad Glynn requires a bedside commode due to being confined to one level of the home and there are no toilet facilities on that level, and is physically incapable of utilizing regular toilet facilities. Current body weight: Weight - Scale: 65.5 kg (144 lb 6.4 oz).     Fawad Glynn was evaluated today and a DME order was entered for variable height hospital bed because he requires assistance for positioning needs not possible in an ordinary bed.  Patient needs variability of bed height to perform patient transfers and for toileting, personal cares, and ambulating.  Current body Weight - Scale: 65.5 kg (144 lb 6.4 oz).  The need for this equipment was discussed with the patient and he understands and is in agreement.     Fawad Glynn requires a patient lift for the transfer between bed and a chair, wheelchair, and commode.   Without the use of the lift, the patient would be bed confined.     Fawad Glynn requires a bedside commode due to being confined to a single room, and is physically incapable of utilizing regular toilet facilities. Current body weight: Weight - Scale: 65.5 kg (144 lb 6.4 oz).

## 2024-02-15 NOTE — CARE COORDINATION
CM  confirmed  HD   Schedule  MWF and Chair time  2:15-5:15   [facility]:   CM informed pt , wife and dgtr  Nereyda and she states she ans her   will be able to get  pt  back and forth to  HD  .     PTOT were at  bedside  working with pt  and  CM  noted :   Daughter and wife  at bedside - confirmed the plan is for patient to discharge home to her house.     Nereyda asked  if CM could  provide  EMS transport home tomorrow  .  -    As that is when  DME ( hospital bed will be  delivered:  )  -  Discussed safety concern  for how the patient is going to be able to transfer in and out of HD chair as an outpatient  -  Daughter reports her and her  are going to help transfer at HD  They continue to decline SNF    Electronically signed by Eleanor Ambrosio RN on 2/15/2024 at 2:40 PM   ++++++++++++++++++++++++++++++++++++++++++++++++++++++++++        CM  spoke with  therapy  today  to  get  updated  eval  re:  DME for  D/C home  bound discharge  ;    CM  clarifying  Out  HD  chair time and schedule.     CM reached out to : Mellissa Tracy PA-C   [facility]  Office -[phone]  Via  [employer]  to  confirm  HD OP and she  stated  Shreya at [phone],   [facility]  is  finalizing  chair time and  schedule.      CM  will follow up .     Electronically signed by Eleanor Ambrosio RN on 2/15/2024 at 1:24 PM     ++++++++++++++++++++++++++++++++++++++++++++++++++++++++++    CM following for  d/c planning:    -  Patient  will need  Out pt  HD  arranged prior to  d/c   IF  planning to return home w/ family : and confirm  pt  has  transport  to  get there 3 times a week.     PT OT need to  make rec:  for  Home Equipment  needed  at  discharge.     Will most likely  need  Hospital bed and  other  equipment.     CM  will assist  with arrangements  once  disposition is  confirmed  .      -   Patient  Dgtr Nereyda Armando ( [address] [place] [address] Dr  # [id number] ) states this am they

## 2024-02-15 NOTE — PROGRESS NOTES
MD notified that Pt has water filled type blisters x2 on left upper arm in elbow region. One is very small eraser sized and one dime size. Will continue to monitor.

## 2024-02-15 NOTE — CARE COORDINATION
CTN contacted Yan with [facility] [phone]. [facility] able to accept this patient. Yan will pull referral from [facility] for SOC by 2/17  Electronically signed by Margareth Ha LPN on 2/15/2024 at 1:28 PM

## 2024-02-15 NOTE — PROGRESS NOTES
Occupational Therapy  Daily Treatment Note  Patient Name: Fawad Glynn  MRN: [id number]    Assessment: Pt continues to requires max assist x2 for transfers and attempted stance + assist of 1-2 for mobility related ADLs. He would benefit from SNF at d/c but daughter has elected to care for pt at home. Recommend 24 hr A of two capable caregivers, home health PT/OT/RN + hospital bed, tehe lift, BSC.       Discharge Recommendations: SNF (going home with family)  Equipment Needs:  hospital bed, thee lift, BSC  Ampac:  14    Chart Reviewed: Yes     Other position/activity restrictions: up as tolerated   Additional Pertinent Hx: Pt is a n 88 y.o. male adm 2/8 with GI bleeding.  Pt presented to ED with an episode of bright red blood per rectum earlier today while going to the bathroom.  Head CT: neg.  CT abd: neg.   EGD 2/9 with reflux esophagitis and erosive gastritis.  Colonoscopy 2/9 was notable for extremely poor prep with limited visualization.  He had blood clots of red blood in the distal transverse colon and the entire left colon.  No underlying lesion could be visualized due to the quality of the bowel prep  Repeat colonoscopy next day (2/12) showing fresh blood but no signs of active bleeding. Diverticulosis present.  PMH:paranoid schizophrenic, CDK 5 (on PD), HTN, HLD, Chronic anemia (sec to CKD 5), prostate ca (treated 2001), seizure disorder, and T2DM    Diagnosis: GI bleed  Treatment Diagnosis: Decreased activity tolerance, impaired ADLs and mobility    Subjective: Pt in bed on entry. Flat affect. Pleasant and cooperative. Wife, granddaughter present throughout; two additional family members arrived during session.     Pain: Denies    Objective:    Cognition/Orientation: alert, flat affect, cooperative, follows simple commands, answers questions appropriately, impaired short term memory, requires cues for initiation, sequencing, problem solving    Bed mobility   Rolling: Min assist  Supine to sit: SBA

## 2024-02-15 NOTE — PROGRESS NOTES
Renal Progress Note  Subjective:   Admit Date: 2/8/2024       Assessment/Plan     # THERESE on CKD 5, now progressed to ESRD  - Renal function worse in setting of hypotension/GIB & contrast study (2/9)  - Started on HD this admission. S/p TDC placement on 2/14  - Plan for HD today   - Monitor UOP and renal function   - CTA 2/9 - no further hydro noted   - Renally dose meds for current GFR  - Avoid nephrotoxins   - Labs in AM    # HTN  - BP's previously low and home meds were held  - Resumed Amlodipine and increased dose as BP's are higher  - Reassess after HD today   - Close monitoring of  BP     # Anemia sec to GIB and chronic disease   - Transfuse PRN per primary   - JI with HD  - Close monitoring of CBC      # Acid- base/ Electrolyte imbalance   - Lytes stable  - Continue to monitor BMP    # Rectal bleeding  - GI consulted  - S/p colonoscopy 2/12 - presumed diverticular bleed   - CTA ordered d/t drop in Hgb but IV infiltrated     # Dementia  - On home meds   - Palliative care consulted to discuss goals of care    # DM2  - Management per primary       HPI   Fawad Glynn is a 88 y.o. male with prior history CKD stage 5, anemia of chronic disease, HTN, HLD, T2DM, seizure disorder, and schizophrenia who was admitted to the ICU for ongoing rectal bleeding and altered mental status.      Interval History:     Resting in bed eating breakfast  Daughter at bedside - confirms the plan is for patient to discharge home to her house    Discussed my concern after reading therapy notes for how the patient is going to be able to transfer in and out of HD chair as an outpatient    Daughter reports her and her  are going to help transfer at HD  They continue to decline SNF      DIET ADULT DIET; Regular; 4 carb choices (60 gm/meal); Low Phosphorus (Less than 1000 mg)  Medications:   Scheduled Meds:   amLODIPine  10 mg Oral Daily    mirtazapine  30 mg Oral Nightly    memantine  2.5 mg Oral Daily    atorvastatin  10 mg Oral

## 2024-02-16 VITALS
BODY MASS INDEX: 17.52 KG/M2 | TEMPERATURE: 98.4 F | HEIGHT: 75 IN | DIASTOLIC BLOOD PRESSURE: 73 MMHG | SYSTOLIC BLOOD PRESSURE: 149 MMHG | OXYGEN SATURATION: 100 % | HEART RATE: 83 BPM | RESPIRATION RATE: 16 BRPM | WEIGHT: 140.87 LBS

## 2024-02-16 LAB
ALBUMIN SERPL-MCNC: 2.8 G/DL (ref 3.4–5)
ANION GAP SERPL CALCULATED.3IONS-SCNC: 8 MMOL/L (ref 3–16)
BASOPHILS # BLD: 0 K/UL (ref 0–0.2)
BASOPHILS NFR BLD: 0.5 %
BUN SERPL-MCNC: 27 MG/DL (ref 7–20)
CALCIUM SERPL-MCNC: 7.3 MG/DL (ref 8.3–10.6)
CHLORIDE SERPL-SCNC: 102 MMOL/L (ref 99–110)
CO2 SERPL-SCNC: 27 MMOL/L (ref 21–32)
CREAT SERPL-MCNC: 3.6 MG/DL (ref 0.8–1.3)
DEPRECATED RDW RBC AUTO: 16.8 % (ref 12.4–15.4)
EOSINOPHIL # BLD: 0.1 K/UL (ref 0–0.6)
EOSINOPHIL NFR BLD: 0.8 %
GFR SERPLBLD CREATININE-BSD FMLA CKD-EPI: 16 ML/MIN/{1.73_M2}
GLUCOSE BLD-MCNC: 115 MG/DL (ref 70–99)
GLUCOSE BLD-MCNC: 158 MG/DL (ref 70–99)
GLUCOSE BLD-MCNC: 197 MG/DL (ref 70–99)
GLUCOSE SERPL-MCNC: 108 MG/DL (ref 70–99)
HCT VFR BLD AUTO: 25.2 % (ref 40.5–52.5)
HGB BLD-MCNC: 8.5 G/DL (ref 13.5–17.5)
LYMPHOCYTES # BLD: 0.4 K/UL (ref 1–5.1)
LYMPHOCYTES NFR BLD: 4.6 %
MAGNESIUM SERPL-MCNC: 1.7 MG/DL (ref 1.8–2.4)
MCH RBC QN AUTO: 29 PG (ref 26–34)
MCHC RBC AUTO-ENTMCNC: 33.7 G/DL (ref 31–36)
MCV RBC AUTO: 86 FL (ref 80–100)
MONOCYTES # BLD: 1 K/UL (ref 0–1.3)
MONOCYTES NFR BLD: 12.5 %
NEUTROPHILS # BLD: 6.3 K/UL (ref 1.7–7.7)
NEUTROPHILS NFR BLD: 81.6 %
PERFORMED ON: ABNORMAL
PHOSPHATE SERPL-MCNC: 3.2 MG/DL (ref 2.5–4.9)
PLATELET # BLD AUTO: 135 K/UL (ref 135–450)
PMV BLD AUTO: 9.3 FL (ref 5–10.5)
POTASSIUM SERPL-SCNC: 4.2 MMOL/L (ref 3.5–5.1)
RBC # BLD AUTO: 2.93 M/UL (ref 4.2–5.9)
SODIUM SERPL-SCNC: 137 MMOL/L (ref 136–145)
WBC # BLD AUTO: 7.8 K/UL (ref 4–11)

## 2024-02-16 PROCEDURE — 6370000000 HC RX 637 (ALT 250 FOR IP): Performed by: STUDENT IN AN ORGANIZED HEALTH CARE EDUCATION/TRAINING PROGRAM

## 2024-02-16 PROCEDURE — 83735 ASSAY OF MAGNESIUM: CPT

## 2024-02-16 PROCEDURE — 80069 RENAL FUNCTION PANEL: CPT

## 2024-02-16 PROCEDURE — 85025 COMPLETE CBC W/AUTO DIFF WBC: CPT

## 2024-02-16 PROCEDURE — 2580000003 HC RX 258: Performed by: STUDENT IN AN ORGANIZED HEALTH CARE EDUCATION/TRAINING PROGRAM

## 2024-02-16 PROCEDURE — A4216 STERILE WATER/SALINE, 10 ML: HCPCS | Performed by: STUDENT IN AN ORGANIZED HEALTH CARE EDUCATION/TRAINING PROGRAM

## 2024-02-16 PROCEDURE — 99239 HOSP IP/OBS DSCHRG MGMT >30: CPT | Performed by: HOSPITALIST

## 2024-02-16 PROCEDURE — C9113 INJ PANTOPRAZOLE SODIUM, VIA: HCPCS | Performed by: STUDENT IN AN ORGANIZED HEALTH CARE EDUCATION/TRAINING PROGRAM

## 2024-02-16 PROCEDURE — 99232 SBSQ HOSP IP/OBS MODERATE 35: CPT | Performed by: INTERNAL MEDICINE

## 2024-02-16 PROCEDURE — 6360000002 HC RX W HCPCS: Performed by: STUDENT IN AN ORGANIZED HEALTH CARE EDUCATION/TRAINING PROGRAM

## 2024-02-16 PROCEDURE — 99231 SBSQ HOSP IP/OBS SF/LOW 25: CPT | Performed by: SURGERY

## 2024-02-16 PROCEDURE — 36415 COLL VENOUS BLD VENIPUNCTURE: CPT

## 2024-02-16 PROCEDURE — 6360000002 HC RX W HCPCS

## 2024-02-16 RX ORDER — POLYETHYLENE GLYCOL 3350 17 G/17G
17 POWDER, FOR SOLUTION ORAL 2 TIMES DAILY
Status: DISCONTINUED | OUTPATIENT
Start: 2024-02-16 | End: 2024-02-16 | Stop reason: HOSPADM

## 2024-02-16 RX ORDER — DOCUSATE SODIUM 100 MG/1
100 CAPSULE, LIQUID FILLED ORAL 2 TIMES DAILY
Status: DISCONTINUED | OUTPATIENT
Start: 2024-02-16 | End: 2024-02-16 | Stop reason: HOSPADM

## 2024-02-16 RX ORDER — LABETALOL HYDROCHLORIDE 5 MG/ML
10 INJECTION, SOLUTION INTRAVENOUS EVERY 4 HOURS PRN
Status: DISCONTINUED | OUTPATIENT
Start: 2024-02-16 | End: 2024-02-16 | Stop reason: HOSPADM

## 2024-02-16 RX ORDER — FOLIC ACID 1 MG/1
1 TABLET ORAL DAILY
Status: DISCONTINUED | OUTPATIENT
Start: 2024-02-16 | End: 2024-02-16

## 2024-02-16 RX ORDER — MULTIVITAMIN WITH IRON
1 TABLET ORAL DAILY
Status: DISCONTINUED | OUTPATIENT
Start: 2024-02-16 | End: 2024-02-16

## 2024-02-16 RX ORDER — MAGNESIUM SULFATE IN WATER 40 MG/ML
2000 INJECTION, SOLUTION INTRAVENOUS ONCE
Status: COMPLETED | OUTPATIENT
Start: 2024-02-16 | End: 2024-02-16

## 2024-02-16 RX ORDER — GAUZE BANDAGE 2" X 2"
100 BANDAGE TOPICAL DAILY
Status: DISCONTINUED | OUTPATIENT
Start: 2024-02-16 | End: 2024-02-16

## 2024-02-16 RX ADMIN — SODIUM CHLORIDE, PRESERVATIVE FREE 40 MG: 5 INJECTION INTRAVENOUS at 08:31

## 2024-02-16 RX ADMIN — ATORVASTATIN CALCIUM 10 MG: 10 TABLET, FILM COATED ORAL at 08:31

## 2024-02-16 RX ADMIN — DOCUSATE SODIUM 100 MG: 100 CAPSULE, LIQUID FILLED ORAL at 08:30

## 2024-02-16 RX ADMIN — MAGNESIUM SULFATE HEPTAHYDRATE 2000 MG: 40 INJECTION, SOLUTION INTRAVENOUS at 08:41

## 2024-02-16 RX ADMIN — SODIUM CHLORIDE, PRESERVATIVE FREE 10 ML: 5 INJECTION INTRAVENOUS at 08:32

## 2024-02-16 RX ADMIN — ACETAMINOPHEN 650 MG: 325 TABLET ORAL at 06:36

## 2024-02-16 RX ADMIN — MEMANTINE HYDROCHLORIDE 2.5 MG: 5 TABLET, FILM COATED ORAL at 08:31

## 2024-02-16 RX ADMIN — POLYETHYLENE GLYCOL 3350 17 G: 17 POWDER, FOR SOLUTION ORAL at 08:30

## 2024-02-16 NOTE — PROGRESS NOTES
Internal Medicine Progress Note    Date: 2/16/2024   Patient: Fawad Glynn   [facility] Day: 8      CC: Rectal Bleeding (Patient presents to the ED via EMS from SNF. Per EMS, nursing staff at facility reports patient passed a significant amount of dark, tarry stool this evening. He is not on blood thinners. Patient states that his stomach began hurting earlier this evening. )       Interval Hx   NAEO. VSS. Overall stable with no chest pain or SOB. Hgb stable. Will plan to discharge today to daughter's home per family/pt wishes.     HPI:   \"Mr Glynn is an 89 yo M with a PMHx of diverticulosis, paranoid schizophrenia, CKD stage V, seizures who presented following an episode of a BM with bright red blood. He arriver via EMS from his nursing facility. Pt states he was using the bathroom and had a large amount of blood in the toilet. He states his wife was present during this as well who also endorses a large volume of blood loss. Pt denies any abdominal pain prior to or during the BM. Denies any dizziness, fatigue, SOB. States his appetite is low, but not lower than normal. Denies any recent N/V, fever, chills, weight loss. Says this has never happened before. Denies known history of hemorrhoids.     In the ED, VS were WNL and stable. CBC was remarkable for WBC 11.3, Hgb 8.9. Baseline Hgb 9.0 in the setting of chronic anemia  2/2 CKD. The patient had a brief episode of unresponsiveness while getting CT done, however maintained a pulse so CPR was not started. Returned to baseline after receiving a 500mL bolus. Repeat Hgb was 8.0 so patient received 1u pRBCs.\"      Objective     Vital Signs:  Patient Vitals for the past 8 hrs:   BP Temp Temp src Pulse Resp SpO2 Weight   02/16/24 0815 101/61 -- -- -- -- -- --   02/16/24 0813 (!) 126/57 99.7 °F (37.6 °C) Oral 94 16 97 % --   02/16/24 0515 (!) 170/79 99.3 °F (37.4 °C) Oral (!) 106 16 96 % 63.9 kg (140 lb 14 oz)         Physical Exam  HENT:      Head: Normocephalic.

## 2024-02-16 NOTE — PROGRESS NOTES
Comprehensive Nutrition Assessment    RECOMMENDATIONS:  PO Diet: Regular, CC4, low phos diet   ONS: Add Nepro BID  Nutrition Education: No recommendation at this time     NUTRITION ASSESSMENT:   Nutritional summary & status: Pt assessed for LOS. Patient admitted after incident of vloody stool. Pt with hx of poor po pta, was on PD at home and now HD. Receron started this admission, minimal PO intake recorded. Pt in HD at visit, unable to interview. Noted pt with low BMI, wt has been stable ~5 months. Regular CC4 low phos diet ordered, RD will add ONS and continue to monitor nutritional status.   Admission // PMH: AMS \\ Diverticulosis, CKD5    MALNUTRITION ASSESSMENT  Context of Malnutrition: Chronic Illness   Malnutrition Status: Insufficient data    NUTRITION DIAGNOSIS   Increased nutrient needs related to catabolic illness as evidenced by      Nutrition Monitoring and Evaluation:   Food/Nutrient Intake Outcomes:  Supplement Intake  Physical Signs/Symptoms Outcomes:  GI Status, Nutrition Focused Physical Findings     OBJECTIVE DATA: Significant to nutrition assessment  Nutrition Related Findings: Mg 1.7, , LBM 2/11  Wounds: Surgical Incision  Nutrition Goals: Meet at least 75% of estimated needs, prior to discharge     CURRENT NUTRITION THERAPIES  ADULT DIET; Regular; 4 carb choices (60 gm/meal); Low Phosphorus (Less than 1000 mg)  PO Intake: 26-50%   PO Supplement Intake:None Ordered    COMPARATIVE STANDARDS  Energy (kcal):  7578-1584 (30-35)     Protein (g):  115-127 (1.8-2)       Fluid (ml/day):  2000 ml    ANTHROPOMETRICS  Current Height: 190.5 cm (6' 3\")  Current Weight - Scale: 63.9 kg (140 lb 14 oz)    Admission weight: 67.3 kg (148 lb 4.8 oz)    The patient will be monitored per nutrition standards of care. Consult dietitian if additional nutrition interventions are needed prior to RD reassessment.     Jocelynn Hameed RD  [place]:  [phone]  Office:  [phone]

## 2024-02-16 NOTE — PROGRESS NOTES
General Surgery   Daily Progress Note  Patient: Fawad Glynn      CC: lower GI bleed of unknown origin    SUBJECTIVE:   Hypertensive and occasionally tachycardic. No acute events overnight. Tolerating diet without nausea or vomiting. Has not had a bowel movement. Denies abdominal pain.     ROS:   A 14 point review of systems was conducted, significant findings as noted above. All other systems negative.    OBJECTIVE:  Date 02/15/24 0701 - 02/16/24 0700 02/16/24 0701 - 02/17/24 0700   Shift 7463-1400 2219-6894 24 Hour Total 2512-0958 0694-8619 24 Hour Total   INTAKE   I.V.  1250 1250      Shift Total  1250 1250      OUTPUT   Urine 575 1000 1575      Shift Total 575 1000 1575      NET -575 250 -325           PHYSICAL EXAM:    Vitals:    02/15/24 1838 02/15/24 1930 02/15/24 2315 02/16/24 0515   BP: (!) 165/86 (!) 161/72 (!) 198/87 (!) 170/79   Pulse: 100 82 (!) 105 (!) 106   Resp: 16 16 18 16   Temp: 98 °F (36.7 °C) 97.7 °F (36.5 °C) 99.4 °F (37.4 °C) 99.3 °F (37.4 °C)   TempSrc:  Oral Oral Oral   SpO2:  99% 99% 96%   Weight: 60.3 kg (132 lb 15 oz)   63.9 kg (140 lb 14 oz)   Height:           General appearance: alert, no acute distress  HEENT: No scleral icterus, EOM grossly intact. Mucous membranes are dry   Neck: trachea midline, no JVD, neck supple  Chest/Lungs: , normal effort with no accessory muscle use on RA  Cardiovascular: RRR  Abdomen: Soft, mild tenderness to lower abdomen, non-distended, no rebound, guarding, or rigidity, scaphoid abdomen  Skin: warm and dry, no rashes  Extremities: no edema, no cyanosis, R fem site without hematoma  Neuro: A&Ox3, no focal deficits, sensation intact    LABS:   Recent Labs     02/14/24  1800 02/15/24  0240   WBC 9.0 7.4   HGB 9.1* 9.0*   HCT 27.6* 27.2*   MCV 87.9 87.1   * 124*          Recent Labs     02/14/24  0842 02/15/24  0240    141   K 3.7 3.9    104   CO2 23 25   PHOS 4.1 4.1   BUN 43* 47*   CREATININE 5.0* 5.6*        No results for input(s):

## 2024-02-16 NOTE — PLAN OF CARE
Problem: Discharge Planning  Goal: Discharge to home or other facility with appropriate resources  Outcome: Progressing   Note: Patient to discharge home with daughter with DME.    Problem: Chronic Conditions and Co-morbidities  Goal: Patient's chronic conditions and co-morbidity symptoms are monitored and maintained or improved  Outcome: Progressing   Note: Co Morbidities are monitored and maintained    Problem: Safety - Adult  Goal: Free from fall injury  Outcome: Progressing   Note: Free from falls    Problem: Pain  Goal: Verbalizes/displays adequate comfort level or baseline comfort level  Outcome: Progressing   Note: Pain adequately controlled    Problem: Skin/Tissue Integrity  Goal: Absence of new skin breakdown  Description: 1.  Monitor for areas of redness and/or skin breakdown  2.  Assess vascular access sites hourly  3.  Every 4-6 hours minimum:  Change oxygen saturation probe site  4.  Every 4-6 hours:  If on nasal continuous positive airway pressure, respiratory therapy assess nares and determine need for appliance change or resting period.  Outcome: Progressing   Note: No New skin breakdown    Problem: ABCDS Injury Assessment  Goal: Absence of physical injury  Outcome: Progressing     Problem: Hematologic - Adult  Goal: Maintains hematologic stability  Outcome: Progressing

## 2024-02-16 NOTE — CARE COORDINATION
Case Management Assessment            Discharge Note                    Date / Time of Note: 2/16/2024 3:46 PM                  Discharge Note Completed by: Ulices Garcia RN    Patient Name: Fawad Glynn   YOB: 1935  Diagnosis: GI bleeding [K92.2]  Rectal bleeding [K62.5]  Spell of abnormal behavior [R46.89]   Date / Time: 2/8/2024  1:53 AM    Current PCP: Callie Hansen MD  Clinic patient: No    Hospitalization in the last 30 days: No       Advance Directives:  Code Status: Full Code  [place] DNR form completed and on chart: No, Not Indicated    Financial:  Payor: MEDICARE / Plan: MEDICARE PART A AND B / Product Type: *No Product type* /      Pharmacy:    [facility]/pharmacy #[id number] - [place], [place] - [address] - P [phone] - F [phone]  [facility] [address]  Phone: [phone] Fax: [phone]      Assistance purchasing medications?: Potential Assistance Purchasing Medications: No  Assistance provided by Case Management: None at this time    Does patient want to participate in local refill/ meds to beds program?: Yes    Meds To Beds General Rules:  1. Can ONLY be done Monday- Friday between 8:30am-5pm  2. Prescription(s) must be in pharmacy by 3pm to be filled same day  3.Copy of patient's insurance/ prescription drug card and patient face sheet must be sent along with the prescription(s)  4. Cost of Rx cannot be added to hospital bill. If financial assistance is needed, please contact unit  or ;  or  CANNOT provide pharmacy voucher for patients co-pays  5. Patients can then  the prescription on their way out of the hospital at discharge, or pharmacy can deliver to the bedside if staff is available. (payment due at time of pick-up or delivery - cash, check, or card accepted)     Able to afford home medications/ co-pay costs: Yes    ADLS:  Current PT AM-PAC Score: 10 /24  Current OT AM-PAC Score: 14

## 2024-02-16 NOTE — PROGRESS NOTES
Renal Progress Note  Subjective:   Admit Date: 2/8/2024       Assessment/Plan     # THERESE on CKD 5, now progressed to ESRD  - Renal function worse in setting of hypotension/GIB & contrast study (2/9)  - Started on HD this admission. S/p TDC placement on 2/14  - Plan for HD today   - Monitor UOP and renal function   - CTA 2/9 - no further hydro noted   - Renally dose meds for current GFR  - Avoid nephrotoxins   - Labs in AM    # HTN  - BP's previously low and home meds were held  - Resumed Amlodipine and increased dose as BP's are higher  - Reassess after HD today   - Close monitoring of  BP     # Anemia sec to GIB and chronic disease   - Transfuse PRN per primary   - JI with HD  - Close monitoring of CBC      # Acid- base/ Electrolyte imbalance   - Lytes stable  - Continue to monitor BMP    # Rectal bleeding  - GI consulted  - S/p colonoscopy 2/12 - presumed diverticular bleed   - CTA ordered d/t drop in Hgb but IV infiltrated     # Dementia  - On home meds   - Palliative care consulted to discuss goals of care    # DM2  - Management per primary       HPI   Fawad Glynn is a 88 y.o. male with prior history CKD stage 5, anemia of chronic disease, HTN, HLD, T2DM, seizure disorder, and schizophrenia who was admitted to the ICU for ongoing rectal bleeding and altered mental status.      Interval History:     Resting in bed eating breakfast  Daughter at bedside   S/p HD yesterday       DIET ADULT DIET; Regular; 4 carb choices (60 gm/meal); Low Phosphorus (Less than 1000 mg)  Medications:   Scheduled Meds:   docusate sodium  100 mg Oral BID    polyethylene glycol  17 g Oral BID    amLODIPine  10 mg Oral Daily    mirtazapine  30 mg Oral Nightly    memantine  2.5 mg Oral Daily    atorvastatin  10 mg Oral Daily    melatonin  5 mg Oral Nightly    sodium chloride flush  5-40 mL IntraVENous 2 times per day    insulin lispro  0-4 Units SubCUTAneous TID WC    insulin lispro  0-4 Units SubCUTAneous Nightly    [Held by  provider] therapeutic multivitamin-minerals  1 tablet Oral Daily    tamsulosin  0.4 mg Oral Daily    pantoprazole (PROTONIX) 40 mg in sodium chloride (PF) 0.9 % 10 mL injection  40 mg IntraVENous Daily    latanoprost  1 drop Both Eyes Nightly    levETIRAcetam  500 mg IntraVENous Q12H     Continuous Infusions:   sodium chloride      sodium chloride Stopped (02/14/24 1346)    dextrose         Labs:  CBC:   Recent Labs     02/14/24  1800 02/15/24  0240 02/16/24  0619   WBC 9.0 7.4 7.8   HGB 9.1* 9.0* 8.5*   * 124* 135       BMP:    Recent Labs     02/14/24  0842 02/15/24  0240 02/16/24  0619    141 137   K 3.7 3.9 4.2    104 102   CO2 23 25 27   BUN 43* 47* 27*   CREATININE 5.0* 5.6* 3.6*   GLUCOSE 89 151* 108*       Ca/Mg/Phos:   Recent Labs     02/14/24  0842 02/15/24  0240 02/16/24  0619   CALCIUM 7.6* 7.5* 7.3*   MG 1.70* 1.90 1.70*   PHOS 4.1 4.1 3.2       Hepatic:   No results for input(s): \"AST\", \"ALT\", \"ALB\", \"BILITOT\", \"ALKPHOS\" in the last 72 hours.    Troponin: No results for input(s): \"TROPONINI\" in the last 72 hours.  BNP: No results for input(s): \"BNP\" in the last 72 hours.  Lipids: No results for input(s): \"CHOL\", \"TRIG\", \"HDL\", \"LDLCALC\" in the last 72 hours.    Invalid input(s): \"LABVLDL\"  ABGs:   No results for input(s): \"PHART\", \"PO2ART\", \"BXC3YAD\" in the last 72 hours.    INR:   Recent Labs     02/13/24  1500   INR 1.12       UA:No results for input(s): \"COLORU\", \"CLARITYU\", \"GLUCOSEU\", \"BILIRUBINUR\", \"KETUA\", \"SPECGRAV\", \"BLOODU\", \"PHUR\", \"PROTEINU\", \"UROBILINOGEN\", \"NITRU\", \"LEUKOCYTESUR\", \"URINETYPE\" in the last 72 hours.    Invalid input(s): \"LABMICR\"   Urine Microscopic: No results for input(s): \"LABCAST\", \"BACTERIA\", \"COMU\", \"HYALCAST\", \"WBCUA\", \"RBCUA\", \"EPIU\" in the last 72 hours.  Urine Culture: No results for input(s): \"LABURIN\" in the last 72 hours.  Urine Chemistry: No results for input(s): \"CLUR\", \"LABCREA\", \"PROTEINUR\", \"NAUR\" in the last 72 hours.    Objective:

## 2024-02-16 NOTE — PROGRESS NOTES
Treatment time: 3 hours  Net UF: 300 ml     Pre weight: 60.6 kg  Post weight:60.3 kg    Access used: CVC    Access function: good with  ml/min     Medications or blood products given: na     Regular outpatient schedule: TBD     Summary of response to treatment: Patient tolerated treatment well and without any complications. Patient remained stable throughout entire treatment. Copy of dialysis treatment record placed in chart, to be scanned into EMR.

## 2024-02-17 LAB
BLOOD BANK DISPENSE STATUS: NORMAL
BLOOD BANK DISPENSE STATUS: NORMAL
BLOOD BANK PRODUCT CODE: NORMAL
BLOOD BANK PRODUCT CODE: NORMAL
BPU ID: NORMAL
BPU ID: NORMAL
DESCRIPTION BLOOD BANK: NORMAL
DESCRIPTION BLOOD BANK: NORMAL

## 2024-02-17 NOTE — PROGRESS NOTES
Pt left with daughter zeina via transport team to [facility]. All belongings and pt a/oX4 vitals stable

## 2024-02-19 ENCOUNTER — HOSPITAL ENCOUNTER (EMERGENCY)
Age: 89
Discharge: HOME | End: 2024-02-19
Attending: EMERGENCY MEDICINE
Payer: MEDICARE

## 2024-02-19 ENCOUNTER — APPOINTMENT (OUTPATIENT)
Dept: GENERAL RADIOLOGY | Age: 89
End: 2024-02-19
Payer: MEDICARE

## 2024-02-19 VITALS
DIASTOLIC BLOOD PRESSURE: 66 MMHG | TEMPERATURE: 98 F | RESPIRATION RATE: 16 BRPM | OXYGEN SATURATION: 100 % | HEART RATE: 79 BPM | BODY MASS INDEX: 18.14 KG/M2 | WEIGHT: 145.9 LBS | HEIGHT: 75 IN | SYSTOLIC BLOOD PRESSURE: 141 MMHG

## 2024-02-19 DIAGNOSIS — N18.6 ESRD (END STAGE RENAL DISEASE) (HCC): Primary | ICD-10-CM

## 2024-02-19 LAB
ALBUMIN SERPL-MCNC: 2.6 G/DL (ref 3.4–5)
ANION GAP SERPL CALCULATED.3IONS-SCNC: 8 MMOL/L (ref 3–16)
BASOPHILS # BLD: 0 K/UL (ref 0–0.2)
BASOPHILS NFR BLD: 0.5 %
BUN SERPL-MCNC: 53 MG/DL (ref 7–20)
CALCIUM SERPL-MCNC: 7.4 MG/DL (ref 8.3–10.6)
CHLORIDE SERPL-SCNC: 102 MMOL/L (ref 99–110)
CO2 SERPL-SCNC: 24 MMOL/L (ref 21–32)
CREAT SERPL-MCNC: 5.5 MG/DL (ref 0.8–1.3)
DEPRECATED RDW RBC AUTO: 15.8 % (ref 12.4–15.4)
EKG ATRIAL RATE: 80 BPM
EKG DIAGNOSIS: NORMAL
EKG P AXIS: 34 DEGREES
EKG P-R INTERVAL: 196 MS
EKG Q-T INTERVAL: 388 MS
EKG QRS DURATION: 84 MS
EKG QTC CALCULATION (BAZETT): 447 MS
EKG R AXIS: 49 DEGREES
EKG T AXIS: 77 DEGREES
EKG VENTRICULAR RATE: 80 BPM
EOSINOPHIL # BLD: 0.2 K/UL (ref 0–0.6)
EOSINOPHIL NFR BLD: 3.4 %
GFR SERPLBLD CREATININE-BSD FMLA CKD-EPI: 9 ML/MIN/{1.73_M2}
GLUCOSE SERPL-MCNC: 155 MG/DL (ref 70–99)
HCT VFR BLD AUTO: 24.6 % (ref 40.5–52.5)
HGB BLD-MCNC: 8.2 G/DL (ref 13.5–17.5)
LYMPHOCYTES # BLD: 0.7 K/UL (ref 1–5.1)
LYMPHOCYTES NFR BLD: 9.7 %
MCH RBC QN AUTO: 29.1 PG (ref 26–34)
MCHC RBC AUTO-ENTMCNC: 33.2 G/DL (ref 31–36)
MCV RBC AUTO: 87.6 FL (ref 80–100)
MONOCYTES # BLD: 0.7 K/UL (ref 0–1.3)
MONOCYTES NFR BLD: 10.5 %
NEUTROPHILS # BLD: 5.2 K/UL (ref 1.7–7.7)
NEUTROPHILS NFR BLD: 75.9 %
PHOSPHATE SERPL-MCNC: 3.3 MG/DL (ref 2.5–4.9)
PLATELET # BLD AUTO: 200 K/UL (ref 135–450)
PMV BLD AUTO: 9.1 FL (ref 5–10.5)
POTASSIUM SERPL-SCNC: 4.4 MMOL/L (ref 3.5–5.1)
RBC # BLD AUTO: 2.81 M/UL (ref 4.2–5.9)
SODIUM SERPL-SCNC: 134 MMOL/L (ref 136–145)
WBC # BLD AUTO: 6.8 K/UL (ref 4–11)

## 2024-02-19 PROCEDURE — 80069 RENAL FUNCTION PANEL: CPT

## 2024-02-19 PROCEDURE — 99285 EMERGENCY DEPT VISIT HI MDM: CPT

## 2024-02-19 PROCEDURE — 93005 ELECTROCARDIOGRAM TRACING: CPT | Performed by: EMERGENCY MEDICINE

## 2024-02-19 PROCEDURE — 36415 COLL VENOUS BLD VENIPUNCTURE: CPT

## 2024-02-19 PROCEDURE — 71046 X-RAY EXAM CHEST 2 VIEWS: CPT

## 2024-02-19 PROCEDURE — 85025 COMPLETE CBC W/AUTO DIFF WBC: CPT

## 2024-02-19 ASSESSMENT — PAIN - FUNCTIONAL ASSESSMENT: PAIN_FUNCTIONAL_ASSESSMENT: NONE - DENIES PAIN

## 2024-02-19 NOTE — ED PROVIDER NOTES
tablet Take 1 tablet by mouth dailyHistorical Med      Multiple Vitamin (DAILY-TJ MULTIVITAMIN) TABS TAKE 1 TABLET BY MOUTH EVERY DAY, Disp-90 tablet, R-1Normal      levETIRAcetam (KEPPRA) 500 MG tablet TAKE 1 TABLET BY MOUTH TWICE A DAY, Disp-180 tablet, R-3Normal      famotidine (PEPCID) 40 MG tablet TAKE 1 TABLET BY MOUTH EVERY DAY IN THE EVENING, Disp-90 tablet, R-1Normal      atorvastatin (LIPITOR) 10 MG tablet TAKE 1 TABLET BY MOUTH EVERY DAY, Disp-90 tablet, R-1Normal      tamsulosin (FLOMAX) 0.4 MG capsule TAKE 1 CAPSULE BY MOUTH EVERY DAY, Disp-90 capsule, R-1DX Code Needed  .Normal      mirtazapine (REMERON) 30 MG tablet TAKE 1 TABLET BY MOUTH NIGHTLY, Disp-90 tablet, R-1Normal      memantine (NAMENDA) 5 MG tablet TAKE 1/2 TABLET BY MOUTH EVERY DAY, Disp-45 tablet, R-1Normal             Allergies     He is allergic to lorazepam.    Physical Exam     INITIAL VITALS: BP: (!) 141/66, Temp: 98 °F (36.7 °C), Pulse: 79, Respirations: 16, SpO2: 100 %   Physical Exam  Constitutional:       Appearance: Normal appearance.   HENT:      Head: Normocephalic and atraumatic.      Mouth/Throat:      Mouth: Mucous membranes are moist.   Eyes:      Extraocular Movements: Extraocular movements intact.      Conjunctiva/sclera: Conjunctivae normal.   Cardiovascular:      Rate and Rhythm: Normal rate and regular rhythm.   Pulmonary:      Effort: Pulmonary effort is normal. No respiratory distress.      Breath sounds: No wheezing.   Abdominal:      General: Abdomen is flat.      Palpations: Abdomen is soft.      Tenderness: There is no abdominal tenderness.   Musculoskeletal:         General: No swelling or tenderness.      Cervical back: Normal range of motion.   Skin:     General: Skin is warm.   Neurological:      General: No focal deficit present.      Mental Status: He is alert. Mental status is at baseline.                    Padilla Yanez MD  02/19/24 8987       Padilla Yanez MD  02/20/24 9745

## 2024-02-19 NOTE — DISCHARGE INSTRUCTIONS
Return to see department for worsening symptoms or other concerns.  Your labs today look good; Dr Rodriguez says you can actually follow-up for dialysis on Wednesday.  Come back for any worsening symptoms or other problems.

## 2024-02-20 ENCOUNTER — CLINICAL DOCUMENTATION ONLY (OUTPATIENT)
Facility: CLINIC | Age: 89
End: 2024-02-20

## 2024-02-20 NOTE — CARE COORDINATION
2/20/23   9:33 AM    Pt's daughter Nereyda called in asking for the AVS as pt was not sent with one. SW informed her she can come  a copy or they can send an activation code for pt's mychart and she can access it that way. Nereyda asked for the my chart code to be sent to her phone. SW verbally provided code as well. Nereyda stated [facility] hasn't started/contacted them yet, SW provided contact information for [facility] home care.   Nereyda inquired about follow up appointments, SW provided names of the MDs and informed her it is also listed on the AVS. Nereyda denies any additional needs at this time.     Electronically signed by YANNICK Whatley, ENOCH on 2/20/2024 at 9:33 AM  [phone]

## 2024-02-20 NOTE — CARE COORDINATION
[facility]    Patient aware and agreeable to services. Faxed orders to [facility] for SOC by 2/21  [facility] was unable to accept this patient due to [address] temporary address    Margareth Ha LPN  Care Transition Nurse  [facility]  [phone]    [address]  [address]  Call [facility]  [phone]

## 2024-02-22 ENCOUNTER — TELEMEDICINE (OUTPATIENT)
Dept: INTERNAL MEDICINE CLINIC | Age: 89
End: 2024-02-22

## 2024-02-22 DIAGNOSIS — N18.6 ESRD (END STAGE RENAL DISEASE) ON DIALYSIS (HCC): ICD-10-CM

## 2024-02-22 DIAGNOSIS — Z09 HOSPITAL DISCHARGE FOLLOW-UP: Primary | ICD-10-CM

## 2024-02-22 DIAGNOSIS — Z99.2 ESRD (END STAGE RENAL DISEASE) ON DIALYSIS (HCC): ICD-10-CM

## 2024-02-22 DIAGNOSIS — I10 HYPERTENSION, UNSPECIFIED TYPE: ICD-10-CM

## 2024-02-22 DIAGNOSIS — K92.2 GASTROINTESTINAL HEMORRHAGE, UNSPECIFIED GASTROINTESTINAL HEMORRHAGE TYPE: ICD-10-CM

## 2024-02-22 DIAGNOSIS — R35.0 URINARY FREQUENCY: ICD-10-CM

## 2024-02-22 DIAGNOSIS — R56.9 SEIZURE (HCC): ICD-10-CM

## 2024-02-22 DIAGNOSIS — K59.00 CONSTIPATION, UNSPECIFIED CONSTIPATION TYPE: Chronic | ICD-10-CM

## 2024-02-22 RX ORDER — LEVETIRACETAM 500 MG/1
500 TABLET ORAL 2 TIMES DAILY
Qty: 180 TABLET | Refills: 3 | Status: ON HOLD | OUTPATIENT
Start: 2024-02-22

## 2024-02-22 RX ORDER — ATORVASTATIN CALCIUM 10 MG/1
10 TABLET, FILM COATED ORAL DAILY
Qty: 90 TABLET | Refills: 1 | Status: ON HOLD | OUTPATIENT
Start: 2024-02-22

## 2024-02-22 RX ORDER — TAMSULOSIN HYDROCHLORIDE 0.4 MG/1
0.4 CAPSULE ORAL DAILY
Qty: 90 CAPSULE | Refills: 0 | Status: ON HOLD | OUTPATIENT
Start: 2024-02-22

## 2024-02-22 RX ORDER — LATANOPROST 50 UG/ML
1 SOLUTION/ DROPS OPHTHALMIC NIGHTLY
Qty: 2.5 ML | Refills: 1 | Status: ON HOLD | OUTPATIENT
Start: 2024-02-22

## 2024-02-22 RX ORDER — ASPIRIN 81 MG/1
81 TABLET ORAL DAILY
Qty: 90 TABLET | Refills: 1 | Status: ON HOLD | OUTPATIENT
Start: 2024-02-22

## 2024-02-22 RX ORDER — AMLODIPINE BESYLATE 10 MG/1
10 TABLET ORAL DAILY
Qty: 90 TABLET | Refills: 1 | Status: ON HOLD | OUTPATIENT
Start: 2024-02-22

## 2024-02-22 RX ORDER — MIRTAZAPINE 30 MG/1
30 TABLET, FILM COATED ORAL NIGHTLY
Qty: 90 TABLET | Refills: 1 | Status: ON HOLD | OUTPATIENT
Start: 2024-02-22

## 2024-02-22 RX ORDER — MEMANTINE HYDROCHLORIDE 5 MG/1
2.5 TABLET ORAL DAILY
Qty: 45 TABLET | Refills: 1 | Status: ON HOLD | OUTPATIENT
Start: 2024-02-22

## 2024-02-22 RX ORDER — FAMOTIDINE 10 MG
10 TABLET ORAL DAILY
Qty: 90 TABLET | Refills: 1 | Status: ON HOLD | OUTPATIENT
Start: 2024-02-22

## 2024-02-22 ASSESSMENT — ENCOUNTER SYMPTOMS
NAUSEA: 0
DIARRHEA: 0
ABDOMINAL PAIN: 0
SORE THROAT: 0
SHORTNESS OF BREATH: 0
CONSTIPATION: 0
VOMITING: 0

## 2024-02-22 NOTE — ASSESSMENT & PLAN NOTE
Patient was put on hemodialysis during his hospital stay.  - Instructed patient to follow-up with Dr. Hesham Lopez as soon as possible  - Continue regular hemodialysis sessions as recommended by nephrology  - Continue amlodipine at increased dose per nephrology recommendations

## 2024-02-22 NOTE — ASSESSMENT & PLAN NOTE
Patient was hospitalized primarily for this problem.  GI tried colonoscopy twice, neither of which were very helpful per their report due to poor prep.  - Patient referred to gastroenterology for follow-up  - Instructed patient to keep a close watch for any more rectal bleeding   n/a

## 2024-02-22 NOTE — ASSESSMENT & PLAN NOTE
Patient was discharged from hospital to daughter's house.  Prior to this patient was living with spouse at their home.  - Continue PT OT outpatient to try to reach baseline strength from prior to hospitalization  - Patient is wheelchair-bound at baseline

## 2024-02-22 NOTE — ASSESSMENT & PLAN NOTE
During recent hospital stay amlodipine dose was increased from 5 mg to 10 mg daily  - Continue at increased amlodipine dose of 10 mg daily

## 2024-02-22 NOTE — ASSESSMENT & PLAN NOTE
Patient reports being constipated since hospital visit.  Daughter has tried giving him Metamucil and MiraLAX.  They purchased a Fleet enema to try next.  - Instructed patient not to take Fleet enema given he has ESRD  - Recommended trying tapwater enema and continuing Metamucil and MiraLAX  - Continue Metamucil and MiraLAX

## 2024-02-22 NOTE — PROGRESS NOTES
2024    TELEHEALTH EVALUATION -- Audio/Visual    HPI:    Fawad Glynn (:  1935) has requested an audio/video evaluation for the following concern(s):    Patient was recently admitted from 2024 to  for GI bleed. He received about 7 units of blood products per report.  His baseline GFR was 11, worsened to 9 on admission.  He was on PD but was transitioned to HD at that time.  Tunneled catheter inserted 2024. Home amlodipine increased from 5 to 10 mg daily attempts at colonoscopy x2 reportedly were of poor success due to incomplete prep, working diagnosis diverticular bleed but the patient stabilized prior to discharge.  Patient was recommended to discharge to SNF based on PT OT scores but family and patient declined.  He was discharged with home health care and advised to follow-up with gastroenterology and nephrology outpatient.    He was living with his wife prior to hospitalization, and he is now living with his daughter at her home. Patient is wheelchair bound at baseline but is weaker than baseline. Has PT coming to house. He is on HD, has been going regularly since hospitalization.     He was evaluated in ED 2024 for reported hypotension at his first dialysis treatment (80s/60s) after about 20 minutes of treatment. He was asymptomatic in the ED with no further episodes of hypotension.  He was evaluated for blood loss and volume overload. Emergency room provider spoke with patient's nephrologist who recommended he get back in for dialysis on Wednesday, back on his regular schedule.        Review of Systems   Constitutional:  Negative for fatigue.   HENT:  Negative for congestion and sore throat.    Respiratory:  Negative for shortness of breath.    Cardiovascular:  Negative for chest pain.   Gastrointestinal:  Negative for abdominal pain, constipation, diarrhea, nausea and vomiting.   Genitourinary:  Negative for difficulty urinating.   Neurological:  Positive for

## 2024-02-23 ENCOUNTER — HOSPITAL ENCOUNTER (INPATIENT)
Age: 89
LOS: 5 days | Discharge: NURSING, REHAB OR LONG-TERM CARE | DRG: 884 | End: 2024-02-29
Attending: EMERGENCY MEDICINE | Admitting: HOSPITALIST
Payer: MEDICARE

## 2024-02-23 ENCOUNTER — APPOINTMENT (OUTPATIENT)
Dept: GENERAL RADIOLOGY | Age: 89
DRG: 884 | End: 2024-02-23
Payer: MEDICARE

## 2024-02-23 DIAGNOSIS — R53.1 GENERAL WEAKNESS: Primary | ICD-10-CM

## 2024-02-23 PROCEDURE — 71045 X-RAY EXAM CHEST 1 VIEW: CPT

## 2024-02-23 PROCEDURE — 99285 EMERGENCY DEPT VISIT HI MDM: CPT

## 2024-02-23 ASSESSMENT — LIFESTYLE VARIABLES: HOW OFTEN DO YOU HAVE A DRINK CONTAINING ALCOHOL: NEVER

## 2024-02-23 ASSESSMENT — PAIN SCALES - GENERAL: PAINLEVEL_OUTOF10: 0

## 2024-02-23 ASSESSMENT — PAIN - FUNCTIONAL ASSESSMENT: PAIN_FUNCTIONAL_ASSESSMENT: NONE - DENIES PAIN

## 2024-02-24 PROBLEM — R53.1 GENERAL WEAKNESS: Status: ACTIVE | Noted: 2024-02-24

## 2024-02-24 PROBLEM — R54 AGE-RELATED PHYSICAL DEBILITY: Status: ACTIVE | Noted: 2024-02-24

## 2024-02-24 PROBLEM — R53.81 DEBILITY: Status: ACTIVE | Noted: 2024-02-24

## 2024-02-24 LAB
ANION GAP SERPL CALCULATED.3IONS-SCNC: 11 MMOL/L (ref 3–16)
BASOPHILS # BLD: 0.1 K/UL (ref 0–0.2)
BASOPHILS NFR BLD: 0.6 %
BUN SERPL-MCNC: 16 MG/DL (ref 7–20)
CALCIUM SERPL-MCNC: 7.9 MG/DL (ref 8.3–10.6)
CHLORIDE SERPL-SCNC: 100 MMOL/L (ref 99–110)
CO2 SERPL-SCNC: 27 MMOL/L (ref 21–32)
CREAT SERPL-MCNC: 2.9 MG/DL (ref 0.8–1.3)
DEPRECATED RDW RBC AUTO: 15.9 % (ref 12.4–15.4)
EOSINOPHIL # BLD: 0.1 K/UL (ref 0–0.6)
EOSINOPHIL NFR BLD: 1.5 %
GFR SERPLBLD CREATININE-BSD FMLA CKD-EPI: 20 ML/MIN/{1.73_M2}
GLUCOSE BLD-MCNC: 158 MG/DL (ref 70–99)
GLUCOSE BLD-MCNC: 168 MG/DL (ref 70–99)
GLUCOSE SERPL-MCNC: 111 MG/DL (ref 70–99)
HCT VFR BLD AUTO: 25.4 % (ref 40.5–52.5)
HGB BLD-MCNC: 8.5 G/DL (ref 13.5–17.5)
LYMPHOCYTES # BLD: 0.7 K/UL (ref 1–5.1)
LYMPHOCYTES NFR BLD: 8.2 %
MCH RBC QN AUTO: 29 PG (ref 26–34)
MCHC RBC AUTO-ENTMCNC: 33.6 G/DL (ref 31–36)
MCV RBC AUTO: 86.3 FL (ref 80–100)
MONOCYTES # BLD: 1.1 K/UL (ref 0–1.3)
MONOCYTES NFR BLD: 12.6 %
NEUTROPHILS # BLD: 6.6 K/UL (ref 1.7–7.7)
NEUTROPHILS NFR BLD: 77.1 %
PERFORMED ON: ABNORMAL
PERFORMED ON: ABNORMAL
PHOSPHATE SERPL-MCNC: 2.1 MG/DL (ref 2.5–4.9)
PLATELET # BLD AUTO: 206 K/UL (ref 135–450)
PMV BLD AUTO: 8.7 FL (ref 5–10.5)
POTASSIUM SERPL-SCNC: 4 MMOL/L (ref 3.5–5.1)
RBC # BLD AUTO: 2.94 M/UL (ref 4.2–5.9)
SODIUM SERPL-SCNC: 138 MMOL/L (ref 136–145)
WBC # BLD AUTO: 8.6 K/UL (ref 4–11)

## 2024-02-24 PROCEDURE — 99222 1ST HOSP IP/OBS MODERATE 55: CPT | Performed by: HOSPITALIST

## 2024-02-24 PROCEDURE — 6370000000 HC RX 637 (ALT 250 FOR IP)

## 2024-02-24 PROCEDURE — 97167 OT EVAL HIGH COMPLEX 60 MIN: CPT

## 2024-02-24 PROCEDURE — 97530 THERAPEUTIC ACTIVITIES: CPT

## 2024-02-24 PROCEDURE — 36556 INSERT NON-TUNNEL CV CATH: CPT

## 2024-02-24 PROCEDURE — 97535 SELF CARE MNGMENT TRAINING: CPT

## 2024-02-24 PROCEDURE — 97162 PT EVAL MOD COMPLEX 30 MIN: CPT

## 2024-02-24 PROCEDURE — 85025 COMPLETE CBC W/AUTO DIFF WBC: CPT

## 2024-02-24 PROCEDURE — 80048 BASIC METABOLIC PNL TOTAL CA: CPT

## 2024-02-24 PROCEDURE — 6360000002 HC RX W HCPCS

## 2024-02-24 PROCEDURE — 2580000003 HC RX 258

## 2024-02-24 PROCEDURE — 84100 ASSAY OF PHOSPHORUS: CPT

## 2024-02-24 PROCEDURE — 1200000000 HC SEMI PRIVATE

## 2024-02-24 RX ORDER — FAMOTIDINE 20 MG/1
10 TABLET, FILM COATED ORAL DAILY
Status: DISCONTINUED | OUTPATIENT
Start: 2024-02-24 | End: 2024-02-29 | Stop reason: HOSPADM

## 2024-02-24 RX ORDER — TAMSULOSIN HYDROCHLORIDE 0.4 MG/1
0.4 CAPSULE ORAL DAILY
Status: DISCONTINUED | OUTPATIENT
Start: 2024-02-25 | End: 2024-02-29 | Stop reason: HOSPADM

## 2024-02-24 RX ORDER — NEPHROCAP 1 MG
1 CAP ORAL DAILY
Status: DISCONTINUED | OUTPATIENT
Start: 2024-02-24 | End: 2024-02-29 | Stop reason: HOSPADM

## 2024-02-24 RX ORDER — LEVETIRACETAM 500 MG/1
500 TABLET ORAL 2 TIMES DAILY
Status: DISCONTINUED | OUTPATIENT
Start: 2024-02-24 | End: 2024-02-29 | Stop reason: HOSPADM

## 2024-02-24 RX ORDER — ASPIRIN 81 MG/1
81 TABLET ORAL DAILY
Status: DISCONTINUED | OUTPATIENT
Start: 2024-02-24 | End: 2024-02-29 | Stop reason: HOSPADM

## 2024-02-24 RX ORDER — GLUCAGON 1 MG/ML
1 KIT INJECTION PRN
Status: DISCONTINUED | OUTPATIENT
Start: 2024-02-24 | End: 2024-02-29 | Stop reason: HOSPADM

## 2024-02-24 RX ORDER — INSULIN LISPRO 100 [IU]/ML
0-4 INJECTION, SOLUTION INTRAVENOUS; SUBCUTANEOUS
Status: DISCONTINUED | OUTPATIENT
Start: 2024-02-24 | End: 2024-02-29 | Stop reason: HOSPADM

## 2024-02-24 RX ORDER — MIRTAZAPINE 30 MG/1
30 TABLET, FILM COATED ORAL NIGHTLY
Status: DISCONTINUED | OUTPATIENT
Start: 2024-02-24 | End: 2024-02-29 | Stop reason: HOSPADM

## 2024-02-24 RX ORDER — SODIUM CHLORIDE 0.9 % (FLUSH) 0.9 %
5-40 SYRINGE (ML) INJECTION PRN
Status: DISCONTINUED | OUTPATIENT
Start: 2024-02-24 | End: 2024-02-29 | Stop reason: HOSPADM

## 2024-02-24 RX ORDER — MEMANTINE HYDROCHLORIDE 5 MG/1
2.5 TABLET ORAL DAILY
Status: DISCONTINUED | OUTPATIENT
Start: 2024-02-24 | End: 2024-02-29 | Stop reason: HOSPADM

## 2024-02-24 RX ORDER — SODIUM CHLORIDE 0.9 % (FLUSH) 0.9 %
5-40 SYRINGE (ML) INJECTION EVERY 12 HOURS SCHEDULED
Status: DISCONTINUED | OUTPATIENT
Start: 2024-02-24 | End: 2024-02-29 | Stop reason: HOSPADM

## 2024-02-24 RX ORDER — SODIUM CHLORIDE 9 MG/ML
INJECTION, SOLUTION INTRAVENOUS PRN
Status: DISCONTINUED | OUTPATIENT
Start: 2024-02-24 | End: 2024-02-29 | Stop reason: HOSPADM

## 2024-02-24 RX ORDER — LEVETIRACETAM 250 MG/1
500 TABLET ORAL 2 TIMES DAILY
Status: DISCONTINUED | OUTPATIENT
Start: 2024-02-24 | End: 2024-02-24

## 2024-02-24 RX ORDER — AMLODIPINE BESYLATE 10 MG/1
10 TABLET ORAL DAILY
Status: DISCONTINUED | OUTPATIENT
Start: 2024-02-25 | End: 2024-02-29 | Stop reason: HOSPADM

## 2024-02-24 RX ORDER — INSULIN LISPRO 100 [IU]/ML
0-4 INJECTION, SOLUTION INTRAVENOUS; SUBCUTANEOUS NIGHTLY
Status: DISCONTINUED | OUTPATIENT
Start: 2024-02-24 | End: 2024-02-29 | Stop reason: HOSPADM

## 2024-02-24 RX ORDER — AMLODIPINE BESYLATE 10 MG/1
10 TABLET ORAL DAILY
Status: DISCONTINUED | OUTPATIENT
Start: 2024-02-24 | End: 2024-02-24

## 2024-02-24 RX ORDER — ACETAMINOPHEN 325 MG/1
650 TABLET ORAL EVERY 6 HOURS PRN
Status: DISCONTINUED | OUTPATIENT
Start: 2024-02-24 | End: 2024-02-29 | Stop reason: HOSPADM

## 2024-02-24 RX ORDER — POLYETHYLENE GLYCOL 3350 17 G/17G
17 POWDER, FOR SOLUTION ORAL DAILY PRN
Status: DISCONTINUED | OUTPATIENT
Start: 2024-02-24 | End: 2024-02-29 | Stop reason: HOSPADM

## 2024-02-24 RX ORDER — HEPARIN SODIUM 5000 [USP'U]/ML
5000 INJECTION, SOLUTION INTRAVENOUS; SUBCUTANEOUS EVERY 8 HOURS SCHEDULED
Status: DISCONTINUED | OUTPATIENT
Start: 2024-02-24 | End: 2024-02-29 | Stop reason: HOSPADM

## 2024-02-24 RX ORDER — TAMSULOSIN HYDROCHLORIDE 0.4 MG/1
0.4 CAPSULE ORAL DAILY
Status: DISCONTINUED | OUTPATIENT
Start: 2024-02-24 | End: 2024-02-24

## 2024-02-24 RX ORDER — ONDANSETRON 4 MG/1
4 TABLET, ORALLY DISINTEGRATING ORAL EVERY 8 HOURS PRN
Status: DISCONTINUED | OUTPATIENT
Start: 2024-02-24 | End: 2024-02-29 | Stop reason: HOSPADM

## 2024-02-24 RX ORDER — ONDANSETRON 2 MG/ML
4 INJECTION INTRAMUSCULAR; INTRAVENOUS EVERY 6 HOURS PRN
Status: DISCONTINUED | OUTPATIENT
Start: 2024-02-24 | End: 2024-02-29 | Stop reason: HOSPADM

## 2024-02-24 RX ORDER — ATORVASTATIN CALCIUM 20 MG/1
10 TABLET, FILM COATED ORAL DAILY
Status: DISCONTINUED | OUTPATIENT
Start: 2024-02-24 | End: 2024-02-29 | Stop reason: HOSPADM

## 2024-02-24 RX ORDER — LATANOPROST 50 UG/ML
1 SOLUTION/ DROPS OPHTHALMIC NIGHTLY
Status: DISCONTINUED | OUTPATIENT
Start: 2024-02-24 | End: 2024-02-29 | Stop reason: HOSPADM

## 2024-02-24 RX ORDER — ACETAMINOPHEN 650 MG/1
650 SUPPOSITORY RECTAL EVERY 6 HOURS PRN
Status: DISCONTINUED | OUTPATIENT
Start: 2024-02-24 | End: 2024-02-29 | Stop reason: HOSPADM

## 2024-02-24 RX ORDER — DEXTROSE MONOHYDRATE 100 MG/ML
INJECTION, SOLUTION INTRAVENOUS CONTINUOUS PRN
Status: DISCONTINUED | OUTPATIENT
Start: 2024-02-24 | End: 2024-02-29 | Stop reason: HOSPADM

## 2024-02-24 RX ADMIN — FAMOTIDINE 10 MG: 20 TABLET, FILM COATED ORAL at 20:38

## 2024-02-24 RX ADMIN — MIRTAZAPINE 30 MG: 30 TABLET, FILM COATED ORAL at 20:38

## 2024-02-24 RX ADMIN — ATORVASTATIN CALCIUM 10 MG: 20 TABLET, FILM COATED ORAL at 13:10

## 2024-02-24 RX ADMIN — ASPIRIN 81 MG: 81 TABLET, COATED ORAL at 13:10

## 2024-02-24 RX ADMIN — MEMANTINE HYDROCHLORIDE 2.5 MG: 5 TABLET, FILM COATED ORAL at 13:10

## 2024-02-24 RX ADMIN — HEPARIN SODIUM 5000 UNITS: 5000 INJECTION INTRAVENOUS; SUBCUTANEOUS at 21:52

## 2024-02-24 RX ADMIN — DIBASIC SODIUM PHOSPHATE, MONOBASIC POTASSIUM PHOSPHATE AND MONOBASIC SODIUM PHOSPHATE 1 TABLET: 852; 155; 130 TABLET ORAL at 16:05

## 2024-02-24 RX ADMIN — LATANOPROST 1 DROP: 50 SOLUTION OPHTHALMIC at 20:38

## 2024-02-24 RX ADMIN — NEPHROCAP 1 MG: 1 CAP ORAL at 13:10

## 2024-02-24 RX ADMIN — SODIUM CHLORIDE, PRESERVATIVE FREE 10 ML: 5 INJECTION INTRAVENOUS at 20:33

## 2024-02-24 RX ADMIN — LEVETIRACETAM 500 MG: 500 TABLET, FILM COATED ORAL at 21:52

## 2024-02-24 RX ADMIN — LEVETIRACETAM 500 MG: 250 TABLET, FILM COATED ORAL at 13:10

## 2024-02-24 RX ADMIN — SODIUM CHLORIDE, PRESERVATIVE FREE 10 ML: 5 INJECTION INTRAVENOUS at 14:01

## 2024-02-24 RX ADMIN — TAMSULOSIN HYDROCHLORIDE 0.4 MG: 0.4 CAPSULE ORAL at 13:10

## 2024-02-24 RX ADMIN — HEPARIN SODIUM 5000 UNITS: 5000 INJECTION INTRAVENOUS; SUBCUTANEOUS at 13:10

## 2024-02-24 RX ADMIN — AMLODIPINE BESYLATE 10 MG: 10 TABLET ORAL at 13:10

## 2024-02-24 NOTE — ED NOTES
ED TO INPATIENT SBAR HANDOFF    Patient Name: Fawad Glynn   :  1935  88 y.o.   MRN:  [id number]  Preferred Name    ED Room #:  A08/A08-08  Family/Caregiver Present no   Restraints no   Sitter no   Sepsis Risk Score Sepsis Risk Score: 2.3    Situation  Code Status: Prior No additional code details.    Allergies: Lorazepam  Weight: Patient Vitals for the past 96 hrs (Last 3 readings):   Weight   24 2220 63.6 kg (140 lb 4.8 oz)     Arrived from: home  Chief Complaint:   Chief Complaint   Patient presents with    Vascular Access Problem     Pt presents for possible issue with hemodialysis cath. EMS states that they were called for the Pt pulling out his catheter at home. Pt cath is stitched in. No complaints noted by the Pt.     Hospital Problem/Diagnosis:  Active Problems:    * No active hospital problems. *  Resolved Problems:    * No resolved hospital problems. *    Imaging:   XR CHEST PORTABLE   Final Result      Small left pleural effusion. Chronic interstitial opacities in the lung bases      Electronically signed by Jose Manuel Ernst MD        Abnormal labs:   Abnormal Labs Reviewed   CBC WITH AUTO DIFFERENTIAL - Abnormal; Notable for the following components:       Result Value    RBC 2.94 (*)     Hemoglobin 8.5 (*)     Hematocrit 25.4 (*)     RDW 15.9 (*)     Lymphocytes Absolute 0.7 (*)     All other components within normal limits   BASIC METABOLIC PANEL W/ REFLEX TO MG FOR LOW K - Abnormal; Notable for the following components:    Glucose 111 (*)     Creatinine 2.9 (*)     Est, Glom Filt Rate 20 (*)     Calcium 7.9 (*)     All other components within normal limits     Critical values: no     Abnormal Assessment Findings: increased generalized weakness.      Background  History:   Past Medical History:   Diagnosis Date    Anemia 2012    Anxiety and depression     Arthritis     bulging disc    BPH (benign prostatic hypertrophy) 2010    Diverticulosis 2010    Eczema 2011

## 2024-02-24 NOTE — DISCHARGE INSTRUCTIONS
- Please make an appointment to follow up with your PCP (Dr. Hansen) in 1 week  - Please make an appointment to follow up Dr Rodriguez, your nephrologist, in 1 week    Please note your medications have changed.  - Please continue taking your medications as prescribed. A full medication list will be provided to you.

## 2024-02-24 NOTE — ED PROVIDER NOTES
THE [facility]  EMERGENCY DEPARTMENT ENCOUNTER          ATTENDING PHYSICIAN NOTE       Date of evaluation: 2/23/2024    Chief Complaint     Vascular Access Problem (Pt presents for possible issue with hemodialysis cath. EMS states that they were called for the Pt pulling out his catheter at home. Pt cath is stitched in. No complaints noted by the Pt.)      History of Present Illness     Fawad Glynn is a 88 y.o. male who presents to the [facility] part with concern for displaced hemodialysis catheter.    Patient states that earlier today accidentally pulled on his catheter at home.  He denies any intentional self-harm.  Patient denies any further complaints.  Denies any fever, cough, shortness of breath, chest pain, abdominal pain, nausea or vomiting specifically.    ASSESSMENT / PLAN  (MEDICAL DECISION MAKING)     INITIAL VITALS: BP: (!) 152/80, Temp: 98.2 °F (36.8 °C), Pulse: 79, Respirations: 16, SpO2: 99 %      Fawad Glynn is a 88 y.o. male who presents to the [facility] part with concern for displaced tunneled dialysis line.  Patient states he excellently pulled on this earlier today.  He has no other concerns.  On examination of the tunneled line, stitching is still in place.  No surrounding trauma.  No surrounding erythema or swelling to raise concern for infection.  Will obtain x-ray to confirm appropriate placement.    X-ray shows appropriate placement.  Patient was marked for discharge however I was notified by nursing staff that family is no longer comfortable taking care of the patient at home due to his multiple medical needs including dementia and schizophrenia.  They requested the patient be placed in a nursing facility.    I have ordered PT/OT evaluation as well as social work and  evaluation for possible placement.    Given presentation on Friday evening, patient will require admission for this to be performed in a timely fashion.      Medical Decision Making  Amount and/or

## 2024-02-24 NOTE — ED NOTES
Call up to unit to alert RN that SBAR is complete and ready for review.     Julia Lopez, RN  02/24/24 7485

## 2024-02-24 NOTE — H&P
Internal Medicine  PGY 1  History & Physical      CC: Vascular Access Problem (Pt presents for possible issue with hemodialysis cath. EMS states that they were called for the Pt pulling out his catheter at home. Pt cath is stitched in. No complaints noted by the Pt.)     History Obtained From:  patient, electronic medical record    HISTORY OF PRESENT ILLNESS:  Mr. Fawad Glynn is an 88-year-old male with a past medical history of diverticulosis, paranoid schizophrenia, CKD stage V, seizures, and episodes of catatonia who presented to the ED with with concerns of a displaced tunneled dialysis catheter line. Patient states that he accidentally pulled on this earlier today and that he has no other concerns. On presentation to the ED the tunneled line was examined and this testing was still in place and there is no surrounding trauma, no surrounding erythema, and/or swelling to raise concern for infection. An x-ray was obtained to confirm appropriate placement of the catheter which showed appropriate placement. Patient was initially marked for discharge however the family was no longer comfortable taking care of the patient at home due to his complex medical needs including but not limited to dementia and schizophrenia.  The family is requesting the patient to be placed in nursing facility.     On presentation to the ED the patient's vital signs were within normal limits except for a BP of 152/80.   Past Medical History:        Diagnosis Date    Anemia 01/09/2012    Anxiety and depression     Arthritis     bulging disc    BPH (benign prostatic hypertrophy) 05/16/2010    Diverticulosis 05/16/2010    Eczema 04/12/2011    Erectile dysfunction 05/16/2010    Essential hypertension 08/26/2015    GERD (gastroesophageal reflux disease) 05/16/2010    Hemorrhoid 05/16/2010    Hyperlipidemia 01/10/2011    Hyperphosphatemia 07/03/2010    Hypertension 05/16/2010    Overactive bladder 07/12/2011    Paranoid schizophrenia (HCC)

## 2024-02-24 NOTE — ED NOTES
Bailey daughter called to verify pt in Emergency and status.  Bailey stated will be here soon.     Julia Lopez, RN  02/24/24 5633

## 2024-02-25 LAB
ALBUMIN SERPL-MCNC: 3.2 G/DL (ref 3.4–5)
ANION GAP SERPL CALCULATED.3IONS-SCNC: 12 MMOL/L (ref 3–16)
BASOPHILS # BLD: 0.1 K/UL (ref 0–0.2)
BASOPHILS NFR BLD: 0.9 %
BUN SERPL-MCNC: 22 MG/DL (ref 7–20)
CALCIUM SERPL-MCNC: 8.5 MG/DL (ref 8.3–10.6)
CHLORIDE SERPL-SCNC: 103 MMOL/L (ref 99–110)
CO2 SERPL-SCNC: 26 MMOL/L (ref 21–32)
CREAT SERPL-MCNC: 4.2 MG/DL (ref 0.8–1.3)
DEPRECATED RDW RBC AUTO: 16.1 % (ref 12.4–15.4)
EOSINOPHIL # BLD: 0.2 K/UL (ref 0–0.6)
EOSINOPHIL NFR BLD: 3.2 %
GFR SERPLBLD CREATININE-BSD FMLA CKD-EPI: 13 ML/MIN/{1.73_M2}
GLUCOSE BLD-MCNC: 112 MG/DL (ref 70–99)
GLUCOSE BLD-MCNC: 135 MG/DL (ref 70–99)
GLUCOSE BLD-MCNC: 173 MG/DL (ref 70–99)
GLUCOSE BLD-MCNC: 182 MG/DL (ref 70–99)
GLUCOSE SERPL-MCNC: 92 MG/DL (ref 70–99)
HCT VFR BLD AUTO: 28.9 % (ref 40.5–52.5)
HGB BLD-MCNC: 9.4 G/DL (ref 13.5–17.5)
LYMPHOCYTES # BLD: 0.9 K/UL (ref 1–5.1)
LYMPHOCYTES NFR BLD: 15.5 %
MAGNESIUM SERPL-MCNC: 2.2 MG/DL (ref 1.8–2.4)
MCH RBC QN AUTO: 28.9 PG (ref 26–34)
MCHC RBC AUTO-ENTMCNC: 32.4 G/DL (ref 31–36)
MCV RBC AUTO: 89.3 FL (ref 80–100)
MONOCYTES # BLD: 0.8 K/UL (ref 0–1.3)
MONOCYTES NFR BLD: 13.8 %
NEUTROPHILS # BLD: 3.9 K/UL (ref 1.7–7.7)
NEUTROPHILS NFR BLD: 66.6 %
PERFORMED ON: ABNORMAL
PHOSPHATE SERPL-MCNC: 3.6 MG/DL (ref 2.5–4.9)
PLATELET # BLD AUTO: 232 K/UL (ref 135–450)
PMV BLD AUTO: 8.6 FL (ref 5–10.5)
POTASSIUM SERPL-SCNC: 4.4 MMOL/L (ref 3.5–5.1)
RBC # BLD AUTO: 3.23 M/UL (ref 4.2–5.9)
SODIUM SERPL-SCNC: 141 MMOL/L (ref 136–145)
WBC # BLD AUTO: 5.9 K/UL (ref 4–11)

## 2024-02-25 PROCEDURE — 6370000000 HC RX 637 (ALT 250 FOR IP)

## 2024-02-25 PROCEDURE — 6360000002 HC RX W HCPCS

## 2024-02-25 PROCEDURE — 85025 COMPLETE CBC W/AUTO DIFF WBC: CPT

## 2024-02-25 PROCEDURE — 80069 RENAL FUNCTION PANEL: CPT

## 2024-02-25 PROCEDURE — 2580000003 HC RX 258

## 2024-02-25 PROCEDURE — 5A1D70Z PERFORMANCE OF URINARY FILTRATION, INTERMITTENT, LESS THAN 6 HOURS PER DAY: ICD-10-PCS | Performed by: INTERNAL MEDICINE

## 2024-02-25 PROCEDURE — 36415 COLL VENOUS BLD VENIPUNCTURE: CPT

## 2024-02-25 PROCEDURE — 6370000000 HC RX 637 (ALT 250 FOR IP): Performed by: INTERNAL MEDICINE

## 2024-02-25 PROCEDURE — 1200000000 HC SEMI PRIVATE

## 2024-02-25 PROCEDURE — 99232 SBSQ HOSP IP/OBS MODERATE 35: CPT | Performed by: HOSPITALIST

## 2024-02-25 PROCEDURE — 83735 ASSAY OF MAGNESIUM: CPT

## 2024-02-25 PROCEDURE — 99222 1ST HOSP IP/OBS MODERATE 55: CPT | Performed by: INTERNAL MEDICINE

## 2024-02-25 RX ORDER — HYDRALAZINE HYDROCHLORIDE 25 MG/1
25 TABLET, FILM COATED ORAL EVERY 8 HOURS SCHEDULED
Status: DISCONTINUED | OUTPATIENT
Start: 2024-02-25 | End: 2024-02-29 | Stop reason: HOSPADM

## 2024-02-25 RX ADMIN — AMLODIPINE BESYLATE 10 MG: 10 TABLET ORAL at 08:27

## 2024-02-25 RX ADMIN — MIRTAZAPINE 30 MG: 30 TABLET, FILM COATED ORAL at 20:47

## 2024-02-25 RX ADMIN — ATORVASTATIN CALCIUM 10 MG: 20 TABLET, FILM COATED ORAL at 08:27

## 2024-02-25 RX ADMIN — TAMSULOSIN HYDROCHLORIDE 0.4 MG: 0.4 CAPSULE ORAL at 08:27

## 2024-02-25 RX ADMIN — SODIUM CHLORIDE, PRESERVATIVE FREE 10 ML: 5 INJECTION INTRAVENOUS at 08:28

## 2024-02-25 RX ADMIN — MEMANTINE HYDROCHLORIDE 2.5 MG: 5 TABLET, FILM COATED ORAL at 08:27

## 2024-02-25 RX ADMIN — HEPARIN SODIUM 5000 UNITS: 5000 INJECTION INTRAVENOUS; SUBCUTANEOUS at 20:47

## 2024-02-25 RX ADMIN — FAMOTIDINE 10 MG: 20 TABLET, FILM COATED ORAL at 20:47

## 2024-02-25 RX ADMIN — ASPIRIN 81 MG: 81 TABLET, COATED ORAL at 08:27

## 2024-02-25 RX ADMIN — HYDRALAZINE HYDROCHLORIDE 25 MG: 25 TABLET, FILM COATED ORAL at 20:47

## 2024-02-25 RX ADMIN — SODIUM CHLORIDE, PRESERVATIVE FREE 10 ML: 5 INJECTION INTRAVENOUS at 20:43

## 2024-02-25 RX ADMIN — HEPARIN SODIUM 5000 UNITS: 5000 INJECTION INTRAVENOUS; SUBCUTANEOUS at 14:47

## 2024-02-25 RX ADMIN — NEPHROCAP 1 MG: 1 CAP ORAL at 08:27

## 2024-02-25 RX ADMIN — HYDRALAZINE HYDROCHLORIDE 25 MG: 25 TABLET, FILM COATED ORAL at 14:47

## 2024-02-25 RX ADMIN — LATANOPROST 1 DROP: 50 SOLUTION OPHTHALMIC at 20:42

## 2024-02-25 RX ADMIN — LEVETIRACETAM 500 MG: 500 TABLET, FILM COATED ORAL at 20:47

## 2024-02-25 RX ADMIN — LEVETIRACETAM 500 MG: 500 TABLET, FILM COATED ORAL at 08:28

## 2024-02-25 RX ADMIN — HEPARIN SODIUM 5000 UNITS: 5000 INJECTION INTRAVENOUS; SUBCUTANEOUS at 06:31

## 2024-02-25 ASSESSMENT — PAIN SCALES - GENERAL
PAINLEVEL_OUTOF10: 0

## 2024-02-25 NOTE — PLAN OF CARE
Problem: Safety - Adult  Goal: Free from fall injury  Outcome: Progressing  Flowsheets (Taken 2/25/2024 0748)  Free From Fall Injury:   Instruct family/caregiver on patient safety   Based on caregiver fall risk screen, instruct family/caregiver to ask for assistance with transferring infant if caregiver noted to have fall risk factors     Problem: ABCDS Injury Assessment  Goal: Absence of physical injury  Outcome: Progressing  Flowsheets (Taken 2/25/2024 0748)  Absence of Physical Injury: Implement safety measures based on patient assessment

## 2024-02-25 NOTE — CONSULTS
Nephrology Consult Note                                                                                                                                                                                                                                                                                                                                                               Office : [phone]     Fax :[phone]    Patient's Name: Fawad Glynn  7:33 AM  2/25/2024    Reason for Consult:  dialysis   Requesting Physician:  Callie Hansen MD  Chief Complaint:    Chief Complaint   Patient presents with    Vascular Access Problem     Pt presents for possible issue with hemodialysis cath. EMS states that they were called for the Pt pulling out his catheter at home. Pt cath is stitched in. No complaints noted by the Pt.       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 male with PMH of diverticulosis, paranoid schizophrenia, CKD stage V on HD  seizures, and episodes of catatonia who presented to the ED with with concerns of a displaced tunneled dialysis catheter line.   In the ED the tunneled line was examined and this testing was still in place and there is no surrounding trauma, no surrounding erythema, and/or swelling to raise concern for infection. An x-ray was obtained to confirm appropriate placement of the catheter which showed appropriate placement  Pt was admitted for placement     Pt denies CP/SOB/palpitations/abdominal pain/N/V.   Pt denies fever/ chills  Pt denies dysuria or hematuria     Interval hx   Pt seen 2/24 2/25  No complains   Past Medical History:   Diagnosis Date    Anemia 01/09/2012

## 2024-02-26 LAB
ALBUMIN SERPL-MCNC: 3.2 G/DL (ref 3.4–5)
ANION GAP SERPL CALCULATED.3IONS-SCNC: 12 MMOL/L (ref 3–16)
BASOPHILS # BLD: 0.1 K/UL (ref 0–0.2)
BASOPHILS NFR BLD: 1.4 %
BUN SERPL-MCNC: 28 MG/DL (ref 7–20)
CALCIUM SERPL-MCNC: 8.5 MG/DL (ref 8.3–10.6)
CHLORIDE SERPL-SCNC: 103 MMOL/L (ref 99–110)
CO2 SERPL-SCNC: 24 MMOL/L (ref 21–32)
CREAT SERPL-MCNC: 5.3 MG/DL (ref 0.8–1.3)
DEPRECATED RDW RBC AUTO: 16.7 % (ref 12.4–15.4)
EOSINOPHIL # BLD: 0.1 K/UL (ref 0–0.6)
EOSINOPHIL NFR BLD: 2.2 %
GFR SERPLBLD CREATININE-BSD FMLA CKD-EPI: 10 ML/MIN/{1.73_M2}
GLUCOSE BLD-MCNC: 101 MG/DL (ref 70–99)
GLUCOSE BLD-MCNC: 128 MG/DL (ref 70–99)
GLUCOSE BLD-MCNC: 131 MG/DL (ref 70–99)
GLUCOSE BLD-MCNC: 154 MG/DL (ref 70–99)
GLUCOSE SERPL-MCNC: 114 MG/DL (ref 70–99)
HCT VFR BLD AUTO: 29.1 % (ref 40.5–52.5)
HGB BLD-MCNC: 9.5 G/DL (ref 13.5–17.5)
LYMPHOCYTES # BLD: 0.8 K/UL (ref 1–5.1)
LYMPHOCYTES NFR BLD: 13.2 %
MAGNESIUM SERPL-MCNC: 2.1 MG/DL (ref 1.8–2.4)
MCH RBC QN AUTO: 29.1 PG (ref 26–34)
MCHC RBC AUTO-ENTMCNC: 32.8 G/DL (ref 31–36)
MCV RBC AUTO: 88.9 FL (ref 80–100)
MONOCYTES # BLD: 0.7 K/UL (ref 0–1.3)
MONOCYTES NFR BLD: 11.4 %
NEUTROPHILS # BLD: 4.6 K/UL (ref 1.7–7.7)
NEUTROPHILS NFR BLD: 71.8 %
PERFORMED ON: ABNORMAL
PHOSPHATE SERPL-MCNC: 3.7 MG/DL (ref 2.5–4.9)
PLATELET # BLD AUTO: 270 K/UL (ref 135–450)
PMV BLD AUTO: 9.3 FL (ref 5–10.5)
POTASSIUM SERPL-SCNC: 4.4 MMOL/L (ref 3.5–5.1)
RBC # BLD AUTO: 3.28 M/UL (ref 4.2–5.9)
SODIUM SERPL-SCNC: 139 MMOL/L (ref 136–145)
WBC # BLD AUTO: 6.4 K/UL (ref 4–11)

## 2024-02-26 PROCEDURE — 1200000000 HC SEMI PRIVATE

## 2024-02-26 PROCEDURE — 90935 HEMODIALYSIS ONE EVALUATION: CPT | Performed by: INTERNAL MEDICINE

## 2024-02-26 PROCEDURE — 36415 COLL VENOUS BLD VENIPUNCTURE: CPT

## 2024-02-26 PROCEDURE — 97530 THERAPEUTIC ACTIVITIES: CPT

## 2024-02-26 PROCEDURE — 6360000002 HC RX W HCPCS

## 2024-02-26 PROCEDURE — 97535 SELF CARE MNGMENT TRAINING: CPT

## 2024-02-26 PROCEDURE — 90935 HEMODIALYSIS ONE EVALUATION: CPT

## 2024-02-26 PROCEDURE — 6370000000 HC RX 637 (ALT 250 FOR IP): Performed by: INTERNAL MEDICINE

## 2024-02-26 PROCEDURE — 80069 RENAL FUNCTION PANEL: CPT

## 2024-02-26 PROCEDURE — 6360000002 HC RX W HCPCS: Performed by: INTERNAL MEDICINE

## 2024-02-26 PROCEDURE — 36556 INSERT NON-TUNNEL CV CATH: CPT

## 2024-02-26 PROCEDURE — 6370000000 HC RX 637 (ALT 250 FOR IP)

## 2024-02-26 PROCEDURE — 83735 ASSAY OF MAGNESIUM: CPT

## 2024-02-26 PROCEDURE — 85025 COMPLETE CBC W/AUTO DIFF WBC: CPT

## 2024-02-26 PROCEDURE — 2580000003 HC RX 258

## 2024-02-26 PROCEDURE — 99232 SBSQ HOSP IP/OBS MODERATE 35: CPT | Performed by: HOSPITALIST

## 2024-02-26 RX ORDER — HEPARIN SODIUM 1000 [USP'U]/ML
3200 INJECTION, SOLUTION INTRAVENOUS; SUBCUTANEOUS PRN
Status: DISCONTINUED | OUTPATIENT
Start: 2024-02-26 | End: 2024-02-29 | Stop reason: HOSPADM

## 2024-02-26 RX ADMIN — HEPARIN SODIUM 5000 UNITS: 5000 INJECTION INTRAVENOUS; SUBCUTANEOUS at 21:30

## 2024-02-26 RX ADMIN — AMLODIPINE BESYLATE 10 MG: 10 TABLET ORAL at 10:06

## 2024-02-26 RX ADMIN — MIRTAZAPINE 30 MG: 30 TABLET, FILM COATED ORAL at 21:11

## 2024-02-26 RX ADMIN — TAMSULOSIN HYDROCHLORIDE 0.4 MG: 0.4 CAPSULE ORAL at 10:05

## 2024-02-26 RX ADMIN — HYDRALAZINE HYDROCHLORIDE 25 MG: 25 TABLET, FILM COATED ORAL at 21:11

## 2024-02-26 RX ADMIN — HYDRALAZINE HYDROCHLORIDE 25 MG: 25 TABLET, FILM COATED ORAL at 06:46

## 2024-02-26 RX ADMIN — HEPARIN SODIUM 3200 UNITS: 1000 INJECTION INTRAVENOUS; SUBCUTANEOUS at 21:16

## 2024-02-26 RX ADMIN — SODIUM CHLORIDE, PRESERVATIVE FREE 10 ML: 5 INJECTION INTRAVENOUS at 21:12

## 2024-02-26 RX ADMIN — ATORVASTATIN CALCIUM 10 MG: 20 TABLET, FILM COATED ORAL at 10:05

## 2024-02-26 RX ADMIN — HEPARIN SODIUM 5000 UNITS: 5000 INJECTION INTRAVENOUS; SUBCUTANEOUS at 16:01

## 2024-02-26 RX ADMIN — HEPARIN SODIUM 5000 UNITS: 5000 INJECTION INTRAVENOUS; SUBCUTANEOUS at 06:46

## 2024-02-26 RX ADMIN — SODIUM CHLORIDE, PRESERVATIVE FREE 10 ML: 5 INJECTION INTRAVENOUS at 10:06

## 2024-02-26 RX ADMIN — HYDRALAZINE HYDROCHLORIDE 25 MG: 25 TABLET, FILM COATED ORAL at 16:01

## 2024-02-26 RX ADMIN — MEMANTINE HYDROCHLORIDE 2.5 MG: 5 TABLET, FILM COATED ORAL at 10:06

## 2024-02-26 RX ADMIN — LEVETIRACETAM 500 MG: 500 TABLET, FILM COATED ORAL at 21:11

## 2024-02-26 RX ADMIN — ASPIRIN 81 MG: 81 TABLET, COATED ORAL at 10:06

## 2024-02-26 RX ADMIN — LEVETIRACETAM 500 MG: 500 TABLET, FILM COATED ORAL at 10:06

## 2024-02-26 RX ADMIN — FAMOTIDINE 10 MG: 20 TABLET, FILM COATED ORAL at 21:11

## 2024-02-26 RX ADMIN — LATANOPROST 1 DROP: 50 SOLUTION OPHTHALMIC at 21:13

## 2024-02-26 RX ADMIN — NEPHROCAP 1 MG: 1 CAP ORAL at 10:06

## 2024-02-26 NOTE — PLAN OF CARE
Problem: Discharge Planning  Goal: Discharge to home or other facility with appropriate resources  2/26/2024 1744 by Nadja Jameson, RN  Outcome: Progressing   Pt. Has pre- cert sent out to several rehabs and is awaiting approval to be d/c'd tomorrow  Problem: Safety - Adult  Goal: Free from fall injury  2/26/2024 1744 by Nadja Jameson, RN  Outcome: Progressing   Pt. Uses call light appropriately. Pt. Is checked on frequently. Bed is locked, in lockest position, and bed alarm on.

## 2024-02-26 NOTE — DISCHARGE INSTR - COC
Continuity of Care Form    Patient Name: Fawad Glynn   :  1935  MRN:  [id number]    Admit date:  2024  Discharge date:  ***    Code Status Order: Full Code   Advance Directives:     Admitting Physician:  Guillermo Partida MD  PCP: Callie Hansen MD    Discharging Nurse: ***  Discharging Hospital Unit/Room#: 6321/6321-01  Discharging Unit Phone Number: ***    Emergency Contact:   Extended Emergency Contact Information  Primary Emergency Contact: karon glynn  Home Phone: [phone]  Mobile Phone: [phone]  Relation: Spouse  Secondary Emergency Contact: Boris Glynn  Home Phone: [phone]  Mobile Phone: [phone]  Relation: Child   needed? No    Past Surgical History:  Past Surgical History:   Procedure Laterality Date    CHEST WALL RESECTION N/A 2020    EXCISION CHEST LESION (3 x 2.8cm) AND RIGHT UPPER ABDOMEN LESION (3 x .1), COMPLEX CLOSURE 1.9CM;  ADJACENT TISSUE TRANSFER; performed by Milady Andre MD at [facility] OR    CHOLECYSTECTOMY, LAPAROSCOPIC  2012    Dr. [facility]    COLONOSCOPY  2011    Dr. Cornell Headley    COLONOSCOPY  2011    COLONOSCOPY N/A 10/1/2019    COLONOSCOPY DIAGNOSTIC performed by Zaid Headley MD at [facility] ENDOSCOPY    COLONOSCOPY N/A 2024    COLONOSCOPY DIAGNOSTIC performed by Qamar Maddox MD at [facility] ENDOSCOPY    COLONOSCOPY N/A 2024    COLONOSCOPY DIAGNOSTIC performed by Pablo Stanford MD at [facility] ENDOSCOPY    ENDOSCOPY, COLON, DIAGNOSTIC      HERNIA REPAIR Left 2021    LAPAROSCOPIC LEFT INGUINAL HERNIA REPAIR performed by Riky Cifuentes MD at [facility] OR    HIP SURGERY Left 2015    Dr. Avtar Powerso - incision and drainage of left hip infected hematoma     IR TUNNELED CATHETER PLACEMENT GREATER THAN 5 YEARS  2024    IR TUNNELED CATHETER PLACEMENT GREATER THAN 5 YEARS 2024 [facility] SPECIAL PROCEDURES    OTHER SURGICAL HISTORY  2017

## 2024-02-26 NOTE — DISCHARGE INSTR - DIET

## 2024-02-27 LAB
ALBUMIN SERPL-MCNC: 3.1 G/DL (ref 3.4–5)
ANION GAP SERPL CALCULATED.3IONS-SCNC: 13 MMOL/L (ref 3–16)
BASOPHILS # BLD: 0.1 K/UL (ref 0–0.2)
BASOPHILS NFR BLD: 1 %
BUN SERPL-MCNC: 22 MG/DL (ref 7–20)
CALCIUM SERPL-MCNC: 7.8 MG/DL (ref 8.3–10.6)
CHLORIDE SERPL-SCNC: 102 MMOL/L (ref 99–110)
CO2 SERPL-SCNC: 24 MMOL/L (ref 21–32)
CREAT SERPL-MCNC: 4.7 MG/DL (ref 0.8–1.3)
DEPRECATED RDW RBC AUTO: 16.9 % (ref 12.4–15.4)
EOSINOPHIL # BLD: 0.1 K/UL (ref 0–0.6)
EOSINOPHIL NFR BLD: 2.4 %
GFR SERPLBLD CREATININE-BSD FMLA CKD-EPI: 11 ML/MIN/{1.73_M2}
GLUCOSE BLD-MCNC: 115 MG/DL (ref 70–99)
GLUCOSE BLD-MCNC: 155 MG/DL (ref 70–99)
GLUCOSE BLD-MCNC: 172 MG/DL (ref 70–99)
GLUCOSE BLD-MCNC: 194 MG/DL (ref 70–99)
GLUCOSE SERPL-MCNC: 97 MG/DL (ref 70–99)
HCT VFR BLD AUTO: 26.2 % (ref 40.5–52.5)
HGB BLD-MCNC: 8.9 G/DL (ref 13.5–17.5)
LYMPHOCYTES # BLD: 0.7 K/UL (ref 1–5.1)
LYMPHOCYTES NFR BLD: 12 %
MAGNESIUM SERPL-MCNC: 2.1 MG/DL (ref 1.8–2.4)
MCH RBC QN AUTO: 29.5 PG (ref 26–34)
MCHC RBC AUTO-ENTMCNC: 33.8 G/DL (ref 31–36)
MCV RBC AUTO: 87.3 FL (ref 80–100)
MONOCYTES # BLD: 0.7 K/UL (ref 0–1.3)
MONOCYTES NFR BLD: 10.9 %
NEUTROPHILS # BLD: 4.5 K/UL (ref 1.7–7.7)
NEUTROPHILS NFR BLD: 73.7 %
PERFORMED ON: ABNORMAL
PHOSPHATE SERPL-MCNC: 4.2 MG/DL (ref 2.5–4.9)
PLATELET # BLD AUTO: 195 K/UL (ref 135–450)
PMV BLD AUTO: 8.9 FL (ref 5–10.5)
POTASSIUM SERPL-SCNC: 4.5 MMOL/L (ref 3.5–5.1)
RBC # BLD AUTO: 3 M/UL (ref 4.2–5.9)
SODIUM SERPL-SCNC: 139 MMOL/L (ref 136–145)
WBC # BLD AUTO: 6.1 K/UL (ref 4–11)

## 2024-02-27 PROCEDURE — 80069 RENAL FUNCTION PANEL: CPT

## 2024-02-27 PROCEDURE — 6370000000 HC RX 637 (ALT 250 FOR IP)

## 2024-02-27 PROCEDURE — 6370000000 HC RX 637 (ALT 250 FOR IP): Performed by: INTERNAL MEDICINE

## 2024-02-27 PROCEDURE — 83735 ASSAY OF MAGNESIUM: CPT

## 2024-02-27 PROCEDURE — 1200000000 HC SEMI PRIVATE

## 2024-02-27 PROCEDURE — 2580000003 HC RX 258

## 2024-02-27 PROCEDURE — 99232 SBSQ HOSP IP/OBS MODERATE 35: CPT | Performed by: HOSPITALIST

## 2024-02-27 PROCEDURE — 85025 COMPLETE CBC W/AUTO DIFF WBC: CPT

## 2024-02-27 PROCEDURE — 36556 INSERT NON-TUNNEL CV CATH: CPT

## 2024-02-27 PROCEDURE — 36415 COLL VENOUS BLD VENIPUNCTURE: CPT

## 2024-02-27 PROCEDURE — 6360000002 HC RX W HCPCS

## 2024-02-27 RX ORDER — LOPERAMIDE HYDROCHLORIDE 2 MG/1
2 CAPSULE ORAL ONCE
Status: COMPLETED | OUTPATIENT
Start: 2024-02-27 | End: 2024-02-27

## 2024-02-27 RX ADMIN — LEVETIRACETAM 500 MG: 500 TABLET, FILM COATED ORAL at 09:15

## 2024-02-27 RX ADMIN — SODIUM CHLORIDE, PRESERVATIVE FREE 10 ML: 5 INJECTION INTRAVENOUS at 21:21

## 2024-02-27 RX ADMIN — FAMOTIDINE 10 MG: 20 TABLET, FILM COATED ORAL at 21:20

## 2024-02-27 RX ADMIN — ASPIRIN 81 MG: 81 TABLET, COATED ORAL at 09:15

## 2024-02-27 RX ADMIN — ATORVASTATIN CALCIUM 10 MG: 20 TABLET, FILM COATED ORAL at 09:15

## 2024-02-27 RX ADMIN — AMLODIPINE BESYLATE 10 MG: 10 TABLET ORAL at 09:15

## 2024-02-27 RX ADMIN — HYDRALAZINE HYDROCHLORIDE 25 MG: 25 TABLET, FILM COATED ORAL at 21:20

## 2024-02-27 RX ADMIN — LOPERAMIDE HYDROCHLORIDE 2 MG: 2 CAPSULE ORAL at 22:04

## 2024-02-27 RX ADMIN — HEPARIN SODIUM 5000 UNITS: 5000 INJECTION INTRAVENOUS; SUBCUTANEOUS at 06:10

## 2024-02-27 RX ADMIN — NEPHROCAP 1 MG: 1 CAP ORAL at 09:15

## 2024-02-27 RX ADMIN — TAMSULOSIN HYDROCHLORIDE 0.4 MG: 0.4 CAPSULE ORAL at 09:15

## 2024-02-27 RX ADMIN — LEVETIRACETAM 500 MG: 500 TABLET, FILM COATED ORAL at 21:20

## 2024-02-27 RX ADMIN — HYDRALAZINE HYDROCHLORIDE 25 MG: 25 TABLET, FILM COATED ORAL at 13:57

## 2024-02-27 RX ADMIN — HEPARIN SODIUM 5000 UNITS: 5000 INJECTION INTRAVENOUS; SUBCUTANEOUS at 21:36

## 2024-02-27 RX ADMIN — MIRTAZAPINE 30 MG: 30 TABLET, FILM COATED ORAL at 21:20

## 2024-02-27 RX ADMIN — MEMANTINE HYDROCHLORIDE 2.5 MG: 5 TABLET, FILM COATED ORAL at 09:15

## 2024-02-27 RX ADMIN — HEPARIN SODIUM 5000 UNITS: 5000 INJECTION INTRAVENOUS; SUBCUTANEOUS at 13:57

## 2024-02-27 RX ADMIN — HYDRALAZINE HYDROCHLORIDE 25 MG: 25 TABLET, FILM COATED ORAL at 06:10

## 2024-02-27 RX ADMIN — LATANOPROST 1 DROP: 50 SOLUTION OPHTHALMIC at 21:19

## 2024-02-27 RX ADMIN — SODIUM CHLORIDE, PRESERVATIVE FREE 10 ML: 5 INJECTION INTRAVENOUS at 09:15

## 2024-02-27 NOTE — PLAN OF CARE
Problem: Discharge Planning  Goal: Discharge to home or other facility with appropriate resources  2/27/2024 0105 by Celia Sorensen, RN  Outcome: Progressing  Flowsheets (Taken 2/27/2024 0105)  Discharge to home or other facility with appropriate resources:   Identify barriers to discharge with patient and caregiver   Identify discharge learning needs (meds, wound care, etc)   Arrange for needed discharge resources and transportation as appropriate  Note: Pt should return home with daughter upon discharge.     Problem: Safety - Adult  Goal: Free from fall injury  2/27/2024 0105 by Celia Sorensen, RN  Outcome: Progressing  Note: Remains free of falls/injury. All safety measures are in place. Plan of care ongoing.     Problem: ABCDS Injury Assessment  Goal: Absence of physical injury  Outcome: Progressing  Flowsheets (Taken 2/27/2024 0105)  Absence of Physical Injury: Implement safety measures based on patient assessment     Problem: Skin/Tissue Integrity  Goal: Absence of new skin breakdown  Description: 1.  Monitor for areas of redness and/or skin breakdown  2.  Assess vascular access sites hourly  3.  Every 4-6 hours minimum:  Change oxygen saturation probe site  4.  Every 4-6 hours:  If on nasal continuous positive airway pressure, respiratory therapy assess nares and determine need for appliance change or resting period.  Outcome: Progressing

## 2024-02-28 ENCOUNTER — TELEPHONE (OUTPATIENT)
Dept: INTERNAL MEDICINE CLINIC | Age: 89
End: 2024-02-28

## 2024-02-28 LAB
ALBUMIN SERPL-MCNC: 3.1 G/DL (ref 3.4–5)
ANION GAP SERPL CALCULATED.3IONS-SCNC: 10 MMOL/L (ref 3–16)
BASOPHILS # BLD: 0 K/UL (ref 0–0.2)
BASOPHILS NFR BLD: 0.5 %
BUN SERPL-MCNC: 31 MG/DL (ref 7–20)
CALCIUM SERPL-MCNC: 8 MG/DL (ref 8.3–10.6)
CHLORIDE SERPL-SCNC: 98 MMOL/L (ref 99–110)
CO2 SERPL-SCNC: 25 MMOL/L (ref 21–32)
CREAT SERPL-MCNC: 6 MG/DL (ref 0.8–1.3)
DEPRECATED RDW RBC AUTO: 16.9 % (ref 12.4–15.4)
EKG ATRIAL RATE: 80 BPM
EKG ATRIAL RATE: 95 BPM
EKG DIAGNOSIS: NORMAL
EKG DIAGNOSIS: NORMAL
EKG P-R INTERVAL: 176 MS
EKG P-R INTERVAL: 200 MS
EKG Q-T INTERVAL: 348 MS
EKG Q-T INTERVAL: 364 MS
EKG QRS DURATION: 70 MS
EKG QRS DURATION: 86 MS
EKG QTC CALCULATION (BAZETT): 419 MS
EKG QTC CALCULATION (BAZETT): 437 MS
EKG R AXIS: -6 DEGREES
EKG R AXIS: 76 DEGREES
EKG T AXIS: 76 DEGREES
EKG T AXIS: 83 DEGREES
EKG VENTRICULAR RATE: 80 BPM
EKG VENTRICULAR RATE: 95 BPM
EOSINOPHIL # BLD: 0.1 K/UL (ref 0–0.6)
EOSINOPHIL NFR BLD: 2.2 %
GFR SERPLBLD CREATININE-BSD FMLA CKD-EPI: 9 ML/MIN/{1.73_M2}
GLUCOSE BLD-MCNC: 130 MG/DL (ref 70–99)
GLUCOSE BLD-MCNC: 140 MG/DL (ref 70–99)
GLUCOSE BLD-MCNC: 206 MG/DL (ref 70–99)
GLUCOSE SERPL-MCNC: 103 MG/DL (ref 70–99)
HCT VFR BLD AUTO: 25.3 % (ref 40.5–52.5)
HGB BLD-MCNC: 8.5 G/DL (ref 13.5–17.5)
LYMPHOCYTES # BLD: 0.9 K/UL (ref 1–5.1)
LYMPHOCYTES NFR BLD: 15.2 %
MAGNESIUM SERPL-MCNC: 2.1 MG/DL (ref 1.8–2.4)
MCH RBC QN AUTO: 29.4 PG (ref 26–34)
MCHC RBC AUTO-ENTMCNC: 33.8 G/DL (ref 31–36)
MCV RBC AUTO: 86.8 FL (ref 80–100)
MONOCYTES # BLD: 0.7 K/UL (ref 0–1.3)
MONOCYTES NFR BLD: 10.7 %
NEUTROPHILS # BLD: 4.5 K/UL (ref 1.7–7.7)
NEUTROPHILS NFR BLD: 71.4 %
PERFORMED ON: ABNORMAL
PHOSPHATE SERPL-MCNC: 3.9 MG/DL (ref 2.5–4.9)
PLATELET # BLD AUTO: 216 K/UL (ref 135–450)
PMV BLD AUTO: 8.6 FL (ref 5–10.5)
POTASSIUM SERPL-SCNC: 5 MMOL/L (ref 3.5–5.1)
RBC # BLD AUTO: 2.91 M/UL (ref 4.2–5.9)
SODIUM SERPL-SCNC: 133 MMOL/L (ref 136–145)
WBC # BLD AUTO: 6.2 K/UL (ref 4–11)

## 2024-02-28 PROCEDURE — 6360000002 HC RX W HCPCS: Performed by: PHYSICIAN ASSISTANT

## 2024-02-28 PROCEDURE — 6370000000 HC RX 637 (ALT 250 FOR IP)

## 2024-02-28 PROCEDURE — 6360000002 HC RX W HCPCS

## 2024-02-28 PROCEDURE — 99232 SBSQ HOSP IP/OBS MODERATE 35: CPT | Performed by: HOSPITALIST

## 2024-02-28 PROCEDURE — 1200000000 HC SEMI PRIVATE

## 2024-02-28 PROCEDURE — 83735 ASSAY OF MAGNESIUM: CPT

## 2024-02-28 PROCEDURE — 80069 RENAL FUNCTION PANEL: CPT

## 2024-02-28 PROCEDURE — 93005 ELECTROCARDIOGRAM TRACING: CPT

## 2024-02-28 PROCEDURE — 36415 COLL VENOUS BLD VENIPUNCTURE: CPT

## 2024-02-28 PROCEDURE — 90935 HEMODIALYSIS ONE EVALUATION: CPT

## 2024-02-28 PROCEDURE — 2580000003 HC RX 258

## 2024-02-28 PROCEDURE — 85025 COMPLETE CBC W/AUTO DIFF WBC: CPT

## 2024-02-28 PROCEDURE — 6370000000 HC RX 637 (ALT 250 FOR IP): Performed by: INTERNAL MEDICINE

## 2024-02-28 RX ADMIN — LEVETIRACETAM 500 MG: 500 TABLET, FILM COATED ORAL at 20:49

## 2024-02-28 RX ADMIN — AMLODIPINE BESYLATE 10 MG: 10 TABLET ORAL at 13:03

## 2024-02-28 RX ADMIN — HYDRALAZINE HYDROCHLORIDE 25 MG: 25 TABLET, FILM COATED ORAL at 20:46

## 2024-02-28 RX ADMIN — NEPHROCAP 1 MG: 1 CAP ORAL at 13:03

## 2024-02-28 RX ADMIN — ATORVASTATIN CALCIUM 10 MG: 20 TABLET, FILM COATED ORAL at 13:03

## 2024-02-28 RX ADMIN — HEPARIN SODIUM 5000 UNITS: 5000 INJECTION INTRAVENOUS; SUBCUTANEOUS at 05:38

## 2024-02-28 RX ADMIN — FAMOTIDINE 10 MG: 20 TABLET, FILM COATED ORAL at 20:46

## 2024-02-28 RX ADMIN — MEMANTINE HYDROCHLORIDE 2.5 MG: 5 TABLET, FILM COATED ORAL at 13:03

## 2024-02-28 RX ADMIN — HYDRALAZINE HYDROCHLORIDE 25 MG: 25 TABLET, FILM COATED ORAL at 05:39

## 2024-02-28 RX ADMIN — TAMSULOSIN HYDROCHLORIDE 0.4 MG: 0.4 CAPSULE ORAL at 13:03

## 2024-02-28 RX ADMIN — EPOETIN ALFA-EPBX 2000 UNITS: 2000 INJECTION, SOLUTION INTRAVENOUS; SUBCUTANEOUS at 22:27

## 2024-02-28 RX ADMIN — ASPIRIN 81 MG: 81 TABLET, COATED ORAL at 13:03

## 2024-02-28 RX ADMIN — LEVETIRACETAM 500 MG: 500 TABLET, FILM COATED ORAL at 13:03

## 2024-02-28 RX ADMIN — HEPARIN SODIUM 5000 UNITS: 5000 INJECTION INTRAVENOUS; SUBCUTANEOUS at 16:10

## 2024-02-28 RX ADMIN — INSULIN LISPRO 1 UNITS: 100 INJECTION, SOLUTION INTRAVENOUS; SUBCUTANEOUS at 18:58

## 2024-02-28 RX ADMIN — HEPARIN SODIUM 5000 UNITS: 5000 INJECTION INTRAVENOUS; SUBCUTANEOUS at 22:26

## 2024-02-28 RX ADMIN — SODIUM CHLORIDE, PRESERVATIVE FREE 10 ML: 5 INJECTION INTRAVENOUS at 20:49

## 2024-02-28 RX ADMIN — SODIUM CHLORIDE, PRESERVATIVE FREE 10 ML: 5 INJECTION INTRAVENOUS at 16:10

## 2024-02-28 RX ADMIN — HYDRALAZINE HYDROCHLORIDE 25 MG: 25 TABLET, FILM COATED ORAL at 16:10

## 2024-02-28 RX ADMIN — MIRTAZAPINE 30 MG: 30 TABLET, FILM COATED ORAL at 20:46

## 2024-02-28 RX ADMIN — LATANOPROST 1 DROP: 50 SOLUTION OPHTHALMIC at 20:54

## 2024-02-28 NOTE — TELEPHONE ENCOUNTER
Lm for daughter Bailey in regards to the FMLA that was dropped off. Just trying to confirm if she is wanting this strictly for appts, episodes, or what she is needing it to be for.     Please obtain info

## 2024-02-28 NOTE — PLAN OF CARE
Problem: Discharge Planning  Goal: Discharge to home or other facility with appropriate resources  Outcome: Progressing     Problem: Safety - Adult  Goal: Free from fall injury  Outcome: Progressing  Note: High risk for fall.  Call light within reach.  Head part side rails raised all the time.  Bed maintain at it's lowest, locked and alarm-on.     Problem: ABCDS Injury Assessment  Goal: Absence of physical injury  Outcome: Progressing  Flowsheets (Taken 2/27/2024 2308)  Absence of Physical Injury: Implement safety measures based on patient assessment     Problem: Skin/Tissue Integrity  Goal: Absence of new skin breakdown  Description: 1.  Monitor for areas of redness and/or skin breakdown  2.  Assess vascular access sites hourly  3.  Every 4-6 hours minimum:  Change oxygen saturation probe site  4.  Every 4-6 hours:  If on nasal continuous positive airway pressure, respiratory therapy assess nares and determine need for appliance change or resting period.  Outcome: Progressing  Note: At risk to develop new skin changes as pt has incontinence.  Keep skin clean and dry, diaper changed as needed.     Problem: Chronic Conditions and Co-morbidities  Goal: Patient's chronic conditions and co-morbidity symptoms are monitored and maintained or improved  Outcome: Progressing  Flowsheets (Taken 2/27/2024 2308)  Care Plan - Patient's Chronic Conditions and Co-Morbidity Symptoms are Monitored and Maintained or Improved:   Monitor and assess patient's chronic conditions and comorbid symptoms for stability, deterioration, or improvement   Collaborate with multidisciplinary team to address chronic and comorbid conditions and prevent exacerbation or deterioration   Update acute care plan with appropriate goals if chronic or comorbid symptoms are exacerbated and prevent overall improvement and discharge

## 2024-02-29 VITALS
TEMPERATURE: 97.6 F | HEART RATE: 88 BPM | HEIGHT: 75 IN | WEIGHT: 136.91 LBS | DIASTOLIC BLOOD PRESSURE: 60 MMHG | RESPIRATION RATE: 18 BRPM | SYSTOLIC BLOOD PRESSURE: 128 MMHG | OXYGEN SATURATION: 100 % | BODY MASS INDEX: 17.02 KG/M2

## 2024-02-29 LAB
ALBUMIN SERPL-MCNC: 3 G/DL (ref 3.4–5)
ANION GAP SERPL CALCULATED.3IONS-SCNC: 14 MMOL/L (ref 3–16)
BASOPHILS # BLD: 0 K/UL (ref 0–0.2)
BASOPHILS NFR BLD: 0.6 %
BUN SERPL-MCNC: 22 MG/DL (ref 7–20)
CALCIUM SERPL-MCNC: 7.9 MG/DL (ref 8.3–10.6)
CHLORIDE SERPL-SCNC: 100 MMOL/L (ref 99–110)
CO2 SERPL-SCNC: 20 MMOL/L (ref 21–32)
CREAT SERPL-MCNC: 4.5 MG/DL (ref 0.8–1.3)
DEPRECATED RDW RBC AUTO: 16.8 % (ref 12.4–15.4)
EOSINOPHIL # BLD: 0.2 K/UL (ref 0–0.6)
EOSINOPHIL NFR BLD: 3.1 %
GFR SERPLBLD CREATININE-BSD FMLA CKD-EPI: 12 ML/MIN/{1.73_M2}
GLUCOSE BLD-MCNC: 100 MG/DL (ref 70–99)
GLUCOSE BLD-MCNC: 180 MG/DL (ref 70–99)
GLUCOSE SERPL-MCNC: 86 MG/DL (ref 70–99)
HCT VFR BLD AUTO: 31.4 % (ref 40.5–52.5)
HGB BLD-MCNC: 9.7 G/DL (ref 13.5–17.5)
LYMPHOCYTES # BLD: 1.2 K/UL (ref 1–5.1)
LYMPHOCYTES NFR BLD: 24.4 %
MAGNESIUM SERPL-MCNC: 2.1 MG/DL (ref 1.8–2.4)
MCH RBC QN AUTO: 26.8 PG (ref 26–34)
MCHC RBC AUTO-ENTMCNC: 30.9 G/DL (ref 31–36)
MCV RBC AUTO: 86.8 FL (ref 80–100)
MONOCYTES # BLD: 0.6 K/UL (ref 0–1.3)
MONOCYTES NFR BLD: 11.1 %
NEUTROPHILS # BLD: 3 K/UL (ref 1.7–7.7)
NEUTROPHILS NFR BLD: 60.8 %
PERFORMED ON: ABNORMAL
PERFORMED ON: ABNORMAL
PHOSPHATE SERPL-MCNC: 4.3 MG/DL (ref 2.5–4.9)
PLATELET # BLD AUTO: 126 K/UL (ref 135–450)
PMV BLD AUTO: 8.8 FL (ref 5–10.5)
POTASSIUM SERPL-SCNC: 4.6 MMOL/L (ref 3.5–5.1)
RBC # BLD AUTO: 3.62 M/UL (ref 4.2–5.9)
SODIUM SERPL-SCNC: 134 MMOL/L (ref 136–145)
WBC # BLD AUTO: 5 K/UL (ref 4–11)

## 2024-02-29 PROCEDURE — 6370000000 HC RX 637 (ALT 250 FOR IP)

## 2024-02-29 PROCEDURE — 85025 COMPLETE CBC W/AUTO DIFF WBC: CPT

## 2024-02-29 PROCEDURE — 97110 THERAPEUTIC EXERCISES: CPT

## 2024-02-29 PROCEDURE — 80069 RENAL FUNCTION PANEL: CPT

## 2024-02-29 PROCEDURE — 6370000000 HC RX 637 (ALT 250 FOR IP): Performed by: INTERNAL MEDICINE

## 2024-02-29 PROCEDURE — 97530 THERAPEUTIC ACTIVITIES: CPT

## 2024-02-29 PROCEDURE — 6360000002 HC RX W HCPCS

## 2024-02-29 PROCEDURE — 83735 ASSAY OF MAGNESIUM: CPT

## 2024-02-29 PROCEDURE — 36415 COLL VENOUS BLD VENIPUNCTURE: CPT

## 2024-02-29 PROCEDURE — 99239 HOSP IP/OBS DSCHRG MGMT >30: CPT | Performed by: HOSPITALIST

## 2024-02-29 RX ADMIN — HEPARIN SODIUM 5000 UNITS: 5000 INJECTION INTRAVENOUS; SUBCUTANEOUS at 05:32

## 2024-02-29 RX ADMIN — ATORVASTATIN CALCIUM 10 MG: 20 TABLET, FILM COATED ORAL at 10:17

## 2024-02-29 RX ADMIN — MEMANTINE HYDROCHLORIDE 2.5 MG: 5 TABLET, FILM COATED ORAL at 10:17

## 2024-02-29 RX ADMIN — HYDRALAZINE HYDROCHLORIDE 25 MG: 25 TABLET, FILM COATED ORAL at 13:47

## 2024-02-29 RX ADMIN — HYDRALAZINE HYDROCHLORIDE 25 MG: 25 TABLET, FILM COATED ORAL at 05:28

## 2024-02-29 RX ADMIN — LEVETIRACETAM 500 MG: 500 TABLET, FILM COATED ORAL at 10:17

## 2024-02-29 RX ADMIN — NEPHROCAP 1 MG: 1 CAP ORAL at 10:17

## 2024-02-29 RX ADMIN — TAMSULOSIN HYDROCHLORIDE 0.4 MG: 0.4 CAPSULE ORAL at 10:17

## 2024-02-29 RX ADMIN — AMLODIPINE BESYLATE 10 MG: 10 TABLET ORAL at 10:17

## 2024-02-29 RX ADMIN — ASPIRIN 81 MG: 81 TABLET, COATED ORAL at 10:17

## 2024-02-29 RX ADMIN — HEPARIN SODIUM 5000 UNITS: 5000 INJECTION INTRAVENOUS; SUBCUTANEOUS at 13:47

## 2024-02-29 NOTE — PLAN OF CARE
Problem: Discharge Planning  Goal: Discharge to home or other facility with appropriate resources  Outcome: Progressing     Problem: Safety - Adult  Goal: Free from fall injury  Outcome: Progressing  Note: High risk for fall.  Keep head part side rails raised.  Call light within reach and instructed to call whenever he need assistance,  Bed locked, alarm- on and maintain at it's lowest height.     Problem: ABCDS Injury Assessment  Goal: Absence of physical injury  Outcome: Progressing  Flowsheets (Taken 2/29/2024 0222)  Absence of Physical Injury: Implement safety measures based on patient assessment     Problem: Skin/Tissue Integrity  Goal: Absence of new skin breakdown  Description: 1.  Monitor for areas of redness and/or skin breakdown  2.  Assess vascular access sites hourly  3.  Every 4-6 hours minimum:  Change oxygen saturation probe site  4.  Every 4-6 hours:  If on nasal continuous positive airway pressure, respiratory therapy assess nares and determine need for appliance change or resting period.  Outcome: Progressing  Note: At risk to develop new skin changes.  Keep skin clean and dry.  Diaper changed as needed.     Problem: Chronic Conditions and Co-morbidities  Goal: Patient's chronic conditions and co-morbidity symptoms are monitored and maintained or improved  Outcome: Progressing  Flowsheets (Taken 2/29/2024 0222)  Care Plan - Patient's Chronic Conditions and Co-Morbidity Symptoms are Monitored and Maintained or Improved:   Monitor and assess patient's chronic conditions and comorbid symptoms for stability, deterioration, or improvement   Collaborate with multidisciplinary team to address chronic and comorbid conditions and prevent exacerbation or deterioration   Update acute care plan with appropriate goals if chronic or comorbid symptoms are exacerbated and prevent overall improvement and discharge

## 2024-02-29 NOTE — PROGRESS NOTES
Internal Medicine Progress Note    Date: 2/25/2024   Patient: Fawad Glynn   [facility] Day: 1      CC: Vascular Access Problem (Pt presents for possible issue with hemodialysis cath. EMS states that they were called for the Pt pulling out his catheter at home. Pt cath is stitched in. No complaints noted by the Pt.)       Interval Hx   Patient was seen at bedside in the AM. He says he feels well this AM. His kidney function is worsening and he will most likely require dialysis as his Cr went from 2.9 to 4.2.       HPI:   Mr. Fawad Glynn is an 88-year-old male with a past medical history of diverticulosis, paranoid schizophrenia, CKD stage V, seizures, and episodes of catatonia who presented to the ED with with concerns of a displaced tunneled dialysis catheter line. Patient states that he accidentally pulled on this earlier today and that he has no other concerns. On presentation to the ED the tunneled line was examined and this testing was still in place and there is no surrounding trauma, no surrounding erythema, and/or swelling to raise concern for infection. An x-ray was obtained to confirm appropriate placement of the catheter which showed appropriate placement. Patient was initially marked for discharge however the family was no longer comfortable taking care of the patient at home due to his complex medical needs including but not limited to dementia and schizophrenia.  The family is requesting the patient to be placed in nursing facility.      On presentation to the ED the patient's vital signs were within normal limits except for a BP of 152/80.       Objective     Vital Signs:  Patient Vitals for the past 8 hrs:   BP Temp Temp src Pulse Resp SpO2   02/25/24 0835 (!) 180/90 98.5 °F (36.9 °C) Oral 92 18 98 %       Physical Exam       Labs:  CBC:   Recent Labs     02/24/24  0220 02/25/24  0614   WBC 8.6 5.9   HGB 8.5* 9.4*   HCT 25.4* 28.9*    232       BMP:   Recent Labs     02/24/24  0202 
     Internal Medicine Progress Note    Date: 2/26/2024   Patient: Fawad Glynn   [facility] Day: 2      CC: Vascular Access Problem (Pt presents for possible issue with hemodialysis cath. EMS states that they were called for the Pt pulling out his catheter at home. Pt cath is stitched in. No complaints noted by the Pt.)       Interval Hx   Patient was seen at bedside in the AM. As per nephrology note, patient scheduled to undergo dialysis today. No other AEON.       HPI:   Mr. Fawad Glynn is an 88-year-old male with a past medical history of diverticulosis, paranoid schizophrenia, CKD stage V, seizures, and episodes of catatonia who presented to the ED with with concerns of a displaced tunneled dialysis catheter line. Patient states that he accidentally pulled on this earlier today and that he has no other concerns. On presentation to the ED the tunneled line was examined and this testing was still in place and there is no surrounding trauma, no surrounding erythema, and/or swelling to raise concern for infection. An x-ray was obtained to confirm appropriate placement of the catheter which showed appropriate placement. Patient was initially marked for discharge however the family was no longer comfortable taking care of the patient at home due to his complex medical needs including but not limited to dementia and schizophrenia.  The family is requesting the patient to be placed in nursing facility.      On presentation to the ED the patient's vital signs were within normal limits except for a BP of 152/80.       Objective     Vital Signs:  Patient Vitals for the past 8 hrs:   BP Temp Temp src Pulse Resp SpO2   02/26/24 1249 (!) 152/72 97.7 °F (36.5 °C) Oral 85 18 100 %   02/26/24 0740 (!) 151/68 98.1 °F (36.7 °C) Oral 89 18 99 %       Physical Exam       Labs:  CBC:   Recent Labs     02/24/24  0220 02/25/24  0614 02/26/24  0844   WBC 8.6 5.9 6.4   HGB 8.5* 9.4* 9.5*   HCT 25.4* 28.9* 29.1*    232 270    
     Internal Medicine Progress Note    Date: 2/28/2024   Patient: Fawad Glynn   Hospital Day: 4      CC: Vascular Access Problem (Pt presents for possible issue with hemodialysis cath. EMS states that they were called for the Pt pulling out his catheter at home. Pt cath is stitched in. No complaints noted by the Pt.)       Interval Hx   Patient seen at bedside while undergoing dialysis. He looked tired and endorsed chest pain that was reproducible on my exam. Ordered an EKD. Complaining of abdominal pain overnight and nurse said that he had a few episodes of soft stools. One time dose of 2 mg Loperamide given. No other AEON.    HPI:   \"Mr. Fawad Glynn is an 88-year-old male with a past medical history of diverticulosis, paranoid schizophrenia, CKD stage V, seizures, and episodes of catatonia who presented to the ED with with concerns of a displaced tunneled dialysis catheter line. Patient states that he accidentally pulled on this earlier today and that he has no other concerns. On presentation to the ED the tunneled line was examined and this testing was still in place and there is no surrounding trauma, no surrounding erythema, and/or swelling to raise concern for infection. An x-ray was obtained to confirm appropriate placement of the catheter which showed appropriate placement. Patient was initially marked for discharge however the family was no longer comfortable taking care of the patient at home due to his complex medical needs including but not limited to dementia and schizophrenia.  The family is requesting the patient to be placed in nursing facility.      On presentation to the ED the patient's vital signs were within normal limits except for a BP of 152/80.\"      Objective     Vital Signs:  Patient Vitals for the past 8 hrs:   BP Temp Temp src Pulse Resp SpO2 Weight   02/28/24 1037 133/77 97.2 °F (36.2 °C) Temporal 58 16 -- 62.3 kg (137 lb 5.6 oz)   02/28/24 0718 -- 97.1 °F (36.2 °C) Temporal 86 
   02/27/24 1007   Encounter Summary   Encounter Overview/Reason  Initial Encounter   Service Provided For: Patient   Support System Spouse   Last Encounter  02/27/24   Complexity of Encounter Low   Begin Time 0955   End Time  1005   Total Time Calculated 10 min   Spiritual/Emotional needs   Type Spiritual Support   Plan and Referrals   Plan/Referrals Continue to visit, (comment)      Note:   met with pt for spiritual support. Pt is supported by his wife and uses his spirituality to support him while in hospital through prayer. The pt belongs to [facility] [employer], the pt request for [employer] not to be contacted at this time. The pt will continue to be supported through a listening presence.   Rev. Elza Oviedo Mdiv., BCC  
   02/28/24 1513   Encounter Summary   Encounter Overview/Reason  Follow-up   Service Provided For: Patient   Begin Time 1500   End Time  1505   Total Time Calculated 5 min   Plan and Referrals   Plan/Referrals No future visits requested      Note:   met with pt to offer support. Pt have no further needs at this time.  Rev. Elza Oviedo Mdiv., BCC  
  PROCEDURE:  Patient seen on hemodialysis     PHYSICIAN:  Aron Mcclain MD    INDICATION:  End-stage renal disease    Wt Readings from Last 3 Encounters:   02/26/24 62.1 kg (136 lb 14.5 oz)   02/19/24 66.2 kg (145 lb 14.4 oz)   02/16/24 63.9 kg (140 lb 14 oz)     Temp Readings from Last 3 Encounters:   02/26/24 98.4 °F (36.9 °C) (Oral)   02/19/24 98 °F (36.7 °C) (Oral)   02/16/24 98.4 °F (36.9 °C) (Oral)     BP Readings from Last 3 Encounters:   02/26/24 138/76   02/19/24 (!) 141/66   02/16/24 (!) 149/73     Pulse Readings from Last 3 Encounters:   02/26/24 86   02/19/24 79   02/16/24 83      In: 1460 [P.O.:960]  Out: 2350 [Urine:850]     CBC:   Recent Labs     02/25/24  0614 02/26/24  0844   WBC 5.9 6.4   HGB 9.4* 9.5*    270     BMP:    Recent Labs     02/25/24  0614 02/26/24  0844    139   K 4.4 4.4    103   CO2 26 24   BUN 22* 28*   CREATININE 4.2* 5.3*   GLUCOSE 92 114*     BMD:   Lab Results   Component Value Date    .2 (H) 12/21/2023    CALCIUM 8.5 02/26/2024    CAION 4.6 04/05/2014    PHOS 3.7 02/26/2024       Iron:     Lab Results   Component Value Date    IRON 85 05/18/2023    TIBC 229 (L) 05/18/2023    FERRITIN 131.6 09/14/2021            RX:  See dialysis flowsheet for specifics on access, blood flow rate, dialysate baths, duration of dialysis, anticoagulation and other technical information.    COMMENTS:      Aron Mcclain MD, MD  Cell - [phone]  Pager -  [phone]   
  PROCEDURE:  Patient seen on hemodialysis     PHYSICIAN:  Aron Mcclain MD    INDICATION:  End-stage renal disease    Wt Readings from Last 3 Encounters:   02/26/24 62.1 kg (136 lb 14.5 oz)   02/19/24 66.2 kg (145 lb 14.4 oz)   02/16/24 63.9 kg (140 lb 14 oz)     Temp Readings from Last 3 Encounters:   02/27/24 97.4 °F (36.3 °C) (Oral)   02/19/24 98 °F (36.7 °C) (Oral)   02/16/24 98.4 °F (36.9 °C) (Oral)     BP Readings from Last 3 Encounters:   02/27/24 132/66   02/19/24 (!) 141/66   02/16/24 (!) 149/73     Pulse Readings from Last 3 Encounters:   02/27/24 75   02/19/24 79   02/16/24 83      In: 1160 [P.O.:660]  Out: 2100 [Urine:600]     CBC:   Recent Labs     02/25/24  0614 02/26/24  0844   WBC 5.9 6.4   HGB 9.4* 9.5*    270     BMP:    Recent Labs     02/25/24  0614 02/26/24  0844    139   K 4.4 4.4    103   CO2 26 24   BUN 22* 28*   CREATININE 4.2* 5.3*   GLUCOSE 92 114*     BMD:   Lab Results   Component Value Date    .2 (H) 12/21/2023    CALCIUM 8.5 02/26/2024    CAION 4.6 04/05/2014    PHOS 3.7 02/26/2024       Iron:     Lab Results   Component Value Date    IRON 85 05/18/2023    TIBC 229 (L) 05/18/2023    FERRITIN 131.6 09/14/2021            RX:  See dialysis flowsheet for specifics on access, blood flow rate, dialysate baths, duration of dialysis, anticoagulation and other technical information.    COMMENTS:      Aron Mcclain MD, MD  Cell - [phone]  Pager -  [phone]   
4 Eyes Skin Assessment     NAME:  Fawad Glynn  YOB: 1935  MEDICAL RECORD NUMBER:  [id number]    The patient is being assessed for  Admission    I agree that at least one RN has performed a thorough Head to Toe Skin Assessment on the patient. ALL assessment sites listed below have been assessed.      Areas assessed by both nurses:    Head, Face, Ears, Shoulders, Back, Chest, Arms, Elbows, Hands, Sacrum. Buttock, Coccyx, Ischium, Legs. Feet and Heels, Under Medical Devices , and Other ***        Does the Patient have a Wound? No noted wound(s)       Jayden Prevention initiated by RN: Yes  Wound Care Orders initiated by RN: Yes    Pressure Injury (Stage 3,4, Unstageable, DTI, NWPT, and Complex wounds) if present, place Wound referral order by RN under : No    New Ostomies, if present place, Ostomy referral order under : No     Nurse 1 eSignature: Electronically signed by Paula Blue RN on 2/24/24 at 3:54 PM EST    **SHARE this note so that the co-signing nurse can place an eSignature**    Nurse 2 eSignature: {Esignature:[id number]}   
Consult received and pt was seen and examined   See consult note 
Nephrology Progress Note                                                                                                                                                                                                                                                                                                                                                               Office : [phone]     Fax :[phone]    Patient's Name: Fawad Glynn  10:57 AM  2/28/2024    Reason for Consult:  dialysis   Requesting Physician:  Callie Hansen MD  Chief Complaint:    Chief Complaint   Patient presents with    Vascular Access Problem     Pt presents for possible issue with hemodialysis cath. EMS states that they were called for the Pt pulling out his catheter at home. Pt cath is stitched in. No complaints noted by the Pt.       Assessment/Plan     # CKD V--> ESRD on hemodialysis   Access: R TDC  Continue HD MWF schedule   Renally dose meds for GFR < 10    # HTN  BP's improved   On Amlodipine   Added hydralazine    # Anemia of chronic disease  Hb 9.5  JI through HD    # MBD  Pi was low-gvien supp- normalized   Liberated diet - no phos restrictions   Ca ok   Monitor labs     # DM2  Management per primary       History of Present Ilness:    Fawad Glynn is a 88 y.o. male with PMH of diverticulosis, paranoid schizophrenia, CKD stage V on HD, seizures, and episodes of catatonia who presented to the ED with with concerns of a displaced tunneled dialysis catheter line.   In the ED the tunneled line was examined and this testing was still in place and there is no surrounding trauma, no surrounding erythema, and/or swelling to raise concern for infection. An x-ray was obtained to confirm appropriate placement of the catheter which showed appropriate placement  Pt was admitted for placement     Pt denies CP/SOB/palpitations/abdominal pain/N/V.   Pt denies fever/ chills  Pt denies dysuria or hematuria     Interval hx:    Doing 
Nephrology Progress Note                                                                                                                                                                                                                                                                                                                                                               Office : [phone]     Fax :[phone]    Patient's Name: Fawad Glynn  9:45 AM  2/29/2024    Reason for Consult:  dialysis   Requesting Physician:  Callie Hansen MD  Chief Complaint:    Chief Complaint   Patient presents with    Vascular Access Problem     Pt presents for possible issue with hemodialysis cath. EMS states that they were called for the Pt pulling out his catheter at home. Pt cath is stitched in. No complaints noted by the Pt.       Assessment/Plan     # CKD V--> ESRD on hemodialysis   Access: R TDC  Continue HD MWF schedule   Renally dose meds for GFR < 10  Labs in AM    # HTN  BP's improved   On Amlodipine   Added hydralazine    # Anemia of chronic disease  Hb 9.5  JI through HD    # MBD  Pi was low-gvien supp- normalized   Liberated diet - no phos restrictions   Ca ok   Monitor labs     # DM2  Management per primary       History of Present Ilness:    Fawad Glynn is a 88 y.o. male with PMH of diverticulosis, paranoid schizophrenia, CKD stage V on HD, seizures, and episodes of catatonia who presented to the ED with with concerns of a displaced tunneled dialysis catheter line.   In the ED the tunneled line was examined and this testing was still in place and there is no surrounding trauma, no surrounding erythema, and/or swelling to raise concern for infection. An x-ray was obtained to confirm appropriate placement of the catheter which showed appropriate placement  Pt was admitted for placement     Pt denies CP/SOB/palpitations/abdominal pain/N/V.   Pt denies fever/ chills  Pt denies dysuria or hematuria     Interval 
Physical Therapy  Daily Treatment Note    Discharge Recommendation:  Skilled Nursing Facility  Equipment Needs:  Defer to next level of care    Assessment:  Pt with good participation for exercises, but needing heavy encouragement to sit EOB today. Needing up to Mod assist for supine <> sit. Pt limited by fatigue and weakness. Would benefit from continued IP PT at D/C to maximize strength and function prior to returning home. Plan is for SNF.     Chart Reviewed: Yes     Other position/activity restrictions: none noted   Additional Pertinent Hx: 87 y/o M presents 2/2 pulling out HD catheter at home.      Diagnosis: generalized weakness   Treatment Diagnosis: impaired strength    Subjective: Pt in bed initially.   \"I don't feel like doing anything today.\" Agreeable to LE exercises and sitting EOB after encouragement of therapist and nurse.     Pain: Denies at this time    Objective:    Exercises  2 sets x 10 reps B ankle pumps, hip adductor squeezes, heel slides, SAQ, hip abd/add (SBA to CGA with cues)  Other: Rest breaks between sets.     Bed mobility  Supine to sit: Min assist x 1, HOB up partially with use of railing  Scooting: Min assist to EOB  Sit to Supine: Mod assist x 1, HOB flat  Rolling: Min assist to R and L  Scooting: Dependent (Max assist x 2) towards HOB in supine (twice)    Balance  Sat EOB ~6 minutes with Min assist initially, progressing to CGA. Pt relying on B UEs to assist with balance EOB. Flexed posture with downward gaze (improved some with cueing)    Patient Education  Role of PT. Calling for assist with needs. Expressed understanding.   Importance of mobility. Expressed understanding, but needing encouragement.     Safety Devices  Pt left with needs in reach. In bed with bed alarm on. RN updated.     AM-PAC score  AM-PAC Inpatient Mobility Raw Score : 11  AM-PAC Inpatient T-Scale Score : 33.86  Mobility Inpatient CMS 0-100% Score: 72.57  Mobility Inpatient CMS G-Code Modifier : CL    Goals: 
Physical Therapy  Facility/Department: [address]  Physical Therapy Treatment    Name: Fawad Glynn  : 1935  MRN: [id number]  Date of Service: 2024    Discharge Recommendations:  Subacute/Skilled Nursing Facility   PT Equipment Recommendations  Equipment Needed:  (defer)      Patient Diagnosis(es): The encounter diagnosis was General weakness.  Past Medical History:  has a past medical history of Anemia, Anxiety and depression, Arthritis, BPH (benign prostatic hypertrophy), Diverticulosis, Eczema, Erectile dysfunction, Essential hypertension, GERD (gastroesophageal reflux disease), Hemorrhoid, Hyperlipidemia, Hyperphosphatemia, Hypertension, Overactive bladder, Paranoid schizophrenia (HCC), Prostate cancer (HCC), Seizures (HCC), and Type 2 diabetes mellitus without complication, without long-term current use of insulin (HCC).  Past Surgical History:  has a past surgical history that includes Prostate surgery (May 8, 2001); Colonoscopy (2011); Upper gastrointestinal endoscopy (2011); Colonoscopy (2011); Cholecystectomy, laparoscopic (2012); Prostate biopsy (2012); Total hip arthroplasty (Left, May 17, 2015); hip surgery (Left, 2015); Upper gastrointestinal endoscopy (2016); Upper gastrointestinal endoscopy (N/A, 04/15/2016); Upper gastrointestinal endoscopy (2017); other surgical history (2017); other surgical history (2017); Upper gastrointestinal endoscopy (N/A, 2019); Upper gastrointestinal endoscopy (N/A, 2019); Endoscopy, colon, diagnostic; Colonoscopy (N/A, 10/1/2019); Chest Wall Resection (N/A, 2020); hernia repair (Left, 2021); Upper gastrointestinal endoscopy (N/A, 2024); Colonoscopy (N/A, 2024); Colonoscopy (N/A, 2024); and IR TUNNELED CVC PLACE WO SQ PORT/PUMP > 5 YEARS (2024).    Assessment   Body Structures, Functions, Activity Limitations 
Physical Therapy  Facility/Department: [address]  Physical Therapy Initial Assessment and Treatment     Name: Fawad Glynn  : 1935  MRN: [id number]  Date of Service: 2024    Discharge Recommendations:  Subacute/Skilled Nursing Facility   PT Equipment Recommendations  Equipment Needed:  (defer to next level of care)      Patient Diagnosis(es): The encounter diagnosis was General weakness.  Past Medical History:  has a past medical history of Anemia, Anxiety and depression, Arthritis, BPH (benign prostatic hypertrophy), Diverticulosis, Eczema, Erectile dysfunction, Essential hypertension, GERD (gastroesophageal reflux disease), Hemorrhoid, Hyperlipidemia, Hyperphosphatemia, Hypertension, Overactive bladder, Paranoid schizophrenia (HCC), Prostate cancer (HCC), Seizures (HCC), and Type 2 diabetes mellitus without complication, without long-term current use of insulin (HCC).  Past Surgical History:  has a past surgical history that includes Prostate surgery (May 8, 2001); Colonoscopy (2011); Upper gastrointestinal endoscopy (2011); Colonoscopy (2011); Cholecystectomy, laparoscopic (2012); Prostate biopsy (2012); Total hip arthroplasty (Left, May 17, 2015); hip surgery (Left, 2015); Upper gastrointestinal endoscopy (2016); Upper gastrointestinal endoscopy (N/A, 04/15/2016); Upper gastrointestinal endoscopy (2017); other surgical history (2017); other surgical history (2017); Upper gastrointestinal endoscopy (N/A, 2019); Upper gastrointestinal endoscopy (N/A, 2019); Endoscopy, colon, diagnostic; Colonoscopy (N/A, 10/1/2019); Chest Wall Resection (N/A, 2020); hernia repair (Left, 2021); Upper gastrointestinal endoscopy (N/A, 2024); Colonoscopy (N/A, 2024); Colonoscopy (N/A, 2024); and IR TUNNELED CVC PLACE WO SQ PORT/PUMP > 5 YEARS (2024).    Assessment   Body 
Pt on RA resp easy. A+OX 3, forgetful. Pleasant and cooperative. Right chest wall vascath dressing C/D/I. Vital signs stable, afebrile. SR on telemetry. Assisted with turning and repositioning will pillow support. Call light in reach. Bed alarm in place.  
Pt passed soft stool 2x since 7pm, total of 5x the whole day. He is complaining of abdominal discomfort, he asked something to stop him from frequently passing stool. Informed to on-call Gladys Angel DO , ordered stat of loperamide.  
Pt said he has the urge to pass stool, assisted him to the bathroom 2x but bowel still not opened. Glycolax PRN given, awaiting for the result.  
Pt to hd not in room for assessment   
Pt. To dialysis   
Treatment time: 3 hours  Net UF: 2000 ml     Pre weight: 64.1 kg  Post weight:62.1 kg    Access used: RTDC    Access function: WELL with  ml/min     Medications or blood products given: heparin dwells     Regular outpatient schedule: mwf     Summary of response to treatment: Patient tolerated treatment well and without any complications. Patient remained stable throughout entire treatment and upon the exiting the dialysis suite via transport.     Report given to BEVERLY Yang and copy of dialysis treatment record faxed to [facility], to be scanned into EMR.  
Treatment time: 3 hrs    Net UF: 700 ml    Pre weight: 63 kg  Post weight: 62.3 kg    Access used: Rt CW TDC  Access function: Well tolerated, 300 BFR    Medications or blood products given: Heparin dwells    Regular outpatient schedule: MWF    Summary of response to treatment: Pt tolerated fair. Pt did pass out during treatment. UFG reduced and NS bolus given. Pt responded well. Pt remained stable throughout entire treatment and upon exiting hemodialysis suite.     Copy of dialysis treatment record placed in chart, to be scanned into EMR.  
Two attempts to call report to [facility] from phone number on cm note. No success at this time. Pt left with r vas cath in place pt belongings including his wallet and change bag, shirt, hat,coat, jeans, socks and shoes. Vitals stable, dry iv out and daughter at bedside to follow transport over.  
ADLs/transfers      Assessment   Performance deficits / Impairments: Decreased functional mobility ;Decreased ADL status;Decreased balance    Assessment: Pt tolerated OT session well this date. Pt able to complete sit<>stand transfer w/ Mod Ax2, however requires use of heather stedy for transfer from bed to chair. Pt required total A to complete LE dressing and toileting this date. Pt below reported baseline and presents as a high fall risk. Pt would benefit from skilled IP OT services upon dc to maximize safety and functional independence. Cont OT per POC    Treatment Diagnosis: impaired ADLs/transfers  REQUIRES OT FOLLOW-UP: Yes  Activity Tolerance  Activity Tolerance: Patient Tolerated treatment well        Plan   Occupational Therapy Plan  Times Per Week: 2-5  Current Treatment Recommendations: Strengthening, Balance training, Functional mobility training, Cognitive reorientation, Safety education & training, Self-Care / ADL     Restrictions  Position Activity Restriction  Other position/activity restrictions: none noted    Subjective   General  Chart Reviewed: Yes  Patient assessed for rehabilitation services?: Yes  Additional Pertinent Hx: 88 y.o. M who presents to ED w/ concern for displaced hemodialysis catheter.       PMH: paranoid schizophrenia, CDK 5 (on PD), HTN, HLD, Chronic anemia (sec to CKD 5), prostate ca (treated 2001), seizure disorder, and T2DM  Family / Caregiver Present: No  Referring Practitioner: Jayjay Evangelista MD  Subjective  Subjective: Pt in bed upon arrival, pleasant and agreeable to therapy with encouragement.       Social/Functional History  Social/Functional History  Lives With: Spouse  Type of Home: House  Home Layout: Multi-level, Bed/Bath upstairs  Home Access: Stairs to enter with rails  Entrance Stairs - Number of Steps: 6  Bathroom Shower/Tub: Walk-in shower  Bathroom Toilet: Handicap height (vanity next to toilet)  Bathroom Equipment: Hand-held shower, Shower chair, Grab bars in 
Extraocular movements intact.      Conjunctiva/sclera: Conjunctivae normal.   Cardiovascular:      Rate and Rhythm: Normal rate and regular rhythm.      Pulses: Normal pulses.      Heart sounds: Normal heart sounds.   Pulmonary:      Effort: Pulmonary effort is normal.      Breath sounds: Normal breath sounds.   Abdominal:      General: Abdomen is flat.      Palpations: Abdomen is soft.   Musculoskeletal:         General: Normal range of motion.      Cervical back: Normal range of motion and neck supple.   Skin:     General: Skin is warm and dry.     Neurological:      General: No focal deficit present.      Mental Status: He is alert and oriented to person, place, and time.   Psychiatric:         Mood and Affect: Mood normal.         Behavior: Behavior normal.         Thought Content: Thought content normal.         Judgment: Judgment normal.       Labs:  CBC:   Recent Labs     02/25/24  0614 02/26/24  0844 02/27/24  0826   WBC 5.9 6.4 6.1   HGB 9.4* 9.5* 8.9*   HCT 28.9* 29.1* 26.2*    270 195         BMP:   Recent Labs     02/25/24  0614 02/26/24  0844 02/27/24  0825    139 139   K 4.4 4.4 4.5    103 102   CO2 26 24 24   BUN 22* 28* 22*   CREATININE 4.2* 5.3* 4.7*   GLUCOSE 92 114* 97   PHOS 3.6 3.7 4.2       Magnesium:   Recent Labs     02/25/24  0614 02/26/24  0844 02/27/24  0825   MG 2.20 2.10 2.10         Radiology:  XR CHEST PORTABLE   Final Result      Small left pleural effusion. Chronic interstitial opacities in the lung bases      Electronically signed by Jose Manuel Ernst MD            Assessment & Plan   Mr. Fawad Glynn is an 88-year-old male with a past medical history of diverticulosis, paranoid schizophrenia, CKD stage V, seizures, and episodes of catatonia who presented to the ED with with concerns of a displaced tunneled dialysis catheter line.     Failure to thrive  2/2 physical debility  On presentation to the ED the tunneled line was examined and this testing was still in place 
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 Treatment included: bed mobility, functional transfer, ADL, pt education                                                         AM-PAC - ADL  AM-PAC Daily Activity - Inpatient   How much help is needed for putting on and taking off regular lower body clothing?: A Lot  How much help is needed for bathing (which includes washing, rinsing, drying)?: A Lot  How much help is needed for toileting (which includes using toilet, bedpan, or urinal)?: A Lot  How much help is needed for putting on and taking off regular upper body clothing?: A Little  How much help is needed for taking care of personal grooming?: A Little  How much help for eating meals?: None  AM-PAC Inpatient Daily Activity Raw Score: 16  AM-PAC Inpatient ADL T-Scale Score : 35.96  ADL Inpatient CMS 0-100% Score: 53.32  ADL Inpatient CMS G-Code Modifier : CK           Goals  Short Term Goals  Time Frame for Short Term Goals: Discharge  Short Term Goal 1: BSC transfer w/ Edward  Short Term Goal 2: bed mobility w/ Supervision  Short Term Goal 3: increase functional standing tolerance to 5 minutes  Short Term Goal 4: 
assessment and plan as documented, with the following addendum:        Aron Mcclain MD

## 2024-02-29 NOTE — CARE COORDINATION
UPDATE: ANI met with pt's daughter, Bailey, who will review the list of SNFs that provide on site HD. At this time she is comfortable with CM sending referrals to [facility] and [facility] while Bailey does research to determine top choice. ANI has electronically sent these referrals.  Electronically signed by YANNICK Cuello on 2/27/2024 at 4:40 PM  [phone]    UPDATE: ANI spoke with Kacie with [facility] who reported that the HD bed originally discussed for this pt was miscounted and thus they do not currently have a HD bed available at this time. [place] attempted to call [facility] to determine if [facility] could provide transportation to the pt's normal OP dialysis chair time and was informed that this chair time had been canceled by the pt's daughter (not Bailey) and thus the pt no longer has a standing chair time at [facility]. ANI spoke with Bailey, pt's daughter, on the phone and updated her on the above information. Bailey rightfully expressed her frustration and shared that she would not be in agreement with the pt being transported to and from an OP dialysis center she would like the pt to be at a SNF with onsite HD. Bailey reported she was on her way into the hospital and at that time will review SNF options that include on-site HD. ANI will continue to follow for discharge planning.  Electronically signed by YANNICK Cuello on 2/27/2024 at 4:24 PM  [phone]    ANI following: ANI faxed HD run sheets to [place] with [facility] today and spoke to [place] who reports they received the information and are reviewing. ANI has updated pt's daughter, Bailey, on status and will reach out to Bailey by phone with updates on [place]'s ability to meet the pt's HD needs. ANI will continue to follow.  Electronically signed by YANNICK Cuello on 2/27/2024 at 1:20 PM  [phone]  
letter #2 on the the appropriate lines. Patient and/or family/POA, provided copy of signed letter and they are aware that this original copy of IMM letter #2 is available prior to discharge from the paper chart on the unit.  Electronic documentation has been entered into epic for IMM letter #2 and original paper copy has been added to the paper chart at the nurses station.     IMM Letter given to Patient/Family/Significant other/Guardian/POA/by:: Daughter Bailey Glynn via phone by Ulices LOCK, RN, CM  IMM Letter date given:: 02/29/24  IMM Letter time given:: 0945    Transportation:  Transportation PLAN for discharge: EMS transportation   Mode of Transport: Ambulance stretcher - S  Reason for medical transport: Bed confined: Meets the following criteria 1) unable to get out of bed without assistance or ambulate, 2) unable to safely sit up in a wheelchair, 3) unable to maintain erect seating position in a chair for time needed for transport and Confused due to dementia and requires ambulance transport due to safe transfer  Name of Transport Company: [facility]  Phone: [phone]  Time of Transport: 1630    Transport form completed: Yes    Home Care:  Home Care ordered at discharge: No, Not Indicated  Home Care Agency: Not Applicable  Orders faxed: No    Durable Medical Equipment:  DME Provider: deferred to SNF  Equipment obtained during hospitalization:     Home Oxygen and Respiratory Equipment:  Oxygen needed at discharge?: No, Not Indicated  Home Oxygen Company: Not Applicable  Portable tank available for discharge?: No, Not Indicated    Dialysis:  Dialysis patient: Yes    Dialysis Center:  In-house HD at [facility]    Hospice Services:  Location: Not Applicable  Agency: Not Applicable    Consents signed: No, Not Indicated    Referrals made at DISCHARGE for outpatient continued care:  Not Applicable    Additional CM Notes: Patient has been accepted to SNF at [facility] 
representative Fawad and his family were provided with a choice of provider and agrees with the discharge plan. Freedom of choice list with basic dialogue that supports the patient's individualized plan of care/goals and shares the quality data associated with the providers was provided to: Patient Representative   Patient Representative Name: daughter Bailey     The Patient and/or Patient Representative Agree with the Discharge Plan? Yes    Care Transition Patient: Yes    IMM Status: IMM Letter given to Patient/Family/Significant other/Guardian/POA/by:: pt access  IMM Letter date given:: 02/26/24  IMM Letter time given:: 0850    Ulices Garcia RN  Case Management Department  Ph: [phone] Fax: [phone]

## 2024-02-29 NOTE — DISCHARGE SUMMARY
INTERNAL MEDICINE DEPARTMENT AT THE [facility]  DISCHARGE SUMMARY    Patient ID: Fawad Glynn                                             Discharge Date: 2/29/2024   Patient's PCP: Clalie Hansen MD                                          Discharge Physician: Idris Elliott MD MD  Admit Date: 2/23/2024   Admitting Physician: Guillermo Partida MD    PROBLEMS DURING HOSPITALIZATION:  Present on Admission:   Age-related physical debility   Essential hypertension   CKD (chronic kidney disease), symptom management only, stage 5 ([facility])   Hypophosphatemia   General weakness   ESRD (end stage renal disease) ([facility])      HPI:  \"Mr. Fawad Glynn is an 88-year-old male with a past medical history of diverticulosis, paranoid schizophrenia, CKD stage V, seizures, and episodes of catatonia who presented to the ED with with concerns of a displaced tunneled dialysis catheter line. Patient states that he accidentally pulled on this earlier today and that he has no other concerns. On presentation to the ED the tunneled line was examined and this testing was still in place and there is no surrounding trauma, no surrounding erythema, and/or swelling to raise concern for infection. An x-ray was obtained to confirm appropriate placement of the catheter which showed appropriate placement. Patient was initially marked for discharge however the family was no longer comfortable taking care of the patient at home due to his complex medical needs including but not limited to dementia and schizophrenia.  The family is requesting the patient to be placed in nursing facility.      On presentation to the ED the patient's vital signs were within normal limits except for a BP of 152/80.\"      The following issues were addressed during hospitalization:  Failure to thrive  2/2 physical debility  On presentation to the ED the tunneled line was examined and this was still in place and there was no surrounding trauma, no surrounding erythema,

## 2024-03-04 ENCOUNTER — CLINICAL DOCUMENTATION ONLY (OUTPATIENT)
Facility: CLINIC | Age: 89
End: 2024-03-04

## 2024-03-23 PROBLEM — Z09 HOSPITAL DISCHARGE FOLLOW-UP: Status: RESOLVED | Noted: 2024-02-22 | Resolved: 2024-03-23

## 2024-04-30 ENCOUNTER — APPOINTMENT (OUTPATIENT)
Dept: CT IMAGING | Age: 89
End: 2024-04-30
Payer: MEDICARE

## 2024-04-30 ENCOUNTER — HOSPITAL ENCOUNTER (EMERGENCY)
Age: 89
Discharge: HOME | End: 2024-04-30
Attending: EMERGENCY MEDICINE
Payer: MEDICARE

## 2024-04-30 ENCOUNTER — APPOINTMENT (OUTPATIENT)
Dept: GENERAL RADIOLOGY | Age: 89
End: 2024-04-30
Payer: MEDICARE

## 2024-04-30 VITALS
OXYGEN SATURATION: 97 % | RESPIRATION RATE: 16 BRPM | TEMPERATURE: 98.8 F | BODY MASS INDEX: 18.07 KG/M2 | HEIGHT: 75 IN | WEIGHT: 145.3 LBS | DIASTOLIC BLOOD PRESSURE: 86 MMHG | SYSTOLIC BLOOD PRESSURE: 153 MMHG | HEART RATE: 100 BPM

## 2024-04-30 DIAGNOSIS — R25.1 SHAKING: ICD-10-CM
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
## 2024-04-30 LAB
ANION GAP SERPL CALCULATED.3IONS-SCNC: 13 MMOL/L (ref 3–16)
ANISOCYTOSIS BLD QL SMEAR: ABNORMAL
BASOPHILS # BLD: 0 K/UL (ref 0–0.2)
BASOPHILS NFR BLD: 0 %
BUN SERPL-MCNC: 31 MG/DL (ref 7–20)
CALCIUM SERPL-MCNC: 9.2 MG/DL (ref 8.3–10.6)
CHLORIDE SERPL-SCNC: 98 MMOL/L (ref 99–110)
CO2 SERPL-SCNC: 22 MMOL/L (ref 21–32)
CREAT SERPL-MCNC: 3.5 MG/DL (ref 0.8–1.3)
DACRYOCYTES BLD QL SMEAR: ABNORMAL
DEPRECATED RDW RBC AUTO: 17.8 % (ref 12.4–15.4)
EOSINOPHIL # BLD: 0 K/UL (ref 0–0.6)
EOSINOPHIL NFR BLD: 0 %
GFR SERPLBLD CREATININE-BSD FMLA CKD-EPI: 16 ML/MIN/{1.73_M2}
GLUCOSE SERPL-MCNC: 126 MG/DL (ref 70–99)
HCT VFR BLD AUTO: 36.2 % (ref 40.5–52.5)
HGB BLD-MCNC: 11.9 G/DL (ref 13.5–17.5)
HYPOCHROMIA BLD QL SMEAR: ABNORMAL
LACTATE BLDV-SCNC: 1.5 MMOL/L (ref 0.4–2)
LYMPHOCYTES # BLD: 0.3 K/UL (ref 1–5.1)
LYMPHOCYTES NFR BLD: 3 %
MCH RBC QN AUTO: 27.5 PG (ref 26–34)
MCHC RBC AUTO-ENTMCNC: 33 G/DL (ref 31–36)
MCV RBC AUTO: 83.3 FL (ref 80–100)
MICROCYTES BLD QL SMEAR: ABNORMAL
MONOCYTES # BLD: 1.1 K/UL (ref 0–1.3)
MONOCYTES NFR BLD: 11 %
NEUTROPHILS # BLD: 8.6 K/UL (ref 1.7–7.7)
NEUTROPHILS NFR BLD: 86 %
OVALOCYTES BLD QL SMEAR: ABNORMAL
PLATELET # BLD AUTO: 194 K/UL (ref 135–450)
PLATELET BLD QL SMEAR: ADEQUATE
PMV BLD AUTO: 8.8 FL (ref 5–10.5)
POTASSIUM SERPL-SCNC: 4 MMOL/L (ref 3.5–5.1)
RBC # BLD AUTO: 4.35 M/UL (ref 4.2–5.9)
SCHISTOCYTES BLD QL SMEAR: ABNORMAL
SODIUM SERPL-SCNC: 133 MMOL/L (ref 136–145)
STOMATOCYTES BLD QL SMEAR: ABNORMAL
TARGETS BLD QL SMEAR: ABNORMAL
TROPONIN, HIGH SENSITIVITY: 63 NG/L (ref 0–22)
TROPONIN, HIGH SENSITIVITY: 64 NG/L (ref 0–22)
WBC # BLD AUTO: 10 K/UL (ref 4–11)

## 2024-04-30 PROCEDURE — 80048 BASIC METABOLIC PNL TOTAL CA: CPT

## 2024-04-30 PROCEDURE — 72125 CT NECK SPINE W/O DYE: CPT

## 2024-04-30 PROCEDURE — 84484 ASSAY OF TROPONIN QUANT: CPT

## 2024-04-30 PROCEDURE — 36415 COLL VENOUS BLD VENIPUNCTURE: CPT

## 2024-04-30 PROCEDURE — 70450 CT HEAD/BRAIN W/O DYE: CPT

## 2024-04-30 PROCEDURE — 71045 X-RAY EXAM CHEST 1 VIEW: CPT

## 2024-04-30 PROCEDURE — 73130 X-RAY EXAM OF HAND: CPT

## 2024-04-30 PROCEDURE — 99285 EMERGENCY DEPT VISIT HI MDM: CPT

## 2024-04-30 PROCEDURE — 85025 COMPLETE CBC W/AUTO DIFF WBC: CPT

## 2024-04-30 PROCEDURE — 83605 ASSAY OF LACTIC ACID: CPT

## 2024-04-30 ASSESSMENT — PAIN - FUNCTIONAL ASSESSMENT: PAIN_FUNCTIONAL_ASSESSMENT: NONE - DENIES PAIN

## 2024-04-30 ASSESSMENT — ENCOUNTER SYMPTOMS: CHEST TIGHTNESS: 0

## 2024-04-30 NOTE — ED PROVIDER NOTES
0.0 %    Neutrophils Absolute 8.6 (H) 1.7 - 7.7 K/uL    Lymphocytes Absolute 0.3 (L) 1.0 - 5.1 K/uL    Monocytes Absolute 1.1 0.0 - 1.3 K/uL    Eosinophils Absolute 0.0 0.0 - 0.6 K/uL    Basophils Absolute 0.0 0.0 - 0.2 K/uL    Anisocytosis Occasional (A)     Microcytes Occasional (A)     Hypochromia 1+ (A)     Schistocytes Occasional (A)     Ovalocytes Occasional (A)     Stomatocytes Occasional (A)     Target Cells 1+ (A)     Tear Drop Cells Occasional (A)    BMP w/ Reflex to MG   Result Value Ref Range    Sodium 133 (L) 136 - 145 mmol/L    Potassium reflex Magnesium 4.0 3.5 - 5.1 mmol/L    Chloride 98 (L) 99 - 110 mmol/L    CO2 22 21 - 32 mmol/L    Anion Gap 13 3 - 16    Glucose 126 (H) 70 - 99 mg/dL    BUN 31 (H) 7 - 20 mg/dL    Creatinine 3.5 (H) 0.8 - 1.3 mg/dL    Est, Glom Filt Rate 16 (A) >60    Calcium 9.2 8.3 - 10.6 mg/dL   Troponin   Result Value Ref Range    Troponin, High Sensitivity 63 (H) 0 - 22 ng/L   Troponin   Result Value Ref Range    Troponin, High Sensitivity 64 (H) 0 - 22 ng/L   Lactic Acid   Result Value Ref Range    Lactic Acid 1.5 0.4 - 2.0 mmol/L     EKG   Sinus tachycardia nonspecific ST-T wave  abnormalities no acute ischemia    ED BEDSIDE ULTRASOUND:  No results found.    MOST RECENT VITALS:  BP: (!) 153/86,Temp: 98.8 °F (37.1 °C), Pulse: (!) 106, Respirations: 16, SpO2: 97 %     Procedures         ED Course     Nursing Notes, Past Medical Hx, Past Surgical Hx, Social Hx,Allergies, and Family Hx were reviewed.         The patient was given the following medications:  No orders of the defined types were placed in this encounter.      CONSULTS:  None    Review of Systems     Review of Systems   Constitutional:  Negative for activity change and appetite change.   HENT: Negative.     Respiratory:  Negative for chest tightness.    Cardiovascular:  Negative for chest pain.   Musculoskeletal:  Negative for joint swelling.   Neurological:  Positive for seizures.   All other systems reviewed and

## 2024-05-03 ENCOUNTER — COMMUNITY CARE MANAGEMENT (OUTPATIENT)
Facility: CLINIC | Age: 89
End: 2024-05-03

## 2024-05-03 DIAGNOSIS — R35.0 URINARY FREQUENCY: ICD-10-CM

## 2024-05-03 RX ORDER — TAMSULOSIN HYDROCHLORIDE 0.4 MG/1
CAPSULE ORAL DAILY
Qty: 90 CAPSULE | Refills: 1 | Status: SHIPPED | OUTPATIENT
Start: 2024-05-03

## 2024-06-17 ENCOUNTER — TELEPHONE (OUTPATIENT)
Dept: VASCULAR SURGERY | Age: 89
End: 2024-06-17

## 2024-06-17 NOTE — TELEPHONE ENCOUNTER
Mayra Meneses with [facility] called to schedule a vein mapping appointment for the patient.     Mayra Meneses with [facility] requests for the callback to be made to the Skilled Nursing Facility at [phone]. It is best to ask for the patient's nurse or anyone that is able to take orders and make appointments such as the DON-Director of Nursing for the facility.

## 2024-06-19 DIAGNOSIS — N18.30 CHRONIC KIDNEY DISEASE, STAGE 3 UNSPECIFIED (HCC): ICD-10-CM

## 2024-06-19 DIAGNOSIS — Z99.2 DEPENDENCE ON RENAL DIALYSIS (HCC): ICD-10-CM

## 2024-06-19 DIAGNOSIS — I77.0 ARTERIOVENOUS FISTULA, ACQUIRED (HCC): ICD-10-CM

## 2024-06-19 DIAGNOSIS — N18.6 ESRD (END STAGE RENAL DISEASE) (HCC): Primary | ICD-10-CM

## 2024-06-19 DIAGNOSIS — Z01.818 ENCOUNTER FOR OTHER PREPROCEDURAL EXAMINATION: ICD-10-CM

## 2024-06-19 NOTE — TELEPHONE ENCOUNTER
MAGDIM with DON to inform them of appts for Mr. Glynn. He is scheduled for vein mapping at [facility] on 6/21 at 1p with a 1245p arrival time and an office visit with Dr. Turner on 6/25 at 0930a.

## 2024-06-21 PROBLEM — B02.21 RAMSAY HUNT SYNDROME (GENICULATE HERPES ZOSTER): Status: ACTIVE | Noted: 2020-08-31

## 2024-06-21 PROBLEM — F03.90 DEMENTIA (HCC): Status: ACTIVE | Noted: 2024-06-21

## 2024-06-21 NOTE — TELEPHONE ENCOUNTER
Spoke with  at [place], Mr. Glynn did not show for his scan at [facility] today. Waiting for nurse to call to reschedule pt scan and appt.

## 2024-06-21 NOTE — TELEPHONE ENCOUNTER
Spoke with Sherice RN at [place] and informed her that I got Mr. Glynn scheduled on 6/26 at 9a for vein mapping at [facility] and office visit with Dr. Turner 6/27 at 10a.

## 2024-06-26 ENCOUNTER — HOSPITAL ENCOUNTER (OUTPATIENT)
Dept: VASCULAR LAB | Age: 89
Discharge: HOME | End: 2024-06-28
Attending: SURGERY
Payer: MEDICARE

## 2024-06-26 DIAGNOSIS — N18.6 ESRD (END STAGE RENAL DISEASE) (HCC): ICD-10-CM

## 2024-06-26 DIAGNOSIS — Z01.818 ENCOUNTER FOR OTHER PREPROCEDURAL EXAMINATION: ICD-10-CM

## 2024-06-26 DIAGNOSIS — I77.0 ARTERIOVENOUS FISTULA, ACQUIRED (HCC): ICD-10-CM

## 2024-06-26 PROCEDURE — 93985 DUP-SCAN HEMO COMPL BI STD: CPT

## 2024-06-27 ENCOUNTER — OFFICE VISIT (OUTPATIENT)
Dept: VASCULAR SURGERY | Age: 89
End: 2024-06-27
Payer: MEDICARE

## 2024-06-27 VITALS
SYSTOLIC BLOOD PRESSURE: 148 MMHG | DIASTOLIC BLOOD PRESSURE: 69 MMHG | HEART RATE: 92 BPM | HEIGHT: 75 IN | BODY MASS INDEX: 18.16 KG/M2 | TEMPERATURE: 98.5 F

## 2024-06-27 DIAGNOSIS — N18.6 ESRD (END STAGE RENAL DISEASE) (HCC): Primary | ICD-10-CM

## 2024-06-27 LAB
VAS LEFT BASILIC VEIN LOWER ARM PROX DIAM: 1.4 MM
VAS LEFT BASILIC VEIN LOWER DIST DEPTH: 0.25 CM
VAS LEFT BASILIC VEIN UPPER ARM DIST DIAM: 4.4 MM
VAS LEFT BASILIC VEIN UPPER ARM MID DIAM: 5.4 MM
VAS LEFT BASILIC VEIN UPPER ARM PROX DIAM: 2.3 MM
VAS LEFT BASILIC VEIN UPPER DIST DEPTH: 0.29 CM
VAS LEFT BASILIC VEIN UPPER MID DEPTH: 0.36 CM
VAS LEFT BASILIC VEIN UPPER PROX DEPTH: 0.29 CM
VAS LEFT BASILIC VEIN WRIST DIAM: 1 MM
VAS LEFT BASLIC VEIN LOWER ARM DIST DIAM: 1.4 MM
VAS LEFT BRACHIAL A DIST PSV: 81.4 CM/S
VAS LEFT BRACHIAL DIST AP DIAM: 5.8 CM
VAS LEFT BRACHIAL VEIN 1 DIAM: 3.2 MM
VAS LEFT BRACHIAL VEIN 2 DIAM: 2.7 MM
VAS LEFT CEPHALIC VEIN LOWER ARM DIST DIAM: 2.4 MM
VAS LEFT CEPHALIC VEIN LOWER ARM MID DIAM: 2 MM
VAS LEFT CEPHALIC VEIN UPPER ARM DIST DIAM: 2.5 MM
VAS LEFT CEPHALIC VEIN UPPER ARM MID DIAM: 2.9 MM
VAS LEFT CEPHALIC VEIN UPPER ARM PROX DIAM: 2.7 MM
VAS LEFT CEPHALIC VEIN UPPER DIST DEPTH: 0.22 CM
VAS LEFT CEPHALIC VEIN UPPER MID DEPTH: 0.13 CM
VAS LEFT CEPHALIC VEIN UPPER PROX DEPTH: 0.23 CM
VAS LEFT CEPHALIC VEIN WRIST DIAM: 1.2 MM
VAS LEFT PERFORATOR VEIN ARM DIAM: 3.1 MM
VAS LEFT RADIAL A DIST PSV: 64.8 CM/S
VAS LEFT RADIAL A PROX PSV: 77.4 CM/S
VAS LEFT RADIAL DIST AP DIAM: 3.2 CM
VAS LEFT RADIAL PROX AP DIAM: 3.3 CM
VAS LEFT SUBCLAVIAN MID PSV: 118 CM/S
VAS LEFT ULNAR A DIST PSV: 73.2 CM/S
VAS LEFT ULNAR A PROX PSV: 97.4 CM/S
VAS LEFT ULNAR DIST AP DIAM: 2.3 CM
VAS LEFT ULNAR PROX AP DIAM: 2.7 CM
VAS RIGHT BASILIC VEIN LOWER ARM PROX DIAM: 1.9 MM
VAS RIGHT BASILIC VEIN UPPER ARM DIST DIAM: 4 MM
VAS RIGHT BASILIC VEIN UPPER ARM MID DIAM: 4 MM
VAS RIGHT BASILIC VEIN UPPER ARM PROX DIAM: 4.5 MM
VAS RIGHT BASILIC VEIN UPPER DIST DEPTH: 0.37 CM
VAS RIGHT BASILIC VEIN UPPER MID DEPTH: 0.43 CM
VAS RIGHT BASILIC VEIN UPPER PROX DEPTH: 0.26 CM
VAS RIGHT BASILIC VEIN WRIST DIAM: 1.6 MM
VAS RIGHT BASLIC VEIN LOWER ARM DIST DIAM: 2.8 MM
VAS RIGHT BRACHIAL A DIST PSV: 78.3 CM/S
VAS RIGHT BRACHIAL DIST AP DIAM: 6.3 CM
VAS RIGHT BRACHIAL VEIN 1 DIAM: 4.2 MM
VAS RIGHT BRACHIAL VEIN 2 DIAM: 3.6 MM
VAS RIGHT CEPHALIC VEIN LOWER ARM DIST DIAM: 2.2 MM
VAS RIGHT CEPHALIC VEIN LOWER ARM MID DIAM: 1.7 MM
VAS RIGHT CEPHALIC VEIN UPPER ARM DIST DIAM: 4.3 MM
VAS RIGHT CEPHALIC VEIN UPPER ARM MID DIAM: 3.4 MM
VAS RIGHT CEPHALIC VEIN UPPER ARM PROX DIAM: 1.4 MM
VAS RIGHT CEPHALIC VEIN UPPER DIST DEPTH: 0.25 CM
VAS RIGHT CEPHALIC VEIN UPPER MID DEPTH: 0.15 CM
VAS RIGHT CEPHALIC VEIN UPPER PROX DEPTH: 0.2 CM
VAS RIGHT CEPHALIC VEIN WRIST DIAM: 1.2 MM
VAS RIGHT PERFORATOR VEIN ARM DIAM: 2.6 MM
VAS RIGHT RADIAL A DIST PSV: 66.5 CM/S
VAS RIGHT RADIAL A PROX PSV: 66.1 CM/S
VAS RIGHT RADIAL DIST AP DIAM: 3.4 CM
VAS RIGHT RADIAL PROX AP DIAM: 4.52 CM
VAS RIGHT SUBCLAVIAN PROX PSV: 132 CM/S
VAS RIGHT ULNAR A DIST PSV: 63.5 CM/S
VAS RIGHT ULNAR A PROX PSV: 55.4 CM/S
VAS RIGHT ULNAR DIST AP DIAM: 1.8 CM
VAS RIGHT ULNAR PROX AP DIAM: 2.6 CM

## 2024-06-27 PROCEDURE — G8419 CALC BMI OUT NRM PARAM NOF/U: HCPCS | Performed by: SURGERY

## 2024-06-27 PROCEDURE — G8427 DOCREV CUR MEDS BY ELIG CLIN: HCPCS | Performed by: SURGERY

## 2024-06-27 PROCEDURE — 1123F ACP DISCUSS/DSCN MKR DOCD: CPT | Performed by: SURGERY

## 2024-06-27 PROCEDURE — 1036F TOBACCO NON-USER: CPT | Performed by: SURGERY

## 2024-06-27 PROCEDURE — 99205 OFFICE O/P NEW HI 60 MIN: CPT | Performed by: SURGERY

## 2024-06-27 RX ORDER — ONDANSETRON 4 MG/1
4 TABLET, FILM COATED ORAL EVERY 6 HOURS PRN
COMMUNITY

## 2024-06-27 RX ORDER — ACETAMINOPHEN 325 MG/1
650 TABLET ORAL EVERY 4 HOURS PRN
COMMUNITY

## 2024-06-27 NOTE — PROGRESS NOTES
Places Lived in the Last Year: 1     Unstable Housing in the Last Year: No       Family History:      Problem Relation Age of Onset    Cancer Mother         colon cancer    Cancer Brother         colon cancer, stomach cancer       Review of Systems:  Pertinent positive and negativeitems are noted in the HPI.  A comprehensive review of all other symptoms is otherwise negative.      Physical Examination:    Vitals:    06/27/24 0922   BP: (!) 148/69   Site: Right Upper Arm   Position: Sitting   Pulse: 92   Temp: 98.5 °F (36.9 °C)   TempSrc: Infrared   Height: 1.905 m (6' 3\")     Body mass index is 18.16 kg/m².   Physical Exam  Constitutional:       Appearance: He is normal weight.      Comments: Elderly, frail-appearing BM presenting in W/C. Very thin. NAD.   HENT:      Head: Normocephalic and atraumatic.   Eyes:      General: No scleral icterus.     Conjunctiva/sclera: Conjunctivae normal.   Neck:      Vascular: No carotid bruit or JVD.      Comments: R IJ tunneled cath in place, with clean exit site in right anterior chest wall.   Cardiovascular:      Rate and Rhythm: Normal rate and regular rhythm.      Heart sounds: No murmur heard.     No friction rub. No gallop.      Comments: Distant heart sounds  Abdominal:      General: Abdomen is flat. Bowel sounds are normal. There is no distension.      Palpations: Abdomen is soft. There is no mass.      Tenderness: There is no abdominal tenderness. There is no guarding.      Comments: Several healed laparoscopic incisions.    Musculoskeletal:         General: No swelling or deformity. Normal range of motion.      Cervical back: Normal range of motion and neck supple. No tenderness.      Comments: Left upper arm with prominent cephalic vein. No areas of phlebitis noted.    Skin:     General: Skin is warm and dry.      Capillary Refill: Capillary refill takes less than 2 seconds.      Coloration: Skin is not jaundiced or pale.      Findings: No rash.   Neurological:

## 2024-07-02 ENCOUNTER — TELEPHONE (OUTPATIENT)
Dept: VASCULAR SURGERY | Age: 89
End: 2024-07-02

## 2024-07-02 NOTE — TELEPHONE ENCOUNTER
Attempted to call Taylor back but unable to LVM, I did get hold of son and informed him to have her call the office. I also attempted to call [facility] to clarify with them procedure, was on hold 10m and then my call was disconnected. A copy of surgery letter was sent to facility when he was transported back from office.

## 2024-07-02 NOTE — TELEPHONE ENCOUNTER
Patient's spouse, Taylor, called stating her spouse is not able to have surgery and said to cancel it. She states the office was probably not aware that he is a resident at [facility]. Informed patient's spouse that the clinical team was aware, and most likely scheduled the procedure with someone from the care center so appropriate transportation could be scheduled... she was confused and kept saying to just cancel the procedure. Informed her I would place a note back to the clinical team to have them call her to discuss everything    Please call patient's spouse, Taylor, back [phone]

## 2024-07-16 ENCOUNTER — TELEPHONE (OUTPATIENT)
Dept: VASCULAR SURGERY | Age: 89
End: 2024-07-16

## 2024-07-16 NOTE — TELEPHONE ENCOUNTER
Taylor, patient's wife, called in stating that she wants to reschedule the patient's procedure    Taylor states that the surgery should not have been cancelled and that the patient is feeling fine and is able to have his surgery    Please contact Taylor at [phone]

## 2024-08-07 ENCOUNTER — TELEPHONE (OUTPATIENT)
Dept: VASCULAR SURGERY | Age: 89
End: 2024-08-07

## 2024-08-07 NOTE — TELEPHONE ENCOUNTER
Attempted to call Mr. Glynn but phone has busy signle. I did LVM on wife cell informing her I was needing to speak to Mr. Glynn in regards to procedure next week.

## 2024-08-12 ENCOUNTER — TELEPHONE (OUTPATIENT)
Dept: VASCULAR SURGERY | Age: 89
End: 2024-08-12

## 2024-08-12 NOTE — TELEPHONE ENCOUNTER
Spoke with nurse at [facility] and informed them of needing to reschedule for Mr. Glynn procedure on 8/14. Informed her I will call them back when I have a date and time to set up transport.I also called and spoke with Taylor his wife and informed her of rescheduling.

## 2024-09-18 ENCOUNTER — TELEPHONE (OUTPATIENT)
Dept: VASCULAR SURGERY | Age: 89
End: 2024-09-18

## 2024-12-06 ENCOUNTER — HOSPITAL ENCOUNTER (EMERGENCY)
Age: 89
Discharge: HOME | End: 2024-12-06
Attending: EMERGENCY MEDICINE
Payer: MEDICARE

## 2024-12-06 ENCOUNTER — APPOINTMENT (OUTPATIENT)
Dept: GENERAL RADIOLOGY | Age: 89
End: 2024-12-06
Payer: MEDICARE

## 2024-12-06 VITALS
SYSTOLIC BLOOD PRESSURE: 120 MMHG | HEIGHT: 75 IN | HEART RATE: 76 BPM | DIASTOLIC BLOOD PRESSURE: 69 MMHG | RESPIRATION RATE: 14 BRPM | TEMPERATURE: 97.8 F | WEIGHT: 164.8 LBS | BODY MASS INDEX: 20.49 KG/M2 | OXYGEN SATURATION: 99 %

## 2024-12-06 DIAGNOSIS — R55 SYNCOPE AND COLLAPSE: Primary | ICD-10-CM

## 2024-12-06 LAB
ANION GAP SERPL CALCULATED.3IONS-SCNC: 13 MMOL/L (ref 3–16)
BASE EXCESS BLDV CALC-SCNC: 3.1 MMOL/L (ref -2–3)
BASOPHILS # BLD: 0 K/UL (ref 0–0.2)
BASOPHILS NFR BLD: 0.4 %
BUN SERPL-MCNC: 24 MG/DL (ref 7–20)
CALCIUM SERPL-MCNC: 8.7 MG/DL (ref 8.3–10.6)
CHLORIDE SERPL-SCNC: 98 MMOL/L (ref 99–110)
CO2 BLDV-SCNC: 30 MMOL/L
CO2 SERPL-SCNC: 24 MMOL/L (ref 21–32)
COHGB MFR BLDV: 1.3 % (ref 0–1.5)
CREAT SERPL-MCNC: 3.7 MG/DL (ref 0.8–1.3)
DEPRECATED RDW RBC AUTO: 14.1 % (ref 12.4–15.4)
DO-HGB MFR BLDV: 9 %
EKG ATRIAL RATE: 80 BPM
EKG DIAGNOSIS: NORMAL
EKG P AXIS: 60 DEGREES
EKG P-R INTERVAL: 230 MS
EKG Q-T INTERVAL: 388 MS
EKG QRS DURATION: 84 MS
EKG QTC CALCULATION (BAZETT): 447 MS
EKG R AXIS: -11 DEGREES
EKG T AXIS: 84 DEGREES
EKG VENTRICULAR RATE: 80 BPM
EOSINOPHIL # BLD: 0.3 K/UL (ref 0–0.6)
EOSINOPHIL NFR BLD: 4 %
GFR SERPLBLD CREATININE-BSD FMLA CKD-EPI: 15 ML/MIN/{1.73_M2}
GLUCOSE SERPL-MCNC: 147 MG/DL (ref 70–99)
HCO3 BLDV-SCNC: 28.3 MMOL/L (ref 24–28)
HCT VFR BLD AUTO: 34.3 % (ref 40.5–52.5)
HGB BLD-MCNC: 11.1 G/DL (ref 13.5–17.5)
LYMPHOCYTES # BLD: 0.9 K/UL (ref 1–5.1)
LYMPHOCYTES NFR BLD: 14.4 %
MAGNESIUM SERPL-MCNC: 2.03 MG/DL (ref 1.8–2.4)
MCH RBC QN AUTO: 28.9 PG (ref 26–34)
MCHC RBC AUTO-ENTMCNC: 32.3 G/DL (ref 31–36)
MCV RBC AUTO: 89.3 FL (ref 80–100)
METHGB MFR BLDV: 0.4 % (ref 0–1.5)
MONOCYTES # BLD: 0.8 K/UL (ref 0–1.3)
MONOCYTES NFR BLD: 12.5 %
NEUTROPHILS # BLD: 4.3 K/UL (ref 1.7–7.7)
NEUTROPHILS NFR BLD: 68.7 %
NT-PROBNP SERPL-MCNC: 189 PG/ML (ref 0–449)
PCO2 BLDV: 44.6 MMHG (ref 41–51)
PH BLDV: 7.41 [PH] (ref 7.35–7.45)
PLATELET # BLD AUTO: 168 K/UL (ref 135–450)
PMV BLD AUTO: 9.4 FL (ref 5–10.5)
PO2 BLDV: 58.5 MMHG (ref 25–40)
POTASSIUM SERPL-SCNC: 3.3 MMOL/L (ref 3.5–5.1)
RBC # BLD AUTO: 3.84 M/UL (ref 4.2–5.9)
SAO2 % BLDV: 91 %
SODIUM SERPL-SCNC: 135 MMOL/L (ref 136–145)
TROPONIN, HIGH SENSITIVITY: 52 NG/L (ref 0–22)
TROPONIN, HIGH SENSITIVITY: 55 NG/L (ref 0–22)
WBC # BLD AUTO: 6.3 K/UL (ref 4–11)

## 2024-12-06 PROCEDURE — 82803 BLOOD GASES ANY COMBINATION: CPT

## 2024-12-06 PROCEDURE — 99285 EMERGENCY DEPT VISIT HI MDM: CPT

## 2024-12-06 PROCEDURE — 84484 ASSAY OF TROPONIN QUANT: CPT

## 2024-12-06 PROCEDURE — 93005 ELECTROCARDIOGRAM TRACING: CPT

## 2024-12-06 PROCEDURE — 83880 ASSAY OF NATRIURETIC PEPTIDE: CPT

## 2024-12-06 PROCEDURE — 85025 COMPLETE CBC W/AUTO DIFF WBC: CPT

## 2024-12-06 PROCEDURE — 80048 BASIC METABOLIC PNL TOTAL CA: CPT

## 2024-12-06 PROCEDURE — 83735 ASSAY OF MAGNESIUM: CPT

## 2024-12-06 PROCEDURE — 71045 X-RAY EXAM CHEST 1 VIEW: CPT

## 2024-12-06 RX ORDER — MIDODRINE HYDROCHLORIDE 5 MG/1
5 TABLET ORAL
COMMUNITY

## 2024-12-06 RX ORDER — ACETAMINOPHEN 325 MG/1
325 TABLET ORAL 3 TIMES DAILY
COMMUNITY

## 2024-12-06 RX ORDER — LATANOPROST 50 UG/ML
1 SOLUTION/ DROPS OPHTHALMIC NIGHTLY
COMMUNITY

## 2024-12-06 RX ORDER — LOPERAMIDE HYDROCHLORIDE 2 MG/1
2 CAPSULE ORAL EVERY 8 HOURS PRN
COMMUNITY

## 2024-12-06 RX ORDER — HYDROPHOR 42 G/100G
OINTMENT TOPICAL
COMMUNITY

## 2024-12-06 ASSESSMENT — PAIN - FUNCTIONAL ASSESSMENT: PAIN_FUNCTIONAL_ASSESSMENT: NONE - DENIES PAIN

## 2024-12-06 NOTE — DISCHARGE INSTRUCTIONS
You were seen in the emergency department after you lost consciousness during dialysis.  This was likely due to low blood pressure.  This can be common and if it continues to happen, your doctor may talk about starting medications to help raise her blood pressure during dialysis.    Please return to the emergency department if you develop chest pain, shortness of breath, or heart palpitations over the weekend.

## 2024-12-06 NOTE — ED NOTES
Clinical Pharmacy Note  Medication History     ED Encounter Date: 12/6/2024    List of current medications patient is taking is complete. Home Medication list in EPIC updated to reflect changes noted below.    Source of information: Transfer documents provided by nursing facility ([facility])    Patient's home pharmacy:  [facility] [address] - Ph: [phone]    Changes made to medication list:   Medications removed: (include reason, ex: therapy completed, patient no longer taking, etc.)  Aspirin EC - no longer taking per facility documentation  Memantine - no longer taking per facility documentation  Multivitamin - no longer taking per facility documentation  Medications added:   Loperamide  Midodrine  Mineral oil/hydrophilic petrolatum ointment  Medication doses adjusted:   Calcium acetate - dose added (1 capsule PO TID WC)  Other notes:   None at this time    Current Outpatient Medications   Medication Instructions    acetaminophen (TYLENOL) 650 mg, Oral, EVERY 8 HOURS PRN    acetaminophen (TYLENOL) 325 mg, Oral, 3 TIMES DAILY    amLODIPine (NORVASC) 10 mg, Oral, DAILY    atorvastatin (LIPITOR) 10 mg, Oral, DAILY    calcium acetate (PHOSLO) 667 MG CAPS capsule 1 capsule, Oral, 3 TIMES DAILY WITH MEALS    famotidine (PEPCID) 10 mg, Oral, DAILY, . Administer after hemodialysis on dialysis days    latanoprost (XALATAN) 0.005 % ophthalmic solution 1 drop, Both Eyes, NIGHTLY    levETIRAcetam (KEPPRA) 500 mg, Oral, 2 TIMES DAILY    loperamide (IMODIUM) 2 mg, Oral, EVERY 8 HOURS PRN    midodrine (PROAMATINE) 5 mg, Oral, THREE TIMES WEEKLY (MONDAY, WEDNESDAY, FRIDAY), on Mon/Wed/Fri    mineral oil-hydrophilic petrolatum (HYDROPHOR) ointment Apply topically as needed for dry skin    mirtazapine (REMERON) 30 mg, Oral, NIGHTLY    ondansetron (ZOFRAN) 4 mg, Oral, EVERY 6 HOURS PRN    tamsulosin (FLOMAX) 0.4 MG capsule Oral, DAILY       Thank you for consulting pharmacy,    Jennifer

## 2024-12-06 NOTE — ED PROVIDER NOTES
ED Attending Attestation Note     Date of evaluation: 12/6/2024    This patient was seen by the advance practice provider.  I have seen and examined the patient, agree with the workup, evaluation, management and diagnosis. The care plan has been discussed.  I have reviewed the ECG and concur with the EZ's interpretation.      Briefly, Fawad Glynn is a 89 y.o. male with a PMH inclusive of ESRD on dialysis, MWF who presents for evaluation of syncope, hypotensive at that time. Was 2 hours into dialysis session. Has felt lightheaded during other sessions.     Notable exam findings include ***    Assessment/ Medical Decision Making:     ***    Critical Care:  Due to the immediate potential for life-threatening deterioration due to ***, I spent *** minutes providing critical care.  This time excludes time spent performing procedures but includes time spent on direct patient care, history retrieval, review of the chart, and discussions with patient, family, and consultant(s).    
5.0 - 10.5 fL    Neutrophils % 68.7 %    Lymphocytes % 14.4 %    Monocytes % 12.5 %    Eosinophils % 4.0 %    Basophils % 0.4 %    Neutrophils Absolute 4.3 1.7 - 7.7 K/uL    Lymphocytes Absolute 0.9 (L) 1.0 - 5.1 K/uL    Monocytes Absolute 0.8 0.0 - 1.3 K/uL    Eosinophils Absolute 0.3 0.0 - 0.6 K/uL    Basophils Absolute 0.0 0.0 - 0.2 K/uL   BMP w/ Reflex to MG   Result Value Ref Range    Sodium 135 (L) 136 - 145 mmol/L    Potassium reflex Magnesium 3.3 (L) 3.5 - 5.1 mmol/L    Chloride 98 (L) 99 - 110 mmol/L    CO2 24 21 - 32 mmol/L    Anion Gap 13 3 - 16    Glucose 147 (H) 70 - 99 mg/dL    BUN 24 (H) 7 - 20 mg/dL    Creatinine 3.7 (H) 0.8 - 1.3 mg/dL    Est, Glom Filt Rate 15 (A) >60    Calcium 8.7 8.3 - 10.6 mg/dL   Troponin   Result Value Ref Range    Troponin, High Sensitivity 55 (H) 0 - 22 ng/L   Brain Natriuretic Peptide   Result Value Ref Range    NT Pro- 0 - 449 pg/mL   Magnesium   Result Value Ref Range    Magnesium 2.03 1.80 - 2.40 mg/dL   Troponin   Result Value Ref Range    Troponin, High Sensitivity 52 (H) 0 - 22 ng/L   Blood gas, venous (Lab)   Result Value Ref Range    pH, Faisal 7.410 7.350 - 7.450    pCO2, Faisal 44.6 41.0 - 51.0 mmHg    PO2, Faisal 58.5 (H) 25.0 - 40.0 mmHg    HCO3, Venous 28.3 (H) 24.0 - 28.0 mmol/L    Base Excess, Faisal 3.1 (H) -2.0 - 3.0 mmol/L    O2 Sat, Faisal 91 Not established %    Carboxyhemoglobin 1.3 0.0 - 1.5 %    MetHgb, Faisal 0.4 0.0 - 1.5 %    TC02 (Calc), Faisal 30 mmol/L    Hemoglobin, Faisal, Reduced 9.00 %   EKG 12 Lead   Result Value Ref Range    Ventricular Rate 80 BPM    Atrial Rate 80 BPM    P-R Interval 230 ms    QRS Duration 84 ms    Q-T Interval 388 ms    QTc Calculation (Bazett) 447 ms    P Axis 60 degrees    R Axis -11 degrees    T Axis 84 degrees    Diagnosis       Sinus rhythm with marked sinus arrhythmia with 1st degree A-V block with occasional Premature ventricular complexesAnterior infarct , age undeterminedAbnormal ECGEKG performed in ER and to be interpreted by

## 2024-12-19 ENCOUNTER — OFFICE VISIT (OUTPATIENT)
Dept: INTERNAL MEDICINE CLINIC | Age: 89
End: 2024-12-19

## 2024-12-19 VITALS — DIASTOLIC BLOOD PRESSURE: 64 MMHG | SYSTOLIC BLOOD PRESSURE: 128 MMHG

## 2024-12-19 DIAGNOSIS — G40.209 COMPLEX PARTIAL SEIZURE DISORDER (HCC): Primary | Chronic | ICD-10-CM

## 2024-12-19 DIAGNOSIS — Z00.00 ANNUAL WELLNESS VISIT: ICD-10-CM

## 2024-12-19 DIAGNOSIS — N18.4 TYPE 2 DIABETES MELLITUS WITH STAGE 4 CHRONIC KIDNEY DISEASE, WITHOUT LONG-TERM CURRENT USE OF INSULIN (HCC): ICD-10-CM

## 2024-12-19 DIAGNOSIS — N18.6 ESRD (END STAGE RENAL DISEASE) (HCC): ICD-10-CM

## 2024-12-19 DIAGNOSIS — F20.0 PARANOID SCHIZOPHRENIA (HCC): Chronic | ICD-10-CM

## 2024-12-19 DIAGNOSIS — E11.22 TYPE 2 DIABETES MELLITUS WITH STAGE 4 CHRONIC KIDNEY DISEASE, WITHOUT LONG-TERM CURRENT USE OF INSULIN (HCC): ICD-10-CM

## 2024-12-19 PROBLEM — N17.9 AKI (ACUTE KIDNEY INJURY) (HCC): Status: RESOLVED | Noted: 2021-04-15 | Resolved: 2024-12-19

## 2024-12-19 PROBLEM — N18.5 CKD (CHRONIC KIDNEY DISEASE), SYMPTOM MANAGEMENT ONLY, STAGE 5 (HCC): Status: RESOLVED | Noted: 2024-01-29 | Resolved: 2024-12-19

## 2024-12-19 SDOH — ECONOMIC STABILITY: FOOD INSECURITY: WITHIN THE PAST 12 MONTHS, YOU WORRIED THAT YOUR FOOD WOULD RUN OUT BEFORE YOU GOT MONEY TO BUY MORE.: NEVER TRUE

## 2024-12-19 SDOH — ECONOMIC STABILITY: INCOME INSECURITY: HOW HARD IS IT FOR YOU TO PAY FOR THE VERY BASICS LIKE FOOD, HOUSING, MEDICAL CARE, AND HEATING?: NOT HARD AT ALL

## 2024-12-19 SDOH — ECONOMIC STABILITY: FOOD INSECURITY: WITHIN THE PAST 12 MONTHS, THE FOOD YOU BOUGHT JUST DIDN'T LAST AND YOU DIDN'T HAVE MONEY TO GET MORE.: NEVER TRUE

## 2024-12-19 NOTE — ASSESSMENT & PLAN NOTE
Questionable diagnosis, pt did have one psychotic episode later in life. Has seen Dr. Meredith  - Continue mirtazapine

## 2024-12-19 NOTE — PROGRESS NOTES
amlodipine  - continue midodrine       Orders Placed This Encounter    CBC with Auto Differential     Standing Status:   Future     Standing Expiration Date:   6/19/2025    Comprehensive Metabolic Panel     Standing Status:   Future     Standing Expiration Date:   6/19/2025    Lipid Panel     Standing Status:   Future     Standing Expiration Date:   6/19/2025    Vitamin D 25 Hydroxy     Standing Status:   Future     Standing Expiration Date:   6/19/2025    TSH reflex to FT4     Standing Status:   Future     Standing Expiration Date:   6/19/2025    Hemoglobin A1C     Standing Status:   Future     Standing Expiration Date:   6/19/2025    PSA, Prostatic Specific Antigen     Standing Status:   Future     Standing Expiration Date:   6/19/2025        Return in about 3 months (around 3/19/2025) for annual wellness visit.

## 2024-12-19 NOTE — ASSESSMENT & PLAN NOTE
Stable.  On HD MWF.  Had recent episode of hypotension during hemodialysis.  - Follow-up with Dr. Mcclain  - Continue amlodipine  - continue midodrine

## 2025-03-10 ENCOUNTER — APPOINTMENT (OUTPATIENT)
Dept: GENERAL RADIOLOGY | Age: 89
End: 2025-03-10
Payer: MEDICARE

## 2025-03-10 ENCOUNTER — HOSPITAL ENCOUNTER (EMERGENCY)
Age: 89
Discharge: NURSING, REHAB OR LONG-TERM CARE | End: 2025-03-11
Attending: EMERGENCY MEDICINE
Payer: MEDICARE

## 2025-03-10 DIAGNOSIS — R11.0 NAUSEA: Primary | ICD-10-CM

## 2025-03-10 LAB
ALBUMIN SERPL-MCNC: 4.3 G/DL (ref 3.4–5)
ALBUMIN/GLOB SERPL: 1.2 {RATIO} (ref 1.1–2.2)
ALP SERPL-CCNC: 131 U/L (ref 40–129)
ALT SERPL-CCNC: 8 U/L (ref 10–40)
ANION GAP SERPL CALCULATED.3IONS-SCNC: 15 MMOL/L (ref 3–16)
AST SERPL-CCNC: 16 U/L (ref 15–37)
BASOPHILS # BLD: 0 K/UL (ref 0–0.2)
BASOPHILS NFR BLD: 0.4 %
BILIRUB SERPL-MCNC: <0.2 MG/DL (ref 0–1)
BUN SERPL-MCNC: 35 MG/DL (ref 7–20)
CALCIUM SERPL-MCNC: 9.3 MG/DL (ref 8.3–10.6)
CHLORIDE SERPL-SCNC: 98 MMOL/L (ref 99–110)
CO2 SERPL-SCNC: 23 MMOL/L (ref 21–32)
CREAT SERPL-MCNC: 5.2 MG/DL (ref 0.8–1.3)
DEPRECATED RDW RBC AUTO: 14 % (ref 12.4–15.4)
EOSINOPHIL # BLD: 0.1 K/UL (ref 0–0.6)
EOSINOPHIL NFR BLD: 1.3 %
FLUAV RNA RESP QL NAA+PROBE: NOT DETECTED
FLUBV RNA RESP QL NAA+PROBE: NOT DETECTED
GFR SERPLBLD CREATININE-BSD FMLA CKD-EPI: 10 ML/MIN/{1.73_M2}
GLUCOSE BLD-MCNC: 128 MG/DL (ref 70–99)
GLUCOSE SERPL-MCNC: 123 MG/DL (ref 70–99)
HCT VFR BLD AUTO: 41 % (ref 40.5–52.5)
HGB BLD-MCNC: 13.4 G/DL (ref 13.5–17.5)
LIPASE SERPL-CCNC: 60 U/L (ref 13–60)
LYMPHOCYTES # BLD: 0.6 K/UL (ref 1–5.1)
LYMPHOCYTES NFR BLD: 5.4 %
MCH RBC QN AUTO: 28.8 PG (ref 26–34)
MCHC RBC AUTO-ENTMCNC: 32.6 G/DL (ref 31–36)
MCV RBC AUTO: 88.3 FL (ref 80–100)
MONOCYTES # BLD: 0.8 K/UL (ref 0–1.3)
MONOCYTES NFR BLD: 7.2 %
NEUTROPHILS # BLD: 9.6 K/UL (ref 1.7–7.7)
NEUTROPHILS NFR BLD: 85.7 %
PERFORMED ON: ABNORMAL
PLATELET # BLD AUTO: 212 K/UL (ref 135–450)
PMV BLD AUTO: 9.8 FL (ref 5–10.5)
POTASSIUM SERPL-SCNC: 4.1 MMOL/L (ref 3.5–5.1)
PROT SERPL-MCNC: 7.8 G/DL (ref 6.4–8.2)
RBC # BLD AUTO: 4.65 M/UL (ref 4.2–5.9)
SARS-COV-2 RNA RESP QL NAA+PROBE: NOT DETECTED
SODIUM SERPL-SCNC: 136 MMOL/L (ref 136–145)
TROPONIN, HIGH SENSITIVITY: 60 NG/L (ref 0–22)
WBC # BLD AUTO: 11.2 K/UL (ref 4–11)

## 2025-03-10 PROCEDURE — 85025 COMPLETE CBC W/AUTO DIFF WBC: CPT

## 2025-03-10 PROCEDURE — 80053 COMPREHEN METABOLIC PANEL: CPT

## 2025-03-10 PROCEDURE — 71045 X-RAY EXAM CHEST 1 VIEW: CPT

## 2025-03-10 PROCEDURE — 87636 SARSCOV2 & INF A&B AMP PRB: CPT

## 2025-03-10 PROCEDURE — 93005 ELECTROCARDIOGRAM TRACING: CPT

## 2025-03-10 PROCEDURE — 84484 ASSAY OF TROPONIN QUANT: CPT

## 2025-03-10 PROCEDURE — 83690 ASSAY OF LIPASE: CPT

## 2025-03-10 PROCEDURE — 99284 EMERGENCY DEPT VISIT MOD MDM: CPT

## 2025-03-11 VITALS
WEIGHT: 157 LBS | HEIGHT: 76 IN | SYSTOLIC BLOOD PRESSURE: 132 MMHG | HEART RATE: 81 BPM | BODY MASS INDEX: 19.12 KG/M2 | RESPIRATION RATE: 15 BRPM | TEMPERATURE: 98.5 F | DIASTOLIC BLOOD PRESSURE: 72 MMHG | OXYGEN SATURATION: 98 %

## 2025-03-11 LAB
EKG ATRIAL RATE: 78 BPM
EKG DIAGNOSIS: NORMAL
EKG P AXIS: 73 DEGREES
EKG P-R INTERVAL: 228 MS
EKG Q-T INTERVAL: 378 MS
EKG QRS DURATION: 90 MS
EKG QTC CALCULATION (BAZETT): 430 MS
EKG R AXIS: 27 DEGREES
EKG T AXIS: 83 DEGREES
EKG VENTRICULAR RATE: 78 BPM
TROPONIN, HIGH SENSITIVITY: 47 NG/L (ref 0–22)

## 2025-03-11 PROCEDURE — 84484 ASSAY OF TROPONIN QUANT: CPT

## 2025-03-11 NOTE — ED PROVIDER NOTES
THE [facility]  EMERGENCY DEPARTMENT ENCOUNTER          EM RESIDENT NOTE       Date of evaluation: 3/10/2025    Chief Complaint     Abdominal Pain (Pt coming from [facility] for abd pain/n/v/d starting today after dialysis. )      History of Present Illness     Fawad Glynn is a 89 y.o. male with a history of HTN, HLD, T2DM, ESRD on HD MWF, prostate surgery, hernia repair who presents to the ED from his nursing facility ([facility]) for nausea and vomiting that began after his dialysis session today.  He reports his symptoms have since resolved.  He denies any blood in his emesis.  Additionally denies any belly pain, fevers, chills, cough, chest pain, shortness of breath, or diarrhea.  He does mention that they were having an issue with his dialysis machine today which caused the session to extend 2 hours longer than usual.  He has a right chest wall port through which he receives dialysis.  He reports he does still make a little urine.    MEDICAL DECISION MAKING / ASSESSMENT / PLAN     INITIAL VITALS: BP: (!) 142/87, Temp: 98.5 °F (36.9 °C), Pulse: 81, Respirations: 16, SpO2: 99 %    Fawad Glynn is a 89 y.o. male presenting with vomiting.  On arrival vital signs are stable, patient is nontoxic-appearing.  Differential includes electrolyte abnormalities, infectious process such as pneumonia/viral URI, ACS.  Given soft and nontender abdomen, I have low concern for acute intra-abdominal process at this time, however will additionally obtain liver enzymes and lipase to further rule out pancreatitis or hepatitis.    Workup thus far shows elevated creatinine consistent with his history of ESRD, no other electrolyte abnormalities, normal LFTs, CBC with a very borderline leukocytosis, troponin of 60 with a delta of -13 and that is consistent with the patient's prior troponins, normal lipase, and negative flu/COVID test.  Chest x-ray did not show any pneumonia.  Patient has remained asymptomatic and

## 2025-03-11 NOTE — ED PROVIDER NOTES
ED Attending Attestation Note     Date of evaluation: 3/10/2025    This patient was seen by the resident.  I have seen and examined the patient, agree with the workup, evaluation, management and diagnosis. The care plan has been discussed.  I have reviewed the ECG and concur with the resident's interpretation.  My assessment reveals a chronically ill-appearing elderly male lying on the hospital stretcher comfortable and in no acute distress.  Patient is alert and oriented to self and situation his abdomen is soft, nontender nontender distended..     Chalino Mtz MD  03/11/25 0119

## 2025-03-11 NOTE — DISCHARGE INSTRUCTIONS
You were seen in the emergency department for vomiting.  We performed blood work including electrolytes, blood counts, pancreas and liver labs which were unremarkable.  You should follow-up with your primary care doctor for reassessment to ensure that you are still symptom-free.  Return to the ER for any worsening vomiting, blood in your vomit, worsening abdominal pain, fevers, or for any other concerns.

## 2025-03-28 ENCOUNTER — HOSPITAL ENCOUNTER (OUTPATIENT)
Age: 89
Setting detail: OBSERVATION
Discharge: NURSING, REHAB OR LONG-TERM CARE | End: 2025-03-30
Attending: EMERGENCY MEDICINE | Admitting: HOSPITALIST
Payer: MEDICARE

## 2025-03-28 ENCOUNTER — APPOINTMENT (OUTPATIENT)
Dept: GENERAL RADIOLOGY | Age: 89
End: 2025-03-28
Payer: MEDICARE

## 2025-03-28 DIAGNOSIS — Z78.9 PROBLEM WITH VASCULAR ACCESS: Primary | ICD-10-CM

## 2025-03-28 PROBLEM — T82.41XA: Status: ACTIVE | Noted: 2025-03-28

## 2025-03-28 LAB
ANION GAP SERPL CALCULATED.3IONS-SCNC: 14 MMOL/L (ref 3–16)
BUN SERPL-MCNC: 48 MG/DL (ref 7–20)
CALCIUM SERPL-MCNC: 9.2 MG/DL (ref 8.3–10.6)
CHLORIDE SERPL-SCNC: 96 MMOL/L (ref 99–110)
CO2 SERPL-SCNC: 22 MMOL/L (ref 21–32)
CREAT SERPL-MCNC: 8.2 MG/DL (ref 0.8–1.3)
GFR SERPLBLD CREATININE-BSD FMLA CKD-EPI: 6 ML/MIN/{1.73_M2}
GLUCOSE BLD-MCNC: 100 MG/DL (ref 70–99)
GLUCOSE SERPL-MCNC: 91 MG/DL (ref 70–99)
PERFORMED ON: ABNORMAL
POTASSIUM SERPL-SCNC: 4.4 MMOL/L (ref 3.5–5.1)
SODIUM SERPL-SCNC: 132 MMOL/L (ref 136–145)

## 2025-03-28 PROCEDURE — G0378 HOSPITAL OBSERVATION PER HR: HCPCS

## 2025-03-28 PROCEDURE — 71045 X-RAY EXAM CHEST 1 VIEW: CPT

## 2025-03-28 PROCEDURE — 96372 THER/PROPH/DIAG INJ SC/IM: CPT

## 2025-03-28 PROCEDURE — 80048 BASIC METABOLIC PNL TOTAL CA: CPT

## 2025-03-28 PROCEDURE — 99285 EMERGENCY DEPT VISIT HI MDM: CPT

## 2025-03-28 PROCEDURE — 36593 DECLOT VASCULAR DEVICE: CPT

## 2025-03-28 PROCEDURE — 2500000003 HC RX 250 WO HCPCS

## 2025-03-28 PROCEDURE — 99223 1ST HOSP IP/OBS HIGH 75: CPT | Performed by: HOSPITALIST

## 2025-03-28 PROCEDURE — 6360000002 HC RX W HCPCS: Performed by: PHYSICIAN ASSISTANT

## 2025-03-28 PROCEDURE — 6370000000 HC RX 637 (ALT 250 FOR IP): Performed by: PHYSICIAN ASSISTANT

## 2025-03-28 PROCEDURE — 6360000002 HC RX W HCPCS

## 2025-03-28 RX ORDER — FAMOTIDINE 20 MG/1
10 TABLET, FILM COATED ORAL DAILY
Status: DISCONTINUED | OUTPATIENT
Start: 2025-03-29 | End: 2025-03-30 | Stop reason: HOSPADM

## 2025-03-28 RX ORDER — ACETAMINOPHEN 650 MG/1
650 SUPPOSITORY RECTAL EVERY 6 HOURS PRN
Status: DISCONTINUED | OUTPATIENT
Start: 2025-03-28 | End: 2025-03-30 | Stop reason: HOSPADM

## 2025-03-28 RX ORDER — CIPROFLOXACIN 500 MG/1
500 TABLET, FILM COATED ORAL NIGHTLY
Status: ON HOLD | COMMUNITY
Start: 2025-03-25 | End: 2025-03-30 | Stop reason: HOSPADM

## 2025-03-28 RX ORDER — GLUCAGON 1 MG/ML
1 KIT INJECTION PRN
Status: DISCONTINUED | OUTPATIENT
Start: 2025-03-28 | End: 2025-03-30 | Stop reason: HOSPADM

## 2025-03-28 RX ORDER — SODIUM CHLORIDE 9 MG/ML
INJECTION, SOLUTION INTRAVENOUS PRN
Status: DISCONTINUED | OUTPATIENT
Start: 2025-03-28 | End: 2025-03-30 | Stop reason: HOSPADM

## 2025-03-28 RX ORDER — SODIUM CHLORIDE 0.9 % (FLUSH) 0.9 %
5-40 SYRINGE (ML) INJECTION PRN
Status: DISCONTINUED | OUTPATIENT
Start: 2025-03-28 | End: 2025-03-30 | Stop reason: HOSPADM

## 2025-03-28 RX ORDER — ACETAMINOPHEN 325 MG/1
650 TABLET ORAL EVERY 6 HOURS PRN
COMMUNITY

## 2025-03-28 RX ORDER — MIRTAZAPINE 30 MG/1
30 TABLET, FILM COATED ORAL NIGHTLY
Status: DISCONTINUED | OUTPATIENT
Start: 2025-03-29 | End: 2025-03-30 | Stop reason: HOSPADM

## 2025-03-28 RX ORDER — LATANOPROST 50 UG/ML
1 SOLUTION/ DROPS OPHTHALMIC NIGHTLY
Status: DISCONTINUED | OUTPATIENT
Start: 2025-03-29 | End: 2025-03-30 | Stop reason: HOSPADM

## 2025-03-28 RX ORDER — WATER 10 ML/10ML
INJECTION INTRAMUSCULAR; INTRAVENOUS; SUBCUTANEOUS
Status: COMPLETED
Start: 2025-03-28 | End: 2025-03-28

## 2025-03-28 RX ORDER — DIPHENHYDRAMINE HCL 25 MG
25 TABLET ORAL ONCE
Status: COMPLETED | OUTPATIENT
Start: 2025-03-28 | End: 2025-03-28

## 2025-03-28 RX ORDER — AMLODIPINE BESYLATE 10 MG/1
10 TABLET ORAL DAILY
Status: DISCONTINUED | OUTPATIENT
Start: 2025-03-29 | End: 2025-03-30 | Stop reason: HOSPADM

## 2025-03-28 RX ORDER — HEPARIN SODIUM 5000 [USP'U]/ML
5000 INJECTION, SOLUTION INTRAVENOUS; SUBCUTANEOUS EVERY 8 HOURS SCHEDULED
Status: DISCONTINUED | OUTPATIENT
Start: 2025-03-28 | End: 2025-03-30 | Stop reason: HOSPADM

## 2025-03-28 RX ORDER — POLYETHYLENE GLYCOL 3350 17 G/17G
17 POWDER, FOR SOLUTION ORAL DAILY PRN
Status: DISCONTINUED | OUTPATIENT
Start: 2025-03-28 | End: 2025-03-30 | Stop reason: HOSPADM

## 2025-03-28 RX ORDER — LEVETIRACETAM 500 MG/1
500 TABLET ORAL 2 TIMES DAILY
Status: DISCONTINUED | OUTPATIENT
Start: 2025-03-29 | End: 2025-03-30 | Stop reason: HOSPADM

## 2025-03-28 RX ORDER — WATER 10 ML/10ML
INJECTION INTRAMUSCULAR; INTRAVENOUS; SUBCUTANEOUS
Status: DISPENSED
Start: 2025-03-28 | End: 2025-03-29

## 2025-03-28 RX ORDER — TAMSULOSIN HYDROCHLORIDE 0.4 MG/1
0.4 CAPSULE ORAL DAILY
Status: DISCONTINUED | OUTPATIENT
Start: 2025-03-29 | End: 2025-03-30 | Stop reason: HOSPADM

## 2025-03-28 RX ORDER — ONDANSETRON 4 MG/1
4 TABLET, ORALLY DISINTEGRATING ORAL EVERY 8 HOURS PRN
Status: DISCONTINUED | OUTPATIENT
Start: 2025-03-28 | End: 2025-03-30 | Stop reason: HOSPADM

## 2025-03-28 RX ORDER — SODIUM CHLORIDE 0.9 % (FLUSH) 0.9 %
5-40 SYRINGE (ML) INJECTION EVERY 12 HOURS SCHEDULED
Status: DISCONTINUED | OUTPATIENT
Start: 2025-03-28 | End: 2025-03-30 | Stop reason: HOSPADM

## 2025-03-28 RX ORDER — ATORVASTATIN CALCIUM 10 MG/1
10 TABLET, FILM COATED ORAL DAILY
Status: DISCONTINUED | OUTPATIENT
Start: 2025-03-29 | End: 2025-03-30 | Stop reason: HOSPADM

## 2025-03-28 RX ORDER — MIDODRINE HYDROCHLORIDE 5 MG/1
5 TABLET ORAL
Status: DISCONTINUED | OUTPATIENT
Start: 2025-03-31 | End: 2025-03-30 | Stop reason: HOSPADM

## 2025-03-28 RX ORDER — ONDANSETRON 2 MG/ML
4 INJECTION INTRAMUSCULAR; INTRAVENOUS EVERY 6 HOURS PRN
Status: DISCONTINUED | OUTPATIENT
Start: 2025-03-28 | End: 2025-03-30 | Stop reason: HOSPADM

## 2025-03-28 RX ORDER — DEXTROSE MONOHYDRATE 100 MG/ML
INJECTION, SOLUTION INTRAVENOUS CONTINUOUS PRN
Status: DISCONTINUED | OUTPATIENT
Start: 2025-03-28 | End: 2025-03-30 | Stop reason: HOSPADM

## 2025-03-28 RX ORDER — ACETAMINOPHEN 325 MG/1
650 TABLET ORAL EVERY 6 HOURS PRN
Status: DISCONTINUED | OUTPATIENT
Start: 2025-03-28 | End: 2025-03-30 | Stop reason: HOSPADM

## 2025-03-28 RX ADMIN — ALTEPLASE 2 MG: 2.2 INJECTION, POWDER, LYOPHILIZED, FOR SOLUTION INTRAVENOUS at 16:29

## 2025-03-28 RX ADMIN — WATER 10 ML: 1 INJECTION INTRAMUSCULAR; INTRAVENOUS; SUBCUTANEOUS at 16:29

## 2025-03-28 RX ADMIN — DIPHENHYDRAMINE HYDROCHLORIDE 25 MG: 25 TABLET ORAL at 17:15

## 2025-03-28 RX ADMIN — HEPARIN SODIUM 5000 UNITS: 5000 INJECTION INTRAVENOUS; SUBCUTANEOUS at 22:43

## 2025-03-28 RX ADMIN — ALTEPLASE 2 MG: 2.2 INJECTION, POWDER, LYOPHILIZED, FOR SOLUTION INTRAVENOUS at 16:28

## 2025-03-28 RX ADMIN — SODIUM CHLORIDE, PRESERVATIVE FREE 10 ML: 5 INJECTION INTRAVENOUS at 22:44

## 2025-03-28 ASSESSMENT — LIFESTYLE VARIABLES
HOW OFTEN DO YOU HAVE A DRINK CONTAINING ALCOHOL: NEVER
HOW MANY STANDARD DRINKS CONTAINING ALCOHOL DO YOU HAVE ON A TYPICAL DAY: PATIENT DOES NOT DRINK
HOW MANY STANDARD DRINKS CONTAINING ALCOHOL DO YOU HAVE ON A TYPICAL DAY: PATIENT DOES NOT DRINK

## 2025-03-28 NOTE — ED NOTES
Patient Name: Fawad Glynn  : 1935 89 y.o.  MRN: [id number]  ED Room #: B21/B21-21     Chief complaint:   Chief Complaint   Patient presents with    Vascular Access Problem     Pt presents to the ED due to a clogged dialysis port. They were unable to access it for dialysis, Pt is a MWF and last received dialysis on Wednesday.      Hospital Problem/Diagnosis:   Hospital Problems           Last Modified POA    * (Principal) Hemodialysis catheter dysfunction, initial encounter 3/28/2025 Yes         O2 Flow Rate:O2 Device: None (Room air)   (if applicable)  Cardiac Rhythm:   (if applicable)  Active LDA's:   Peripheral IV 25 Left Antecubital (Active)   Site Assessment Clean, dry & intact 25   Line Status Blood return noted 25            How does patient ambulate? Unknown, did not assess in the Emergency Department    2. How does patient take pills? Whole with Water    3. Is patient alert? Alert    4. Is patient oriented? To Person, To Place, To Time, To Situation, and Follows Commands    5.   Patient arrived from:  home  Facility Name: ___________________________________________    6. If patient is disoriented or from a Skill Nursing Facility has family been notified of admission? yes    7. Patient belongings? Belongings: Clothing    Disposition of belongings? Kept with Patient     8. Any specific patient or family belongings/needs/dynamics?   a.     9. Miscellaneous comments/pending orders?  a. Cath koby overnight and attempting in the morning.       If there are any additional questions please reach out to the Emergency Department.      Marie Lopez, RN  [phone]

## 2025-03-28 NOTE — PROGRESS NOTES
Clinical Pharmacy Note  Medication History     Admit Date: 3/28/2025    List of current medications patient is taking is complete. Home Medication list in Epic updated to reflect changes noted below.    Source of information: Rx dispense report and [facility] medication list    Patient's home pharmacy: [facility] - [facility] [address] - P [phone] - F [phone]     Changes made to medication list:   Medications removed: (include reason, ex: therapy completed, patient no longer taking, etc.)  Mineral oil hydrophilic petrolatum (Hydrophor)  Medications added:   Ciprofloxacin (03/25/25 - 03/31/25)  Medication doses adjusted:   Acetaminophen, patient is given 650 mg three times daily instead of 325 mg three times daily.   Other notes:   None    Current Outpatient Medications   Medication Instructions    acetaminophen (TYLENOL) 650 mg, Oral, 3 TIMES DAILY    acetaminophen (TYLENOL) 650 mg, EVERY 6 HOURS PRN    amLODIPine (NORVASC) 10 mg, Oral, DAILY    atorvastatin (LIPITOR) 10 mg, Oral, DAILY    calcium acetate (PHOSLO) 667 MG CAPS capsule 1 capsule, 3 TIMES DAILY WITH MEALS    ciprofloxacin (CIPRO) 500 mg, Nightly    famotidine (PEPCID) 10 mg, Oral, DAILY    Latanoprost PF 0.005 % SOLN 1 drop, Both Eyes, Nightly    levETIRAcetam (KEPPRA) 500 mg, Oral, 2 TIMES DAILY    loperamide (IMODIUM) 2 mg, Oral, EVERY 8 HOURS PRN    midodrine (PROAMATINE) 5 mg, Oral, THREE TIMES WEEKLY (MONDAY, WEDNESDAY, FRIDAY), on Mon/Wed/Fri   Hold if SBP > 120    mirtazapine (REMERON) 30 mg, Oral, NIGHTLY    ondansetron (ZOFRAN) 4 mg, Oral, EVERY 6 HOURS PRN    tamsulosin (FLOMAX) 0.4 MG capsule Oral, DAILY       Please contact pharmacy with any questions,    Ronaldo Alvarado (Iris)  Pharmacy Intern, PharmD Candidate [id number]

## 2025-03-28 NOTE — ED PROVIDER NOTES
THE [facility]  EMERGENCY DEPARTMENT ENCOUNTER          PHYSICIAN ASSISTANT NOTE     Date of evaluation: 3/28/2025    ADDENDUM:      Care of this patient was assumed from Maxine Shepard PA-C.  The patient was seen for Vascular Access Problem (Pt presents to the ED due to a clogged dialysis port. They were unable to access it for dialysis, Pt is a MWF and last received dialysis on Wednesday. )  .  The patient's initial evaluation and plan have been discussed with the prior provider who initially evaluated the patient.  Nursing Notes, Past Medical Hx, Past Surgical Hx, Social Hx, Allergies, and Family Hx were all reviewed.    ASSESSMENT / PLAN  (MEDICAL DECISION MAKING)     Fawad Glynn is a 89 y.o. male who presents with vascular access problem.  Vital signs stable on presentation remained stable throughout his stay.  Thorough history and physical exam performed.    On turnover the patient, we are awaiting results of BMP, discussion with nephrology, disposition.  BMP with an elevated creatinine at 8.2, however potassium was normal at 4.4.  Nephrology did come down to evaluate the patient.  Since the Cathflo has to be in for 24 hours, we do feel that he warrants admission to the hospital as he will require dialysis if the Cathflo works.  The plan was discussed with the patient who is understanding and agreeable.  Message was sent to hospitalist to discuss admission    Is this patient to be included in the SEP-1 core measure? No Exclusion criteria - the patient is NOT to be included for SEP-1 Core Measure due to: Infection is not suspected    Medical Decision Making  Risk  Decision regarding hospitalization.        This patient was also evaluated by the attending physician. All care plans were discussed and agreed upon.    Clinical Impression     1. Problem with vascular access        Disposition     PATIENT REFERRED TO:  No follow-up provider specified.    DISCHARGE MEDICATIONS:  New Prescriptions    No

## 2025-03-28 NOTE — ED PROVIDER NOTES
THE [facility]  EMERGENCY DEPARTMENT ENCOUNTER          PHYSICIAN ASSISTANT NOTE       Date of evaluation: 3/28/2025    Chief Complaint     Vascular Access Problem (Pt presents to the ED due to a clogged dialysis port. They were unable to access it for dialysis, Pt is a MWF and last received dialysis on Wednesday. )      History of Present Illness     Fawad Glynn is a 89 y.o. male who presents with chief complaint of vascular access problem.  Patient has a tunneled IJ catheter in the right chest wall.  It was placed by IR in February 2022.  He followed up with vascular surgery for potential fistula placement, however never had this completed.  He does dialysis on a Monday Wednesday Friday schedule.  Had a full session on Wednesday.  Went to dialysis today and they were unable to access his catheter, unable to get any blood return.  Was sent to the ED for further evaluation.  Patient reports that he feels fine and has no acute complaints.    ASSESSMENT / PLAN  (MEDICAL DECISION MAKING)     INITIAL VITALS: BP: 117/71, Temp: 97.5 °F (36.4 °C), Pulse: 78, Respirations: 18, SpO2: 99 %    Fawad Glynn is a 89 y.o. male presenting with vascular access problem.  Patient is hemodynamically stable and in no acute distress.  Dialysis nurse was contacted and instilled Cathflo.  After discussion with them, they typically leave Cathflo in place for 24 hours to hopefully resolve issues.  At this time, it was determined to obtain a BMP and reach out to patient's nephrologist to hopefully arrange for dialysis tomorrow.    At this time I am going off service and patient's care will be turned over to the oncoming provider, Davy Parson.  Pending at this time is discussion with nephrology and BMP results.    Is this patient to be included in the SEP-1 core measure? No Exclusion criteria - the patient is NOT to be included for SEP-1 Core Measure due to: Infection is not suspected    Medical Decision Making  Amount and/or

## 2025-03-28 NOTE — H&P
Internal Medicine H&P    Date:   3/28/2025   Patient:  Fawad Gylnn   :   1935     CC:  Vascular Access Problem (Pt presents to the ED due to a clogged dialysis port. They were unable to access it for dialysis, Pt is a MWF and last received dialysis on Wednesday. )       Source of HPI: chart review and patient     Subjective     HPI:   Mr. Fawad Glynn is a 89 y.o. male with a MHx significant for, ESRD on HD MWF, Hypertension,  T2DM, complex partial seizure disorder, paranoid schizophrenia who presented to the ED on 3/28/25 with clogged dialysis port. Patient went to dialysis today and the dialysis team was unable to access his catheter with no blood return. He was sent to the ED for further evaluation. Patient overall is doing well, he completed a full session of dialysis on Wednesday. Denies any chest pain, SOB, palpitations, fevers, or chills.       ED Course:  On arrival to the ED, /71, Temp 97.5, HR 78, RR 18, and saturating on room air at 99%.   Labs were significant for: Na 132, BUN 48, and Cr 8.2.   EKG showed   While in the ED, he received cathflo which they typically leave cathflo in place for 24 hours and nephrology was consulted. Plans for hemodialysis tomorrow.       PMHx:      Diagnosis Date    Anemia 2012    Anxiety and depression     Arthritis     bulging disc    BPH (benign prostatic hypertrophy) 2010    Diverticulosis 2010    Eczema 2011    Erectile dysfunction 2010    Essential hypertension 2015    GERD (gastroesophageal reflux disease) 2010    Hemorrhoid 2010    Hyperlipidemia 01/10/2011    Hyperphosphatemia 2010    Hypertension 2010    Overactive bladder 2011    Paranoid schizophrenia (HCC) 2010    Prostate cancer (HCC) 2013    had radiation treatments    Seizures (HCC) long ago    Type 2 diabetes mellitus without complication, without long-term current use of insulin (HCC) 2016

## 2025-03-28 NOTE — ED PROVIDER NOTES
ED Attending Attestation Note     Date of evaluation: 3/28/2025    This patient was seen by the advance practice provider.  I have seen and examined the patient, agree with the workup, evaluation, management and diagnosis. The care plan has been discussed.  My assessment reveals patient sent from dialysis because his dialysis catheter may be clotted.  They were unable to dialyze him.  Patient is otherwise asymptomatic and has no complaints and unremarkable exam dialysis nurse was called to address the issue and Cathflo was instilled but it was felt that this would need to be in place for approximately 24 hours before an assessment could be made on patency of the dialysis catheter.  Labs obtained did not indicate the need for emergent hemodialysis but the patient will be admitted for further attention to the clogged dialysis catheter.     Mike Martino MD  03/28/25 1928

## 2025-03-29 PROBLEM — E83.9 CHRONIC KIDNEY DISEASE-MINERAL AND BONE DISORDER (CKD-MBD): Status: ACTIVE | Noted: 2025-03-29

## 2025-03-29 PROBLEM — Z78.9 PROBLEM WITH VASCULAR ACCESS: Status: ACTIVE | Noted: 2025-03-29

## 2025-03-29 PROBLEM — N18.9 CHRONIC KIDNEY DISEASE-MINERAL AND BONE DISORDER (CKD-MBD): Status: ACTIVE | Noted: 2025-03-29

## 2025-03-29 PROBLEM — M89.9 CHRONIC KIDNEY DISEASE-MINERAL AND BONE DISORDER (CKD-MBD): Status: ACTIVE | Noted: 2025-03-29

## 2025-03-29 PROBLEM — I15.0 RENOVASCULAR HYPERTENSION: Status: ACTIVE | Noted: 2025-03-29

## 2025-03-29 LAB
ALBUMIN SERPL-MCNC: 4.1 G/DL (ref 3.4–5)
ANION GAP SERPL CALCULATED.3IONS-SCNC: 16 MMOL/L (ref 3–16)
BASOPHILS # BLD: 0 K/UL (ref 0–0.2)
BASOPHILS NFR BLD: 0.4 %
BUN SERPL-MCNC: 58 MG/DL (ref 7–20)
CALCIUM SERPL-MCNC: 9.3 MG/DL (ref 8.3–10.6)
CHLORIDE SERPL-SCNC: 97 MMOL/L (ref 99–110)
CO2 SERPL-SCNC: 23 MMOL/L (ref 21–32)
CREAT SERPL-MCNC: 8.9 MG/DL (ref 0.8–1.3)
DEPRECATED RDW RBC AUTO: 14.4 % (ref 12.4–15.4)
EOSINOPHIL # BLD: 0.4 K/UL (ref 0–0.6)
EOSINOPHIL NFR BLD: 6.9 %
GFR SERPLBLD CREATININE-BSD FMLA CKD-EPI: 5 ML/MIN/{1.73_M2}
GLUCOSE BLD-MCNC: 139 MG/DL (ref 70–99)
GLUCOSE BLD-MCNC: 200 MG/DL (ref 70–99)
GLUCOSE BLD-MCNC: 77 MG/DL (ref 70–99)
GLUCOSE BLD-MCNC: 90 MG/DL (ref 70–99)
GLUCOSE SERPL-MCNC: 84 MG/DL (ref 70–99)
HAV IGM SERPL QL IA: NORMAL
HBV CORE IGM SERPL QL IA: NORMAL
HBV SURFACE AB SERPL IA-ACNC: 7.67 MIU/ML
HBV SURFACE AG SERPL QL IA: NORMAL
HCT VFR BLD AUTO: 40.5 % (ref 40.5–52.5)
HCT VFR BLD AUTO: 41.8 % (ref 40.5–52.5)
HCV AB SERPL QL IA: NORMAL
HGB BLD-MCNC: 13.7 G/DL (ref 13.5–17.5)
HGB BLD-MCNC: 13.9 G/DL (ref 13.5–17.5)
LYMPHOCYTES # BLD: 0.8 K/UL (ref 1–5.1)
LYMPHOCYTES NFR BLD: 13.6 %
MAGNESIUM SERPL-MCNC: 2.47 MG/DL (ref 1.8–2.4)
MCH RBC QN AUTO: 28.2 PG (ref 26–34)
MCHC RBC AUTO-ENTMCNC: 33.7 G/DL (ref 31–36)
MCV RBC AUTO: 83.7 FL (ref 80–100)
MONOCYTES # BLD: 0.8 K/UL (ref 0–1.3)
MONOCYTES NFR BLD: 12.8 %
NEUTROPHILS # BLD: 4.1 K/UL (ref 1.7–7.7)
NEUTROPHILS NFR BLD: 66.3 %
PERFORMED ON: ABNORMAL
PERFORMED ON: ABNORMAL
PERFORMED ON: NORMAL
PERFORMED ON: NORMAL
PHOSPHATE SERPL-MCNC: 4.3 MG/DL (ref 2.5–4.9)
PLATELET # BLD AUTO: 234 K/UL (ref 135–450)
PMV BLD AUTO: 8.4 FL (ref 5–10.5)
POTASSIUM SERPL-SCNC: 4.2 MMOL/L (ref 3.5–5.1)
RBC # BLD AUTO: 4.84 M/UL (ref 4.2–5.9)
SODIUM SERPL-SCNC: 136 MMOL/L (ref 136–145)
WBC # BLD AUTO: 6.2 K/UL (ref 4–11)

## 2025-03-29 PROCEDURE — 83735 ASSAY OF MAGNESIUM: CPT

## 2025-03-29 PROCEDURE — 36415 COLL VENOUS BLD VENIPUNCTURE: CPT

## 2025-03-29 PROCEDURE — 86706 HEP B SURFACE ANTIBODY: CPT

## 2025-03-29 PROCEDURE — G0378 HOSPITAL OBSERVATION PER HR: HCPCS

## 2025-03-29 PROCEDURE — 85018 HEMOGLOBIN: CPT

## 2025-03-29 PROCEDURE — 80074 ACUTE HEPATITIS PANEL: CPT

## 2025-03-29 PROCEDURE — 99222 1ST HOSP IP/OBS MODERATE 55: CPT | Performed by: HOSPITALIST

## 2025-03-29 PROCEDURE — 80069 RENAL FUNCTION PANEL: CPT

## 2025-03-29 PROCEDURE — 6360000002 HC RX W HCPCS

## 2025-03-29 PROCEDURE — 2500000003 HC RX 250 WO HCPCS

## 2025-03-29 PROCEDURE — 85025 COMPLETE CBC W/AUTO DIFF WBC: CPT

## 2025-03-29 PROCEDURE — 96372 THER/PROPH/DIAG INJ SC/IM: CPT

## 2025-03-29 PROCEDURE — 6370000000 HC RX 637 (ALT 250 FOR IP)

## 2025-03-29 PROCEDURE — 90935 HEMODIALYSIS ONE EVALUATION: CPT

## 2025-03-29 PROCEDURE — 85014 HEMATOCRIT: CPT

## 2025-03-29 PROCEDURE — 99232 SBSQ HOSP IP/OBS MODERATE 35: CPT | Performed by: INTERNAL MEDICINE

## 2025-03-29 RX ORDER — HEPARIN SODIUM 1000 [USP'U]/ML
3200 INJECTION, SOLUTION INTRAVENOUS; SUBCUTANEOUS PRN
Status: DISCONTINUED | OUTPATIENT
Start: 2025-03-29 | End: 2025-03-30 | Stop reason: HOSPADM

## 2025-03-29 RX ORDER — INSULIN LISPRO 100 [IU]/ML
0-4 INJECTION, SOLUTION INTRAVENOUS; SUBCUTANEOUS
Status: DISCONTINUED | OUTPATIENT
Start: 2025-03-29 | End: 2025-03-30 | Stop reason: HOSPADM

## 2025-03-29 RX ADMIN — HEPARIN SODIUM 5000 UNITS: 5000 INJECTION INTRAVENOUS; SUBCUTANEOUS at 20:51

## 2025-03-29 RX ADMIN — LATANOPROST 1 DROP: 50 SOLUTION OPHTHALMIC at 20:44

## 2025-03-29 RX ADMIN — LEVETIRACETAM 500 MG: 500 TABLET, FILM COATED ORAL at 09:49

## 2025-03-29 RX ADMIN — INSULIN LISPRO 1 UNITS: 100 INJECTION, SOLUTION INTRAVENOUS; SUBCUTANEOUS at 12:43

## 2025-03-29 RX ADMIN — SODIUM CHLORIDE, PRESERVATIVE FREE 10 ML: 5 INJECTION INTRAVENOUS at 20:46

## 2025-03-29 RX ADMIN — FAMOTIDINE 10 MG: 20 TABLET, FILM COATED ORAL at 20:46

## 2025-03-29 RX ADMIN — HEPARIN SODIUM 5000 UNITS: 5000 INJECTION INTRAVENOUS; SUBCUTANEOUS at 17:07

## 2025-03-29 RX ADMIN — LEVETIRACETAM 500 MG: 500 TABLET, FILM COATED ORAL at 20:45

## 2025-03-29 RX ADMIN — MIRTAZAPINE 30 MG: 30 TABLET, FILM COATED ORAL at 20:45

## 2025-03-29 RX ADMIN — ATORVASTATIN CALCIUM 10 MG: 10 TABLET, FILM COATED ORAL at 09:49

## 2025-03-29 RX ADMIN — LEVETIRACETAM 500 MG: 500 TABLET, FILM COATED ORAL at 00:44

## 2025-03-29 RX ADMIN — AMLODIPINE BESYLATE 10 MG: 10 TABLET ORAL at 09:49

## 2025-03-29 RX ADMIN — HEPARIN SODIUM 5000 UNITS: 5000 INJECTION INTRAVENOUS; SUBCUTANEOUS at 05:44

## 2025-03-29 RX ADMIN — TAMSULOSIN HYDROCHLORIDE 0.4 MG: 0.4 CAPSULE ORAL at 09:49

## 2025-03-29 RX ADMIN — MIRTAZAPINE 30 MG: 30 TABLET, FILM COATED ORAL at 00:44

## 2025-03-29 RX ADMIN — SODIUM CHLORIDE, PRESERVATIVE FREE 10 ML: 5 INJECTION INTRAVENOUS at 09:49

## 2025-03-29 NOTE — CONSULTS
hours.  BMP:    Recent Labs     03/28/25  1722   *   K 4.4   CL 96*   CO2 22   BUN 48*   CREATININE 8.2*   GLUCOSE 91     Ca/Mg/Phos:   Recent Labs     03/28/25  1722   CALCIUM 9.2     Hepatic: No results for input(s): \"AST\", \"ALT\", \"BILITOT\", \"ALKPHOS\" in the last 72 hours.    Invalid input(s): \"ALB\"  Troponin: No results for input(s): \"TROPONINI\" in the last 72 hours.  BNP: No results for input(s): \"BNP\" in the last 72 hours.  Lipids: No results for input(s): \"CHOL\", \"TRIG\", \"HDL\" in the last 72 hours.    Invalid input(s): \"LDLCALC\", \"LABVLDL\"  ABGs: No results for input(s): \"PHART\", \"PO2ART\", \"WVY8QRU\" in the last 72 hours.  INR: No results for input(s): \"INR\" in the last 72 hours.  UA:No results for input(s): \"COLORU\", \"CLARITYU\", \"GLUCOSEU\", \"BILIRUBINUR\", \"KETUA\", \"SPECGRAV\", \"BLOODU\", \"PHUR\", \"PROTEINU\", \"UROBILINOGEN\", \"NITRU\", \"LEUKOCYTESUR\", \"URINETYPE\" in the last 72 hours.    Invalid input(s): \"LABMICR\"   Urine Microscopic: No results for input(s): \"LABCAST\", \"BACTERIA\", \"COMU\", \"HYALCAST\", \"WBCUA\", \"RBCUA\" in the last 72 hours.    Invalid input(s): \"EPIU\"  Urine Culture: No results for input(s): \"LABURIN\" in the last 72 hours.  Urine Chemistry: No results for input(s): \"CLUR\", \"LABCREA\", \"PROTEINUR\", \"NAUR\" in the last 72 hours.      IMAGING:  XR CHEST PORTABLE   Final Result      Right IJ hemodialysis catheter tip projects over the lower superior vena cava. No pneumothorax.      Electronically signed by Levi Dobbs MD          Medical Decision Making:  The following items were considered in medical decision making:  Discussion of patient care with other providers  Reviewed clinical lab tests  Reviewed radiology tests  Reviewed other diagnostic tests/interventions    Chu Dupree MD   [facility]  Office : [phone] or through [employer]  Fax :[phone]

## 2025-03-29 NOTE — FLOWSHEET NOTE
Patient arrived in 6326 alert and oriented x4 on room air. With complaint of Right Tunneled vascular access problem. Yoder to staff, bed set up, room and use of call light. Vital signs and physical assessment done. Keep patient comfortable in bed.       03/28/25 2229   Vitals   Temp 97.5 °F (36.4 °C)   Temp Source Oral   Pulse 88   Heart Rate Source Monitor   Respirations 18   BP (!) 145/80   MAP (Calculated) 102   BP Location Left upper arm   BP Upper/Lower Upper   BP Method Automatic   Patient Position Semi fowlers   Pain Assessment   Pain Assessment None - Denies Pain   Opioid-Induced Sedation   POSS Score 1   RASS   Jett Agitation Sedation Scale (RASS) 0   Oxygen Therapy   SpO2 96 %   Pulse Oximeter Device Mode Intermittent   Pulse Oximeter Device Location Left;Finger   O2 Device None (Room air)   Ventilator Associated Pneumonia Bundle   Anti-Embolism Devices Other (Comment)   Anti-Embolism Intervention On;Medication   Laterality Bilateral

## 2025-03-29 NOTE — PROGRESS NOTES
4 Eyes Admission Assessment     I agree as the admission nurse that 2 RN's have performed a thorough Head to Toe Skin Assessment on the patient. ALL assessment sites listed below have been assessed on admission.       Areas assessed by both nurses:   [x]   Head, Face, and Ears   [x]   Shoulders, Back, and Chest  [x]   Arms, Elbows, and Hands   [x]   Coccyx, Sacrum, and Ischum  [x]   Legs, Feet, and Heels        Does the Patient have Skin Breakdown?  No         Jayden Prevention initiated:  Yes   Wound Care Orders initiated:  No      [facility] nurse consulted for Pressure Injury (Stage 3,4, Unstageable, DTI, NWPT, and Complex wounds):  No      Nurse 1 eSignature: Electronically signed by Anu Sen RN on 3/29/25 at 4:50 AM EDT    **SHARE this note so that the co-signing nurse is able to place an eSignature**    Nurse 2 eSignature: Electronically signed by Chrystal Hsu RN on 3/29/25 at 7:33 AM EDT

## 2025-03-29 NOTE — PLAN OF CARE
Problem: Chronic Conditions and Co-morbidities  Goal: Patient's chronic conditions and co-morbidity symptoms are monitored and maintained or improved  Outcome: Progressing  Flowsheets (Taken 3/29/2025 0134)  Care Plan - Patient's Chronic Conditions and Co-Morbidity Symptoms are Monitored and Maintained or Improved:   Monitor and assess patient's chronic conditions and comorbid symptoms for stability, deterioration, or improvement   Collaborate with multidisciplinary team to address chronic and comorbid conditions and prevent exacerbation or deterioration   Update acute care plan with appropriate goals if chronic or comorbid symptoms are exacerbated and prevent overall improvement and discharge     Problem: Skin/Tissue Integrity  Goal: Skin integrity remains intact  Description: 1.  Monitor for areas of redness and/or skin breakdown  2.  Assess vascular access sites hourly  3.  Every 4-6 hours minimum:  Change oxygen saturation probe site  4.  Every 4-6 hours:  If on nasal continuous positive airway pressure, respiratory therapy assess nares and determine need for appliance change or resting period  Outcome: Progressing  Flowsheets (Taken 3/29/2025 0134)  Skin Integrity Remains Intact: Monitor for areas of redness and/or skin breakdown     Problem: Safety - Adult  Goal: Free from fall injury  Outcome: Progressing  Flowsheets (Taken 3/29/2025 0134)  Free From Fall Injury: Based on caregiver fall risk screen, instruct family/caregiver to ask for assistance with transferring infant if caregiver noted to have fall risk factors  Note: Patient is free from fall. Ensured all safety measures are in placed, bed locked in lowest positioned, bed alarm on, 2/4 side rails are up, bedside table with patient's belongings and call light within patient reached     Problem: Discharge Planning  Goal: Discharge to home or other facility with appropriate resources  Outcome: Progressing

## 2025-03-29 NOTE — PROGRESS NOTES
Treatment time: 3 hrs    Net UF: 1000 ml    Pre weight: 63.4 kg  Post weight: 62.4 kg    Access used: Rt CW TDC  Access function: Well tolerated, 300 BFR  Skin under dressing irritated. Site cleaned with Betadine and Island dressing placed on site. Pt has a rash which may be caused by CHG dressing.    Medications or blood products given: Heparin dwells    Regular outpatient schedule: TTS    Summary of response to treatment: Pt tolerated treatment well. Pt remained stable throughout entire treatment and upon exiting the hemodialysis suite. Report given to Winnie Saldana RN

## 2025-03-29 NOTE — PROGRESS NOTES
Nephrology Progress Note                                                                                                                                                                                                                                                                                                                                                               Office : [phone]     Fax :[phone]    Patient's Name: Fawad Glynn  8:27 AM  3/29/2025    Reason for Consult:  dialysis   Requesting Physician:  Andreea Delgado MD  Chief Complaint:    Chief Complaint   Patient presents with    Vascular Access Problem     Pt presents to the ED due to a clogged dialysis port. They were unable to access it for dialysis, Pt is a MWF and last received dialysis on Wednesday.        Assessment/Plan     # TDC malfunction   - Alteplase overnight   - Try HD today to see if HD cathter works: discussed w/ HD nurse     # ESRD on hemodialysis   Access: R TDC  Continue HD MWF schedule   Renally dose meds for GFR < 10  HD today   Prescription:  Blood flow rate (BFR) 300   K+ 2   Ca++ 2.5   Bicarb 32   Na+ 136   Dialyzer F160   Dialysate Temperature (C) 35   Dialysate flow rate (DFR) 600   Treatment Time 3   Fluid removal (L) 2   Access Tunneled Dialysis Catheter   Use sliding scale potassium bath protocol? No       # HTN  Controlled   Resume home medications    # Anemia of chronic disease  None   No need for JI    # MBD  Monitor labs     # DM2  Management per primary       History of Present Ilness:    Fawad Glynn is a 89 y.o. male with PMH of diverticulosis, paranoid schizophrenia, CKD stage V on HD, seizures, and episodes of catatonia who presented to the ED with with concerns of a TDC malfunction. Last HD was on Monday. He denies SOA. No leg swelling. K is ok. Alteplase done.    Interval hx:  Pt feels well  Pt denies CP/SOB/palpitations/abdominal pain/N/V.   Lytes stable this AM   BP controlled     Past

## 2025-03-29 NOTE — PROGRESS NOTES
Internal Medicine Progress Note    Date: 3/29/2025   Patient: Fawad Glynn   [facility] Day: 0      CC: Vascular Access Problem (Pt presents to the ED due to a clogged dialysis port. They were unable to access it for dialysis, Pt is a MWF and last received dialysis on Wednesday. )       Interval Hx   Briefly, Mr Fawad Glynn is an 88 yo M with ESRD on HD MWF who is admitted to the hospital for TDC malfunction requiring cathflo overnight.     VSS. NAD. No acute complaints. Labs stable / baseline.     Brief Daily Plan:  - HD today to see if TDC works      Objective     Vital Signs:  Patient Vitals for the past 8 hrs:   BP Temp Temp src Pulse Resp SpO2 Weight   03/29/25 0609 -- -- -- -- -- -- 63.8 kg (140 lb 10.5 oz)   03/29/25 0422 (!) 140/75 97.7 °F (36.5 °C) Oral 82 19 98 % --     Physical Exam  Constitutional:       Comments: Frail elderly male. AOX3. Pleasant.    HENT:      Head: Normocephalic and atraumatic.      Mouth/Throat:      Mouth: Mucous membranes are dry.      Pharynx: Oropharynx is clear.   Eyes:      Conjunctiva/sclera: Conjunctivae normal.   Cardiovascular:      Rate and Rhythm: Normal rate and regular rhythm.   Pulmonary:      Effort: Pulmonary effort is normal.      Breath sounds: Normal breath sounds.   Abdominal:      General: There is no distension.      Tenderness: There is no abdominal tenderness.   Musculoskeletal:      Right lower leg: No edema.      Left lower leg: No edema.   Skin:     General: Skin is warm and dry.   Neurological:      Mental Status: He is oriented to person, place, and time. Mental status is at baseline.   Psychiatric:         Mood and Affect: Mood normal.        Labs:  CBC:   Recent Labs     03/29/25  0550   WBC 6.2   HGB 13.7   HCT 40.5          BMP:   Recent Labs     03/28/25  1722 03/29/25  0550   * 136   K 4.4 4.2   CL 96* 97*   CO2 22 23   BUN 48* 58*   CREATININE 8.2* 8.9*   GLUCOSE 91 84   PHOS  --  4.3     Magnesium:   Recent Labs

## 2025-03-30 VITALS
WEIGHT: 138.23 LBS | TEMPERATURE: 98.2 F | DIASTOLIC BLOOD PRESSURE: 75 MMHG | OXYGEN SATURATION: 95 % | RESPIRATION RATE: 16 BRPM | BODY MASS INDEX: 16.83 KG/M2 | HEIGHT: 76 IN | HEART RATE: 85 BPM | SYSTOLIC BLOOD PRESSURE: 130 MMHG

## 2025-03-30 LAB
ALBUMIN SERPL-MCNC: 3.9 G/DL (ref 3.4–5)
ANION GAP SERPL CALCULATED.3IONS-SCNC: 15 MMOL/L (ref 3–16)
BASOPHILS # BLD: 0 K/UL (ref 0–0.2)
BASOPHILS NFR BLD: 0.5 %
BUN SERPL-MCNC: 39 MG/DL (ref 7–20)
CALCIUM SERPL-MCNC: 9.1 MG/DL (ref 8.3–10.6)
CHLORIDE SERPL-SCNC: 101 MMOL/L (ref 99–110)
CO2 SERPL-SCNC: 20 MMOL/L (ref 21–32)
CREAT SERPL-MCNC: 6.8 MG/DL (ref 0.8–1.3)
DEPRECATED RDW RBC AUTO: 14.6 % (ref 12.4–15.4)
EOSINOPHIL # BLD: 0.4 K/UL (ref 0–0.6)
EOSINOPHIL NFR BLD: 5 %
GFR SERPLBLD CREATININE-BSD FMLA CKD-EPI: 7 ML/MIN/{1.73_M2}
GLUCOSE BLD-MCNC: 240 MG/DL (ref 70–99)
GLUCOSE BLD-MCNC: 79 MG/DL (ref 70–99)
GLUCOSE SERPL-MCNC: 79 MG/DL (ref 70–99)
HCT VFR BLD AUTO: 42 % (ref 40.5–52.5)
HGB BLD-MCNC: 13.9 G/DL (ref 13.5–17.5)
LYMPHOCYTES # BLD: 0.7 K/UL (ref 1–5.1)
LYMPHOCYTES NFR BLD: 9.6 %
MAGNESIUM SERPL-MCNC: 2.23 MG/DL (ref 1.8–2.4)
MCH RBC QN AUTO: 28.6 PG (ref 26–34)
MCHC RBC AUTO-ENTMCNC: 33 G/DL (ref 31–36)
MCV RBC AUTO: 86.7 FL (ref 80–100)
MONOCYTES # BLD: 0.7 K/UL (ref 0–1.3)
MONOCYTES NFR BLD: 10 %
NEUTROPHILS # BLD: 5.4 K/UL (ref 1.7–7.7)
NEUTROPHILS NFR BLD: 74.9 %
PERFORMED ON: ABNORMAL
PERFORMED ON: NORMAL
PHOSPHATE SERPL-MCNC: 3.7 MG/DL (ref 2.5–4.9)
PLATELET # BLD AUTO: 186 K/UL (ref 135–450)
PMV BLD AUTO: 9.1 FL (ref 5–10.5)
POTASSIUM SERPL-SCNC: 4.1 MMOL/L (ref 3.5–5.1)
RBC # BLD AUTO: 4.85 M/UL (ref 4.2–5.9)
SODIUM SERPL-SCNC: 136 MMOL/L (ref 136–145)
WBC # BLD AUTO: 7.1 K/UL (ref 4–11)

## 2025-03-30 PROCEDURE — 99239 HOSP IP/OBS DSCHRG MGMT >30: CPT | Performed by: HOSPITALIST

## 2025-03-30 PROCEDURE — 6370000000 HC RX 637 (ALT 250 FOR IP)

## 2025-03-30 PROCEDURE — 83735 ASSAY OF MAGNESIUM: CPT

## 2025-03-30 PROCEDURE — G0378 HOSPITAL OBSERVATION PER HR: HCPCS

## 2025-03-30 PROCEDURE — 2500000003 HC RX 250 WO HCPCS

## 2025-03-30 PROCEDURE — 80069 RENAL FUNCTION PANEL: CPT

## 2025-03-30 PROCEDURE — 6360000002 HC RX W HCPCS

## 2025-03-30 PROCEDURE — 99232 SBSQ HOSP IP/OBS MODERATE 35: CPT | Performed by: INTERNAL MEDICINE

## 2025-03-30 PROCEDURE — 96372 THER/PROPH/DIAG INJ SC/IM: CPT

## 2025-03-30 PROCEDURE — 85025 COMPLETE CBC W/AUTO DIFF WBC: CPT

## 2025-03-30 PROCEDURE — 36415 COLL VENOUS BLD VENIPUNCTURE: CPT

## 2025-03-30 RX ADMIN — SODIUM CHLORIDE, PRESERVATIVE FREE 10 ML: 5 INJECTION INTRAVENOUS at 10:14

## 2025-03-30 RX ADMIN — ATORVASTATIN CALCIUM 10 MG: 10 TABLET, FILM COATED ORAL at 10:14

## 2025-03-30 RX ADMIN — HEPARIN SODIUM 5000 UNITS: 5000 INJECTION INTRAVENOUS; SUBCUTANEOUS at 06:00

## 2025-03-30 RX ADMIN — TAMSULOSIN HYDROCHLORIDE 0.4 MG: 0.4 CAPSULE ORAL at 10:14

## 2025-03-30 RX ADMIN — LEVETIRACETAM 500 MG: 500 TABLET, FILM COATED ORAL at 10:14

## 2025-03-30 RX ADMIN — AMLODIPINE BESYLATE 10 MG: 10 TABLET ORAL at 10:14

## 2025-03-30 NOTE — PROGRESS NOTES
Nephrology Progress Note                                                                                                                                                                                                                                                                                                                                                               Office : [phone]     Fax :[phone]    Patient's Name: Fawad Glynn  8:58 AM  3/30/2025    Reason for Consult:  dialysis   Requesting Physician:  Andreea Delgado MD  Chief Complaint:    Chief Complaint   Patient presents with    Vascular Access Problem     Pt presents to the ED due to a clogged dialysis port. They were unable to access it for dialysis, Pt is a MWF and last received dialysis on Wednesday.        Assessment/Plan     # TDC malfunction   - Alteplase overnight   - Try HD today to see if HD cathter works: discussed w/ HD nurse     # ESRD on hemodialysis   Access: R TDC  Continue HD MWF schedule   Renally dose meds for GFR < 10  HD 3/29ok t d/c from nephro perspectives  Discussed w/ Dr Partida     # HTN  Controlled   Resume home medications    # Anemia of chronic disease  None   No need for JI    # MBD  Monitor labs     # DM2  Management per primary       History of Present Ilness:    Fawad Glynn is a 89 y.o. male with PMH of diverticulosis, paranoid schizophrenia, CKD stage V on HD, seizures, and episodes of catatonia who presented to the ED with with concerns of a TDC malfunction. Last HD was on Monday. He denies SOA. No leg swelling. K is ok. Alteplase done.    Interval hx:  Pt feels well  Pt denies CP/SOB/palpitations/abdominal pain/N/V.   Lytes stable this AM   BP controlled   S/p HD no issues with vasc cath     Past Medical History:   Diagnosis Date    Anemia 01/09/2012    Anxiety and depression     Arthritis     bulging disc    BPH (benign prostatic hypertrophy) 05/16/2010    Diverticulosis 05/16/2010    Eczema

## 2025-03-30 NOTE — DISCHARGE INSTR - COC
Continuity of Care Form    Patient Name: Fawad Glynn   :  1935  MRN:  [id number]    Admit date:  3/28/2025  Discharge date:  2025    Code Status Order: Full Code   Advance Directives:     Admitting Physician:  Guillermo Partida MD  PCP: Andreea Delgado MD    Discharging Nurse: [facility] Unit/Room#: 6326/6326-01  Discharging Unit Phone Number: [phone]    Emergency Contact:   Extended Emergency Contact Information  Primary Emergency Contact: Nereyda Armando  Home Phone: [phone]  Work Phone: [phone]  Mobile Phone: [phone]  Relation: Child  Secondary Emergency Contact: karon glynn  Home Phone: [phone]  Mobile Phone: [phone]  Relation: Spouse    Past Surgical History:  Past Surgical History:   Procedure Laterality Date    CHEST WALL RESECTION N/A 2020    EXCISION CHEST LESION (3 x 2.8cm) AND RIGHT UPPER ABDOMEN LESION (3 x .1), COMPLEX CLOSURE 1.9CM;  ADJACENT TISSUE TRANSFER; performed by Milady Andre MD at [facility] OR    CHOLECYSTECTOMY, LAPAROSCOPIC  2012    Dr. Le [facility]    COLONOSCOPY  2011    Dr. Cornell Headley    COLONOSCOPY  2011    COLONOSCOPY N/A 10/1/2019    COLONOSCOPY DIAGNOSTIC performed by Zaid Headley MD at [facility] ENDOSCOPY    COLONOSCOPY N/A 2024    COLONOSCOPY DIAGNOSTIC performed by Qamar Maddox MD at [facility] ENDOSCOPY    COLONOSCOPY N/A 2024    COLONOSCOPY DIAGNOSTIC performed by Pablo Stanford MD at [facility] ENDOSCOPY    ENDOSCOPY, COLON, DIAGNOSTIC      HERNIA REPAIR Left 2021    LAPAROSCOPIC LEFT INGUINAL HERNIA REPAIR performed by Riky Cifuentes MD at [facility] OR    HIP SURGERY Left 2015    Dr. Avtar Powerso - incision and drainage of left hip infected hematoma     IR TUNNELED CVC PLACE WO SQ PORT/PUMP > 5 YEARS  2024    IR TUNNELED CATHETER PLACEMENT GREATER THAN 5 YEARS 2024 [facility] SPECIAL PROCEDURES    OTHER SURGICAL HISTORY

## 2025-03-30 NOTE — PROGRESS NOTES
Pt is A&Ox4 and discharging back to facility. Pt's Iv and telemetry removed. Pt and his family's questions and concerns addressed with RN. Pt left with all personal belongings and discharge instructions. Pt transported to facility by EMS. Report called to [place].

## 2025-03-30 NOTE — DISCHARGE INSTRUCTIONS
Dear Fawad Glynn    You were admitted for management of your clogged dialysis port and it now is open and works.     Schedule a follow up with your primary care doctor to discuss your hospital stay  Continue regular dialysis sessions    It was a pleasure taking care of you.   If you develop, chest pain, shortness of breath please go to the nearest emergency department

## 2025-03-30 NOTE — DISCHARGE SUMMARY
INTERNAL MEDICINE DEPARTMENT  DISCHARGE SUMMARY    Patient ID: Fawad Glynn                                             Discharge Date: 3/30/2025   Patient's PCP: Andreea Delgado MD                                          Discharge Physician: Vashti Bojorquez DO   Admit Date: 3/28/2025   Admitting Physician: Guillermo Partida MD    PROBLEMS DURING HOSPITALIZATION:  Present on Admission:   Hemodialysis catheter dysfunction, initial encounter   ESRD on hemodialysis (HCC)   Problem with vascular access   Renovascular hypertension   Chronic kidney disease-mineral and bone disorder (CKD-MBD)      DISCHARGE DIAGNOSES:  Hemodialysis catheter dysfunction   ESRD on HD MWF    Hospital Course:    Mr. Fawad Glynn is a 89 y.o. male with a MHx significant for, ESRD on HD MWF, Hypertension,  T2DM, complex partial seizure disorder, paranoid schizophrenia who presented to the ED on 3/28/25 with clogged dialysis port. Patient went to dialysis on 3/28 and the dialysis team was unable to access his catheter with no blood return. He was sent to the ED for further evaluation. In the ED he received cathflo. The next day patient had successful dialysis. He remained hemodynamically stable with labs at baseline. No concerns for bleeding or infection.     On the basis of discharge, patient reported feeling stable and in NAD. Vitals WNL.  Further, the patient expressed appropriate understanding of, and agreement with, the discharge plans to go back to SNF and have normal dialysis sessions MWF.         Physical Exam:  Vitals: /75   Pulse 85   Temp 98.2 °F (36.8 °C) (Axillary)   Resp 16   Ht 1.93 m (6' 4\")   Wt 62.7 kg (138 lb 3.7 oz)   SpO2 95%   BMI 16.83 kg/m²   I/O : No intake or output data in the 24 hours ending 03/30/25 1045    General appearance: Frail, elderly, but alert, cooperative  HEENT: Head: Normocephalic, no lesions, without obvious abnormality.  Lungs: clear to auscultation bilaterally  Heart: regular rate and

## 2025-03-30 NOTE — PLAN OF CARE
Problem: Chronic Conditions and Co-morbidities  Goal: Patient's chronic conditions and co-morbidity symptoms are monitored and maintained or improved  3/30/2025 1049 by Winnie Saldana RN  Outcome: Adequate for Discharge     Problem: Discharge Planning  Goal: Discharge to home or other facility with appropriate resources  3/30/2025 1049 by Winnie Saldana RN  Outcome: Adequate for Discharge     Problem: Skin/Tissue Integrity  Goal: Skin integrity remains intact  Description: 1.  Monitor for areas of redness and/or skin breakdown  2.  Assess vascular access sites hourly  3.  Every 4-6 hours minimum:  Change oxygen saturation probe site  4.  Every 4-6 hours:  If on nasal continuous positive airway pressure, respiratory therapy assess nares and determine need for appliance change or resting period  3/30/2025 1049 by Winnie Saldana RN  Outcome: Adequate for Discharge     Problem: ABCDS Injury Assessment  Goal: Absence of physical injury  3/30/2025 1049 by Winnie Saldana RN  Outcome: Adequate for Discharge     Problem: Safety - Adult  Goal: Free from fall injury  3/30/2025 1049 by Winnie Saldana RN  Outcome: Adequate for Discharge

## 2025-06-04 ENCOUNTER — HOSPITAL ENCOUNTER (EMERGENCY)
Age: 89
Discharge: NURSING, REHAB OR LONG-TERM CARE | End: 2025-06-04
Attending: STUDENT IN AN ORGANIZED HEALTH CARE EDUCATION/TRAINING PROGRAM
Payer: COMMERCIAL

## 2025-06-04 ENCOUNTER — APPOINTMENT (OUTPATIENT)
Dept: GENERAL RADIOLOGY | Age: 89
End: 2025-06-04
Payer: COMMERCIAL

## 2025-06-04 VITALS
DIASTOLIC BLOOD PRESSURE: 62 MMHG | OXYGEN SATURATION: 100 % | SYSTOLIC BLOOD PRESSURE: 106 MMHG | TEMPERATURE: 98.6 F | RESPIRATION RATE: 18 BRPM | HEART RATE: 89 BPM | HEIGHT: 76 IN | WEIGHT: 154.9 LBS | BODY MASS INDEX: 18.86 KG/M2

## 2025-06-04 DIAGNOSIS — R55 SYNCOPE AND COLLAPSE: Primary | ICD-10-CM

## 2025-06-04 LAB
ANION GAP SERPL CALCULATED.3IONS-SCNC: 12 MMOL/L (ref 3–16)
BASOPHILS # BLD: 0 K/UL (ref 0–0.2)
BASOPHILS NFR BLD: 0.4 %
BUN SERPL-MCNC: 34 MG/DL (ref 7–20)
CALCIUM SERPL-MCNC: 8.8 MG/DL (ref 8.3–10.6)
CHLORIDE SERPL-SCNC: 99 MMOL/L (ref 99–110)
CO2 SERPL-SCNC: 23 MMOL/L (ref 21–32)
CREAT SERPL-MCNC: 5.4 MG/DL (ref 0.8–1.3)
DEPRECATED RDW RBC AUTO: 15.3 % (ref 12.4–15.4)
EKG ATRIAL RATE: 80 BPM
EKG DIAGNOSIS: NORMAL
EKG P AXIS: 48 DEGREES
EKG P-R INTERVAL: 226 MS
EKG Q-T INTERVAL: 364 MS
EKG QRS DURATION: 90 MS
EKG QTC CALCULATION (BAZETT): 419 MS
EKG R AXIS: 21 DEGREES
EKG T AXIS: 82 DEGREES
EKG VENTRICULAR RATE: 80 BPM
EOSINOPHIL # BLD: 0.3 K/UL (ref 0–0.6)
EOSINOPHIL NFR BLD: 3.2 %
GFR SERPLBLD CREATININE-BSD FMLA CKD-EPI: 10 ML/MIN/{1.73_M2}
GLUCOSE SERPL-MCNC: 103 MG/DL (ref 70–99)
HCT VFR BLD AUTO: 30.2 % (ref 40.5–52.5)
HGB BLD-MCNC: 10.1 G/DL (ref 13.5–17.5)
LYMPHOCYTES # BLD: 1 K/UL (ref 1–5.1)
LYMPHOCYTES NFR BLD: 13 %
MCH RBC QN AUTO: 27.4 PG (ref 26–34)
MCHC RBC AUTO-ENTMCNC: 33.4 G/DL (ref 31–36)
MCV RBC AUTO: 82.2 FL (ref 80–100)
MONOCYTES # BLD: 1 K/UL (ref 0–1.3)
MONOCYTES NFR BLD: 12.3 %
NEUTROPHILS # BLD: 5.5 K/UL (ref 1.7–7.7)
NEUTROPHILS NFR BLD: 71.1 %
NT-PROBNP SERPL-MCNC: 185 PG/ML (ref 0–449)
PLATELET # BLD AUTO: 188 K/UL (ref 135–450)
PMV BLD AUTO: 8.9 FL (ref 5–10.5)
POTASSIUM SERPL-SCNC: 3.8 MMOL/L (ref 3.5–5.1)
RBC # BLD AUTO: 3.68 M/UL (ref 4.2–5.9)
SODIUM SERPL-SCNC: 134 MMOL/L (ref 136–145)
TROPONIN, HIGH SENSITIVITY: 51 NG/L (ref 0–22)
TROPONIN, HIGH SENSITIVITY: 55 NG/L (ref 0–22)
WBC # BLD AUTO: 7.8 K/UL (ref 4–11)

## 2025-06-04 PROCEDURE — 80048 BASIC METABOLIC PNL TOTAL CA: CPT

## 2025-06-04 PROCEDURE — 83880 ASSAY OF NATRIURETIC PEPTIDE: CPT

## 2025-06-04 PROCEDURE — 93005 ELECTROCARDIOGRAM TRACING: CPT | Performed by: PHYSICIAN ASSISTANT

## 2025-06-04 PROCEDURE — 84484 ASSAY OF TROPONIN QUANT: CPT

## 2025-06-04 PROCEDURE — 71045 X-RAY EXAM CHEST 1 VIEW: CPT

## 2025-06-04 PROCEDURE — 85025 COMPLETE CBC W/AUTO DIFF WBC: CPT

## 2025-06-04 PROCEDURE — 99285 EMERGENCY DEPT VISIT HI MDM: CPT

## 2025-06-04 ASSESSMENT — ENCOUNTER SYMPTOMS
ABDOMINAL PAIN: 0
SHORTNESS OF BREATH: 0
EYE REDNESS: 0
NAUSEA: 0
VOMITING: 0

## 2025-06-04 ASSESSMENT — PAIN - FUNCTIONAL ASSESSMENT: PAIN_FUNCTIONAL_ASSESSMENT: NONE - DENIES PAIN

## 2025-06-04 NOTE — DISCHARGE INSTRUCTIONS
Follow-up with primary care for repeat blood work to check your blood count.  Return to the emergency department with new or worsening symptoms, including recurrent fainting episodes, dizziness, chest pain, black or stools.

## 2025-06-04 NOTE — ED PROVIDER NOTES
THE [facility]  EMERGENCY DEPARTMENT ENCOUNTER          PHYSICIAN ASSISTANT NOTE       Date of evaluation: 6/4/2025    Chief Complaint     Loss of Consciousness (Pt presents to the ED d/t syncopal episode while at dialysis. EMS reports pt did not complete HD treatment. Pt is alert and oriented upon arrival to ED.)      History of Present Illness     Fawad Glynn is a 89 y.o. male with PMH of HTN, HLD, T2DM, ESRD on HD MWF who presents via EMS for evaluation of syncope during dialysis. Patient got through about 1.5 hrs of his dialysis today when he became unresponsive, BP bottomed out. Reported LOC for a few minutes. At time of arrival, patient reports that he feels fine and denies any symptoms. He is asking for oatmeal. He states that he typically eats oatmeal before dialysis but his nursing home was out today. He denies having eaten or drank anything today.  No headache, chest pain, shortness of breath, n/v, other complaints.    ASSESSMENT / PLAN  (MEDICAL DECISION MAKING)     INITIAL VITALS: BP: 115/69, Temp: 98.6 °F (37 °C), Pulse: 70, Respirations: 18, SpO2: 99 %    Fawad Glynn is a 89 y.o. male with PMH of HTN, HLD, T2DM, ESRD on HD MWF who presents via EMS for evaluation of syncope during dialysis. Patient was hypotensive, had LOC for a few minutes. No fall or head injury. Patient alert and orientedx4 with normal vital signs on arrival. He denies any symptoms. He has no focal neurologic deficit. Cardiac rhythm regular. Lungs clear. Abdomen soft non-tender.    EKG NSR with PACs, 1st degree AV block, no acute ST changes.  Labs obtained with wbc 7.8, hgb 10.1 (down from 13.9 two months ago), creatinine 5.4 (ESRD), K 3.8, bicarb 23, hsT 55-> 51. Pro-.  CXR without acute findings.  Suspect that patient's syncopal episode was secondary to dialysis related hypotension. Patient entirely asymptomatic now. Patient's hgb is noted to have dropped since his last cbc 2 months ago. However, he has a hx

## 2025-06-04 NOTE — ED PROVIDER NOTES
ED Attending Attestation Note     Date of evaluation: 6/4/2025    This patient was seen by the advance practice provider.  I have seen and examined the patient, agree with the workup, evaluation, management and diagnosis. The care plan has been discussed.  I have reviewed the ECG and concur with the EZ's interpretation.  My assessment reveals a nontoxic-appearing 89-year-old male who presents after syncopal episode during dialysis.  Patient reportedly syncopized while sitting in the chair while obtaining dialysis, did not complete his treatment.  Patient is awake, alert, and reports no complaints.  Patient reports that this is occurred in the past during dialysis treatments.  He denies chest pain, shortness of breath, dark stools, abdominal pain, lightheadedness or dizziness.  Patient reports that he did not eat this morning which is atypical for him, that he normally eats 3 bowls of oatmeal per day.  On examination, he has clear lungs auscultation bilaterally.  He has a mild systolic murmur.  Abdomen soft and nontender to palpation.  No signs of extremity edema.  Laboratory workup overall reassuring with elevated but stable troponins, likely secondary to his ESRD.  His electrolytes demonstrated elevated creatinine and BUN consistent with his ESRD.  Hemoglobin 10, which is decreased from last value observed 3 months ago at 13.  Per chart review, his hemoglobin is typically 8-10, and he reports no clinical signs of bleeding, low concern for symptomatic bleeding causing syncopal episode today.  Will discharge back to his facility with close PCP follow-up.     Spike Antonio MD  06/04/25 5461

## 2025-07-29 ENCOUNTER — APPOINTMENT (OUTPATIENT)
Dept: CT IMAGING | Age: 89
End: 2025-07-29
Payer: COMMERCIAL

## 2025-07-29 ENCOUNTER — HOSPITAL ENCOUNTER (EMERGENCY)
Age: 89
Discharge: HOME | End: 2025-07-30
Attending: EMERGENCY MEDICINE
Payer: COMMERCIAL

## 2025-07-29 VITALS
DIASTOLIC BLOOD PRESSURE: 82 MMHG | HEIGHT: 76 IN | TEMPERATURE: 97.8 F | OXYGEN SATURATION: 100 % | SYSTOLIC BLOOD PRESSURE: 137 MMHG | WEIGHT: 154.6 LBS | HEART RATE: 87 BPM | BODY MASS INDEX: 18.83 KG/M2 | RESPIRATION RATE: 15 BRPM

## 2025-07-29 DIAGNOSIS — K21.9 GASTROESOPHAGEAL REFLUX DISEASE, UNSPECIFIED WHETHER ESOPHAGITIS PRESENT: ICD-10-CM

## 2025-07-29 DIAGNOSIS — R10.84 GENERALIZED ABDOMINAL PAIN: Primary | ICD-10-CM

## 2025-07-29 LAB
ALBUMIN SERPL-MCNC: 4.2 G/DL (ref 3.4–5)
ALBUMIN/GLOB SERPL: 1.2 {RATIO} (ref 1.1–2.2)
ALP SERPL-CCNC: 126 U/L (ref 40–129)
ALT SERPL-CCNC: 8 U/L (ref 10–40)
ALT SERPL-CCNC: ABNORMAL U/L (ref 10–40)
ANION GAP SERPL CALCULATED.3IONS-SCNC: 14 MMOL/L (ref 3–16)
AST SERPL-CCNC: 34 U/L (ref 15–37)
BASOPHILS # BLD: 0 K/UL (ref 0–0.2)
BASOPHILS NFR BLD: 0.6 %
BILIRUB SERPL-MCNC: 0.3 MG/DL (ref 0–1)
BUN SERPL-MCNC: 41 MG/DL (ref 7–20)
CALCIUM SERPL-MCNC: 8.9 MG/DL (ref 8.3–10.6)
CHLORIDE SERPL-SCNC: 96 MMOL/L (ref 99–110)
CO2 SERPL-SCNC: 23 MMOL/L (ref 21–32)
CREAT SERPL-MCNC: 6.4 MG/DL (ref 0.8–1.3)
DEPRECATED RDW RBC AUTO: 14.5 % (ref 12.4–15.4)
EOSINOPHIL # BLD: 0.2 K/UL (ref 0–0.6)
EOSINOPHIL NFR BLD: 4.3 %
GFR SERPLBLD CREATININE-BSD FMLA CKD-EPI: 8 ML/MIN/{1.73_M2}
GLUCOSE SERPL-MCNC: 122 MG/DL (ref 70–99)
HCT VFR BLD AUTO: 31.6 % (ref 40.5–52.5)
HGB BLD-MCNC: 10.6 G/DL (ref 13.5–17.5)
LIPASE SERPL-CCNC: 96 U/L (ref 13–60)
LYMPHOCYTES # BLD: 1.1 K/UL (ref 1–5.1)
LYMPHOCYTES NFR BLD: 19.2 %
MCH RBC QN AUTO: 28.8 PG (ref 26–34)
MCHC RBC AUTO-ENTMCNC: 33.4 G/DL (ref 31–36)
MCV RBC AUTO: 86.3 FL (ref 80–100)
MONOCYTES # BLD: 0.9 K/UL (ref 0–1.3)
MONOCYTES NFR BLD: 15.5 %
NEUTROPHILS # BLD: 3.5 K/UL (ref 1.7–7.7)
NEUTROPHILS NFR BLD: 60.4 %
PLATELET # BLD AUTO: 224 K/UL (ref 135–450)
PMV BLD AUTO: 8.4 FL (ref 5–10.5)
POTASSIUM SERPL-SCNC: 4 MMOL/L (ref 3.5–5.1)
POTASSIUM SERPL-SCNC: ABNORMAL MMOL/L (ref 3.5–5.1)
PROT SERPL-MCNC: 7.6 G/DL (ref 6.4–8.2)
RBC # BLD AUTO: 3.66 M/UL (ref 4.2–5.9)
SODIUM SERPL-SCNC: 133 MMOL/L (ref 136–145)
WBC # BLD AUTO: 5.8 K/UL (ref 4–11)

## 2025-07-29 PROCEDURE — 6370000000 HC RX 637 (ALT 250 FOR IP)

## 2025-07-29 PROCEDURE — 2500000003 HC RX 250 WO HCPCS

## 2025-07-29 PROCEDURE — 99284 EMERGENCY DEPT VISIT MOD MDM: CPT

## 2025-07-29 PROCEDURE — 74176 CT ABD & PELVIS W/O CONTRAST: CPT

## 2025-07-29 PROCEDURE — 83690 ASSAY OF LIPASE: CPT

## 2025-07-29 PROCEDURE — 96374 THER/PROPH/DIAG INJ IV PUSH: CPT

## 2025-07-29 PROCEDURE — 85025 COMPLETE CBC W/AUTO DIFF WBC: CPT

## 2025-07-29 PROCEDURE — 96375 TX/PRO/DX INJ NEW DRUG ADDON: CPT

## 2025-07-29 PROCEDURE — 80053 COMPREHEN METABOLIC PANEL: CPT

## 2025-07-29 PROCEDURE — 6360000002 HC RX W HCPCS

## 2025-07-29 RX ORDER — ONDANSETRON 2 MG/ML
4 INJECTION INTRAMUSCULAR; INTRAVENOUS ONCE
Status: COMPLETED | OUTPATIENT
Start: 2025-07-29 | End: 2025-07-29

## 2025-07-29 RX ORDER — ONDANSETRON 4 MG/1
4 TABLET, ORALLY DISINTEGRATING ORAL 3 TIMES DAILY PRN
Qty: 21 TABLET | Refills: 0 | Status: SHIPPED | OUTPATIENT
Start: 2025-07-29

## 2025-07-29 RX ORDER — MAGNESIUM HYDROXIDE/ALUMINUM HYDROXICE/SIMETHICONE 120; 1200; 1200 MG/30ML; MG/30ML; MG/30ML
30 SUSPENSION ORAL ONCE
Status: COMPLETED | OUTPATIENT
Start: 2025-07-29 | End: 2025-07-29

## 2025-07-29 RX ADMIN — ALUMINUM HYDROXIDE, MAGNESIUM HYDROXIDE, AND SIMETHICONE 30 ML: 200; 200; 20 SUSPENSION ORAL at 20:14

## 2025-07-29 RX ADMIN — FAMOTIDINE 20 MG: 10 INJECTION, SOLUTION INTRAVENOUS at 20:32

## 2025-07-29 RX ADMIN — ALUMINUM HYDROXIDE, MAGNESIUM HYDROXIDE, AND SIMETHICONE 30 ML: 1200; 120; 1200 SUSPENSION ORAL at 22:20

## 2025-07-29 RX ADMIN — ONDANSETRON 4 MG: 2 INJECTION, SOLUTION INTRAMUSCULAR; INTRAVENOUS at 20:36

## 2025-07-29 ASSESSMENT — PAIN SCALES - GENERAL: PAINLEVEL_OUTOF10: 0

## 2025-07-29 NOTE — ED PROVIDER NOTES
THE [facility]  EMERGENCY DEPARTMENT ENCOUNTER          PHYSICIAN ASSISTANT NOTE       Date of evaluation: 7/29/2025    Chief Complaint     Vomiting (Pt has been vomiting today. PT had BBQ today. PT does not tolerate spicy well. PT was given 4 mg of Zofran at the facility and has felt better. No issues w/ dialysis yesterday. )      History of Present Illness     Fawad Glynn is a 89 y.o. male with past medical history of hypertension, hyperlipidemia, type 1 diabetes, end-stage renal disease (MWF last dialysis yesterday), GERD presenting with vomiting.  Patient had a barbecue earlier today and started to throw up.  Patient had 4 mg of Zofran at his nursing facility which was not beneficial and was sent here.  Patient denies any pain, shortness of breath,, fever.    ASSESSMENT / PLAN  (MEDICAL DECISION MAKING)     INITIAL VITALS: BP: 137/78, Temp: 97.8 °F (36.6 °C), Pulse: 83, Respirations: 18, SpO2: 100 %    Fawad Glynn is a 89 y.o. male presenting with vomiting.  On presentation patient is chronically ill-appearing and in no acute distress.  Patient is afebrile and hemodynamically stable.  On exam patient has significant tenderness palpation of his abdomen.  Given patient presentation workup was initiated which consisted of CBC, BMP, lipase, LFTs and a CT abdomen and pelvis.  Patient was provided Pepcid, Zofran and Mylanta for symptom management.  Initially patient threw up Mylanta but after Zofran was given he was able to keep it down.  Workup demonstrated no significant leukocytosis and a hemoglobin of 10.6 consistent with patient's previous.  Patient was hyponatremic at 133 and hypochloremic at 96.  Patient's creatinine and BUN consistent with previous.  Lipase 96.  LFTs within normal range.  CT abdomen pelvis showed no acute abdominal process.  Patient stated to feel better after medications.  Patient passed p.o. challenge with liquids and crackers.  Patient is safe for discharge at this time.  We

## 2025-07-30 ASSESSMENT — PAIN - FUNCTIONAL ASSESSMENT: PAIN_FUNCTIONAL_ASSESSMENT: 0-10

## 2025-07-30 ASSESSMENT — PAIN SCALES - GENERAL: PAINLEVEL_OUTOF10: 0

## 2025-07-30 NOTE — ED NOTES
Patient prepared for and ready to be discharged. Dressed in clothes and given belongings.  IV removed, pt tolerated well, no complications.  Patient discharged at this time in no acute distress after pt verbalized understanding of discharge instructions.   Reviewed medications, and when to return to the ED with patient. Encouraged follow up with PCP  ABDULKADIR to take patient back to nursing home.      Escuadra, Marylee, RN  07/30/25 0044

## 2025-07-30 NOTE — DISCHARGE INSTRUCTIONS
Avoid spicy foods.  Take Zofran under tongue for nausea.  Apply diclofenac gel to areas of pain.  Recommend returning to ED with worsening symptoms, chest pain, shortness of breath, fever

## 2025-07-30 NOTE — ED PROVIDER NOTES
ED Attending Attestation Note     Date of evaluation: 7/29/2025    This patient was seen by the advance practice provider.  I have seen and examined the patient, agree with the workup, evaluation, management and diagnosis. The care plan has been discussed.        The patient's medical decision making is as follows    Medical Decision Making       Fawad Glynn is a 89 y.o. male who presents to the Emergency Department with concerns for nausea vomiting and abdominal pain after eating some spicy food prior to arrival.  Ancillary history obtained from family member notes that he has been feeling unwell for several days.  Patient does have a history of end-stage renal disease on dialysis next dialysis session will be tomorrow.  The patient on my examination was having some diffuse abdominal tenderness to palpation without significant peritoneal signs.  He had laboratory studies done to evaluate for the possibility of pancreatitis, colitis, small bowel obstruction.  The patient CBC has a white count of 5.8 hemoglobin shows a chronic stable anemia his lipase mildly elevated at 96 his CMP was hemolyzed a BUN of 41 and a creatinine of 6.4 repeat potassium is currently pending.  Also is pending a CT scan of his abdomen pelvis without contrast to further evaluate his symptoms.        Medical Decision Making  Problems Addressed:  Generalized abdominal pain: acute illness or injury    Amount and/or Complexity of Data Reviewed  Labs: ordered.  Radiology: ordered.    Risk  OTC drugs.  Prescription drug management.             Rey Gonzalez MD  07/29/25 2044

## (undated) DEVICE — COVER LT HNDL BLU PLAS

## (undated) DEVICE — GOWN,SIRUS,POLYRNF,BRTHSLV,LG,30/CS: Brand: MEDLINE

## (undated) DEVICE — SET VLV 3 PC AWS DISPOSABLE GRDIAN SCOPEVALET

## (undated) DEVICE — CANNULA SAMP CO2 AD GRN 7FT CO2 AND 7FT O2 TBNG UNIV CONN

## (undated) DEVICE — SOLUTION IV IRRIG POUR BRL 0.9% SODIUM CHL 2F7124

## (undated) DEVICE — PLATE ES AD W 9FT CRD 2

## (undated) DEVICE — CORD ES L10FT MPLR LAP

## (undated) DEVICE — MERCY HEALTH WEST TURNOVER: Brand: MEDLINE INDUSTRIES, INC.

## (undated) DEVICE — E-Z CLEAN, NON-STICK, PTFE COATED, ELECTROSURGICAL BLADE ELECTRODE, MODIFIED EXTENDED INSULATION, 2.5 INCH (6.35 CM): Brand: MEGADYNE

## (undated) DEVICE — DRAPE,SPLIT ,77X120: Brand: MEDLINE

## (undated) DEVICE — MOUTHPIECE ENDOSCP L CTRL OPN AND SIDE PORTS DISP

## (undated) DEVICE — ADHESIVE SKIN CLSR 0.7ML TOP DERMBND ADV

## (undated) DEVICE — DRAPE,LAP,CHOLE,W/TROUGHS,STERILE: Brand: MEDLINE

## (undated) DEVICE — SOLUTION IV IRRIG WATER 500ML POUR BRL ST 2F7113

## (undated) DEVICE — SUTURE VCRL SZ 0 L27IN ABSRB UD L26MM CT-2 1/2 CIR J270H

## (undated) DEVICE — NEEDLE 25GAX5.0MM INJ CARR LOCKE

## (undated) DEVICE — NEEDLE HYPO 25GA L0.625IN BLU POLYPR HUB S STL REG BVL STR

## (undated) DEVICE — SUTURE MCRYL SZ 4-0 L18IN ABSRB UD L19MM PS-2 3/8 CIR PRIM Y496G

## (undated) DEVICE — SPONGE LAP W18XL18IN WHT COT 4 PLY FLD STRUNG RADPQ DISP ST

## (undated) DEVICE — PROCEDURE KIT ENDOSCP CUST

## (undated) DEVICE — INTENDED FOR TISSUE SEPARATION, AND OTHER PROCEDURES THAT REQUIRE A SHARP SURGICAL BLADE TO PUNCTURE OR CUT.: Brand: BARD-PARKER ® STAINLESS STEEL BLADES

## (undated) DEVICE — Z DISCONTINUED BY MEDLINE USE 2711682 TRAY SKIN PREP DRY W/ PREM GLV

## (undated) DEVICE — JEWISH HOSPITAL TURNOVER KIT: Brand: MEDLINE INDUSTRIES, INC.

## (undated) DEVICE — SURGICAL SET UP - SURE SET: Brand: MEDLINE INDUSTRIES, INC.

## (undated) DEVICE — [HIGH FLOW INSUFFLATOR,  DO NOT USE IF PACKAGE IS DAMAGED,  KEEP DRY,  KEEP AWAY FROM SUNLIGHT,  PROTECT FROM HEAT AND RADIOACTIVE SOURCES.]: Brand: PNEUMOSURE

## (undated) DEVICE — MAJOR SET UP PK

## (undated) DEVICE — STANDARD HYPODERMIC NEEDLE,POLYPROPYLENE HUB: Brand: MONOJECT

## (undated) DEVICE — BW-412T DISP COMBO CLEANING BRUSH: Brand: SINGLE USE COMBINATION CLEANING BRUSH

## (undated) DEVICE — TROCARS: Brand: KII® BALLOON BLUNT TIP SYSTEM

## (undated) DEVICE — KIT,ANTI FOG,W/SPONGE & FLUID,SOFT PACK: Brand: MEDLINE

## (undated) DEVICE — SUTURE PROL SZ 5-0 L18IN NONABSORBABLE BLU L13MM P-3 3/8 8698G

## (undated) DEVICE — PAD,NON-ADHERENT,3X8,STERILE,LF,1/PK: Brand: MEDLINE

## (undated) DEVICE — SYRINGE,EAR/ULCER, 2 OZ, STERILE: Brand: MEDLINE

## (undated) DEVICE — SURE SET-DOUBLE BASIN-LF: Brand: MEDLINE INDUSTRIES, INC.

## (undated) DEVICE — GLOVE ORANGE PI 7 1/2   MSG9075

## (undated) DEVICE — STRIP,CLOSURE,WOUND,MEDI-STRIP,1/2X4: Brand: MEDLINE

## (undated) DEVICE — ELECTRODE PT RET AD L9FT HI MOIST COND ADH HYDRGEL CORDED

## (undated) DEVICE — GLOVE SURG SZ 7 L12IN FNGR THK75MIL WHT LTX POLYMER BEAD

## (undated) DEVICE — DILATOR ENDOSCP L180CM DIA6FR BLLN L8CM DIA54-60FR

## (undated) DEVICE — SUTURE VCRL SZ 4-0 L18IN ABSRB UD L13MM P-3 3/8 CIR PRIM J494H

## (undated) DEVICE — GARMENT COMPR STD FOR 17IN CALF UNIF THER FLOTRN

## (undated) DEVICE — TOWEL,OR,DSP,ST,BLUE,STD,4/PK,20PK/CS: Brand: MEDLINE

## (undated) DEVICE — TROCAR: Brand: KII SHIELDED BLADED ACCESS SYSTEM

## (undated) DEVICE — SYRINGE MED 10ML LUERLOCK TIP W/O SFTY DISP

## (undated) DEVICE — GARMENT,MEDLINE,DVT,INT,CALF,MED, GEN2: Brand: MEDLINE

## (undated) DEVICE — SYRINGE INFL 60ML DISP ALLIANCE II

## (undated) DEVICE — APPLICATOR PREP 26ML 0.7% IOD POVACRYLEX 74% ISO ALC ST